# Patient Record
Sex: FEMALE | Race: WHITE | NOT HISPANIC OR LATINO
[De-identification: names, ages, dates, MRNs, and addresses within clinical notes are randomized per-mention and may not be internally consistent; named-entity substitution may affect disease eponyms.]

---

## 2018-01-12 ENCOUNTER — TRANSCRIPTION ENCOUNTER (OUTPATIENT)
Age: 78
End: 2018-01-12

## 2019-04-18 ENCOUNTER — INPATIENT (INPATIENT)
Facility: HOSPITAL | Age: 79
LOS: 5 days | Discharge: HOME | DRG: 871 | End: 2019-04-24
Attending: HOSPITALIST | Admitting: HOSPITALIST
Payer: COMMERCIAL

## 2019-04-18 VITALS — WEIGHT: 145.06 LBS | HEIGHT: 61 IN

## 2019-04-18 DIAGNOSIS — E87.6 HYPOKALEMIA: ICD-10-CM

## 2019-04-18 LAB
ALBUMIN SERPL ELPH-MCNC: 3.5 G/DL — SIGNIFICANT CHANGE UP (ref 3.3–5.2)
ALP SERPL-CCNC: 72 U/L — SIGNIFICANT CHANGE UP (ref 40–120)
ALT FLD-CCNC: 21 U/L — SIGNIFICANT CHANGE UP
ANION GAP SERPL CALC-SCNC: 15 MMOL/L — SIGNIFICANT CHANGE UP (ref 5–17)
APPEARANCE UR: CLEAR — SIGNIFICANT CHANGE UP
AST SERPL-CCNC: 16 U/L — SIGNIFICANT CHANGE UP
BACTERIA # UR AUTO: ABNORMAL
BASOPHILS # BLD AUTO: 0 K/UL — SIGNIFICANT CHANGE UP (ref 0–0.2)
BASOPHILS NFR BLD AUTO: 0.2 % — SIGNIFICANT CHANGE UP (ref 0–2)
BILIRUB SERPL-MCNC: 0.6 MG/DL — SIGNIFICANT CHANGE UP (ref 0.4–2)
BILIRUB UR-MCNC: NEGATIVE — SIGNIFICANT CHANGE UP
BUN SERPL-MCNC: 34 MG/DL — HIGH (ref 8–20)
CALCIUM SERPL-MCNC: 9.3 MG/DL — SIGNIFICANT CHANGE UP (ref 8.6–10.2)
CHLORIDE SERPL-SCNC: 96 MMOL/L — LOW (ref 98–107)
CO2 SERPL-SCNC: 28 MMOL/L — SIGNIFICANT CHANGE UP (ref 22–29)
COLOR SPEC: YELLOW — SIGNIFICANT CHANGE UP
CREAT SERPL-MCNC: 1.18 MG/DL — SIGNIFICANT CHANGE UP (ref 0.5–1.3)
DIFF PNL FLD: ABNORMAL
EOSINOPHIL # BLD AUTO: 0.1 K/UL — SIGNIFICANT CHANGE UP (ref 0–0.5)
EOSINOPHIL NFR BLD AUTO: 0.4 % — SIGNIFICANT CHANGE UP (ref 0–6)
EPI CELLS # UR: SIGNIFICANT CHANGE UP
GLUCOSE SERPL-MCNC: 123 MG/DL — HIGH (ref 70–115)
GLUCOSE UR QL: NEGATIVE MG/DL — SIGNIFICANT CHANGE UP
HCT VFR BLD CALC: 36.6 % — LOW (ref 37–47)
HGB BLD-MCNC: 12 G/DL — SIGNIFICANT CHANGE UP (ref 12–16)
KETONES UR-MCNC: NEGATIVE — SIGNIFICANT CHANGE UP
LEUKOCYTE ESTERASE UR-ACNC: ABNORMAL
LYMPHOCYTES # BLD AUTO: 1.1 K/UL — SIGNIFICANT CHANGE UP (ref 1–4.8)
LYMPHOCYTES # BLD AUTO: 7.1 % — LOW (ref 20–55)
MCHC RBC-ENTMCNC: 29.7 PG — SIGNIFICANT CHANGE UP (ref 27–31)
MCHC RBC-ENTMCNC: 32.8 G/DL — SIGNIFICANT CHANGE UP (ref 32–36)
MCV RBC AUTO: 90.6 FL — SIGNIFICANT CHANGE UP (ref 81–99)
MONOCYTES # BLD AUTO: 1.2 K/UL — HIGH (ref 0–0.8)
MONOCYTES NFR BLD AUTO: 7.6 % — SIGNIFICANT CHANGE UP (ref 3–10)
NEUTROPHILS # BLD AUTO: 13.2 K/UL — HIGH (ref 1.8–8)
NEUTROPHILS NFR BLD AUTO: 84.4 % — HIGH (ref 37–73)
NITRITE UR-MCNC: NEGATIVE — SIGNIFICANT CHANGE UP
PH UR: 6.5 — SIGNIFICANT CHANGE UP (ref 5–8)
PLATELET # BLD AUTO: 411 K/UL — HIGH (ref 150–400)
POTASSIUM SERPL-MCNC: 2.7 MMOL/L — CRITICAL LOW (ref 3.5–5.3)
POTASSIUM SERPL-SCNC: 2.7 MMOL/L — CRITICAL LOW (ref 3.5–5.3)
PROT SERPL-MCNC: 7.4 G/DL — SIGNIFICANT CHANGE UP (ref 6.6–8.7)
PROT UR-MCNC: 30 MG/DL
RBC # BLD: 4.04 M/UL — LOW (ref 4.4–5.2)
RBC # FLD: 15.2 % — SIGNIFICANT CHANGE UP (ref 11–15.6)
RBC CASTS # UR COMP ASSIST: ABNORMAL /HPF (ref 0–4)
SODIUM SERPL-SCNC: 139 MMOL/L — SIGNIFICANT CHANGE UP (ref 135–145)
SP GR SPEC: 1.01 — SIGNIFICANT CHANGE UP (ref 1.01–1.02)
UROBILINOGEN FLD QL: 4 MG/DL
WBC # BLD: 15.7 K/UL — HIGH (ref 4.8–10.8)
WBC # FLD AUTO: 15.7 K/UL — HIGH (ref 4.8–10.8)
WBC UR QL: ABNORMAL

## 2019-04-18 PROCEDURE — 99223 1ST HOSP IP/OBS HIGH 75: CPT | Mod: GC

## 2019-04-18 PROCEDURE — 93010 ELECTROCARDIOGRAM REPORT: CPT

## 2019-04-18 PROCEDURE — 71045 X-RAY EXAM CHEST 1 VIEW: CPT | Mod: 26

## 2019-04-18 PROCEDURE — 99285 EMERGENCY DEPT VISIT HI MDM: CPT

## 2019-04-18 RX ORDER — ATORVASTATIN CALCIUM 80 MG/1
20 TABLET, FILM COATED ORAL AT BEDTIME
Qty: 0 | Refills: 0 | Status: DISCONTINUED | OUTPATIENT
Start: 2019-04-18 | End: 2019-04-24

## 2019-04-18 RX ORDER — AMIODARONE HYDROCHLORIDE 400 MG/1
200 TABLET ORAL DAILY
Qty: 0 | Refills: 0 | Status: DISCONTINUED | OUTPATIENT
Start: 2019-04-18 | End: 2019-04-24

## 2019-04-18 RX ORDER — POTASSIUM CHLORIDE 20 MEQ
40 PACKET (EA) ORAL ONCE
Qty: 0 | Refills: 0 | Status: COMPLETED | OUTPATIENT
Start: 2019-04-18 | End: 2019-04-18

## 2019-04-18 RX ORDER — CEFTRIAXONE 500 MG/1
1 INJECTION, POWDER, FOR SOLUTION INTRAMUSCULAR; INTRAVENOUS EVERY 24 HOURS
Qty: 0 | Refills: 0 | Status: DISCONTINUED | OUTPATIENT
Start: 2019-04-18 | End: 2019-04-22

## 2019-04-18 RX ORDER — SODIUM CHLORIDE 9 MG/ML
1000 INJECTION, SOLUTION INTRAVENOUS
Qty: 0 | Refills: 0 | Status: DISCONTINUED | OUTPATIENT
Start: 2019-04-18 | End: 2019-04-19

## 2019-04-18 RX ORDER — AMLODIPINE BESYLATE 2.5 MG/1
5 TABLET ORAL DAILY
Qty: 0 | Refills: 0 | Status: DISCONTINUED | OUTPATIENT
Start: 2019-04-18 | End: 2019-04-24

## 2019-04-18 RX ORDER — SACCHAROMYCES BOULARDII 250 MG
250 POWDER IN PACKET (EA) ORAL
Qty: 0 | Refills: 0 | Status: DISCONTINUED | OUTPATIENT
Start: 2019-04-18 | End: 2019-04-24

## 2019-04-18 RX ORDER — APIXABAN 2.5 MG/1
5 TABLET, FILM COATED ORAL EVERY 12 HOURS
Qty: 0 | Refills: 0 | Status: DISCONTINUED | OUTPATIENT
Start: 2019-04-18 | End: 2019-04-24

## 2019-04-18 RX ORDER — POTASSIUM CHLORIDE 20 MEQ
20 PACKET (EA) ORAL
Qty: 0 | Refills: 0 | Status: DISCONTINUED | OUTPATIENT
Start: 2019-04-18 | End: 2019-04-18

## 2019-04-18 RX ORDER — LEVOTHYROXINE SODIUM 125 MCG
25 TABLET ORAL DAILY
Qty: 0 | Refills: 0 | Status: DISCONTINUED | OUTPATIENT
Start: 2019-04-18 | End: 2019-04-24

## 2019-04-18 RX ORDER — POTASSIUM CHLORIDE 20 MEQ
10 PACKET (EA) ORAL
Qty: 0 | Refills: 0 | Status: COMPLETED | OUTPATIENT
Start: 2019-04-18 | End: 2019-04-19

## 2019-04-18 RX ORDER — HEPARIN SODIUM 5000 [USP'U]/ML
5000 INJECTION INTRAVENOUS; SUBCUTANEOUS EVERY 8 HOURS
Qty: 0 | Refills: 0 | Status: DISCONTINUED | OUTPATIENT
Start: 2019-04-18 | End: 2019-04-18

## 2019-04-18 RX ORDER — ACETAMINOPHEN 500 MG
650 TABLET ORAL EVERY 6 HOURS
Qty: 0 | Refills: 0 | Status: DISCONTINUED | OUTPATIENT
Start: 2019-04-18 | End: 2019-04-24

## 2019-04-18 RX ADMIN — Medication 100 MILLIEQUIVALENT(S): at 19:41

## 2019-04-18 RX ADMIN — Medication 100 MILLIEQUIVALENT(S): at 22:52

## 2019-04-18 RX ADMIN — Medication 40 MILLIEQUIVALENT(S): at 19:41

## 2019-04-18 RX ADMIN — CEFTRIAXONE 100 GRAM(S): 500 INJECTION, POWDER, FOR SOLUTION INTRAMUSCULAR; INTRAVENOUS at 22:02

## 2019-04-18 NOTE — H&P ADULT - ASSESSMENT
77 yo female, PMH of afib, on eliquis, htn, hld, hypothyroid, sent from [facility] for medical eval for abnormal lab results, found to have acute metabolic encephalopathy secondary to hypokalemia and uti     Admit to medicine  monitored bed  vitals per routine  dash/tlc diet  ambulate as tolerated     Acute Metabolic Encephalopathy 79 yo female, PMH of afib, on eliquis, htn, hld, hypothyroid, sent from [facility] for medical eval for abnormal lab results, found to have acute metabolic encephalopathy secondary to hypokalemia and uti     Admit to medicine  monitored bed  vitals per routine  dash/tlc diet  ambulate as tolerated     Acute Metabolic Encephalopathy secondary to urinary tract infection  Patient symptomatic with increased frequency for last 2 weeks.   Leukocytosis noted   s/p rocephin 1g iv x1 in ER  c/w rocephin 1g iv daily with florastor  urine cultures in lab pending    Hypokalemia  noted in labs  s/p 40meq oral repletion in ER  s/p 3 10meg kriders in ER  Will repeat in AM labs to assess trend  Mag level ordered     Atrial Fibrillation  currently rate controlled  c/w home meds  Amiodarone 200mg po daily  Eliquis 5mg po bid 79 yo female, PMH of afib, on eliquis, htn, hld, hypothyroid, sent from [facility] for medical eval for abnormal lab results, found to have acute metabolic encephalopathy secondary to hypokalemia and uti     Admit to medicine  monitored bed  vitals per routine  dash/tlc diet  ambulate as tolerated     Acute Metabolic Encephalopathy secondary to urinary tract infection  Patient symptomatic with increased frequency for last 2 weeks.   Leukocytosis noted   s/p rocephin 1g iv x1 in ER  c/w rocephin 1g iv daily with florastor  urine cultures in lab pending    Hypokalemia  noted in labs  s/p 40meq oral repletion in ER  s/p 3 10meg kriders in ER  Will repeat in AM labs to assess trend  Mag level ordered     Atrial Fibrillation  currently rate controlled  c/w home meds  Amiodarone 200mg po daily  Eliquis 5mg po bid    HTN  currently stable  take 3 agents at home  c/w norvasc 5mg po daily  holding enalapril 20mg po daily and chlorthalidone 25 mg po daily given symptoms of urinary frequency along with ckd state  will monitor bp and adjust agents as needed     CKD stage 3   unknown prior baseline  avoid nephrotoxic agents   will monitor    Hypothyroidism  c/w home meds  levothyroxine 25mcg daily    HLD  c/w home meds  atorvastatin 20mg po qhs    Preventive Measure  DVT: on eliquis    Advanced Directives: DNR/DNI MOLST completed at bedside 77 yo female, PMH of afib, on eliquis, htn, hld, hypothyroid, sent from [facility] for medical eval for abnormal lab results, found to have acute metabolic encephalopathy secondary to hypokalemia and uti     Admit to medicine  monitored bed  vitals per routine  dash/tlc diet  ambulate as tolerated     Acute Metabolic Encephalopathy secondary to urinary tract infection  Patient symptomatic with increased frequency for last 2 weeks.   Leukocytosis noted   s/p rocephin 1g iv x1 in ER  c/w rocephin 1g iv daily with florastor  urine cultures in lab pending    Hypokalemia  noted in labs  s/p 40meq oral repletion in ER  s/p 3 10meg kriders in ER  Will repeat in AM labs to assess trend  Mag level ordered     Atrial Fibrillation  currently rate controlled  c/w home meds  Amiodarone 200mg po daily  Eliquis 5mg po bid    HTN  currently stable  take 3 agents at home  c/w norvasc 5mg po daily  holding enalapril 20mg po daily and chlorthalidone 25 mg po daily given symptoms of urinary frequency along with ckd state  will monitor bp and adjust agents as needed     CKD stage 3   unknown prior baseline  avoid nephrotoxic agents   will monitor    Hypothyroidism  c/w home meds  levothyroxine 25mcg daily    HLD  c/w home meds  atorvastatin 20mg po qhs    Preventive Measure  DVT: on eliquis    Advanced Directives: FULL CODE 77 yo female, PMH of afib, on eliquis, htn, hld, hypothyroid, sent from [facility] for medical eval for abnormal lab results, found to have acute metabolic encephalopathy secondary to hypokalemia and uti     Admit to medicine  monitored bed  vitals per routine  dash/tlc diet  ambulate as tolerated     Acute Metabolic Encephalopathy secondary to urinary tract infection with sepsis  Noted for fever, leukocytosis with source  Patient symptomatic with increased frequency for last 2 weeks.   Leukocytosis noted   s/p rocephin 1g iv x1 in ER  c/w rocephin 1g iv daily with florastor  urine cultures in lab pending  lactate ordered, will order fluids if elevated  ordering blood cultures x2    Hypokalemia  noted in labs  s/p 40meq oral repletion in ER  s/p 3 10meg kriders in ER  Will repeat in AM labs to assess trend  Mag level ordered     Umbilical Hernia, reducible   No active drainage from hernia      Atrial Fibrillation  currently rate controlled  c/w home meds  Amiodarone 200mg po daily  Eliquis 5mg po bid    HTN  currently stable  take 3 agents at home  c/w norvasc 5mg po daily  holding enalapril 20mg po daily and chlorthalidone 25 mg po daily given symptoms of urinary frequency along with ckd state  will monitor bp and adjust agents as needed     CKD stage 3   unknown prior baseline  avoid nephrotoxic agents   will monitor    Hypothyroidism  c/w home meds  levothyroxine 25mcg daily    HLD  c/w home meds  atorvastatin 20mg po qhs    Preventive Measure  DVT: on eliquis    Advanced Directives: FULL CODE 79 yo female, PMH of afib, on eliquis, htn, hld, hypothyroid, sent from [facility] for medical eval for abnormal lab results, found to have acute metabolic encephalopathy secondary to hypokalemia and uti     Admit to medicine  monitored bed  vitals per routine  dash/tlc diet  ambulate as tolerated     Acute Metabolic Encephalopathy secondary to urinary tract infection with sepsis  Noted for fever, leukocytosis with source  Patient symptomatic with increased frequency for last 2 weeks.   Leukocytosis noted   s/p rocephin 1g iv x1 in ER  c/w rocephin 1g iv daily with florastor  urine cultures in lab pending  lactate ordered, will order fluids if elevated  ordering blood cultures x2    Hypokalemia  noted in labs  s/p 40meq oral repletion in ER  s/p 3 10meg kriders in ER  Will repeat in AM labs to assess trend  Mag level ordered     Cellulitis of Umbilical Hernia   No active drainage from hernia  ordering Bactroban topical tid   Will monitor for improvement and if no change, will start systemic antibiotic     Atrial Fibrillation  currently rate controlled  c/w home meds  Amiodarone 200mg po daily  Eliquis 5mg po bid    HTN  currently stable  take 3 agents at home  c/w norvasc 5mg po daily  holding enalapril 20mg po daily and chlorthalidone 25 mg po daily given symptoms of urinary frequency along with ckd state  will monitor bp and adjust agents as needed     CKD stage 3   unknown prior baseline  avoid nephrotoxic agents   will monitor    Hypothyroidism  c/w home meds  levothyroxine 25mcg daily    HLD  c/w home meds  atorvastatin 20mg po qhs    Preventive Measure  DVT: on eliquis    Advanced Directives: FULL CODE

## 2019-04-18 NOTE — H&P ADULT - HISTORY OF PRESENT ILLNESS
77 yo female, PMH of Afib, Hypothyroid, HLD, HTN presenting 77 yo female, PMH of Afib, Hypothyroid, HLD, HTN presenting to [facility] send in from 77 yo female, PMH of Afib, Hypothyroid, HLD, HTN presenting to [facility] sent in from PMD office for evaluation. HPI derived from daughter and patient at bedside.     Per daughter at beside, patient has been having increased confusion and difficulty concentrating over last 2 weeks. Patient resides with this daughter. Patient states that she has felt as if she is "lost in herself." Does not have any specific recalling events that triggered this feeling off but per daughter. 79 yo female, PMH of Afib, Hypothyroid, HLD, HTN presenting to [facility] sent in from PMD office for evaluation. HPI derived from daughter and patient at bedside.     Per daughter at beside, patient has been having increased confusion and difficulty concentrating over last 2 weeks. Patient resides with this daughter. Patient states that she has felt as if she is "lost in herself." Does not have any specific recalling events that triggered this feeling off but per daughter. Over last week, patient states that she has been having urinary frequency but without any symptoms of dysuria. Denied any fevers, chills, chest pain, productive cough. Denied any falls or trauma at home. Went to PMD who evaluated patient and noted that umbilical hernia was concerning for infection and insisted that patient present to hospital for evaluation.     In ED: urine cx drawn, noted to have leukocytosis with positive UA and hypokalemia that was repleted with oral K and 3 k riders. rocephin x1 given in ER. Patient also noted to have fever in .4, blood cultures x2 ordered to be drawn.

## 2019-04-18 NOTE — ED STATDOCS - PROGRESS NOTE DETAILS
PA NOTE: PT evaluated by intake physician. HPI/PE/ROS as noted above. Will follow up plan per intake physician. Potassium level 2.7, po K, k rider and bedside monitor, awaiting urinalysis PA NOTE: Discussed need to admit with patient & discussed risk and benefits.  Patient agreed to admission.  Discussed case w/ admitting medicine doctor - agreed to admit to their service. Pt admitted for hypokalemia and UTI and confusion.

## 2019-04-18 NOTE — ED ADULT NURSE NOTE - OBJECTIVE STATEMENT
received pt AOx4 in supertrack, c/o new onset incontinence, sent by PMD for elevated WBC and + UTI, pt also has bulding umblilical hernia which pt states is draining, appears red around the edges and painful to touch, denies fevers, chills, n/v/d, resp even unlabored, in no distress, MAEx4, neuro intact. will continue to monitor

## 2019-04-18 NOTE — H&P ADULT - NSHPSOCIALHISTORY_GEN_ALL_CORE
Denies smoking, drinking or drug use  Lives with daughter  ambulates with rolling walker  able to complete most ADLs at home

## 2019-04-18 NOTE — H&P ADULT - NSHPLABSRESULTS_GEN_ALL_CORE
12.0   15.7  )-----------( 411      ( 2019 18:37 )             36.6     -18    139  |  96<L>  |  34.0<H>  ----------------------------<  123<H>  2.7<LL>   |  28.0  |  1.18    Ca    9.3      2019 18:37    TPro  7.4  /  Alb  3.5  /  TBili  0.6  /  DBili  x   /  AST  16  /  ALT  21  /  AlkPhos  72  -      Urinalysis Basic - ( 2019 19:24 )    Color: Yellow / Appearance: Clear / S.010 / pH: x  Gluc: x / Ketone: Negative  / Bili: Negative / Urobili: 4 mg/dL   Blood: x / Protein: 30 mg/dL / Nitrite: Negative   Leuk Esterase: Moderate / RBC: 11-25 /HPF / WBC 26-50   Sq Epi: x / Non Sq Epi: Occasional / Bacteria: Occasional

## 2019-04-18 NOTE — ED ADULT NURSE NOTE - NSIMPLEMENTINTERV_GEN_ALL_ED
Implemented All Fall with Harm Risk Interventions:  [place] to call system. Call bell, personal items and telephone within reach. Instruct patient to call for assistance. Room bathroom lighting operational. Non-slip footwear when patient is off stretcher. Physically safe environment: no spills, clutter or unnecessary equipment. Stretcher in lowest position, wheels locked, appropriate side rails in place. Provide visual cue, wrist band, yellow gown, etc. Monitor gait and stability. Monitor for mental status changes and reorient to person, place, and time. Review medications for side effects contributing to fall risk. Reinforce activity limits and safety measures with patient and family. Provide visual clues: red socks.

## 2019-04-18 NOTE — ED ADULT TRIAGE NOTE - CHIEF COMPLAINT QUOTE
"The doctor says I have in infection and to come in" Pt is unsure of where infection is arrives with paper from [facility], [place], [place] and [place] lab results WBC 16.4  Pt A & Ox4.

## 2019-04-18 NOTE — H&P ADULT - NSHPPHYSICALEXAM_GEN_ALL_CORE
VS:   Vital Signs Last 24 Hrs  T(C): 38 (18 Apr 2019 22:25), Max: 38 (18 Apr 2019 22:25)  T(F): 100.4 (18 Apr 2019 22:25), Max: 100.4 (18 Apr 2019 22:25)  HR: 71 (18 Apr 2019 22:25) (71 - 78)  BP: 136/71 (18 Apr 2019 22:25) (121/71 - 136/71)  BP(mean): --  RR: 18 (18 Apr 2019 22:25) (18 - 20)  SpO2: 96% (18 Apr 2019 22:25) (96% - 97%)    Physical Exam:   Gen: elderly female, laying in ER stretcher, NAD  HEENT: NCAT, EOMI, PERRLA, Pupils 6mm to 4mm. moist oral mucosa  CVS: RRR, +S1/S2, no murmurs, rubs or gallops appreciated  Pulm: CTAB, no wheeze, rales, rhonchi  GI: +BS, obese, soft, nondistended, mildly tender umbilical hernia, reducible, with mild erythema and warmth on left lateral border of hernia. No active drainage   Ext: No cyanosis, edema or calf tenderness. 2+ radial and dp pulses b/l.   Neuro: AAOx 2 to self and time. Motor 4/5 upper and lower ext. Sensation intact. Gait not assessed. No focal deficits.  Skin: mildly erythematous skin over umbilical hernia with tenderness. No active drainage. No other lesions or rashes noted.

## 2019-04-18 NOTE — ED STATDOCS - ATTENDING CONTRIBUTION TO CARE
I, Dr. Gonzalez, performed the initial face to face bedside interview with this patient regarding history of present illness, review of symptoms and relevant past medical, social and family history.  I completed an independent physical examination.  I was the initial provider who evaluated this patient. I have signed out the follow up of any pending tests (i.e. labs, radiological studies) to the ACP.  I have communicated the patient’s plan of care and disposition with the ACP.

## 2019-04-18 NOTE — ED STATDOCS - OBJECTIVE STATEMENT
73 y/o F pt with PMHx of HTN presents to the ED c/o elevated white count and UTI symptoms. Past 2 days she has been slightly short of breath, chills and coughing. Blood work was completed today, showing high white blood cell count. Pt is not having burning during urination, but is having incontinence, for the past few weeks, associated with not being able to control bowels (for 3 weeks). She has also been having short term memory issues and confusion. Pt has a hernia at the belly button and for the past two weeks discharge has become more mucus like, and thicker and has its normal erythematous color. Belly button was cleaned at 2pm by Dr. Whitaker, who believes it is infected. Denies dysuria, fever.   Dr. Whitaker

## 2019-04-18 NOTE — H&P ADULT - NSICDXPASTMEDICALHX_GEN_ALL_CORE_FT
PAST MEDICAL HISTORY:  Atrial fibrillation     HLD (hyperlipidemia)     HTN (hypertension)     Hypothyroid

## 2019-04-18 NOTE — H&P ADULT - NSHPREVIEWOFSYSTEMS_GEN_ALL_CORE
denied fevers, chills, chest pain, dyspnea, productive cough, n/v/d/c, leg swelling, dysuria, hematuria    +abdominal pain at umbilicus, urinary frequency.

## 2019-04-19 LAB
ANION GAP SERPL CALC-SCNC: 13 MMOL/L — SIGNIFICANT CHANGE UP (ref 5–17)
ANION GAP SERPL CALC-SCNC: 14 MMOL/L — SIGNIFICANT CHANGE UP (ref 5–17)
BASE EXCESS BLDV CALC-SCNC: 4 MMOL/L — HIGH (ref -2–2)
BASOPHILS # BLD AUTO: 0 K/UL — SIGNIFICANT CHANGE UP (ref 0–0.2)
BASOPHILS NFR BLD AUTO: 0.1 % — SIGNIFICANT CHANGE UP (ref 0–2)
BUN SERPL-MCNC: 24 MG/DL — HIGH (ref 8–20)
BUN SERPL-MCNC: 27 MG/DL — HIGH (ref 8–20)
CA-I SERPL-SCNC: 1.07 MMOL/L — LOW (ref 1.15–1.33)
CALCIUM SERPL-MCNC: 8.2 MG/DL — LOW (ref 8.6–10.2)
CALCIUM SERPL-MCNC: 8.5 MG/DL — LOW (ref 8.6–10.2)
CHLORIDE BLDV-SCNC: 103 MMOL/L — SIGNIFICANT CHANGE UP (ref 98–107)
CHLORIDE SERPL-SCNC: 100 MMOL/L — SIGNIFICANT CHANGE UP (ref 98–107)
CHLORIDE SERPL-SCNC: 101 MMOL/L — SIGNIFICANT CHANGE UP (ref 98–107)
CO2 SERPL-SCNC: 23 MMOL/L — SIGNIFICANT CHANGE UP (ref 22–29)
CO2 SERPL-SCNC: 25 MMOL/L — SIGNIFICANT CHANGE UP (ref 22–29)
CREAT SERPL-MCNC: 0.85 MG/DL — SIGNIFICANT CHANGE UP (ref 0.5–1.3)
CREAT SERPL-MCNC: 0.97 MG/DL — SIGNIFICANT CHANGE UP (ref 0.5–1.3)
EOSINOPHIL # BLD AUTO: 0.1 K/UL — SIGNIFICANT CHANGE UP (ref 0–0.5)
EOSINOPHIL NFR BLD AUTO: 0.5 % — SIGNIFICANT CHANGE UP (ref 0–6)
GAS PNL BLDV: 140 MMOL/L — SIGNIFICANT CHANGE UP (ref 135–145)
GAS PNL BLDV: SIGNIFICANT CHANGE UP
GAS PNL BLDV: SIGNIFICANT CHANGE UP
GLUCOSE BLDV-MCNC: 125 MG/DL — HIGH (ref 70–99)
GLUCOSE SERPL-MCNC: 117 MG/DL — HIGH (ref 70–115)
GLUCOSE SERPL-MCNC: 130 MG/DL — HIGH (ref 70–115)
HCO3 BLDV-SCNC: 28 MMOL/L — HIGH (ref 20–26)
HCT VFR BLD CALC: 32 % — LOW (ref 37–47)
HCT VFR BLDA CALC: 36 — LOW (ref 39–50)
HGB BLD CALC-MCNC: 11.8 G/DL — SIGNIFICANT CHANGE UP (ref 11.5–15.5)
HGB BLD-MCNC: 10.5 G/DL — LOW (ref 12–16)
LACTATE BLDV-MCNC: 1.2 MMOL/L — SIGNIFICANT CHANGE UP (ref 0.5–2)
LYMPHOCYTES # BLD AUTO: 1.2 K/UL — SIGNIFICANT CHANGE UP (ref 1–4.8)
LYMPHOCYTES # BLD AUTO: 8.5 % — LOW (ref 20–55)
MAGNESIUM SERPL-MCNC: 2 MG/DL — SIGNIFICANT CHANGE UP (ref 1.6–2.6)
MAGNESIUM SERPL-MCNC: 2.1 MG/DL — SIGNIFICANT CHANGE UP (ref 1.6–2.6)
MCHC RBC-ENTMCNC: 29.5 PG — SIGNIFICANT CHANGE UP (ref 27–31)
MCHC RBC-ENTMCNC: 32.8 G/DL — SIGNIFICANT CHANGE UP (ref 32–36)
MCV RBC AUTO: 89.9 FL — SIGNIFICANT CHANGE UP (ref 81–99)
MONOCYTES # BLD AUTO: 1.3 K/UL — HIGH (ref 0–0.8)
MONOCYTES NFR BLD AUTO: 9.4 % — SIGNIFICANT CHANGE UP (ref 3–10)
NEUTROPHILS # BLD AUTO: 11.1 K/UL — HIGH (ref 1.8–8)
NEUTROPHILS NFR BLD AUTO: 81.1 % — HIGH (ref 37–73)
OTHER CELLS CSF MANUAL: 15 ML/DL — LOW (ref 18–22)
PCO2 BLDV: 38 MMHG — SIGNIFICANT CHANGE UP (ref 35–50)
PH BLDV: 7.48 — HIGH (ref 7.32–7.43)
PHOSPHATE SERPL-MCNC: 2.4 MG/DL — SIGNIFICANT CHANGE UP (ref 2.4–4.7)
PLATELET # BLD AUTO: 331 K/UL — SIGNIFICANT CHANGE UP (ref 150–400)
PO2 BLDV: 69 MMHG — HIGH (ref 25–45)
POTASSIUM BLDV-SCNC: 3.2 MMOL/L — LOW (ref 3.4–4.5)
POTASSIUM SERPL-MCNC: 3.2 MMOL/L — LOW (ref 3.5–5.3)
POTASSIUM SERPL-MCNC: 3.4 MMOL/L — LOW (ref 3.5–5.3)
POTASSIUM SERPL-SCNC: 3.2 MMOL/L — LOW (ref 3.5–5.3)
POTASSIUM SERPL-SCNC: 3.4 MMOL/L — LOW (ref 3.5–5.3)
RBC # BLD: 3.56 M/UL — LOW (ref 4.4–5.2)
RBC # FLD: 15.1 % — SIGNIFICANT CHANGE UP (ref 11–15.6)
SAO2 % BLDV: 94 % — SIGNIFICANT CHANGE UP
SODIUM SERPL-SCNC: 137 MMOL/L — SIGNIFICANT CHANGE UP (ref 135–145)
SODIUM SERPL-SCNC: 139 MMOL/L — SIGNIFICANT CHANGE UP (ref 135–145)
WBC # BLD: 13.6 K/UL — HIGH (ref 4.8–10.8)
WBC # FLD AUTO: 13.6 K/UL — HIGH (ref 4.8–10.8)

## 2019-04-19 PROCEDURE — 99232 SBSQ HOSP IP/OBS MODERATE 35: CPT | Mod: GC

## 2019-04-19 RX ORDER — NYSTATIN CREAM 100000 [USP'U]/G
1 CREAM TOPICAL
Qty: 0 | Refills: 0 | Status: DISCONTINUED | OUTPATIENT
Start: 2019-04-19 | End: 2019-04-24

## 2019-04-19 RX ORDER — SODIUM CHLORIDE 9 MG/ML
1000 INJECTION, SOLUTION INTRAVENOUS
Qty: 0 | Refills: 0 | Status: DISCONTINUED | OUTPATIENT
Start: 2019-04-19 | End: 2019-04-20

## 2019-04-19 RX ORDER — MUPIROCIN 20 MG/G
1 OINTMENT TOPICAL THREE TIMES A DAY
Qty: 0 | Refills: 0 | Status: DISCONTINUED | OUTPATIENT
Start: 2019-04-19 | End: 2019-04-19

## 2019-04-19 RX ORDER — POTASSIUM CHLORIDE 20 MEQ
40 PACKET (EA) ORAL EVERY 4 HOURS
Qty: 0 | Refills: 0 | Status: COMPLETED | OUTPATIENT
Start: 2019-04-19 | End: 2019-04-19

## 2019-04-19 RX ADMIN — APIXABAN 5 MILLIGRAM(S): 2.5 TABLET, FILM COATED ORAL at 17:30

## 2019-04-19 RX ADMIN — SODIUM CHLORIDE 100 MILLILITER(S): 9 INJECTION, SOLUTION INTRAVENOUS at 11:14

## 2019-04-19 RX ADMIN — APIXABAN 5 MILLIGRAM(S): 2.5 TABLET, FILM COATED ORAL at 06:33

## 2019-04-19 RX ADMIN — Medication 25 MICROGRAM(S): at 06:33

## 2019-04-19 RX ADMIN — MUPIROCIN 1 APPLICATION(S): 20 OINTMENT TOPICAL at 06:31

## 2019-04-19 RX ADMIN — Medication 250 MILLIGRAM(S): at 17:30

## 2019-04-19 RX ADMIN — AMIODARONE HYDROCHLORIDE 200 MILLIGRAM(S): 400 TABLET ORAL at 06:33

## 2019-04-19 RX ADMIN — Medication 650 MILLIGRAM(S): at 02:21

## 2019-04-19 RX ADMIN — Medication 100 MILLIEQUIVALENT(S): at 00:00

## 2019-04-19 RX ADMIN — ATORVASTATIN CALCIUM 20 MILLIGRAM(S): 80 TABLET, FILM COATED ORAL at 22:42

## 2019-04-19 RX ADMIN — Medication 40 MILLIEQUIVALENT(S): at 11:14

## 2019-04-19 RX ADMIN — SODIUM CHLORIDE 100 MILLILITER(S): 9 INJECTION, SOLUTION INTRAVENOUS at 00:55

## 2019-04-19 RX ADMIN — Medication 250 MILLIGRAM(S): at 06:33

## 2019-04-19 RX ADMIN — NYSTATIN CREAM 1 APPLICATION(S): 100000 CREAM TOPICAL at 14:06

## 2019-04-19 RX ADMIN — Medication 40 MILLIEQUIVALENT(S): at 06:54

## 2019-04-19 RX ADMIN — CEFTRIAXONE 100 GRAM(S): 500 INJECTION, POWDER, FOR SOLUTION INTRAMUSCULAR; INTRAVENOUS at 19:53

## 2019-04-19 RX ADMIN — MUPIROCIN 1 APPLICATION(S): 20 OINTMENT TOPICAL at 13:21

## 2019-04-19 NOTE — PROVIDER CONTACT NOTE (OTHER) - SITUATION
pt temperature orally was 120.4 then rectally 103.2. /58 HR 78 SPO2 is 94%. pt temperature orally was 102.4 then rectally 103.2. /58 HR 78 SPO2 is 94%.

## 2019-04-19 NOTE — PROGRESS NOTE ADULT - ASSESSMENT
77 yo female, PMH of afib, on eliquis, htn, hld, hypothyroid, sent from [facility] for medical eval for abnormal lab results, found to have acute metabolic encephalopathy secondary to hypokalemia and uti       Acute Metabolic Encephalopathy secondary to urinary tract infection with sepsis  Noted for fever, leukocytosis with source  Patient symptomatic with increased frequency for last 2 weeks.   Leukocytosis noted   s/p rocephin 1g iv x1 in ER  c/w rocephin 1g iv daily with florastor  urine cultures in lab pending  lactate ordered, will order fluids if elevated  ordering blood cultures x2    Hypokalemia  improved  noted in labs  s/p 40meq oral repletion in ER  s/p 3 10meg kriders in ER  40meq kcl po x2 ordered  Mag level ordered     Cellulitis of Umbilical Hernia   No active drainage from hernia  ordering Bactroban topical tid   Will monitor for improvement and if no change, will start systemic antibiotic     Atrial Fibrillation  currently rate controlled  c/w home meds  Amiodarone 200mg po daily  Eliquis 5mg po bid    HTN  currently stable  take 3 agents at home  c/w norvasc 5mg po daily  holding enalapril 20mg po daily and chlorthalidone 25 mg po daily given symptoms of urinary frequency along with ckd state  will monitor bp and adjust agents as needed     CKD stage 3   unknown prior baseline  avoid nephrotoxic agents   will monitor    Hypothyroidism  c/w home meds  levothyroxine 25mcg daily    HLD  c/w home meds  atorvastatin 20mg po qhs    Preventive Measure  DVT: on eliquis    Advanced Directives: FULL CODE 79 yo female, PMH of afib, on eliquis, htn, hld, hypothyroid, sent from [facility] for medical eval for abnormal lab results, found to have acute metabolic encephalopathy secondary to hypokalemia and uti       Acute Toxic Metabolic Encephalopathy secondary to GNR urinary tract infection with sepsis  encephalopathy + sepsis resolved  c/w rocephin 1g iv daily with florastor, started 4/18, 5d course planned empirically unless resistant org noted on final UC, last day 4/22 of abx  fu final UC, < 100k cfu but likely because sample taken after abx, pt was symptomatic so warrants tx  fu blood cultures x2 4/19    Hypokalemia  persistent, mild, asymptomatic  replete + repeat  Mag level WNL    Umbilical Hernia fungal infection  nystatin topical bid started 4/19, at least 10d course, last day 4/28, longer if needed    Atrial Fibrillation  currently rate controlled  c/w home meds  Amiodarone 200mg po daily  Eliquis 5mg po bid    HTN  currently stable  take 3 agents at home  c/w norvasc 5mg po daily  holding enalapril 20mg po daily and chlorthalidone 25 mg po daily as not needed at this time  will monitor bp and adjust agents as needed, resume home meds as needed/tolerated    CKD stage 3   unknown prior baseline  avoid nephrotoxic agents   will monitor    Hypothyroidism  chronic, stable, uncomplicated  c/w home meds  levothyroxine 25mcg daily    HLD  chronic, stable, uncomplicated  c/w home meds  atorvastatin 20mg po qhs    Preventive Measure  DVT: on eliquis    Advanced Directives: FULL CODE    dispo: dc home when blood cx resulted if neg, also pending final UC first to ensure no resistant org, likely in 24-48hrs    outpt fu: pmd

## 2019-04-19 NOTE — PROGRESS NOTE ADULT - SUBJECTIVE AND OBJECTIVE BOX
CC: Patient is a 78y old  Female who presents with a chief complaint of hypokalemia and uti (18 Apr 2019 22:19)      SUBJECTIVE:  Patient seen and examined at bedside, No acute overnight events.  Patient ambulating, eating well, voiding and last BM yesterday.  Not on telemetry    Patient says that she feels a bit better. Still complains that she still feels confused. Still without any dysuria symptoms.     ROS: Denies fever, chills, chest pain, cough, SOB, abdominal pain, nausea, vomiting, diarrhea, constipation, calf pain.      OBJECTIVE:  VS:   Vital Signs Last 24 Hrs  T(C): 37.1 (19 Apr 2019 06:17), Max: 38.9 (19 Apr 2019 02:17)  T(F): 98.7 (19 Apr 2019 06:17), Max: 102 (19 Apr 2019 02:17)  HR: 66 (19 Apr 2019 03:54) (66 - 78)  BP: 104/51 (19 Apr 2019 03:54) (104/51 - 136/71)  BP(mean): --  RR: 18 (19 Apr 2019 03:54) (18 - 20)  SpO2: 94% (19 Apr 2019 03:54) (94% - 97%)    Physical Exam:   Gen: elderly female, laying in bed, NAD  HEENT: NCAT, EOMI, PERRLA moist oral mucosa  CVS: RRR, +S1/S2, no murmurs, rubs or gallops appreciated  Pulm: CTAB, no wheeze, rales, rhonchi  GI: +BS, obese, soft, nondistended, mildly tender umbilical hernia, reducible, with mild erythema and warmth on left lateral border of hernia. No active drainage   Ext: No cyanosis, edema or calf tenderness. 2+ radial and dp pulses b/l.   Neuro: AAOx 2 to self and time. No focal deficits.  Skin: mildly erythematous skin over umbilical hernia with tenderness. No active drainage. No other lesions or rashes noted.    Labs:                        12.0   15.7  )-----------( 411      ( 18 Apr 2019 18:37 )             36.6   04-19    137  |  100  |  27.0<H>  ----------------------------<  130<H>  3.4<L>   |  23.0  |  0.97    Ca    8.5<L>      19 Apr 2019 02:05  Mg     2.0     04-19    TPro  7.4  /  Alb  3.5  /  TBili  0.6  /  DBili  x   /  AST  16  /  ALT  21  /  AlkPhos  72  04-18        Radiology:  *Pull    Medications:  MEDICATIONS  (STANDING):  amiodarone    Tablet 200 milliGRAM(s) Oral daily  amLODIPine   Tablet 5 milliGRAM(s) Oral daily  apixaban 5 milliGRAM(s) Oral every 12 hours  atorvastatin 20 milliGRAM(s) Oral at bedtime  cefTRIAXone   IVPB 1 Gram(s) IV Intermittent every 24 hours  levothyroxine 25 MICROGram(s) Oral daily  multiple electrolytes Injection Type 1 1000 milliLiter(s) (100 mL/Hr) IV Continuous <Continuous>  mupirocin 2% Ointment 1 Application(s) Topical three times a day  potassium chloride    Tablet ER 40 milliEquivalent(s) Oral every 4 hours  saccharomyces boulardii 250 milliGRAM(s) Oral two times a day    MEDICATIONS  (PRN):  acetaminophen   Tablet .. 650 milliGRAM(s) Oral every 6 hours PRN Temp greater or equal to 38C (100.4F), Mild Pain (1 - 3)

## 2019-04-19 NOTE — PROVIDER CONTACT NOTE (OTHER) - ACTION/TREATMENT ORDERED:
Resident put in IVF Normosol @100mL/hr and Tylenol PO to be given. Lactate from 4/19 is resulted, ceftriaxone is being given for UTI, blood cultures are pending. continue to monitor and recheck temp

## 2019-04-20 LAB
-  AMIKACIN: SIGNIFICANT CHANGE UP
-  AMPICILLIN/SULBACTAM: SIGNIFICANT CHANGE UP
-  AMPICILLIN: SIGNIFICANT CHANGE UP
-  AZTREONAM: SIGNIFICANT CHANGE UP
-  CEFAZOLIN: SIGNIFICANT CHANGE UP
-  CEFEPIME: SIGNIFICANT CHANGE UP
-  CEFOXITIN: SIGNIFICANT CHANGE UP
-  CEFTRIAXONE: SIGNIFICANT CHANGE UP
-  CIPROFLOXACIN: SIGNIFICANT CHANGE UP
-  ERTAPENEM: SIGNIFICANT CHANGE UP
-  GENTAMICIN: SIGNIFICANT CHANGE UP
-  IMIPENEM: SIGNIFICANT CHANGE UP
-  LEVOFLOXACIN: SIGNIFICANT CHANGE UP
-  MEROPENEM: SIGNIFICANT CHANGE UP
-  NITROFURANTOIN: SIGNIFICANT CHANGE UP
-  PIPERACILLIN/TAZOBACTAM: SIGNIFICANT CHANGE UP
-  TIGECYCLINE: SIGNIFICANT CHANGE UP
-  TOBRAMYCIN: SIGNIFICANT CHANGE UP
-  TRIMETHOPRIM/SULFAMETHOXAZOLE: SIGNIFICANT CHANGE UP
ANION GAP SERPL CALC-SCNC: 12 MMOL/L — SIGNIFICANT CHANGE UP (ref 5–17)
BASOPHILS # BLD AUTO: 0 K/UL — SIGNIFICANT CHANGE UP (ref 0–0.2)
BASOPHILS NFR BLD AUTO: 0.1 % — SIGNIFICANT CHANGE UP (ref 0–2)
BUN SERPL-MCNC: 21 MG/DL — HIGH (ref 8–20)
CALCIUM SERPL-MCNC: 8.7 MG/DL — SIGNIFICANT CHANGE UP (ref 8.6–10.2)
CHLORIDE SERPL-SCNC: 104 MMOL/L — SIGNIFICANT CHANGE UP (ref 98–107)
CO2 SERPL-SCNC: 26 MMOL/L — SIGNIFICANT CHANGE UP (ref 22–29)
CREAT SERPL-MCNC: 0.89 MG/DL — SIGNIFICANT CHANGE UP (ref 0.5–1.3)
CULTURE RESULTS: SIGNIFICANT CHANGE UP
EOSINOPHIL # BLD AUTO: 0.1 K/UL — SIGNIFICANT CHANGE UP (ref 0–0.5)
EOSINOPHIL NFR BLD AUTO: 0.7 % — SIGNIFICANT CHANGE UP (ref 0–6)
GLUCOSE SERPL-MCNC: 110 MG/DL — SIGNIFICANT CHANGE UP (ref 70–115)
HCT VFR BLD CALC: 34.5 % — LOW (ref 37–47)
HGB BLD-MCNC: 11 G/DL — LOW (ref 12–16)
LYMPHOCYTES # BLD AUTO: 1.2 K/UL — SIGNIFICANT CHANGE UP (ref 1–4.8)
LYMPHOCYTES # BLD AUTO: 9 % — LOW (ref 20–55)
MAGNESIUM SERPL-MCNC: 2.2 MG/DL — SIGNIFICANT CHANGE UP (ref 1.6–2.6)
MCHC RBC-ENTMCNC: 29.2 PG — SIGNIFICANT CHANGE UP (ref 27–31)
MCHC RBC-ENTMCNC: 31.9 G/DL — LOW (ref 32–36)
MCV RBC AUTO: 91.5 FL — SIGNIFICANT CHANGE UP (ref 81–99)
METHOD TYPE: SIGNIFICANT CHANGE UP
MONOCYTES # BLD AUTO: 1.3 K/UL — HIGH (ref 0–0.8)
MONOCYTES NFR BLD AUTO: 9.9 % — SIGNIFICANT CHANGE UP (ref 3–10)
NEUTROPHILS # BLD AUTO: 10.8 K/UL — HIGH (ref 1.8–8)
NEUTROPHILS NFR BLD AUTO: 80 % — HIGH (ref 37–73)
NT-PROBNP SERPL-SCNC: 2450 PG/ML — HIGH (ref 0–300)
ORGANISM # SPEC MICROSCOPIC CNT: SIGNIFICANT CHANGE UP
ORGANISM # SPEC MICROSCOPIC CNT: SIGNIFICANT CHANGE UP
PHOSPHATE SERPL-MCNC: 2.7 MG/DL — SIGNIFICANT CHANGE UP (ref 2.4–4.7)
PLATELET # BLD AUTO: 324 K/UL — SIGNIFICANT CHANGE UP (ref 150–400)
POTASSIUM SERPL-MCNC: 3.5 MMOL/L — SIGNIFICANT CHANGE UP (ref 3.5–5.3)
POTASSIUM SERPL-SCNC: 3.5 MMOL/L — SIGNIFICANT CHANGE UP (ref 3.5–5.3)
RBC # BLD: 3.77 M/UL — LOW (ref 4.4–5.2)
RBC # FLD: 15.4 % — SIGNIFICANT CHANGE UP (ref 11–15.6)
SODIUM SERPL-SCNC: 142 MMOL/L — SIGNIFICANT CHANGE UP (ref 135–145)
SPECIMEN SOURCE: SIGNIFICANT CHANGE UP
WBC # BLD: 13.5 K/UL — HIGH (ref 4.8–10.8)
WBC # FLD AUTO: 13.5 K/UL — HIGH (ref 4.8–10.8)

## 2019-04-20 PROCEDURE — 99233 SBSQ HOSP IP/OBS HIGH 50: CPT | Mod: GC

## 2019-04-20 RX ORDER — FLUCONAZOLE 150 MG/1
150 TABLET ORAL ONCE
Qty: 0 | Refills: 0 | Status: COMPLETED | OUTPATIENT
Start: 2019-04-20 | End: 2019-04-20

## 2019-04-20 RX ORDER — NYSTATIN CREAM 100000 [USP'U]/G
1 CREAM TOPICAL ONCE
Qty: 0 | Refills: 0 | Status: COMPLETED | OUTPATIENT
Start: 2019-04-20 | End: 2019-04-20

## 2019-04-20 RX ORDER — SODIUM CHLORIDE 9 MG/ML
1000 INJECTION, SOLUTION INTRAVENOUS
Qty: 0 | Refills: 0 | Status: COMPLETED | OUTPATIENT
Start: 2019-04-20 | End: 2019-04-20

## 2019-04-20 RX ADMIN — AMLODIPINE BESYLATE 5 MILLIGRAM(S): 2.5 TABLET ORAL at 06:04

## 2019-04-20 RX ADMIN — SODIUM CHLORIDE 100 MILLILITER(S): 9 INJECTION, SOLUTION INTRAVENOUS at 05:48

## 2019-04-20 RX ADMIN — Medication 250 MILLIGRAM(S): at 17:23

## 2019-04-20 RX ADMIN — CEFTRIAXONE 100 GRAM(S): 500 INJECTION, POWDER, FOR SOLUTION INTRAMUSCULAR; INTRAVENOUS at 20:45

## 2019-04-20 RX ADMIN — APIXABAN 5 MILLIGRAM(S): 2.5 TABLET, FILM COATED ORAL at 17:23

## 2019-04-20 RX ADMIN — NYSTATIN CREAM 1 APPLICATION(S): 100000 CREAM TOPICAL at 12:28

## 2019-04-20 RX ADMIN — APIXABAN 5 MILLIGRAM(S): 2.5 TABLET, FILM COATED ORAL at 06:04

## 2019-04-20 RX ADMIN — AMIODARONE HYDROCHLORIDE 200 MILLIGRAM(S): 400 TABLET ORAL at 08:29

## 2019-04-20 RX ADMIN — NYSTATIN CREAM 1 APPLICATION(S): 100000 CREAM TOPICAL at 17:23

## 2019-04-20 RX ADMIN — NYSTATIN CREAM 1 APPLICATION(S): 100000 CREAM TOPICAL at 06:04

## 2019-04-20 RX ADMIN — ATORVASTATIN CALCIUM 20 MILLIGRAM(S): 80 TABLET, FILM COATED ORAL at 20:45

## 2019-04-20 RX ADMIN — Medication 250 MILLIGRAM(S): at 06:04

## 2019-04-20 RX ADMIN — FLUCONAZOLE 150 MILLIGRAM(S): 150 TABLET ORAL at 12:28

## 2019-04-20 RX ADMIN — Medication 25 MICROGRAM(S): at 06:04

## 2019-04-20 NOTE — PROGRESS NOTE ADULT - ASSESSMENT
77 yo female, PMH of afib, on eliquis, htn, hld, hypothyroid, sent from [facility] for medical eval for abnormal lab results, found to have acute metabolic encephalopathy secondary to hypokalemia and uti     Acute Toxic Metabolic Encephalopathy secondary to GNR urinary tract infection with sepsis  Encephalopathy + sepsis resolved now  C/w Rocephin 1g iv daily with florastor, started 4/18, 5d course planned empirically unless resistant org noted on final UC, last day 4/22 of abx  F/u final UC, < 100k cfu but likely because sample taken after abx, pt was symptomatic so warrants tx  F/u blood cultures x2 4/19    Hypokalemia  Resolved  Mag level WNL    Umbilical Hernia fungal infection  Nystatin topical bid started 4/19, at least 10d course, last day 4/28, longer if needed    Atrial Fibrillation  Currently rate controlled  C/w home meds  Amiodarone 200mg po daily  Eliquis 5mg po bid    HTN  Currently stable  Takes 3 agents at home  c/w norvasc 5mg po daily  Holding enalapril 20mg po daily and chlorthalidone 25 mg po daily as not needed at this time  Will monitor bp and adjust agents as needed, resume home meds as needed/tolerated    CKD stage 3   Unknown prior baseline  Avoid nephrotoxic agents   Will monitor    Hypothyroidism  chronic, stable, uncomplicated  c/w home meds  levothyroxine 25mcg daily    HLD  chronic, stable, uncomplicated  c/w home meds  atorvastatin 20mg po qhs    Preventive Measure  DVT: on eliquis    Advanced Directives: FULL CODE    dispo: dc home when blood cx resulted if neg, also pending final UC first to ensure no resistant org, likely in 24-48hrs    outpt fu: pmd Patient is a 79 y/o female with PMH of afib, on eliquis, htn, hld, hypothyroid, sent from [facility] for medical eval for abnormal lab results, found to have acute metabolic encephalopathy secondary to hypokalemia and uti. Patient started rocephin with mild improvement of symptoms.     Acute Toxic Metabolic Encephalopathy secondary to GNR urinary tract infection with sepsis  with leucocytosis trending down   Encephalopathy + sepsis resolved now  C/w Rocephin 1g iv daily with florastor, started 4/18, currently day #3, 7d course planned empirically unless resistant org noted on final UC  F/u final UC, < 100k cfu but likely because sample taken after abx, pt was symptomatic so warrants tx  F/u blood cultures x2 4/19    Hypokalemia  replete and follow up     Umbilical Hernia with fungal infection  Nystatin topical bid started 4/19, at least 10d course, last day 4/28, longer if needed  diflucan x 1    Atrial Fibrillation  Currently rate controlled  Amiodarone 200mg po daily  Eliquis 5mg po bid    HTN- well controlled  c/w norvasc 5mg po daily  Holding enalapril 20mg po daily and chlorthalidone 25 mg po daily as not needed at this time  Will monitor bp and adjust agents as needed, resume home meds as needed/tolerated    CKD stage 3   with elevated bnp   Unknown prior baseline  Avoid nephrotoxic agents   Will monitor, f/up echo    Hypothyroidism  chronic, stable, uncomplicated  levothyroxine 25mcg daily  f/up TSH due to use of amio     HLD  chronic, stable, uncomplicated  atorvastatin 20mg po qhs    Normocytic anemia - likely aocd, will monitor. No e/o acute bleeding noted    Preventive Measure  DVT: on eliquis    Advanced Directives: FULL CODE    dispo: PT evaluation pending. D/c to home in next 2-3 days pending PT and improvement in clinical status    outpt fu: pmd Dr Whitaker [phone] (will call with update on 4/22). Plan d/w daughter over the phone

## 2019-04-20 NOTE — PROGRESS NOTE ADULT - SUBJECTIVE AND OBJECTIVE BOX
Patient is a 78y old  Female who presents with a chief complaint of hypokalemia and uti (19 Apr 2019 07:47)    INTERVAL/OVERNIGHT EVENTS  Patient reports subjective improvement of her symptoms since admission but still reports some decreased sense of well being. She had one febrile spike overnight that has not recurred and not further complaints.    ROS: Denies chest pain, cough, SOB, abdominal pain, nausea, vomiting, diarrhea, constipation, calf pain    Vital Signs Last 24 Hrs  T(C): 37.2 (20 Apr 2019 09:00), Max: 39.6 (19 Apr 2019 23:53)  T(F): 99 (20 Apr 2019 09:00), Max: 103.2 (19 Apr 2019 23:53)  HR: 72 (20 Apr 2019 09:00) (56 - 75)  BP: 110/60 (20 Apr 2019 09:00) (110/60 - 130/60)  BP(mean): --  RR: 18 (20 Apr 2019 09:00) (18 - 20)  SpO2: 97% (20 Apr 2019 09:00) (94% - 97%)    Physical Exam:   Gen: elderly female, lying in bed in NAD  HEENT: NCAT, EOMI, PERRLA moist oral mucosa  CVS: RRR, +S1/S2, no murmurs, rubs or gallops appreciated  Pulm: CTAB, no wheeze, rales, rhonchi but mild bibasilar crackles  GI: +BS, obese, soft, nondistended, mildly tender umbilical hernia, reducible, with mild erythema and warmth on left lateral border of hernia and minimal whitish fluid.   Ext: No cyanosis, edema or calf tenderness.   Neuro: AAOx 2 to self and time. No focal deficits.  Skin: mildly erythematous skin over umbilical hernia with tenderness. No active drainage. No other lesions or rashes noted.                          11.0   13.5  )-----------( 324      ( 20 Apr 2019 07:28 )             34.5     04-20    142  |  104  |  21.0<H>  ----------------------------<  110  3.5   |  26.0  |  0.89    Ca    8.7      20 Apr 2019 07:28  Phos  2.7     04-20  Mg     2.2     04-20    TPro  7.4  /  Alb  3.5  /  TBili  0.6  /  DBili  x   /  AST  16  /  ALT  21  /  AlkPhos  72  04-18    MEDICATIONS  (STANDING):  amiodarone    Tablet 200 milliGRAM(s) Oral daily  amLODIPine   Tablet 5 milliGRAM(s) Oral daily  apixaban 5 milliGRAM(s) Oral every 12 hours  atorvastatin 20 milliGRAM(s) Oral at bedtime  cefTRIAXone   IVPB 1 Gram(s) IV Intermittent every 24 hours  levothyroxine 25 MICROGram(s) Oral daily  nystatin Ointment 1 Application(s) Topical two times a day  saccharomyces boulardii 250 milliGRAM(s) Oral two times a day    MEDICATIONS  (PRN):  acetaminophen   Tablet .. 650 milliGRAM(s) Oral every 6 hours PRN Temp greater or equal to 38C (100.4F), Mild Pain (1 - 3) Patient is a 78y old  Female who presents with a chief complaint of hypokalemia and uti (19 Apr 2019 07:47)    INTERVAL/OVERNIGHT EVENTS  Patient seen and examined at bedside. No acute distress and no acute overnight events. States that she is feeling weak but overall better. She had one febrile spike overnight that has not recurred and not further complaints.    ROS: Denies chest pain, cough, SOB, abdominal pain, nausea, vomiting, diarrhea, constipation, calf pain. All other ROS negative     Vital Signs Last 24 Hrs  T(C): 37.2 (20 Apr 2019 09:00), Max: 39.6 (19 Apr 2019 23:53) (Tmax 101.3)  T(F): 99 (20 Apr 2019 09:00), Max: 103.2 (19 Apr 2019 23:53)  HR: 72 (20 Apr 2019 09:00) (56 - 75)  BP: 110/60 (20 Apr 2019 09:00) (110/60 - 130/60)  RR: 18 (20 Apr 2019 09:00) (18 - 20)  SpO2: 97% (20 Apr 2019 09:00) (94% - 97%)    Physical Exam:   Gen: elderly female, lying in bed in NAD  HEENT: NCAT, EOMI, PERRLA moist oral mucosa  CVS: RRR, +S1/S2, no murmurs, rubs or gallops appreciated  Pulm: CTAB, no wheeze, rales, rhonchi but mild bibasilar crackles  GI: +BS, obese, soft, nondistended, mildly tender umbilical hernia, reducible, with mild erythema and warmth on left lateral border of hernia and minimal whitish fluid. No foul odor noted   Ext: No cyanosis, edema or calf tenderness.   Neuro: AAOx 2 to self and time. No focal deficits.  Skin: mildly erythematous skin over umbilical hernia with tenderness as above. b/l fungal rash under breats    LABS:                   11.0   13.5  )-----------( 324      ( 20 Apr 2019 07:28 )             34.5     04-20    142  |  104  |  21.0<H>  ----------------------------<  110  3.5   |  26.0  |  0.89    Ca    8.7      20 Apr 2019 07:28  Phos  2.7     04-20  Mg     2.2     04-20    TPro  7.4  /  Alb  3.5  /  TBili  0.6  /  DBili  x   /  AST  16  /  ALT  21  /  AlkPhos  72  04-18    Urine culture with Ecoli 50-99K pansensitive with blood cx pending in lab     MEDICATIONS  (STANDING):  amiodarone    Tablet 200 milliGRAM(s) Oral daily  amLODIPine   Tablet 5 milliGRAM(s) Oral daily  apixaban 5 milliGRAM(s) Oral every 12 hours  atorvastatin 20 milliGRAM(s) Oral at bedtime  cefTRIAXone   IVPB 1 Gram(s) IV Intermittent every 24 hours  levothyroxine 25 MICROGram(s) Oral daily  nystatin Ointment 1 Application(s) Topical two times a day  saccharomyces boulardii 250 milliGRAM(s) Oral two times a day    MEDICATIONS  (PRN):  acetaminophen   Tablet .. 650 milliGRAM(s) Oral every 6 hours PRN Temp greater or equal to 38C (100.4F), Mild Pain (1 - 3)

## 2019-04-21 ENCOUNTER — TRANSCRIPTION ENCOUNTER (OUTPATIENT)
Age: 79
End: 2019-04-21

## 2019-04-21 LAB
ANION GAP SERPL CALC-SCNC: 11 MMOL/L — SIGNIFICANT CHANGE UP (ref 5–17)
BASOPHILS # BLD AUTO: 0 K/UL — SIGNIFICANT CHANGE UP (ref 0–0.2)
BASOPHILS NFR BLD AUTO: 0.2 % — SIGNIFICANT CHANGE UP (ref 0–2)
BUN SERPL-MCNC: 18 MG/DL — SIGNIFICANT CHANGE UP (ref 8–20)
CALCIUM SERPL-MCNC: 8.9 MG/DL — SIGNIFICANT CHANGE UP (ref 8.6–10.2)
CHLORIDE SERPL-SCNC: 102 MMOL/L — SIGNIFICANT CHANGE UP (ref 98–107)
CO2 SERPL-SCNC: 27 MMOL/L — SIGNIFICANT CHANGE UP (ref 22–29)
CREAT SERPL-MCNC: 0.75 MG/DL — SIGNIFICANT CHANGE UP (ref 0.5–1.3)
EOSINOPHIL # BLD AUTO: 0.1 K/UL — SIGNIFICANT CHANGE UP (ref 0–0.5)
EOSINOPHIL NFR BLD AUTO: 0.8 % — SIGNIFICANT CHANGE UP (ref 0–6)
GLUCOSE SERPL-MCNC: 114 MG/DL — SIGNIFICANT CHANGE UP (ref 70–115)
HCT VFR BLD CALC: 34 % — LOW (ref 37–47)
HGB BLD-MCNC: 10.9 G/DL — LOW (ref 12–16)
LYMPHOCYTES # BLD AUTO: 1.2 K/UL — SIGNIFICANT CHANGE UP (ref 1–4.8)
LYMPHOCYTES # BLD AUTO: 9.4 % — LOW (ref 20–55)
MAGNESIUM SERPL-MCNC: 2 MG/DL — SIGNIFICANT CHANGE UP (ref 1.6–2.6)
MCHC RBC-ENTMCNC: 29 PG — SIGNIFICANT CHANGE UP (ref 27–31)
MCHC RBC-ENTMCNC: 32.1 G/DL — SIGNIFICANT CHANGE UP (ref 32–36)
MCV RBC AUTO: 90.4 FL — SIGNIFICANT CHANGE UP (ref 81–99)
MONOCYTES # BLD AUTO: 0.8 K/UL — SIGNIFICANT CHANGE UP (ref 0–0.8)
MONOCYTES NFR BLD AUTO: 6.2 % — SIGNIFICANT CHANGE UP (ref 3–10)
NEUTROPHILS # BLD AUTO: 10.9 K/UL — HIGH (ref 1.8–8)
NEUTROPHILS NFR BLD AUTO: 83 % — HIGH (ref 37–73)
PHOSPHATE SERPL-MCNC: 3.1 MG/DL — SIGNIFICANT CHANGE UP (ref 2.4–4.7)
PLATELET # BLD AUTO: 330 K/UL — SIGNIFICANT CHANGE UP (ref 150–400)
POTASSIUM SERPL-MCNC: 3.5 MMOL/L — SIGNIFICANT CHANGE UP (ref 3.5–5.3)
POTASSIUM SERPL-SCNC: 3.5 MMOL/L — SIGNIFICANT CHANGE UP (ref 3.5–5.3)
RBC # BLD: 3.76 M/UL — LOW (ref 4.4–5.2)
RBC # FLD: 15.1 % — SIGNIFICANT CHANGE UP (ref 11–15.6)
SODIUM SERPL-SCNC: 140 MMOL/L — SIGNIFICANT CHANGE UP (ref 135–145)
TROPONIN T SERPL-MCNC: <0.01 NG/ML — SIGNIFICANT CHANGE UP (ref 0–0.06)
TSH SERPL-MCNC: 3.47 UIU/ML — SIGNIFICANT CHANGE UP (ref 0.27–4.2)
WBC # BLD: 13.2 K/UL — HIGH (ref 4.8–10.8)
WBC # FLD AUTO: 13.2 K/UL — HIGH (ref 4.8–10.8)

## 2019-04-21 PROCEDURE — 93010 ELECTROCARDIOGRAM REPORT: CPT

## 2019-04-21 PROCEDURE — 99232 SBSQ HOSP IP/OBS MODERATE 35: CPT | Mod: GC

## 2019-04-21 RX ORDER — LIDOCAINE 4 G/100G
1 CREAM TOPICAL ONCE
Qty: 0 | Refills: 0 | Status: COMPLETED | OUTPATIENT
Start: 2019-04-21 | End: 2019-04-21

## 2019-04-21 RX ORDER — NYSTATIN CREAM 100000 [USP'U]/G
1 CREAM TOPICAL
Qty: 1 | Refills: 0
Start: 2019-04-21 | End: 2019-04-27

## 2019-04-21 RX ORDER — POTASSIUM CHLORIDE 20 MEQ
20 PACKET (EA) ORAL
Qty: 0 | Refills: 0 | Status: COMPLETED | OUTPATIENT
Start: 2019-04-21 | End: 2019-04-21

## 2019-04-21 RX ADMIN — Medication 20 MILLIEQUIVALENT(S): at 14:12

## 2019-04-21 RX ADMIN — ATORVASTATIN CALCIUM 20 MILLIGRAM(S): 80 TABLET, FILM COATED ORAL at 22:39

## 2019-04-21 RX ADMIN — LIDOCAINE 1 PATCH: 4 CREAM TOPICAL at 07:11

## 2019-04-21 RX ADMIN — APIXABAN 5 MILLIGRAM(S): 2.5 TABLET, FILM COATED ORAL at 18:01

## 2019-04-21 RX ADMIN — AMIODARONE HYDROCHLORIDE 200 MILLIGRAM(S): 400 TABLET ORAL at 06:26

## 2019-04-21 RX ADMIN — NYSTATIN CREAM 1 APPLICATION(S): 100000 CREAM TOPICAL at 05:46

## 2019-04-21 RX ADMIN — Medication 250 MILLIGRAM(S): at 18:01

## 2019-04-21 RX ADMIN — LIDOCAINE 1 PATCH: 4 CREAM TOPICAL at 06:37

## 2019-04-21 RX ADMIN — AMLODIPINE BESYLATE 5 MILLIGRAM(S): 2.5 TABLET ORAL at 06:26

## 2019-04-21 RX ADMIN — Medication 20 MILLIEQUIVALENT(S): at 11:42

## 2019-04-21 RX ADMIN — Medication 25 MICROGRAM(S): at 06:26

## 2019-04-21 RX ADMIN — Medication 250 MILLIGRAM(S): at 06:26

## 2019-04-21 RX ADMIN — APIXABAN 5 MILLIGRAM(S): 2.5 TABLET, FILM COATED ORAL at 06:26

## 2019-04-21 RX ADMIN — NYSTATIN CREAM 1 APPLICATION(S): 100000 CREAM TOPICAL at 18:01

## 2019-04-21 RX ADMIN — CEFTRIAXONE 100 GRAM(S): 500 INJECTION, POWDER, FOR SOLUTION INTRAMUSCULAR; INTRAVENOUS at 22:39

## 2019-04-21 NOTE — PROVIDER CONTACT NOTE (OTHER) - BACKGROUND
pt admitted from PMD office due to hypokalemia and positive UTI. pt has history atrial fibrillation, hypertension, hypothyroid, and hyperlipidemia.

## 2019-04-21 NOTE — PROVIDER CONTACT NOTE (OTHER) - ASSESSMENT
pt denies SOB; VSS. denies radiation of pain. pt describes pain as a dull but constant 6/10 pain under right breast. pt said onset was about 3-4 am but went away for short time and she fell back asleep but then now it is constant.

## 2019-04-21 NOTE — PROVIDER CONTACT NOTE (OTHER) - SITUATION
pt complaint of chest pain under right breast; states it feels like someone is sitting on her chest. 6/10 pain scale. denies numbness or weakness in hands.

## 2019-04-21 NOTE — PROGRESS NOTE ADULT - ASSESSMENT
Patient is a 77 y/o female with PMH of afib, on eliquis, htn, hld, hypothyroid, sent [facility] for medical eval for abnormal lab results, found to have acute metabolic encephalopathy secondary to hypokalemia and uti. Patient started rocephin with mild improvement of symptoms.     Acute Toxic Metabolic Encephalopathy secondary to GNR urinary tract infection with sepsis  with leucocytosis trending down   - Encephalopathy + sepsis RESOLVED  - C/w Rocephin 1g iv daily with florastor, started 4/18, currently day #4, 7d course planned empirically   - E.coli on Urine culture, sensitive to ceftriaxone.  - blood cultures negative x2 4/19    Hypokalemia  - Corrected.   - As patient is on amiodarone will replete until K at 4.    Umbilical Hernia with fungal infection  - Skin erythema is improved.   - c/w Nystatin topical bid started 4/19, at least 10d course, last day 4/28, longer if needed  diflucan x 1    Atrial Fibrillation  - Currently rate controlled  - c/w Amiodarone 200mg po daily  - c/w Eliquis 5mg po bid    HTN- well controlled  - BP has been well controlled with norvasc 5mg po daily, will continue.   - C/w Holding enalapril 20mg po daily and chlorthalidone 25 mg po daily as not needed at this time, will resume home meds as needed/tolerated    WILDER   - Resolved.   - Unknown prior baseline  - Avoid nephrotoxic agents       Moderate Aortic Stenosis  - Systolic murmur n aortic focus  - TTE confirmed    Hypothyroidism  chronic, stable, uncomplicated  levothyroxine 25mcg daily  f/up TSH due to use of amio     HLD  chronic, stable, uncomplicated  atorvastatin 20mg po qhs    Normocytic anemia - likely aocd, will monitor. No e/o acute bleeding noted    Preventive Measure  DVT: on eliquis    Advanced Directives: FULL CODE    dispo: PT evaluation pending. D/c to home in next 2-3 days pending PT and improvement in clinical status    outpt fu: pmd Dr Whitaker [phone] (will call with update on 4/22). Plan d/w daughter over the phone Patient is a 79 y/o female with PMH of afib, on eliquis, htn, hld, hypothyroid, sent from [facility] for medical eval for abnormal lab results, found to have acute metabolic encephalopathy secondary to hypokalemia and uti. Patient started rocephin with mild improvement of symptoms.     Acute Toxic Metabolic Encephalopathy secondary to GNR urinary tract infection with sepsis  with leucocytosis trending down   - Encephalopathy + sepsis RESOLVED  - C/w Rocephin 1g iv daily with florastor, started 4/18, currently day #4, 7d course planned empirically.  - E.coli on Urine culture, sensitive to ceftriaxone.  - Pending PT eval for disposition planning   - blood cultures negative x2 4/19    Hypokalemia  - Corrected.   - As patient is on amiodarone will replete until K at 4.    Umbilical Hernia with fungal infection  - Skin erythema is improved.   - c/w Nystatin topical bid started 4/19, at least 10d course, last day 4/28, longer if needed  diflucan x 1    Pleuritic chest pain  - Localized stabbing- like pain on right chest, exacerbating with deep inspiration.  - EKG w/o change from admission no ST-T changes. Troponin negative x1  - Probably secondary to reduce mobility out of bed.  - c/w Lidocaine patch qd PRN and incentive spirometry.     Atrial Fibrillation  - Currently rate controlled  - c/w Amiodarone 200mg po daily  - c/w Eliquis 5mg po bid    HTN- well controlled  - BP has been well controlled with norvasc 5mg po daily, will continue.   - C/w Holding enalapril 20mg po daily and chlorthalidone 25 mg po daily as not needed at this time, will resume home meds as needed/tolerated    WILDER   - Resolved.   - Unknown prior baseline  - Avoid nephrotoxic agents       Moderate Aortic Stenosis  - Systolic murmur n aortic focus  - TTE confirmed    Hypothyroidism  chronic, stable, uncomplicated  levothyroxine 25mcg daily  f/up TSH due to use of amio     HLD  chronic, stable, uncomplicated  atorvastatin 20mg po qhs    Normocytic anemia - likely aocd, will monitor. No e/o acute bleeding noted    Preventive Measure  DVT: on eliquis    Advanced Directives: FULL CODE    Dispo: D/c to home in next 24-48 hr pending PT evaluation and disposition planning.     Outpt fu: pmd Dr Whitaker [phone] (will call with update on 4/22). Plan d/w daughter over the phone Patient is a 79 y/o female with PMH of afib, on eliquis, htn, hld, hypothyroid, sent from [facility] for medical eval for abnormal lab results, found to have acute metabolic encephalopathy secondary to hypokalemia and uti. Patient started rocephin with improvement of symptoms.     Acute Toxic Metabolic Encephalopathy secondary to GNR urinary tract infection with sepsis  with leucocytosis trending down   - Encephalopathy + sepsis RESOLVED  - C/w Rocephin 1g iv daily with florastor, started 4/18, currently day #4, 7d course planned empirically.  - E.coli on Urine culture, sensitive to ceftriaxone.  - Pending PT eval for disposition planning   - blood cultures negative x2 4/19    Pleuritic chest pain  - Localized stabbing- like pain on right chest, exacerbating with deep inspiration.  - EKG w/o change from admission no ST-T changes. Troponin negative x1  - Probably secondary to reduce mobility out of bed.  - c/w Lidocaine patch qd PRN and incentive spirometry.     Hypokalemia  - Corrected.   - As patient is on amiodarone will replete until K at 4.    Umbilical Hernia with fungal infection  - Skin erythema is improved.   - c/w Nystatin topical bid started 4/19, at least 10d course, last day 4/28, longer if needed  diflucan x 1    Atrial Fibrillation  - Currently rate controlled  - c/w Amiodarone 200mg po daily  - c/w Eliquis 5mg po bid    HTN- well controlled  - BP has been well controlled with norvasc 5mg po daily, will continue.   - C/w Holding enalapril 20mg po daily and chlorthalidone 25 mg po daily as not needed at this time, will resume home meds as needed/tolerated    WILDER   - Resolved.   - Unknown prior baseline  - Avoid nephrotoxic agents     Moderate Aortic Stenosis  - Systolic murmur in aortic focus  - TTE confirmed    Hypothyroidism  chronic, stable, uncomplicated  levothyroxine 25mcg daily  f/up TSH noted due to use of amio     HLD  chronic, stable, uncomplicated  atorvastatin 20mg po qhs    Normocytic anemia - likely aocd, will monitor. No e/o acute bleeding noted    Preventive Measure  DVT: on eliquis    Advanced Directives: FULL CODE    Dispo: D/c to home on 4/22 pending PT evaluation and disposition planning.     Outpt fu: pmd Dr Whitaker [phone] (will call with update on 4/22). Plan d/w daughter over the phone

## 2019-04-21 NOTE — DISCHARGE NOTE PROVIDER - CARE PROVIDER_API CALL
Kerrie Whitaker MD  Address: [address] [place] [address]  Phone: [phone]  Phone: (   )    -  Fax: (   )    -  Follow Up Time:

## 2019-04-21 NOTE — PHYSICAL THERAPY INITIAL EVALUATION ADULT - GAIT PATTERN USED, PT EVAL
decreased gait velocity and activity tolerance, decreased upright posture, incidental standing rest breaks due to fatigue

## 2019-04-21 NOTE — PHYSICAL THERAPY INITIAL EVALUATION ADULT - CRITERIA FOR SKILLED THERAPEUTIC INTERVENTIONS
impairments found/predicted duration of therapy intervention/functional limitations in following categories/therapy frequency/anticipated discharge recommendation/rehab potential

## 2019-04-21 NOTE — PHYSICAL THERAPY INITIAL EVALUATION ADULT - ADDITIONAL COMMENTS
owns and uses a RW, 3 steps 1 rail to enter home, at this time does not need to negotiate stairs within the home

## 2019-04-21 NOTE — PHYSICAL THERAPY INITIAL EVALUATION ADULT - PERTINENT HX OF CURRENT PROBLEM, REHAB EVAL
pt presents to [facility] due to UTI, chest pain, acute metabolic encephalopathy, hypokalemia

## 2019-04-21 NOTE — DISCHARGE NOTE PROVIDER - NSDCCPTREATMENT_GEN_ALL_CORE_FT
PRINCIPAL PROCEDURE  Procedure: XR chest, 1 view  Findings and Treatment:       SECONDARY PROCEDURE  Procedure: TTE (transthoracic echocardiography)  Findings and Treatment:

## 2019-04-21 NOTE — DISCHARGE NOTE PROVIDER - PROVIDER TOKENS
FREE:[LAST:[Sherman],FIRST:[MD Kerrie],PHONE:[(   )    -],FAX:[(   )    -],ADDRESS:[Address: [address] [place] [address]  Phone: [phone]]]

## 2019-04-21 NOTE — DISCHARGE NOTE PROVIDER - NSDCFUADDAPPT_GEN_ALL_CORE_FT
Please see Dr Kerrie Whitaker in office in 2-3 days after discharge (up to one week maximum)   Please make an appointment with a dermatologist to look at the umbilical discoloration as an outpatient Please see Dr Kerrie Whitaker in office in 2-3 days after discharge (up to one week maximum)   Please make an appointment with a dermatologist to look at the umbilical discoloration as an outpatient. We have cleaned the area and placed absorptive gauze in the crease and have not noted any further discharge. It is important during showers that you clean this area and pat dry

## 2019-04-21 NOTE — DISCHARGE NOTE PROVIDER - NSDCCPCAREPLAN_GEN_ALL_CORE_FT
PRINCIPAL DISCHARGE DIAGNOSIS  Diagnosis: Acute metabolic encephalopathy  Assessment and Plan of Treatment:       SECONDARY DISCHARGE DIAGNOSES  Diagnosis: Hyperlipidemia  Assessment and Plan of Treatment:     Diagnosis: Hypothyroidism  Assessment and Plan of Treatment:     Diagnosis: Atrial fibrillation, controlled  Assessment and Plan of Treatment:     Diagnosis: Hypokalemia  Assessment and Plan of Treatment:     Diagnosis: Acute kidney injury  Assessment and Plan of Treatment:     Diagnosis: Moderate aortic stenosis  Assessment and Plan of Treatment:     Diagnosis: Pleuritic chest pain  Assessment and Plan of Treatment:     Diagnosis: Sepsis secondary to UTI  Assessment and Plan of Treatment: PRINCIPAL DISCHARGE DIAGNOSIS  Diagnosis: Acute metabolic encephalopathy  Assessment and Plan of Treatment: You were found to have a UTI during your hospital stay leading to changes in your mental status. You were treated with IV antibiotics for a total of 6 days and will go home with 1 more day of oral antibiotics.      SECONDARY DISCHARGE DIAGNOSES  Diagnosis: Hyperlipidemia  Assessment and Plan of Treatment: Continue with your home medication, and continue with your regularly scheduled appointments with your family doctor.    Diagnosis: Hypothyroidism  Assessment and Plan of Treatment: Stable. Continue with your home medications.    Diagnosis: Atrial fibrillation, controlled  Assessment and Plan of Treatment: Stable. Please continue to follow up with your cardiologist and primary care doctor, and take your medications as prescribed.    Diagnosis: Acute kidney injury  Assessment and Plan of Treatment: You had a slight elevation of your kidney numbers on admission to the hospital, you were given IV fluids and the numbers were monitored. The creatinine and BUN which are the makers we use for kidney function returned back to normal. PRINCIPAL DISCHARGE DIAGNOSIS  Diagnosis: Acute metabolic encephalopathy  Assessment and Plan of Treatment: You were found to have a UTI during your hospital stay leading to changes in your mental status. You were treated with IV antibiotics (vanco and zosyn for one day, then rocephin for a few days) for a total of 6 days and will go home with 1 more day of oral antibiotics.  Labs as below:             11.3   9.8   )-----------( 392      ( 22 Apr 2019 06:52 )             35.1   04-22  141  |  104  |  21.0<H>  ----------------------------<  98  4.0   |  26.0  |  0.79  Ca    9.4      22 Apr 2019 06:52  Phos  3.2     04-22  Mg     2.2     04-22  < from: TTE Echo Complete w/Doppler (04.21.19 @ 09:02) >  Summary:   1. Normal left ventricular internal cavity size.   2. Normal global left ventricular systolic function.   3. Left ventricular ejection fraction, by visual estimation, is 50 to   55%.   4. Spectral Doppler shows impaired relaxation pattern of left   ventricular myocardial filling (Grade I diastolic dysfunction).   5. Peak transaortic gradient equals 29.2 mmHg, mean transaortic gradient   equals 14.0 mmHg, the calculated aortic valve area equals 1.23 cm² by the   continuity equation consistent with moderate aortic stenosis.   6. Mild mitral annular calcification.  < end of copied text >      SECONDARY DISCHARGE DIAGNOSES  Diagnosis: Acute kidney injury  Assessment and Plan of Treatment: You had a slight elevation of your kidney numbers on admission to the hospital, you were given IV fluids and the numbers were monitored. The creatinine and BUN which are the makers we use for kidney function returned back to normal.    Diagnosis: Hyperlipidemia  Assessment and Plan of Treatment: Continue with your home medication, and continue with your regularly scheduled appointments with your family doctor.    Diagnosis: Hypothyroidism  Assessment and Plan of Treatment: Stable. Continue with your home medications.    Diagnosis: Atrial fibrillation, controlled  Assessment and Plan of Treatment: Stable. Please continue to follow up with your cardiologist and primary care doctor, and take your medications as prescribed.

## 2019-04-21 NOTE — DISCHARGE NOTE PROVIDER - NSDCHHHOMEBOUNDOTHER_GEN_ALL_CORE_FT
not bed bount, requires home PT to return to baseline. not bed bound, requires home PT to return to baseline.

## 2019-04-21 NOTE — PROGRESS NOTE ADULT - SUBJECTIVE AND OBJECTIVE BOX
78y Female,   Patient is a 78y old  Female who presents with a chief complaint of hypokalemia and uti (20 Apr 2019 12:32)      INTERVAL/OVERNIGHT EVENTS:    Patient examined at beside. Patient presented stabbing-like chest pain localized and limited to the 5th intercostal space directly below the breast. Patient states this pain is 6/10 intensity, non radiating, exacerbates with deep inspiration and to the touch.  Repeat EKG w/o changes compared to admission, Troponin negative x1. Patient was given lidocaine patch with improvement of symptoms.     Patient states she has not been walking since admission. Tolerates PO w/o nausea, vomiting, diarrhea. Patient states she has been using the incentive spirometer regularly.   Patient denies calf pain, abdominal pain, headaches, fever, chills, dysuria, hematuria, diarrhea, constipation, SOB, palpitations.   Rest of ROS not contributory except for above.      VITALS  T(C): 37 (04-21-19 @ 05:32), Max: 37.2 (04-20-19 @ 09:00)  HR: 60 (04-21-19 @ 05:32) (57 - 76)  BP: 128/54 (04-21-19 @ 05:32) (110/60 - 138/65)  RR: 20 (04-21-19 @ 05:32) (18 - 20)  SpO2: 94% (04-21-19 @ 05:32) (94% - 99%)      PHYSICAL EXAM:  Physical Exam:   Gen: elderly female, lying in bed in NAD  HEENT: NCAT, EOMI, PERRLA moist oral mucosa  CVS: RRR, +S1/S2, no murmurs, rubs or gallops appreciated  Pulm: Tenderness on palapation of right chest. Lungs are clear to auscultation.   GI: +BS, obese, soft, nondistended, mildly tender umbilical hernia, reducible, with mild erythema and warmth on left lateral border of hernia and minimal whitish fluid. No foul odor noted   Ext: No cyanosis, edema or calf tenderness.   Neuro: AAOx 2 to self and time. No focal deficits.  Skin: mildly erythematous skin over umbilical hernia improving.  b/l fungal rash under breats        LABS                     10.9   13.2  )-----------( 330      ( 21 Apr 2019 06:01 )             34.0       04-21    140  |  102  |  18.0  ----------------------------<  114  3.5   |  27.0  |  0.75    Ca    8.9      21 Apr 2019 06:01  Phos  3.1     04-21  Mg     2.0     04-21        CARDIAC MARKERS ( 21 Apr 2019 06:01 )  x     / <0.01 ng/mL / x     / x     / x          Culture - Blood (collected 19 Apr 2019 02:07)  Source: .Blood  Preliminary Report (21 Apr 2019 03:00):    No growth at 48 hours    Culture - Blood (collected 19 Apr 2019 02:06)  Source: .Blood  Preliminary Report (21 Apr 2019 03:00):    No growth at 48 hours    Culture - Urine (collected 18 Apr 2019 19:24)  Source: .Urine  Final Report (20 Apr 2019 12:00):    50,000 - 99,000 CFU/mL Escherichia coli    <10,000 CFU/ml Gram Positive Cocci in Clusters  Organism: Escherichia coli (20 Apr 2019 12:00)  Organism: Escherichia coli (20 Apr 2019 12:00)      MEDICATIONS  (STANDING):  amiodarone    Tablet 200 milliGRAM(s) Oral daily  amLODIPine   Tablet 5 milliGRAM(s) Oral daily  apixaban 5 milliGRAM(s) Oral every 12 hours  atorvastatin 20 milliGRAM(s) Oral at bedtime  cefTRIAXone   IVPB 1 Gram(s) IV Intermittent every 24 hours  levothyroxine 25 MICROGram(s) Oral daily  nystatin Ointment 1 Application(s) Topical two times a day  saccharomyces boulardii 250 milliGRAM(s) Oral two times a day    MEDICATIONS  (PRN):  acetaminophen   Tablet .. 650 milliGRAM(s) Oral every 6 hours PRN Temp greater or equal to 38C (100.4F), Mild Pain (1 - 3) 78y Female,   Patient is a 78y old  Female who presents with a chief complaint of hypokalemia and uti (20 Apr 2019 12:32)      INTERVAL/OVERNIGHT EVENTS:    Patient examined at beside. Patient presented stabbing-like chest pain localized and limited to the 5th intercostal space directly below the breast. Patient states this pain is 6/10 intensity, non radiating, exacerbates with deep inspiration and to the touch.  Repeat EKG w/o changes compared to admission, Troponin negative x1. Patient was given lidocaine patch with improvement of symptoms.     Patient states she has not been walking since admission. Tolerates PO w/o nausea, vomiting, diarrhea. Patient states she has been using the incentive spirometer regularly.   Patient denies calf pain, abdominal pain, headaches, fever, chills, dysuria, hematuria, diarrhea, constipation, SOB, palpitations.   Rest of ROS not contributory except for above.      VITALS  T(C): 37 (04-21-19 @ 05:32), Max: 37.2 (04-20-19 @ 09:00)  HR: 60 (04-21-19 @ 05:32) (57 - 76)  BP: 128/54 (04-21-19 @ 05:32) (110/60 - 138/65)  RR: 20 (04-21-19 @ 05:32) (18 - 20)  SpO2: 94% (04-21-19 @ 05:32) (94% - 99%)      PHYSICAL EXAM:  Physical Exam:   Gen: elderly female, lying in bed in NAD  HEENT: NCAT, EOMI, PERRLA moist oral mucosa  CVS: RRR, +S1/S2, Systolic murmur III/VI on aortic focus.   Pulm: Tenderness on palpation of right chest. Lungs are clear to auscultation.   GI: +BS, obese, soft, nondistended, mildly tender umbilical hernia, reducible, with mild erythema and warmth on left lateral border of hernia and minimal whitish fluid. No foul odor noted   Ext: No cyanosis, edema or calf tenderness.   Neuro: AAOx 2 to self and time. No focal deficits.  Skin: mildly erythematous skin over umbilical hernia improving.  b/l fungal rash under breats        LABS                     10.9   13.2  )-----------( 330      ( 21 Apr 2019 06:01 )             34.0       04-21    140  |  102  |  18.0  ----------------------------<  114  3.5   |  27.0  |  0.75    Ca    8.9      21 Apr 2019 06:01  Phos  3.1     04-21  Mg     2.0     04-21        CARDIAC MARKERS ( 21 Apr 2019 06:01 )  x     / <0.01 ng/mL / x     / x     / x          Culture - Blood (collected 19 Apr 2019 02:07)  Source: .Blood  Preliminary Report (21 Apr 2019 03:00):    No growth at 48 hours    Culture - Blood (collected 19 Apr 2019 02:06)  Source: .Blood  Preliminary Report (21 Apr 2019 03:00):    No growth at 48 hours    Culture - Urine (collected 18 Apr 2019 19:24)  Source: .Urine  Final Report (20 Apr 2019 12:00):    50,000 - 99,000 CFU/mL Escherichia coli    <10,000 CFU/ml Gram Positive Cocci in Clusters  Organism: Escherichia coli (20 Apr 2019 12:00)  Organism: Escherichia coli (20 Apr 2019 12:00)      MEDICATIONS  (STANDING):  amiodarone    Tablet 200 milliGRAM(s) Oral daily  amLODIPine   Tablet 5 milliGRAM(s) Oral daily  apixaban 5 milliGRAM(s) Oral every 12 hours  atorvastatin 20 milliGRAM(s) Oral at bedtime  cefTRIAXone   IVPB 1 Gram(s) IV Intermittent every 24 hours  levothyroxine 25 MICROGram(s) Oral daily  nystatin Ointment 1 Application(s) Topical two times a day  saccharomyces boulardii 250 milliGRAM(s) Oral two times a day    MEDICATIONS  (PRN):  acetaminophen   Tablet .. 650 milliGRAM(s) Oral every 6 hours PRN Temp greater or equal to 38C (100.4F), Mild Pain (1 - 3) 78y Female,   Patient is a 78y old  Female who presents with a chief complaint of hypokalemia and uti (20 Apr 2019 12:32)      INTERVAL/OVERNIGHT EVENTS:    Patient examined at beside. Patient presented stabbing-like chest pain localized and limited to the 5th intercostal space directly below the breast. Patient states this pain is 6/10 intensity, non radiating, exacerbates with deep inspiration and to the touch.  Repeat EKG w/o changes compared to admission, Troponin negative x1. Patient was given lidocaine patch with improvement of symptoms.     Patient states she has not been walking since admission. Tolerates PO w/o nausea, vomiting, diarrhea. Patient states she has been using the incentive spirometer regularly.   Patient denies calf pain, abdominal pain, headaches, fever, chills, dysuria, hematuria, diarrhea, constipation, SOB, palpitations.   Rest of ROS not contributory except for above.      VITALS  T(C): 37 (04-21-19 @ 05:32), Max: 37.2 (04-20-19 @ 09:00)  HR: 60 (04-21-19 @ 05:32) (57 - 76)  BP: 128/54 (04-21-19 @ 05:32) (110/60 - 138/65)  RR: 20 (04-21-19 @ 05:32) (18 - 20)  SpO2: 94% (04-21-19 @ 05:32) (94% - 99%)      PHYSICAL EXAM:  Physical Exam:   Gen: elderly female, lying in bed in NAD  HEENT: NCAT, EOMI, PERRLA moist oral mucosa  CVS: RRR, +S1/S2, Systolic murmur III/VI on aortic focus.   Pulm: Tenderness on palpation of right chest. Lungs are clear to auscultation.   GI: +BS, obese, soft, nondistended, mildly tender umbilical hernia, reducible, with mild erythema and warmth on left lateral border of hernia and minimal whitish fluid. No foul odor noted   Ext: No cyanosis, edema or calf tenderness.   Neuro: AAOx 2 to self and time. No focal deficits.  Skin: mildly erythematous skin over umbilical hernia improving.  b/l fungal rash under breats        LABS                     10.9   13.2  )-----------( 330      ( 21 Apr 2019 06:01 )             34.0       04-21    140  |  102  |  18.0  ----------------------------<  114  3.5   |  27.0  |  0.75    Ca    8.9      21 Apr 2019 06:01  Phos  3.1     04-21  Mg     2.0     04-21        CARDIAC MARKERS ( 21 Apr 2019 06:01 )  x     / <0.01 ng/mL / x     / x     / x          Culture - Blood (collected 19 Apr 2019 02:07)  Source: .Blood  Preliminary Report (21 Apr 2019 03:00):    No growth at 48 hours    Culture - Blood (collected 19 Apr 2019 02:06)  Source: .Blood  Preliminary Report (21 Apr 2019 03:00):    No growth at 48 hours    Culture - Urine (collected 18 Apr 2019 19:24)  Source: .Urine  Final Report (20 Apr 2019 12:00):    50,000 - 99,000 CFU/mL Escherichia coli    <10,000 CFU/ml Gram Positive Cocci in Clusters  Organism: Escherichia coli (20 Apr 2019 12:00)  Organism: Escherichia coli (20 Apr 2019 12:00)        IMAGING   TTE Echo Complete w/Doppler (04.21.19 @ 09:02) >  Summary:   1. Normal left ventricular internal cavity size.   2. Normal global left ventricular systolic function.   3. Left ventricular ejection fraction, by visual estimation, is 50 to   55%.   4. Spectral Doppler shows impaired relaxation pattern of left   ventricular myocardial filling (Grade I diastolic dysfunction).   5. Peak transaortic gradient equals 29.2 mmHg, mean transaortic gradient   equals 14.0 mmHg, the calculated aortic valve area equals 1.23 cm² by the   continuity equation consistent with moderate aortic stenosis.   6. Mild mitral annular calcification.            MEDICATIONS  (STANDING):  amiodarone    Tablet 200 milliGRAM(s) Oral daily  amLODIPine   Tablet 5 milliGRAM(s) Oral daily  apixaban 5 milliGRAM(s) Oral every 12 hours  atorvastatin 20 milliGRAM(s) Oral at bedtime  cefTRIAXone   IVPB 1 Gram(s) IV Intermittent every 24 hours  levothyroxine 25 MICROGram(s) Oral daily  nystatin Ointment 1 Application(s) Topical two times a day  saccharomyces boulardii 250 milliGRAM(s) Oral two times a day    MEDICATIONS  (PRN):  acetaminophen   Tablet .. 650 milliGRAM(s) Oral every 6 hours PRN Temp greater or equal to 38C (100.4F), Mild Pain (1 - 3) 78y Female,   Patient is a 78y old  Female who presents with a chief complaint of hypokalemia and uti (20 Apr 2019 12:32)    INTERVAL/OVERNIGHT EVENTS:    Patient seen and examined at beside. No acute distress. Patient presented stabbing-like chest pain localized and limited to the 5th intercostal space directly below the breast. Patient states this pain is 6/10 intensity, non radiating, exacerbates with deep inspiration and to the touch.  Repeat EKG w/o changes compared to admission, Troponin negative x1. Patient was given lidocaine patch with improvement of symptoms.     Patient states she has not been walking since admission. Tolerates PO w/o nausea, vomiting, diarrhea. Patient states she has been using the incentive spirometer regularly.     ROS: Patient denies calf pain, abdominal pain, headaches, fever, chills, dysuria, hematuria, diarrhea, constipation, SOB, palpitations.   Rest of ROS not contributory except for above.    VITALS  T(C): 37 (04-21-19 @ 05:32), Max: 37.2 (04-20-19 @ 09:00)  HR: 60 (04-21-19 @ 05:32) (57 - 76)  BP: 128/54 (04-21-19 @ 05:32) (110/60 - 138/65)  RR: 20 (04-21-19 @ 05:32) (18 - 20)  SpO2: 94% (04-21-19 @ 05:32) (94% - 99%)    PHYSICAL EXAM:  Gen: elderly female, lying in bed in NAD  HEENT: NCAT, EOMI, PERRLA moist oral mucosa  CVS: RRR, +S1/S2, Systolic murmur III/VI on aortic focus.   Pulm: Tenderness on palpation of right chest. Lungs are clear to auscultation.   GI: +BS, obese, soft, nondistended, mildly tender umbilical hernia, reducible, with mild erythema and warmth on left lateral border of hernia and minimal whitish fluid. No foul odor noted   Ext: No cyanosis, edema or calf tenderness.   Neuro: AAOx 2 to self and time. No focal deficits.  Skin: mildly erythematous skin over umbilical hernia improving.  b/l fungal rash under breats      LABS                     10.9   13.2  )-----------( 330      ( 21 Apr 2019 06:01 )             34.0       04-21    140  |  102  |  18.0  ----------------------------<  114  3.5   |  27.0  |  0.75    Ca    8.9      21 Apr 2019 06:01  Phos  3.1     04-21  Mg     2.0     04-21        CARDIAC MARKERS ( 21 Apr 2019 06:01 )  x     / <0.01 ng/mL / x     / x     / x          Culture - Blood (collected 19 Apr 2019 02:07)  Source: .Blood  Preliminary Report (21 Apr 2019 03:00):    No growth at 48 hours    Culture - Blood (collected 19 Apr 2019 02:06)  Source: .Blood  Preliminary Report (21 Apr 2019 03:00):    No growth at 48 hours    Culture - Urine (collected 18 Apr 2019 19:24)  Source: .Urine  Final Report (20 Apr 2019 12:00):    50,000 - 99,000 CFU/mL Escherichia coli    <10,000 CFU/ml Gram Positive Cocci in Clusters  Organism: Escherichia coli (20 Apr 2019 12:00)  Organism: Escherichia coli (20 Apr 2019 12:00)        IMAGING   TTE Echo Complete w/Doppler (04.21.19 @ 09:02) >  Summary:   1. Normal left ventricular internal cavity size.   2. Normal global left ventricular systolic function.   3. Left ventricular ejection fraction, by visual estimation, is 50 to   55%.   4. Spectral Doppler shows impaired relaxation pattern of left   ventricular myocardial filling (Grade I diastolic dysfunction).   5. Peak transaortic gradient equals 29.2 mmHg, mean transaortic gradient   equals 14.0 mmHg, the calculated aortic valve area equals 1.23 cm² by the   continuity equation consistent with moderate aortic stenosis.   6. Mild mitral annular calcification.      MEDICATIONS  (STANDING):  amiodarone    Tablet 200 milliGRAM(s) Oral daily  amLODIPine   Tablet 5 milliGRAM(s) Oral daily  apixaban 5 milliGRAM(s) Oral every 12 hours  atorvastatin 20 milliGRAM(s) Oral at bedtime  cefTRIAXone   IVPB 1 Gram(s) IV Intermittent every 24 hours  levothyroxine 25 MICROGram(s) Oral daily  nystatin Ointment 1 Application(s) Topical two times a day  saccharomyces boulardii 250 milliGRAM(s) Oral two times a day    MEDICATIONS  (PRN):  acetaminophen   Tablet .. 650 milliGRAM(s) Oral every 6 hours PRN Temp greater or equal to 38C (100.4F), Mild Pain (1 - 3)

## 2019-04-21 NOTE — DISCHARGE NOTE PROVIDER - HOSPITAL COURSE
Patient is a 77 y/o female with PMH of afib, on eliquis, htn, hld, hypothyroid, sent from [facility] for medical eval for abnormal lab results, found to have acute metabolic encephalopathy secondary to hypokalemia and uti. Patient started rocephin with improvement of symptoms.         On ED found to have,        Hospital course further complicated with pleuritic chest pain probably secondary to ...        Case followed by PT who recommended ...        Vitals were closely followed and corrected as needed. All electrolyte abnormalities were monitored carefully and repleted as necessary during this hospitalization.        Patient is medically optimized for discharge  _______ with recommendation to follow up with primary care doctor within 1 week from discharge.        ________________________________    Progress Note for 04/22/2019 Patient is a 79 y/o female with PMH of afib, on eliquis, htn, hld, hypothyroid, sent from [facility] for medical eval for abnormal lab results, and chief complain of feeling “out of herself”.        Upon arrival to ED, Code sepsis was activated for fever (100.4), leukocytosis (15) and grossly positive UA. Patient was started on vancomycin and Zosyn IV x 1 dose and later changed to Rocephin. Patient was also found to have WILDER, hypokalemia (2.7) and periumbilical skin fungal infection.  At day 1 encephalopathy was resolved and leukocytosis trending down. Urine culture came back positive for E.coli sensitive to Ceftriaxone and blood cultures were negative x 2. At time of discharge patient will be changed to Po antibiotic ________, with last dose on __________        Hospital course further complicated with pleuritic chest pain probably secondary to decreased mobility during admission. Chest Xray on admission w/o signs of effusion or infection. ECG was w/o changed from ECG done on admission and troponin was negative x2. Patient improved after better positioning and lidocaine patch.         Patient was noted to have elevated BNP (2450) and systolic murmur, TTE done during admission showed grade I diastolic dysfunction with EF 55-60% and moderate aortic stenosis. Follow up with cardiologist is recommended after discharge.        Kidney function was closely monitored during admission. Creatinine and BUN trended down to normal values with improvement of GFR.        During admission the blood pressure was controlled only with Norvasc 5 mg. Enalapril and chlorthalidone were held due to altered kidney function. At discharge, it will be recommended to follow up with PMD for monitoring of BP and adjustment of HTN medication as needed.         Regarding A.fib, patient remained rate controlled during admission.        Case followed by PT who recommended home with home PT and assistance PRN.         Vitals were closely followed and corrected as needed. All electrolyte abnormalities were monitored carefully and repleted as necessary during this hospitalization.        Patient is medically optimized for discharge _______ with recommendation to follow up with primary care doctor within 1 week from discharge.            ________________________________    Progress Note for 04/22/2019 Patient is a 79 y/o female with PMH of afib, on eliquis, htn, hld, hypothyroid, sent from [facility] for medical eval for abnormal lab results, and chief complain of feeling “out of herself”. Upon arrival to ED, Code sepsis was activated for fever and leukocytosis w/ a grossly positive UA. Pt was admitted Acute Toxic Metabolic Encephalopathy secondary to GNR urinary tract infection complicated by sepsis. Blood and urine cultures were drawn. Patient was started on vancomycin and Zosyn IV x 1 dose and later changed to Rocephin. Pt received a total of 5 days of rocephin and was changed to Vantin PO on 4/23 for 2 more days of antibiotics.         Hospital course further complicated with pleuritic chest pain probably secondary to decreased mobility during admission. Chest Xray on admission w/o signs of effusion or infection. ECG was w/o changed from ECG done on admission and troponin was negative x2. Patient improved after better positioning and lidocaine patch.         Patient was noted to have elevated BNP (2450) and systolic murmur, TTE done during admission showed grade I diastolic dysfunction with EF 55-60% and moderate aortic stenosis. Follow up with cardiologist is recommended after discharge.        Hospital course was further complicated by hypokalemia and mild creatinine and BUN elevations, likely prerenal. Creatinine and BUN trended down to normal values during hospital stay. Enalapril and chlorthalidone were held due to altered kidney function. At discharge, it will be recommended to follow up with PMD for monitoring of BP and adjustment of HTN medication as needed.         Case followed by PT who recommended home with home PT and assistance PRN.         Vitals were closely followed and corrected as needed. All electrolyte abnormalities were monitored carefully and repleted as necessary during this hospitalization. Patient is medically optimized for discharge with recommendation to follow up with primary care doctor within 1 week from discharge.        Vital Signs Last 24 Hrs    T(C): 36.2 (24 Apr 2019 08:00), Max: 36.8 (24 Apr 2019 00:35)    T(F): 97.2 (24 Apr 2019 08:00), Max: 98.2 (24 Apr 2019 00:35)    HR: 53 (24 Apr 2019 08:00) (53 - 60)    BP: 117/57 (24 Apr 2019 08:00) (110/53 - 138/76)    BP(mean): --    RR: 18 (24 Apr 2019 08:00) (18 - 18)    SpO2: 95% (24 Apr 2019 08:00) (95% - 96%)        Gen: elderly female, lying in bed in NAD    HEENT: clear conjunctiva b/l, EOMI, pupils are reactive to light and accomodation. Moist oral mucosa.    CVS: RRR, +S1/S2, Systolic murmur III/VI on aortic focus.     Pulm: Tenderness on palpation of right chest, improved. Lungs are clear to auscultation.     GI: +BS, obese, soft, nondistended, mildly tender umbilical hernia, reducible, with mild erythema and warmth on left lateral border of hernia. No foul odor noted     Ext: No cyanosis, edema or calf tenderness.     Neuro: AAOx 2 to self and time. No focal deficits.                   11.3     9.8   )-----------( 392      ( 22 Apr 2019 06:52 )               35.1     04-22        141  |  104  |  21.0<H>    ----------------------------<  98    4.0   |  26.0  |  0.79        Ca    9.4      22 Apr 2019 06:52    Phos  3.2     04-22    Mg     2.2     04-22        MEDICATIONS  (STANDING):    amiodarone    Tablet 200 milliGRAM(s) Oral daily    amLODIPine   Tablet 5 milliGRAM(s) Oral daily    apixaban 5 milliGRAM(s) Oral every 12 hours    atorvastatin 20 milliGRAM(s) Oral at bedtime    cefpodoxime 200 milliGRAM(s) Oral every 12 hours    levothyroxine 25 MICROGram(s) Oral daily    nystatin Ointment 1 Application(s) Topical two times a day    saccharomyces boulardii 250 milliGRAM(s) Oral two times a day        MEDICATIONS  (PRN):    acetaminophen   Tablet .. 650 milliGRAM(s) Oral every 6 hours PRN Temp greater or equal to 38C (100.4F), Mild Pain (1 - 3) Patient is a 79 y/o female with PMH of afib, on eliquis, htn, hld, hypothyroid, sent from [facility] for medical eval for abnormal lab results, and chief complain of feeling “out of herself”. Upon arrival to ED, Code sepsis was activated for fever and leukocytosis w/ a grossly positive UA. Pt was admitted Acute Toxic Metabolic Encephalopathy secondary to GNR urinary tract infection complicated by sepsis. Blood and urine cultures were drawn. Patient was started on vancomycin and Zosyn IV x 1 dose and later changed to Rocephin. Pt received a total of 5 days of rocephin and was changed to Vantin PO on 4/23 for 2 more days of antibiotics.         Hospital course further complicated with pleuritic chest pain probably secondary to decreased mobility during admission. Chest Xray on admission w/o signs of effusion or infection. ECG was w/o changed from ECG done on admission and troponin was negative x2. Patient improved after better positioning and lidocaine patch. She was also noted to have irritation in the skin near an umbilical hernia that is reducible. It was cleaned and no further noted discharge. Improvement in erythema also noted. We do recommend that she see dermatology for this skin region.         Patient was noted to have elevated BNP (2450) and systolic murmur, TTE done during admission showed grade I diastolic dysfunction with EF 55-60% and moderate aortic stenosis. Follow up with cardiologist is recommended after discharge.        Hospital course was further complicated by hypokalemia and mild creatinine and BUN elevations, likely prerenal. Creatinine and BUN trended down to normal values during hospital stay. Enalapril and chlorthalidone were held due to altered kidney function. At discharge, it will be recommended to follow up with PMD for monitoring of BP and adjustment of HTN medication as needed.         Case followed by PT who recommended home with home PT and assistance PRN.         Vitals were closely followed and corrected as needed. All electrolyte abnormalities were monitored carefully and repleted as necessary during this hospitalization. Patient is medically optimized for discharge with recommendation to follow up with primary care doctor within 1 week from discharge. Total time coordinating this discharge was 40 minutes.

## 2019-04-22 LAB
ANION GAP SERPL CALC-SCNC: 11 MMOL/L — SIGNIFICANT CHANGE UP (ref 5–17)
BASOPHILS # BLD AUTO: 0 K/UL — SIGNIFICANT CHANGE UP (ref 0–0.2)
BASOPHILS NFR BLD AUTO: 0.1 % — SIGNIFICANT CHANGE UP (ref 0–2)
BUN SERPL-MCNC: 21 MG/DL — HIGH (ref 8–20)
CALCIUM SERPL-MCNC: 9.4 MG/DL — SIGNIFICANT CHANGE UP (ref 8.6–10.2)
CHLORIDE SERPL-SCNC: 104 MMOL/L — SIGNIFICANT CHANGE UP (ref 98–107)
CO2 SERPL-SCNC: 26 MMOL/L — SIGNIFICANT CHANGE UP (ref 22–29)
CREAT SERPL-MCNC: 0.79 MG/DL — SIGNIFICANT CHANGE UP (ref 0.5–1.3)
EOSINOPHIL # BLD AUTO: 0.1 K/UL — SIGNIFICANT CHANGE UP (ref 0–0.5)
EOSINOPHIL NFR BLD AUTO: 1.1 % — SIGNIFICANT CHANGE UP (ref 0–6)
GLUCOSE SERPL-MCNC: 98 MG/DL — SIGNIFICANT CHANGE UP (ref 70–115)
HCT VFR BLD CALC: 35.1 % — LOW (ref 37–47)
HGB BLD-MCNC: 11.3 G/DL — LOW (ref 12–16)
LYMPHOCYTES # BLD AUTO: 1.1 K/UL — SIGNIFICANT CHANGE UP (ref 1–4.8)
LYMPHOCYTES # BLD AUTO: 11.7 % — LOW (ref 20–55)
MAGNESIUM SERPL-MCNC: 2.2 MG/DL — SIGNIFICANT CHANGE UP (ref 1.6–2.6)
MCHC RBC-ENTMCNC: 29.2 PG — SIGNIFICANT CHANGE UP (ref 27–31)
MCHC RBC-ENTMCNC: 32.2 G/DL — SIGNIFICANT CHANGE UP (ref 32–36)
MCV RBC AUTO: 90.7 FL — SIGNIFICANT CHANGE UP (ref 81–99)
MONOCYTES # BLD AUTO: 0.5 K/UL — SIGNIFICANT CHANGE UP (ref 0–0.8)
MONOCYTES NFR BLD AUTO: 5.3 % — SIGNIFICANT CHANGE UP (ref 3–10)
NEUTROPHILS # BLD AUTO: 7.9 K/UL — SIGNIFICANT CHANGE UP (ref 1.8–8)
NEUTROPHILS NFR BLD AUTO: 81.4 % — HIGH (ref 37–73)
PHOSPHATE SERPL-MCNC: 3.2 MG/DL — SIGNIFICANT CHANGE UP (ref 2.4–4.7)
PLATELET # BLD AUTO: 392 K/UL — SIGNIFICANT CHANGE UP (ref 150–400)
POTASSIUM SERPL-MCNC: 4 MMOL/L — SIGNIFICANT CHANGE UP (ref 3.5–5.3)
POTASSIUM SERPL-SCNC: 4 MMOL/L — SIGNIFICANT CHANGE UP (ref 3.5–5.3)
RBC # BLD: 3.87 M/UL — LOW (ref 4.4–5.2)
RBC # FLD: 14.9 % — SIGNIFICANT CHANGE UP (ref 11–15.6)
SODIUM SERPL-SCNC: 141 MMOL/L — SIGNIFICANT CHANGE UP (ref 135–145)
WBC # BLD: 9.8 K/UL — SIGNIFICANT CHANGE UP (ref 4.8–10.8)
WBC # FLD AUTO: 9.8 K/UL — SIGNIFICANT CHANGE UP (ref 4.8–10.8)

## 2019-04-22 PROCEDURE — 99232 SBSQ HOSP IP/OBS MODERATE 35: CPT | Mod: GC

## 2019-04-22 RX ORDER — CEFPODOXIME PROXETIL 100 MG
200 TABLET ORAL EVERY 12 HOURS
Qty: 0 | Refills: 0 | Status: DISCONTINUED | OUTPATIENT
Start: 2019-04-23 | End: 2019-04-24

## 2019-04-22 RX ORDER — CEFTRIAXONE 500 MG/1
1 INJECTION, POWDER, FOR SOLUTION INTRAMUSCULAR; INTRAVENOUS ONCE
Qty: 0 | Refills: 0 | Status: COMPLETED | OUTPATIENT
Start: 2019-04-22 | End: 2019-04-22

## 2019-04-22 RX ADMIN — Medication 250 MILLIGRAM(S): at 17:14

## 2019-04-22 RX ADMIN — NYSTATIN CREAM 1 APPLICATION(S): 100000 CREAM TOPICAL at 06:26

## 2019-04-22 RX ADMIN — AMIODARONE HYDROCHLORIDE 200 MILLIGRAM(S): 400 TABLET ORAL at 06:26

## 2019-04-22 RX ADMIN — APIXABAN 5 MILLIGRAM(S): 2.5 TABLET, FILM COATED ORAL at 06:26

## 2019-04-22 RX ADMIN — NYSTATIN CREAM 1 APPLICATION(S): 100000 CREAM TOPICAL at 17:14

## 2019-04-22 RX ADMIN — CEFTRIAXONE 100 GRAM(S): 500 INJECTION, POWDER, FOR SOLUTION INTRAMUSCULAR; INTRAVENOUS at 20:34

## 2019-04-22 RX ADMIN — ATORVASTATIN CALCIUM 20 MILLIGRAM(S): 80 TABLET, FILM COATED ORAL at 22:00

## 2019-04-22 RX ADMIN — Medication 250 MILLIGRAM(S): at 06:26

## 2019-04-22 RX ADMIN — Medication 25 MICROGRAM(S): at 06:25

## 2019-04-22 RX ADMIN — APIXABAN 5 MILLIGRAM(S): 2.5 TABLET, FILM COATED ORAL at 17:14

## 2019-04-22 RX ADMIN — AMLODIPINE BESYLATE 5 MILLIGRAM(S): 2.5 TABLET ORAL at 06:26

## 2019-04-22 NOTE — PROGRESS NOTE ADULT - ASSESSMENT
Patient is a 79 y/o female with PMH of afib, on eliquis, htn, hld, hypothyroid, sent from [facility] for medical eval for abnormal lab results, found to have acute metabolic encephalopathy secondary to hypokalemia and uti. Patient started Rocephin with improvement of symptoms, now change to PO Vantin.     Acute Toxic Metabolic Encephalopathy secondary to GNR urinary tract infection with sepsis  with leucocytosis trending down   - Encephalopathy + sepsis RESOLVED  - C/w Rocephin 1g iv daily with florastor, started 4/18, currently day #5, 7d course planned empirically.  - Rocephin will be d/c today after 8pm dose and Vantin will be started on 04/23  - E.coli on Urine culture, sensitive to ceftriaxone.  - blood cultures negative x2 4/19    Pleuritic chest pain  - Improving.  - Localized stabbing- like pain on right chest, exacerbating with deep inspiration.  - EKG w/o change from admission no ST-T changes. Troponin negative x1  - Probably secondary to reduce mobility out of bed.  - c/w Lidocaine patch qd PRN and incentive spirometry.     Hypokalemia  - Corrected.   - As patient is on amiodarone will replete until K at 4.    Umbilical Hernia with fungal infection  - Skin erythema is improved.   - c/w Nystatin topical bid started 4/19, at least 10d course, last day 4/28, longer if needed  diflucan x 1    Atrial Fibrillation  - Currently rate controlled  - c/w Amiodarone 200mg po daily  - c/w Eliquis 5mg po bid    HTN- well controlled  - BP has been well controlled with norvasc 5mg po daily, will continue.   - C/w Holding enalapril 20mg po daily and chlorthalidone 25 mg po daily as not needed at this time, will resume home meds as needed/tolerated    WILDER   - Resolved.   - Unknown prior baseline  - Avoid nephrotoxic agents     Moderate Aortic Stenosis  - Systolic murmur in aortic focus  - TTE confirmed    Hypothyroidism  chronic, stable, uncomplicated  levothyroxine 25mcg daily  f/up TSH noted due to use of amio     HLD  chronic, stable, uncomplicated  atorvastatin 20mg po qhs    Normocytic anemia - likely aocd, will monitor. No e/o acute bleeding noted    Preventive Measure  DVT: on eliquis    Advanced Directives: FULL CODE    Dispo: Patient was evaluated by PT on 04/22 who recommended Home with PT. After phone conversation with daughter, is now known that patient will be living on the basement for which she will need to go up and down 13 stairsteps and patient will be alone most of the day. Daughter requested patient to be placed on HERB until she is able to not need assistance for ADL, as daughter works all day and will not be able to help her. PT fatoumata is pending to reassess patient.  .  D/c within 24- 48 hours pending PT fatoumata.     Outpt fu: pmd Dr Whitaker [phone] (will call with update on 4/22). Plan d/w daughter over the phone Patient is a 77 y/o female with PMH of afib, on eliquis, htn, hld, hypothyroid, sent from [facility] for medical eval for abnormal lab results, found to have acute metabolic encephalopathy secondary to hypokalemia and uti. Patient started Rocephin with improvement of symptoms, now change to PO Vantin.     Acute Toxic Metabolic Encephalopathy secondary to GNR urinary tract infection with sepsis  with leucocytosis trending down   - Encephalopathy + sepsis RESOLVED  - C/w Rocephin 1g iv daily with florastor, started 4/18, currently day #5, 7d course planned empirically.  - Rocephin will be d/c today after 8pm dose and Vantin will be started on 04/23  - E.coli on Urine culture, sensitive to ceftriaxone.  - blood cultures negative x2 4/19    Pleuritic chest pain  - Improving.  - Localized stabbing- like pain on right chest, exacerbating with deep inspiration.  - EKG w/o change from admission no ST-T changes. Troponin negative x1  - Probably secondary to reduce mobility out of bed.  - c/w Lidocaine patch qd PRN and incentive spirometry.     Hypokalemia  - Corrected.   - As patient is on amiodarone will replete until K at 4.    Umbilical Hernia with fungal infection  - Skin erythema is improved.   - c/w Nystatin topical bid started 4/19, at least 10d course, last day 4/28, longer if needed  diflucan x 1    Atrial Fibrillation  - Currently rate controlled  - c/w Amiodarone 200mg po daily  - c/w Eliquis 5mg po bid    HTN- well controlled  - BP has been well controlled with norvasc 5mg po daily, will continue.   - C/w Holding enalapril 20mg po daily and chlorthalidone 25 mg po daily as not needed at this time, will resume home meds as needed/tolerated    WILDER   - Resolved.   - Unknown prior baseline  - Avoid nephrotoxic agents     Moderate Aortic Stenosis  - Systolic murmur in aortic focus  - TTE confirmed    Hypothyroidism  chronic, stable, uncomplicated  levothyroxine 25mcg daily  f/up TSH noted due to use of amio     HLD  chronic, stable, uncomplicated  atorvastatin 20mg po qhs    Normocytic anemia - likely aocd, will monitor. No e/o acute bleeding noted    Preventive Measure  DVT: on eliquis    Advanced Directives: FULL CODE    Dispo: Patient was evaluated by PT on 04/22 who recommended Home with PT. After phone conversation with daughter, is now known that patient will be living in the basement for which she will need to go up and down 13 stairsteps and patient will be alone most of the day. Daughter requested patient to be placed on HERB until she is able to not need assistance for ADL, as daughter works all day and will not be able to help her. PT fatoumata is pending to reassess patient.  .  D/c within 24- 48 hours pending PT fatoumata.     Outpt fu: pmd Dr Whitaker [phone] (will call with update on 4/23). Plan d/w daughter over the phone

## 2019-04-22 NOTE — PROGRESS NOTE ADULT - SUBJECTIVE AND OBJECTIVE BOX
78y Female,   Patient is a 78y old  Female who presents with a chief complaint of hypokalemia and uti (20 Apr 2019 12:32)    INTERVAL/OVERNIGHT EVENTS:    Patient seen and examined at beside. No acute distress.   Patient states feeling well. Pleuritic pain is still present, mild intensity, non radiating. Patient states she was OOB to chair on couple of occasion on prior day and fel "weird," on further questioning patient stated she felt weak and required assistance to get out and back to bed. Patient continues tolerating PO diet w/o nausea, vomiting, diarrhea. Patient states she has been using the incentive spirometer regularly.  Patient states pain related to fungal infection on umbilical area is controlled.     ROS: Patient denies calf pain, abdominal pain, headaches, fever, chills, dysuria, hematuria, diarrhea, constipation, SOB, palpitations.   Rest of ROS not contributory except for above.    VITALS  Vital Signs Last 24 Hrs  T(C): 36.6 (22 Apr 2019 08:36), Max: 36.9 (21 Apr 2019 23:35)  T(F): 97.8 (22 Apr 2019 08:36), Max: 98.4 (21 Apr 2019 23:35)  HR: 69 (22 Apr 2019 08:36) (61 - 69)  BP: 132/67 (22 Apr 2019 08:36) (116/53 - 142/57)  RR: 18 (22 Apr 2019 08:36) (18 - 18)  SpO2: 96% (22 Apr 2019 08:36) (94% - 97%)    PHYSICAL EXAM:  Gen: elderly female, lying in bed in NAD  HEENT: NCAT, EOMI, PERRLA moist oral mucosa  CVS: RRR, +S1/S2, Systolic murmur III/VI on aortic focus.   Pulm: Tenderness on palpation of right chest. Lungs are clear to auscultation.   GI: +BS, obese, soft, nondistended, mildly tender umbilical hernia, reducible, with mild erythema and warmth on left lateral border of hernia and minimal whitish fluid. No foul odor noted   Ext: No cyanosis, edema or calf tenderness.   Neuro: AAOx 2 to self and time. No focal deficits.  Skin: mildly erythematous skin over umbilical hernia improving.  b/l fungal rash under breasts      LABS                        11.3   9.8   )-----------( 392      ( 22 Apr 2019 06:52 )             35.1   04-22    141  |  104  |  21.0<H>  ----------------------------<  98  4.0   |  26.0  |  0.79    Ca    9.4      22 Apr 2019 06:52  Phos  3.2     04-22  Mg     2.2     04-22    Culture - Urine (04.18.19 @ 19:24)    -  Amikacin: S <=16    -  Ampicillin: S <=8 These ampicillin results predict results for amoxicillin    -  Ampicillin/Sulbactam: S <=8/4    -  Aztreonam: S <=4    -  Cefazolin: S <=8 For uncomplicated UTI with K. pneumoniae, E. coli, or P. mirablis: HIMANSHU <=16 is sensitive and HIMANSHU >=32 is resistant. This also predicts results for oral agents cefaclor, cefdinir, cefpodoxime, cefprozil, cefuroxime axetil, cephalexin and locarbef for uncomplicated UTI. Note that some isolates may be susceptible to these agents while testing resistant to cefazolin.    -  Cefepime: S <=4    -  Cefoxitin: S <=8    -  Ceftriaxone: S <=1 Enterobacter, Citrobacter, and Serratia may develop resistance during prolonged therapy    -  Ciprofloxacin: S <=1    -  Ertapenem: S <=1    -  Gentamicin: S <=4    -  Imipenem: S <=1    -  Levofloxacin: S <=2    -  Meropenem: S <=1    -  Nitrofurantoin: S <=32 Should not be used to treat pyelonephritis    -  Piperacillin/Tazobactam: S <=16    -  Tigecycline: S <=2    -  Tobramycin: S <=4    -  Trimethoprim/Sulfamethoxazole: S <=2/38    Specimen Source: .Urine    Culture Results:   50,000 - 99,000 CFU/mL Escherichia coli  <10,000 CFU/ml Gram Positive Cocci in Clusters    Organism Identification: Escherichia coli    Organism: Escherichia coli    Method Type: HIMANSHU    Culture - Blood (04.19.19 @ 02:07)    Specimen Source: .Blood    Culture Results:   No growth at 48 hours        MEDICATIONS  (STANDING):  MEDICATIONS  (STANDING):  amiodarone    Tablet 200 milliGRAM(s) Oral daily  amLODIPine   Tablet 5 milliGRAM(s) Oral daily  apixaban 5 milliGRAM(s) Oral every 12 hours  atorvastatin 20 milliGRAM(s) Oral at bedtime  cefTRIAXone   IVPB 1 Gram(s) IV Intermittent once  levothyroxine 25 MICROGram(s) Oral daily  nystatin Ointment 1 Application(s) Topical two times a day  saccharomyces boulardii 250 milliGRAM(s) Oral two times a day    MEDICATIONS  (PRN):  acetaminophen   Tablet .. 650 milliGRAM(s) Oral every 6 hours PRN Temp greater or equal to 38C (100.4F), Mild Pain (1 - 3)

## 2019-04-23 PROCEDURE — 99232 SBSQ HOSP IP/OBS MODERATE 35: CPT | Mod: GC

## 2019-04-23 RX ADMIN — NYSTATIN CREAM 1 APPLICATION(S): 100000 CREAM TOPICAL at 17:43

## 2019-04-23 RX ADMIN — AMIODARONE HYDROCHLORIDE 200 MILLIGRAM(S): 400 TABLET ORAL at 06:14

## 2019-04-23 RX ADMIN — NYSTATIN CREAM 1 APPLICATION(S): 100000 CREAM TOPICAL at 06:15

## 2019-04-23 RX ADMIN — ATORVASTATIN CALCIUM 20 MILLIGRAM(S): 80 TABLET, FILM COATED ORAL at 21:50

## 2019-04-23 RX ADMIN — Medication 250 MILLIGRAM(S): at 17:44

## 2019-04-23 RX ADMIN — Medication 25 MICROGRAM(S): at 06:14

## 2019-04-23 RX ADMIN — APIXABAN 5 MILLIGRAM(S): 2.5 TABLET, FILM COATED ORAL at 06:14

## 2019-04-23 RX ADMIN — Medication 200 MILLIGRAM(S): at 08:49

## 2019-04-23 RX ADMIN — APIXABAN 5 MILLIGRAM(S): 2.5 TABLET, FILM COATED ORAL at 17:43

## 2019-04-23 RX ADMIN — AMLODIPINE BESYLATE 5 MILLIGRAM(S): 2.5 TABLET ORAL at 06:14

## 2019-04-23 RX ADMIN — Medication 200 MILLIGRAM(S): at 21:50

## 2019-04-23 RX ADMIN — Medication 250 MILLIGRAM(S): at 06:14

## 2019-04-23 NOTE — PROGRESS NOTE ADULT - ATTENDING COMMENTS
Note addended where needed. Plan discussed with patient at bedside. d/c planning pending PT reeval
Patient seen and examined at the bedside. I was physically present for key portions of the evaluation and management services provided. Agree with the above history, physical, assessment, and plan with the necessary amendments/elaborations already made above.
Note addended where needed. Plan discussed with patient at bedside and daughter over the phone
Note addended where needed. Plan discussed with patient & daughter
Note addended where needed. Plan discussed with patient at bedside. Spoke to daughter yesterday.

## 2019-04-23 NOTE — PROGRESS NOTE ADULT - ASSESSMENT
Patient is a 79 y/o female with PMH of afib, on eliquis, htn, hld, hypothyroid, sent [facility] for medical eval for abnormal lab results, found to have acute metabolic encephalopathy secondary to hypokalemia and uti. Patient started Rocephin with improvement of symptoms, now change to PO Vantin.     Acute Toxic Metabolic Encephalopathy secondary to GNR urinary tract infection with sepsis  with leucocytosis trending down   - Encephalopathy + sepsis RESOLVED  - C/w Rocephin 1g iv daily with florastor, started 4/18, currently day #5, 7d course planned empirically.  - Rocephin will be d/c today after 8pm dose and Vantin will be started on 04/23  - E.coli on Urine culture, sensitive to ceftriaxone.  - blood cultures negative x2 4/19    Pleuritic chest pain  - Improving.  - Localized stabbing- like pain on right chest, exacerbating with deep inspiration.  - EKG w/o change from admission no ST-T changes. Troponin negative x1  - Probably secondary to reduce mobility out of bed.  - c/w Lidocaine patch qd PRN and incentive spirometry.     Hypokalemia  - Corrected.   - As patient is on amiodarone will replete until K at 4.    Umbilical Hernia with fungal infection  - Skin erythema is improved.   - c/w Nystatin topical bid started 4/19, at least 10d course, last day 4/28, longer if needed  diflucan x 1    Atrial Fibrillation  - Currently rate controlled  - c/w Amiodarone 200mg po daily  - c/w Eliquis 5mg po bid    HTN- well controlled  - BP has been well controlled with norvasc 5mg po daily, will continue.   - C/w Holding enalapril 20mg po daily and chlorthalidone 25 mg po daily as not needed at this time, will resume home meds as needed/tolerated    WILDER   - Resolved.   - Unknown prior baseline  - Avoid nephrotoxic agents     Moderate Aortic Stenosis  - Systolic murmur in aortic focus  - TTE confirmed    Hypothyroidism  chronic, stable, uncomplicated  levothyroxine 25mcg daily  f/up TSH noted due to use of amio     HLD  chronic, stable, uncomplicated  atorvastatin 20mg po qhs    Normocytic anemia - likely aocd, will monitor. No e/o acute bleeding noted    Preventive Measure  DVT: on eliquis    Advanced Directives: FULL CODE    Dispo: Patient was evaluated by PT on 04/22 who recommended Home with PT. After phone conversation with daughter, is now known that patient will be living in the basement for which she will need to go up and down 10 stairsteps and patient will be alone most of the day. Daughter requested patient to be placed on HERB until she is able to not need assistance for ADL and accomodations at the house can be done for her to be safely placed. PT eval is pending to reassess patient.  .  D/c within 24- 48 hours pending PT eval.     Outpt fu: pmd Dr Whitaker [phone] (will call with update on 4/23). Plan d/w daughter over the phone Patient is a 77 y/o female with PMH of afib, on eliquis, htn, hld, hypothyroid, sent from [facility] for medical eval for abnormal lab results, found to have acute metabolic encephalopathy secondary to hypokalemia and uti. Patient started Rocephin with improvement of symptoms, now change to PO Vantin.     Acute Toxic Metabolic Encephalopathy secondary to GNR urinary tract infection with sepsis  with leucocytosis trending down   - Encephalopathy + sepsis RESOLVED  - C/w Rocephin 1g iv daily with florastor, started 4/18, currently day #5, 7d course planned empirically.  - Rocephin will be d/c today after 8pm dose and Vantin will be started on 04/23  - E.coli on Urine culture, sensitive to ceftriaxone.  - blood cultures negative x2 4/19    Pleuritic chest pain  - Improving.  - Localized stabbing- like pain on right chest, exacerbating with deep inspiration.  - EKG w/o change from admission no ST-T changes. Troponin negative x1  - Probably secondary to reduce mobility out of bed.  - c/w Lidocaine patch qd PRN and incentive spirometry.     Hypokalemia  - Corrected.   - As patient is on amiodarone will replete until K at 4.    Umbilical Hernia with fungal infection  - Skin erythema is improved.   - c/w Nystatin topical bid started 4/19, at least 10d course, last day 4/28, longer if needed  diflucan x 1    Atrial Fibrillation  - Currently rate controlled  - c/w Amiodarone 200mg po daily  - c/w Eliquis 5mg po bid    HTN- well controlled  - BP has been well controlled with norvasc 5mg po daily, will continue.   - C/w Holding enalapril 20mg po daily and chlorthalidone 25 mg po daily as not needed at this time, will resume home meds as needed/tolerated    WILDER   - Resolved.   - Unknown prior baseline  - Avoid nephrotoxic agents     Moderate Aortic Stenosis  - Systolic murmur in aortic focus  - TTE confirmed    Hypothyroidism  chronic, stable, uncomplicated  levothyroxine 25mcg daily  f/up TSH noted due to use of amio     HLD  chronic, stable, uncomplicated  atorvastatin 20mg po qhs    Normocytic anemia - likely aocd, will monitor. No e/o acute bleeding noted    Preventive Measure  DVT: on eliquis    Advanced Directives: FULL CODE    Dispo: Patient was evaluated by PT on 04/22 who recommended Home with PT. After phone conversation with daughter, is now known that patient will be living in the basement for which she will need to go up and down 10 stairsteps and patient will be alone most of the day. Daughter requested patient to be placed on HERB until she is able to not need assistance for ADL and accommodations at the house can be done for her to be safely placed. PT eval is pending to reassess patient.  .  D/c within 24- 48 hours pending PT eval.     Outpt fu: pmd Dr Whitaker [phone] (will call with update on 4/23). Plan d/w daughter over the phone Patient is a 79 y/o female with PMH of afib, on eliquis, htn, hld, hypothyroid, sent from [facility] for medical eval for abnormal lab results, found to have acute metabolic encephalopathy secondary to hypokalemia and uti. Patient started Rocephin with improvement of symptoms, now changed to PO Vantin.     Acute Toxic Metabolic Encephalopathy secondary to GNR urinary tract infection with sepsis  with leucocytosis normalized. Improved  - Encephalopathy + sepsis RESOLVED  - C/w Rocephin 1g iv daily with florastor, started 4/18, currently day #5, 7d course planned empirically.  - Rocephin was d/c'd after 8pm dose on 4/22 and Vantin will be started on 04/23  - E.coli on Urine culture, sensitive to ceftriaxone.  - blood cultures negative x2 4/19    Pleuritic chest pain  - Improving.  - EKG w/o change from admission no ST-T changes. Troponin negative x1  - Probably secondary to reduce mobility out of bed.  - c/w Lidocaine patch qd PRN and incentive spirometry.     Hypokalemia  - Corrected.   - As patient is on amiodarone will replete until K at 4.    Umbilical Hernia with fungal infection  - Skin erythema is improved.   - c/w Nystatin topical bid started 4/19, at least 10d course, last day 4/28, longer if needed  diflucan x 1    Atrial Fibrillation  - Currently rate controlled  - c/w Amiodarone 200mg po daily, Eliquis 5mg po bid    HTN- well controlled  - BP has been well controlled with norvasc 5mg po daily, will continue.   - C/w Holding enalapril 20mg po daily and chlorthalidone 25 mg po daily as not needed at this time, will resume home meds as needed/tolerated    WILDER   -stable  - Unknown prior baseline  - Avoid nephrotoxic agents     Moderate Aortic Stenosis  - Systolic murmur in aortic focus  - TTE confirmed    Hypothyroidism  levothyroxine 25mcg daily  f/up TSH noted due to use of amio     HLD  chronic, stable, uncomplicated  atorvastatin 20mg po qhs    Normocytic anemia - likely aocd, will monitor. No e/o acute bleeding noted    Preventive Measure  DVT: on eliquis    Advanced Directives: FULL CODE    Dispo: Patient was evaluated by PT on 04/22 who recommended Home with PT. Seen by PT again today, did well with minimal assist. Spoke to daughter 2x, will try for rehab and if denied, she will have to take home and help with needs in addition to PT at home. There are 3 stairs to the home, 10 stairs to get down to basement and a basement exit with 5 stairs to go up and exit.     Outpt fu: pmd Dr Whitaker [phone] (will call with update prior to d/c)  Plan d/w daughter over the phone

## 2019-04-23 NOTE — PROGRESS NOTE ADULT - SUBJECTIVE AND OBJECTIVE BOX
78y Female,   Patient is a 78y old  Female who presents with a chief complaint of hypokalemia and uti (20 Apr 2019 12:32)    INTERVAL/OVERNIGHT EVENTS:    Patient seen and examined at beside. No acute distress.   Patient states feeling well. Pleuritic pain is still present, mild intensity, non radiating. Patient states she was OOB to chair on couple of occasion on prior day and fel "weird," on further questioning patient stated she felt weak and required assistance to get out and back to bed. Patient continues tolerating PO diet w/o nausea, vomiting, diarrhea. Patient states she has been using the incentive spirometer regularly.  Patient states pain related to fungal infection on umbilical area is controlled.     ROS: Patient denies calf pain, abdominal pain, headaches, fever, chills, dysuria, hematuria, diarrhea, constipation, SOB, palpitations.   Rest of ROS not contributory except for above.    VITALS  Vital Signs Last 24 Hrs  T(C): 36.6 (23 Apr 2019 07:48), Max: 36.8 (22 Apr 2019 23:38)  T(F): 97.9 (23 Apr 2019 07:48), Max: 98.3 (22 Apr 2019 23:38)  HR: 55 (23 Apr 2019 07:48) (54 - 82)  BP: 110/53 (23 Apr 2019 07:48) (110/53 - 138/69)  RR: 18 (23 Apr 2019 07:48) (18 - 18)  SpO2: 95% (23 Apr 2019 07:48) (94% - 95%)    PHYSICAL EXAM:  Gen: elderly female, lying in bed in NAD  HEENT: clear conjunctiva b/l, EOMI, pupils are reactive to light and accomodation. Moist oral mucosa.  CVS: RRR, +S1/S2, Systolic murmur III/VI on aortic focus.   Pulm: Tenderness on palpation of right chest, improved. Lungs are clear to auscultation.   GI: +BS, obese, soft, nondistended, mildly tender umbilical hernia, reducible, with mild erythema and warmth on left lateral border of hernia. No foul odor noted   Ext: No cyanosis, edema or calf tenderness.   Neuro: AAOx 2 to self and time. No focal deficits.  Skin: mildly erythematous skin over umbilical hernia improving.  b/l fungal rash under breasts, no new rashes, erythema or other skin lesions noted.       LABS                                   11.3   9.8   )-----------( 392      ( 22 Apr 2019 06:52 )             35.1   04-22    141  |  104  |  21.0<H>  ----------------------------<  98  4.0   |  26.0  |  0.79    Ca    9.4      22 Apr 2019 06:52  Phos  3.2     04-22  Mg     2.2     04-22        MEDICATIONS  (STANDING):  amiodarone    Tablet 200 milliGRAM(s) Oral daily  amLODIPine   Tablet 5 milliGRAM(s) Oral daily  apixaban 5 milliGRAM(s) Oral every 12 hours  atorvastatin 20 milliGRAM(s) Oral at bedtime  cefpodoxime 200 milliGRAM(s) Oral every 12 hours  levothyroxine 25 MICROGram(s) Oral daily  nystatin Ointment 1 Application(s) Topical two times a day  saccharomyces boulardii 250 milliGRAM(s) Oral two times a day      MEDICATIONS  (PRN):  acetaminophen   Tablet .. 650 milliGRAM(s) Oral every 6 hours PRN Temp greater or equal to 38C (100.4F), Mild Pain (1 - 3) 78y Female,   Patient is a 78y old  Female who presents with a chief complaint of hypokalemia and uti (20 Apr 2019 12:32)    INTERVAL/OVERNIGHT EVENTS:    Patient seen and examined at beside. No acute distress.   Patient states feeling well. Pleuritic pain is still present, mild intensity, non radiating. Patient states she was OOB to chair on couple of occasion on prior day and fel "weird," on further questioning patient stated she felt weak and required assistance to get out and back to bed. Patient continues tolerating PO diet w/o nausea, vomiting, diarrhea. Patient states she has been using the incentive spirometer regularly.  Patient states pain related to fungal infection on umbilical area is controlled.     ROS: Patient denies calf pain, abdominal pain, headaches, fever, chills, dysuria, hematuria, diarrhea, constipation, SOB, palpitations.   Rest of ROS not contributory except for above.    VITALS  Vital Signs Last 24 Hrs  T(C): 36.6 (23 Apr 2019 07:48), Max: 36.8 (22 Apr 2019 23:38)  T(F): 97.9 (23 Apr 2019 07:48), Max: 98.3 (22 Apr 2019 23:38)  HR: 55 (23 Apr 2019 07:48) (54 - 82)  BP: 110/53 (23 Apr 2019 07:48) (110/53 - 138/69)  RR: 18 (23 Apr 2019 07:48) (18 - 18)  SpO2: 95% (23 Apr 2019 07:48) (94% - 95%)    PHYSICAL EXAM:  Gen: elderly female, lying in bed in NAD  HEENT: clear conjunctiva b/l, EOMI, pupils are reactive to light and accomodation. Moist oral mucosa.  CVS: RRR, +S1/S2, Systolic murmur III/VI on aortic focus.   Pulm: Tenderness on palpation of right chest, improved. Lungs are clear to auscultation.   GI: +BS, obese, soft, nondistended, mildly tender umbilical hernia, reducible, with mild erythema and warmth on left lateral border of hernia. No foul odor noted   Ext: No cyanosis, edema or calf tenderness.   Neuro: AAOx 2 to self and time. No focal deficits.  Skin: mildly erythematous skin over umbilical hernia improving.  b/l fungal rash under breasts, no new rashes, erythema or other skin lesions noted.     LABS                              11.3   9.8   )-----------( 392      ( 22 Apr 2019 06:52 )             35.1   04-22    141  |  104  |  21.0<H>  ----------------------------<  98  4.0   |  26.0  |  0.79    Ca    9.4      22 Apr 2019 06:52  Phos  3.2     04-22  Mg     2.2     04-22    MEDICATIONS  (STANDING):  amiodarone    Tablet 200 milliGRAM(s) Oral daily  amLODIPine   Tablet 5 milliGRAM(s) Oral daily  apixaban 5 milliGRAM(s) Oral every 12 hours  atorvastatin 20 milliGRAM(s) Oral at bedtime  cefpodoxime 200 milliGRAM(s) Oral every 12 hours  levothyroxine 25 MICROGram(s) Oral daily  nystatin Ointment 1 Application(s) Topical two times a day  saccharomyces boulardii 250 milliGRAM(s) Oral two times a day      MEDICATIONS  (PRN):  acetaminophen   Tablet .. 650 milliGRAM(s) Oral every 6 hours PRN Temp greater or equal to 38C (100.4F), Mild Pain (1 - 3)

## 2019-04-24 ENCOUNTER — TRANSCRIPTION ENCOUNTER (OUTPATIENT)
Age: 79
End: 2019-04-24

## 2019-04-24 VITALS — WEIGHT: 151.9 LBS

## 2019-04-24 LAB
CULTURE RESULTS: SIGNIFICANT CHANGE UP
CULTURE RESULTS: SIGNIFICANT CHANGE UP
SPECIMEN SOURCE: SIGNIFICANT CHANGE UP
SPECIMEN SOURCE: SIGNIFICANT CHANGE UP

## 2019-04-24 PROCEDURE — 87040 BLOOD CULTURE FOR BACTERIA: CPT

## 2019-04-24 PROCEDURE — 84295 ASSAY OF SERUM SODIUM: CPT

## 2019-04-24 PROCEDURE — 93306 TTE W/DOPPLER COMPLETE: CPT

## 2019-04-24 PROCEDURE — 97530 THERAPEUTIC ACTIVITIES: CPT

## 2019-04-24 PROCEDURE — 82330 ASSAY OF CALCIUM: CPT

## 2019-04-24 PROCEDURE — 83605 ASSAY OF LACTIC ACID: CPT

## 2019-04-24 PROCEDURE — 83880 ASSAY OF NATRIURETIC PEPTIDE: CPT

## 2019-04-24 PROCEDURE — 93005 ELECTROCARDIOGRAM TRACING: CPT

## 2019-04-24 PROCEDURE — 71045 X-RAY EXAM CHEST 1 VIEW: CPT

## 2019-04-24 PROCEDURE — 36415 COLL VENOUS BLD VENIPUNCTURE: CPT

## 2019-04-24 PROCEDURE — 83735 ASSAY OF MAGNESIUM: CPT

## 2019-04-24 PROCEDURE — 82947 ASSAY GLUCOSE BLOOD QUANT: CPT

## 2019-04-24 PROCEDURE — 82803 BLOOD GASES ANY COMBINATION: CPT

## 2019-04-24 PROCEDURE — 97116 GAIT TRAINING THERAPY: CPT

## 2019-04-24 PROCEDURE — 80048 BASIC METABOLIC PNL TOTAL CA: CPT

## 2019-04-24 PROCEDURE — 96374 THER/PROPH/DIAG INJ IV PUSH: CPT

## 2019-04-24 PROCEDURE — 85027 COMPLETE CBC AUTOMATED: CPT

## 2019-04-24 PROCEDURE — 84484 ASSAY OF TROPONIN QUANT: CPT

## 2019-04-24 PROCEDURE — 84443 ASSAY THYROID STIM HORMONE: CPT

## 2019-04-24 PROCEDURE — 87186 SC STD MICRODIL/AGAR DIL: CPT

## 2019-04-24 PROCEDURE — 80053 COMPREHEN METABOLIC PANEL: CPT

## 2019-04-24 PROCEDURE — 99285 EMERGENCY DEPT VISIT HI MDM: CPT | Mod: 25

## 2019-04-24 PROCEDURE — 84132 ASSAY OF SERUM POTASSIUM: CPT

## 2019-04-24 PROCEDURE — 84100 ASSAY OF PHOSPHORUS: CPT

## 2019-04-24 PROCEDURE — 97110 THERAPEUTIC EXERCISES: CPT

## 2019-04-24 PROCEDURE — 82435 ASSAY OF BLOOD CHLORIDE: CPT

## 2019-04-24 PROCEDURE — 81001 URINALYSIS AUTO W/SCOPE: CPT

## 2019-04-24 PROCEDURE — 99239 HOSP IP/OBS DSCHRG MGMT >30: CPT | Mod: GC

## 2019-04-24 PROCEDURE — 85014 HEMATOCRIT: CPT

## 2019-04-24 PROCEDURE — 97163 PT EVAL HIGH COMPLEX 45 MIN: CPT

## 2019-04-24 PROCEDURE — 87086 URINE CULTURE/COLONY COUNT: CPT

## 2019-04-24 RX ORDER — SACCHAROMYCES BOULARDII 250 MG
1 POWDER IN PACKET (EA) ORAL
Qty: 6 | Refills: 0
Start: 2019-04-24 | End: 2019-04-26

## 2019-04-24 RX ORDER — CEFPODOXIME PROXETIL 100 MG
1 TABLET ORAL
Qty: 2 | Refills: 0
Start: 2019-04-24 | End: 2019-04-24

## 2019-04-24 RX ADMIN — Medication 250 MILLIGRAM(S): at 05:04

## 2019-04-24 RX ADMIN — NYSTATIN CREAM 1 APPLICATION(S): 100000 CREAM TOPICAL at 05:04

## 2019-04-24 RX ADMIN — APIXABAN 5 MILLIGRAM(S): 2.5 TABLET, FILM COATED ORAL at 05:04

## 2019-04-24 RX ADMIN — AMIODARONE HYDROCHLORIDE 200 MILLIGRAM(S): 400 TABLET ORAL at 05:04

## 2019-04-24 RX ADMIN — AMLODIPINE BESYLATE 5 MILLIGRAM(S): 2.5 TABLET ORAL at 05:04

## 2019-04-24 RX ADMIN — Medication 200 MILLIGRAM(S): at 07:57

## 2019-04-24 RX ADMIN — Medication 25 MICROGRAM(S): at 05:04

## 2019-04-24 NOTE — DIETITIAN INITIAL EVALUATION ADULT. - OTHER INFO
Pt admitted for hypokalemia and UTI. Pt currently with great appetite/intake. 100% PO. Pt with good comprehension of DASH diet. No other issues at this time.

## 2019-04-24 NOTE — CHART NOTE - NSCHARTNOTEFT_GEN_A_CORE
Brief discharge day note     Patient seen and examined at bedside. No acute distress and no acute overnight events.   ROS No chest pain, palpitations, sob, light headedness/dizziness, difficulty breathing/cough, fevers/chills, abdominal pain, n/v, diarrhea/constipation, dysuria or increased urinary frequency. All other ROS negative     Vital Signs Last 24 Hrs  T(C): 36.2 (24 Apr 2019 08:00), Max: 36.8 (24 Apr 2019 00:35)  T(F): 97.2 (24 Apr 2019 08:00), Max: 98.2 (24 Apr 2019 00:35)  HR: 53 (24 Apr 2019 08:00) (53 - 60)  BP: 117/57 (24 Apr 2019 08:00) (110/53 - 138/76)  RR: 18 (24 Apr 2019 08:00) (18 - 18)  SpO2: 95% (24 Apr 2019 08:00) (95% - 96%)    PHYSICAL EXAM:  Gen: elderly female, lying in bed in NAD  HEENT: clear conjunctiva b/l, EOMI, pupils are reactive to light and accomodation. Moist oral mucosa.  CVS: RRR, +S1/S2, Systolic murmur III/VI on aortic focus.   Pulm: Tenderness on palpation of right chest, improved. Lungs are clear to auscultation.   GI: +BS, obese, soft, nondistended, mildly tender umbilical hernia, reducible, with mild erythema and no discharge noted   Ext: No cyanosis, edema or calf tenderness.   Neuro: AAOx 3  Skin: mildly erythematous skin over umbilical hernia improving.  b/l fungal rash under breasts, no new rashes, erythema or other skin lesions noted.     No new labs     Patient for d/c today to home. Please see discharge papers for details. Plan d/w daughter over the phone

## 2019-04-24 NOTE — DISCHARGE NOTE NURSING/CASE MANAGEMENT/SOCIAL WORK - NSDCFUADDAPPT_GEN_ALL_CORE_FT
Pt uses [facility] in [place] [phone]  Pt has no difficulties obtaining her prescriptions/medications for home.

## 2019-04-24 NOTE — DISCHARGE NOTE NURSING/CASE MANAGEMENT/SOCIAL WORK - NSDCDPATPORTLINK_GEN_ALL_CORE
You can access the [facility] Patient Portal, offered by [facility], by registering with the following website: [email or web address]

## 2019-05-19 NOTE — ED ADULT TRIAGE NOTE - AS HEIGHT TYPE
I have personally seen and examined this patient.  I have fully participated in the care of this patient. I have reviewed all pertinent clinical information, including history, physical exam, plan and the Resident’s note and agree except as noted.
stated

## 2019-12-07 ENCOUNTER — INPATIENT (INPATIENT)
Facility: HOSPITAL | Age: 79
LOS: 6 days | Discharge: HOME | DRG: 689 | End: 2019-12-14
Attending: HOSPITALIST | Admitting: HOSPITALIST
Payer: COMMERCIAL

## 2019-12-07 VITALS
HEART RATE: 70 BPM | TEMPERATURE: 98 F | RESPIRATION RATE: 18 BRPM | OXYGEN SATURATION: 99 % | DIASTOLIC BLOOD PRESSURE: 67 MMHG | WEIGHT: 119.93 LBS | HEIGHT: 60 IN | SYSTOLIC BLOOD PRESSURE: 143 MMHG

## 2019-12-07 DIAGNOSIS — E87.6 HYPOKALEMIA: ICD-10-CM

## 2019-12-07 PROBLEM — I10 ESSENTIAL (PRIMARY) HYPERTENSION: Chronic | Status: ACTIVE | Noted: 2019-04-18

## 2019-12-07 PROBLEM — I48.91 UNSPECIFIED ATRIAL FIBRILLATION: Chronic | Status: ACTIVE | Noted: 2019-04-18

## 2019-12-07 PROBLEM — K46.9 UNSPECIFIED ABDOMINAL HERNIA WITHOUT OBSTRUCTION OR GANGRENE: Chronic | Status: ACTIVE | Noted: 2019-04-25

## 2019-12-07 PROBLEM — E03.9 HYPOTHYROIDISM, UNSPECIFIED: Chronic | Status: ACTIVE | Noted: 2019-04-18

## 2019-12-07 PROBLEM — E78.5 HYPERLIPIDEMIA, UNSPECIFIED: Chronic | Status: ACTIVE | Noted: 2019-04-18

## 2019-12-07 LAB
ALBUMIN SERPL ELPH-MCNC: 3.5 G/DL — SIGNIFICANT CHANGE UP (ref 3.3–5.2)
ALP SERPL-CCNC: 69 U/L — SIGNIFICANT CHANGE UP (ref 40–120)
ALT FLD-CCNC: 49 U/L — HIGH
ANION GAP SERPL CALC-SCNC: 16 MMOL/L — SIGNIFICANT CHANGE UP (ref 5–17)
APPEARANCE UR: ABNORMAL
AST SERPL-CCNC: 55 U/L — HIGH
BACTERIA # UR AUTO: ABNORMAL
BASOPHILS # BLD AUTO: 0.03 K/UL — SIGNIFICANT CHANGE UP (ref 0–0.2)
BASOPHILS NFR BLD AUTO: 0.3 % — SIGNIFICANT CHANGE UP (ref 0–2)
BILIRUB SERPL-MCNC: 0.7 MG/DL — SIGNIFICANT CHANGE UP (ref 0.4–2)
BILIRUB UR-MCNC: NEGATIVE — SIGNIFICANT CHANGE UP
BUN SERPL-MCNC: 29 MG/DL — HIGH (ref 8–20)
CALCIUM SERPL-MCNC: 9.2 MG/DL — SIGNIFICANT CHANGE UP (ref 8.6–10.2)
CHLORIDE SERPL-SCNC: 92 MMOL/L — LOW (ref 98–107)
CO2 SERPL-SCNC: 31 MMOL/L — HIGH (ref 22–29)
COLOR SPEC: YELLOW — SIGNIFICANT CHANGE UP
CREAT SERPL-MCNC: 0.7 MG/DL — SIGNIFICANT CHANGE UP (ref 0.5–1.3)
DIFF PNL FLD: ABNORMAL
EOSINOPHIL # BLD AUTO: 0.04 K/UL — SIGNIFICANT CHANGE UP (ref 0–0.5)
EOSINOPHIL NFR BLD AUTO: 0.3 % — SIGNIFICANT CHANGE UP (ref 0–6)
EPI CELLS # UR: SIGNIFICANT CHANGE UP
GLUCOSE SERPL-MCNC: 109 MG/DL — SIGNIFICANT CHANGE UP (ref 70–115)
GLUCOSE UR QL: NEGATIVE MG/DL — SIGNIFICANT CHANGE UP
HCT VFR BLD CALC: 40.6 % — SIGNIFICANT CHANGE UP (ref 34.5–45)
HGB BLD-MCNC: 12.3 G/DL — SIGNIFICANT CHANGE UP (ref 11.5–15.5)
IMM GRANULOCYTES NFR BLD AUTO: 0.5 % — SIGNIFICANT CHANGE UP (ref 0–1.5)
KETONES UR-MCNC: NEGATIVE — SIGNIFICANT CHANGE UP
LEUKOCYTE ESTERASE UR-ACNC: ABNORMAL
LIDOCAIN IGE QN: 31 U/L — SIGNIFICANT CHANGE UP (ref 22–51)
LYMPHOCYTES # BLD AUTO: 0.89 K/UL — LOW (ref 1–3.3)
LYMPHOCYTES # BLD AUTO: 7.5 % — LOW (ref 13–44)
MCHC RBC-ENTMCNC: 25.6 PG — LOW (ref 27–34)
MCHC RBC-ENTMCNC: 30.3 GM/DL — LOW (ref 32–36)
MCV RBC AUTO: 84.4 FL — SIGNIFICANT CHANGE UP (ref 80–100)
MONOCYTES # BLD AUTO: 0.89 K/UL — SIGNIFICANT CHANGE UP (ref 0–0.9)
MONOCYTES NFR BLD AUTO: 7.5 % — SIGNIFICANT CHANGE UP (ref 2–14)
NEUTROPHILS # BLD AUTO: 9.92 K/UL — HIGH (ref 1.8–7.4)
NEUTROPHILS NFR BLD AUTO: 83.9 % — HIGH (ref 43–77)
NITRITE UR-MCNC: NEGATIVE — SIGNIFICANT CHANGE UP
PH UR: 7 — SIGNIFICANT CHANGE UP (ref 5–8)
PLATELET # BLD AUTO: 269 K/UL — SIGNIFICANT CHANGE UP (ref 150–400)
POTASSIUM SERPL-MCNC: 2.1 MMOL/L — CRITICAL LOW (ref 3.5–5.3)
POTASSIUM SERPL-SCNC: 2.1 MMOL/L — CRITICAL LOW (ref 3.5–5.3)
PROT SERPL-MCNC: 7.2 G/DL — SIGNIFICANT CHANGE UP (ref 6.6–8.7)
PROT UR-MCNC: 30 MG/DL
RBC # BLD: 4.81 M/UL — SIGNIFICANT CHANGE UP (ref 3.8–5.2)
RBC # FLD: 15.7 % — HIGH (ref 10.3–14.5)
RBC CASTS # UR COMP ASSIST: ABNORMAL /HPF (ref 0–4)
SODIUM SERPL-SCNC: 139 MMOL/L — SIGNIFICANT CHANGE UP (ref 135–145)
SP GR SPEC: 1.01 — SIGNIFICANT CHANGE UP (ref 1.01–1.02)
TROPONIN T SERPL-MCNC: <0.01 NG/ML — SIGNIFICANT CHANGE UP (ref 0–0.06)
UROBILINOGEN FLD QL: 1 MG/DL
WBC # BLD: 11.83 K/UL — HIGH (ref 3.8–10.5)
WBC # FLD AUTO: 11.83 K/UL — HIGH (ref 3.8–10.5)
WBC UR QL: >50

## 2019-12-07 PROCEDURE — 71045 X-RAY EXAM CHEST 1 VIEW: CPT | Mod: 26

## 2019-12-07 PROCEDURE — 93010 ELECTROCARDIOGRAM REPORT: CPT

## 2019-12-07 PROCEDURE — 99223 1ST HOSP IP/OBS HIGH 75: CPT

## 2019-12-07 PROCEDURE — 99285 EMERGENCY DEPT VISIT HI MDM: CPT

## 2019-12-07 RX ORDER — ESCITALOPRAM OXALATE 10 MG/1
10 TABLET, FILM COATED ORAL DAILY
Refills: 0 | Status: DISCONTINUED | OUTPATIENT
Start: 2019-12-07 | End: 2019-12-14

## 2019-12-07 RX ORDER — ACETAMINOPHEN 500 MG
650 TABLET ORAL EVERY 6 HOURS
Refills: 0 | Status: DISCONTINUED | OUTPATIENT
Start: 2019-12-07 | End: 2019-12-14

## 2019-12-07 RX ORDER — POTASSIUM CHLORIDE 20 MEQ
40 PACKET (EA) ORAL EVERY 4 HOURS
Refills: 0 | Status: COMPLETED | OUTPATIENT
Start: 2019-12-07 | End: 2019-12-08

## 2019-12-07 RX ORDER — LEVOTHYROXINE SODIUM 125 MCG
25 TABLET ORAL DAILY
Refills: 0 | Status: DISCONTINUED | OUTPATIENT
Start: 2019-12-07 | End: 2019-12-14

## 2019-12-07 RX ORDER — ATORVASTATIN CALCIUM 80 MG/1
10 TABLET, FILM COATED ORAL AT BEDTIME
Refills: 0 | Status: DISCONTINUED | OUTPATIENT
Start: 2019-12-07 | End: 2019-12-14

## 2019-12-07 RX ORDER — CEFTRIAXONE 500 MG/1
1000 INJECTION, POWDER, FOR SOLUTION INTRAMUSCULAR; INTRAVENOUS ONCE
Refills: 0 | Status: COMPLETED | OUTPATIENT
Start: 2019-12-07 | End: 2019-12-07

## 2019-12-07 RX ORDER — SODIUM CHLORIDE 9 MG/ML
1000 INJECTION INTRAMUSCULAR; INTRAVENOUS; SUBCUTANEOUS ONCE
Refills: 0 | Status: COMPLETED | OUTPATIENT
Start: 2019-12-07 | End: 2019-12-07

## 2019-12-07 RX ORDER — MAGNESIUM SULFATE 500 MG/ML
2 VIAL (ML) INJECTION ONCE
Refills: 0 | Status: COMPLETED | OUTPATIENT
Start: 2019-12-07 | End: 2019-12-07

## 2019-12-07 RX ORDER — AMLODIPINE BESYLATE 2.5 MG/1
5 TABLET ORAL DAILY
Refills: 0 | Status: DISCONTINUED | OUTPATIENT
Start: 2019-12-07 | End: 2019-12-14

## 2019-12-07 RX ORDER — APIXABAN 2.5 MG/1
5 TABLET, FILM COATED ORAL
Refills: 0 | Status: DISCONTINUED | OUTPATIENT
Start: 2019-12-07 | End: 2019-12-14

## 2019-12-07 RX ORDER — POTASSIUM CHLORIDE 20 MEQ
10 PACKET (EA) ORAL
Refills: 0 | Status: COMPLETED | OUTPATIENT
Start: 2019-12-07 | End: 2019-12-07

## 2019-12-07 RX ORDER — SODIUM CHLORIDE 9 MG/ML
1000 INJECTION, SOLUTION INTRAVENOUS
Refills: 0 | Status: DISCONTINUED | OUTPATIENT
Start: 2019-12-07 | End: 2019-12-14

## 2019-12-07 RX ORDER — SACCHAROMYCES BOULARDII 250 MG
250 POWDER IN PACKET (EA) ORAL
Refills: 0 | Status: DISCONTINUED | OUTPATIENT
Start: 2019-12-07 | End: 2019-12-14

## 2019-12-07 RX ORDER — POTASSIUM CHLORIDE 20 MEQ
20 PACKET (EA) ORAL DAILY
Refills: 0 | Status: DISCONTINUED | OUTPATIENT
Start: 2019-12-08 | End: 2019-12-14

## 2019-12-07 RX ADMIN — SODIUM CHLORIDE 1000 MILLILITER(S): 9 INJECTION INTRAMUSCULAR; INTRAVENOUS; SUBCUTANEOUS at 16:33

## 2019-12-07 RX ADMIN — Medication 50 GRAM(S): at 18:07

## 2019-12-07 RX ADMIN — SODIUM CHLORIDE 1000 MILLILITER(S): 9 INJECTION INTRAMUSCULAR; INTRAVENOUS; SUBCUTANEOUS at 19:07

## 2019-12-07 RX ADMIN — Medication 100 MILLIEQUIVALENT(S): at 19:17

## 2019-12-07 RX ADMIN — Medication 40 MILLIEQUIVALENT(S): at 22:02

## 2019-12-07 RX ADMIN — Medication 100 MILLIEQUIVALENT(S): at 21:56

## 2019-12-07 RX ADMIN — Medication 100 MILLIEQUIVALENT(S): at 20:36

## 2019-12-07 RX ADMIN — ATORVASTATIN CALCIUM 10 MILLIGRAM(S): 80 TABLET, FILM COATED ORAL at 22:02

## 2019-12-07 NOTE — H&P ADULT - NSICDXPASTMEDICALHX_GEN_ALL_CORE_FT
PAST MEDICAL HISTORY:  Atrial fibrillation     Hernia     HLD (hyperlipidemia)     HTN (hypertension)     Hypothyroid

## 2019-12-07 NOTE — H&P ADULT - NSHPLABSRESULTS_GEN_ALL_CORE
12.3   11.83 )-----------( 269      ( 07 Dec 2019 17:11 )             40.6       139  |  92<L>  |  29.0<H>  ----------------------------<  109  2.1<LL>   |  31.0<H>  |  0.70    Ca    9.2      07 Dec 2019 17:11    TPro  7.2  /  Alb  3.5  /  TBili  0.7  /  DBili  x   /  AST  55<H>  /  ALT  49<H>  /  AlkPhos  69      Urine Microscopic-Add On (NC) (19 @ 17:29)    Red Blood Cell - Urine: 6-10 /HPF    White Blood Cell - Urine: >50    Bacteria: Many    Epithelial Cells: Occasional  Urinalysis Basic - ( 07 Dec 2019 17:29 )    Color: Yellow / Appearance: Slightly Turbid / S.015 / pH: x  Gluc: x / Ketone: Negative  / Bili: Negative / Urobili: 1 mg/dL   Blood: x / Protein: 30 mg/dL / Nitrite: Negative   Leuk Esterase: Moderate / RBC: 6-10 /HPF / WBC >50   Sq Epi: x / Non Sq Epi: Occasional / Bacteria: Many

## 2019-12-07 NOTE — ED PROVIDER NOTE - CARE PLAN
Principal Discharge DX:	Hypokalemia  Secondary Diagnosis:	HTN (hypertension)  Secondary Diagnosis:	HLD (hyperlipidemia)

## 2019-12-07 NOTE — ED ADULT NURSE NOTE - OBJECTIVE STATEMENT
Patient presents to ED for c/o  weakness and disoriented lately. Instructed to come to PMD by Dr. Kramer. Pt had similar sx's when she had a UTI. Denies urinary symptoms. Patient presents to ED A/Ox4, VSS, denies chest pain or sob.  Respirations are even and unlabored, lungs cta, +bowel x4 quads, abdomen soft, nontender/nondistended, skin w/d/i.

## 2019-12-07 NOTE — ED ADULT TRIAGE NOTE - CHIEF COMPLAINT QUOTE
As per daughter pt has been weak and disoriented lately, currently a&ox3. Instructed to come to PMD by Dr. Kramer. Pt had similar sx's when she had a UTI. Denies urinary symptoms.

## 2019-12-07 NOTE — ED PROVIDER NOTE - CHPI ED SYMPTOMS NEG
no headache/no loss of consciousness/no nausea/no pain/no back pain/no dizziness/no fever/no vomiting/no chills

## 2019-12-07 NOTE — ED PROVIDER NOTE - OBJECTIVE STATEMENT
The patient is a 79 year old female presents with generalized weakness similar to her previous UTI  No motor No sensory loss, No fever, No CP, No SOB, No abd pain.

## 2019-12-07 NOTE — H&P ADULT - ASSESSMENT
78 yo fenale with atrial fib , HTN , h/o UTI admitted foir severe hypokalemia , weakness and possible UTI     1- Hypokalemia - severe   replace iv K , Po K ordered   iv fludi with K supplement ordered     2- Weakness -multifactorial [robably due to UTI and hypokalemia   PT ambulate   iv hydration   3- suopected recurrent UTI   blood cx urine cx ordered   start empirical iv rocephin   add florastar     4- Atrial fib   resume ho,me meds , anticoagulation   rate cristina   5- Hypothyroidsm - cont synthroid   will check TSH   DVt prophylaxis on eliquis

## 2019-12-07 NOTE — ED ADULT NURSE NOTE - CHPI ED NUR SYMPTOMS NEG
no nausea/no numbness/no fever/no loss of consciousness/no change in level of consciousness/no vomiting/no blurred vision/no confusion

## 2019-12-07 NOTE — ED PROVIDER NOTE - CLINICAL SUMMARY MEDICAL DECISION MAKING FREE TEXT BOX
The patient presents with generalized weakness and has K of 2.1 and also has UTI and will admit for further evaluation

## 2019-12-07 NOTE — ED ADULT NURSE NOTE - ED STAT RN HANDOFF DETAILS
report given to THAIS Khanna in ED holding room. pt transported to [facility] in stable condition by RN.

## 2019-12-07 NOTE — H&P ADULT - HISTORY OF PRESENT ILLNESS
80 yo kaila was brought to [facility] with complaints of weakness , slight confusion . Per son in law she was having hard time getting up stand since yesterday , noted to be weak and having some intermittent confusion , she is with frequent urination as well, had previous UTI and similar symptoms per family . Pt denies nay fever , no chills ,no urinary burning or pain , admits frequency and smelling urine . She had also admits having right sided abd discomfoart and righ flank lower back pain , + headache . she has no diarrhea , good appetite .

## 2019-12-07 NOTE — ED ADULT TRIAGE NOTE - PRO INTERPRETER NEED 2
Preferred pharmacy verified and updated.    Pt advised to check with pharmacy first before picking up prescriptions.      Pt advised their refill will be addressed within 24-48 hrs.    Please call patient back if any questions, comments or concerns.    Telephone:  [phone]         English

## 2019-12-08 LAB
-  COAGULASE NEGATIVE STAPHYLOCOCCUS: SIGNIFICANT CHANGE UP
ANION GAP SERPL CALC-SCNC: 12 MMOL/L — SIGNIFICANT CHANGE UP (ref 5–17)
BUN SERPL-MCNC: 18 MG/DL — SIGNIFICANT CHANGE UP (ref 8–20)
CALCIUM SERPL-MCNC: 8.2 MG/DL — LOW (ref 8.6–10.2)
CHLORIDE SERPL-SCNC: 104 MMOL/L — SIGNIFICANT CHANGE UP (ref 98–107)
CO2 SERPL-SCNC: 25 MMOL/L — SIGNIFICANT CHANGE UP (ref 22–29)
CREAT SERPL-MCNC: 0.53 MG/DL — SIGNIFICANT CHANGE UP (ref 0.5–1.3)
GLUCOSE SERPL-MCNC: 99 MG/DL — SIGNIFICANT CHANGE UP (ref 70–115)
MAGNESIUM SERPL-MCNC: 2.3 MG/DL — SIGNIFICANT CHANGE UP (ref 1.6–2.6)
METHOD TYPE: SIGNIFICANT CHANGE UP
POTASSIUM SERPL-MCNC: 3.5 MMOL/L — SIGNIFICANT CHANGE UP (ref 3.5–5.3)
POTASSIUM SERPL-SCNC: 3.5 MMOL/L — SIGNIFICANT CHANGE UP (ref 3.5–5.3)
SODIUM SERPL-SCNC: 141 MMOL/L — SIGNIFICANT CHANGE UP (ref 135–145)
TSH SERPL-MCNC: 3.76 UIU/ML — SIGNIFICANT CHANGE UP (ref 0.27–4.2)

## 2019-12-08 PROCEDURE — 99233 SBSQ HOSP IP/OBS HIGH 50: CPT

## 2019-12-08 RX ORDER — IBUPROFEN 200 MG
400 TABLET ORAL EVERY 8 HOURS
Refills: 0 | Status: COMPLETED | OUTPATIENT
Start: 2019-12-08 | End: 2019-12-10

## 2019-12-08 RX ADMIN — Medication 250 MILLIGRAM(S): at 15:08

## 2019-12-08 RX ADMIN — Medication 400 MILLIGRAM(S): at 15:09

## 2019-12-08 RX ADMIN — Medication 250 MILLIGRAM(S): at 06:09

## 2019-12-08 RX ADMIN — Medication 400 MILLIGRAM(S): at 22:30

## 2019-12-08 RX ADMIN — ATORVASTATIN CALCIUM 10 MILLIGRAM(S): 80 TABLET, FILM COATED ORAL at 21:44

## 2019-12-08 RX ADMIN — Medication 400 MILLIGRAM(S): at 18:35

## 2019-12-08 RX ADMIN — Medication 25 MICROGRAM(S): at 06:09

## 2019-12-08 RX ADMIN — Medication 20 MILLIEQUIVALENT(S): at 12:21

## 2019-12-08 RX ADMIN — SODIUM CHLORIDE 80 MILLILITER(S): 9 INJECTION, SOLUTION INTRAVENOUS at 01:32

## 2019-12-08 RX ADMIN — AMLODIPINE BESYLATE 5 MILLIGRAM(S): 2.5 TABLET ORAL at 06:09

## 2019-12-08 RX ADMIN — Medication 40 MILLIEQUIVALENT(S): at 01:36

## 2019-12-08 RX ADMIN — Medication 400 MILLIGRAM(S): at 21:44

## 2019-12-08 RX ADMIN — APIXABAN 5 MILLIGRAM(S): 2.5 TABLET, FILM COATED ORAL at 06:09

## 2019-12-08 RX ADMIN — ESCITALOPRAM OXALATE 10 MILLIGRAM(S): 10 TABLET, FILM COATED ORAL at 12:21

## 2019-12-08 RX ADMIN — APIXABAN 5 MILLIGRAM(S): 2.5 TABLET, FILM COATED ORAL at 15:08

## 2019-12-08 RX ADMIN — Medication 40 MILLIEQUIVALENT(S): at 06:09

## 2019-12-08 NOTE — PHYSICAL THERAPY INITIAL EVALUATION ADULT - ADDITIONAL COMMENTS
Pt. is a  poor historian. Reporting she lives in a house with her daughter and son in law. Reports son in law is at home at all times and able to assist as needed. Pt. with 2 HENNA and one rail and no stairs inside. Pt. reports being modified independent with a RW with all functional skills with exception to stairs (requiring assist) prior to admission. Reporting she does not own any other DME.

## 2019-12-08 NOTE — PROGRESS NOTE ADULT - ASSESSMENT
80 yo  female with HTN , h/o UTI admittedc for hypokalemia , UTI weakness       1- UTI - gr neg   recurrent infection with some flank pain   will get CT of abd pelvis to evaluate for pyelonephritis'    2- severe hypokalemia   replaced , K is better     3- HTN - controlled on current medication

## 2019-12-08 NOTE — ED ADULT NURSE REASSESSMENT NOTE - NS ED NURSE REASSESS COMMENT FT1
pt cleaned and changed into clean gown and clean sheets placed on stretcher. pt tolerated well. will continue to reassess.

## 2019-12-08 NOTE — ED ADULT NURSE REASSESSMENT NOTE - NS ED NURSE REASSESS COMMENT FT1
pt c/o L lateral upper thigh pain. tender to palpation. no redness, swelling or warmth noted. medicine PA made aware. awaiting further orders.

## 2019-12-08 NOTE — PROGRESS NOTE ADULT - SUBJECTIVE AND OBJECTIVE BOX
Internal Medicine Hospitalist Progress Note    seen examined , c/o left sided hip back pain   no nausea , no diarrhea         ROS: as above, all remaining ROS are negative.       BACKGROUND:  MEDICATIONS  (STANDING):  amLODIPine   Tablet 5 milliGRAM(s) Oral daily  apixaban 5 milliGRAM(s) Oral two times a day  atorvastatin 10 milliGRAM(s) Oral at bedtime  escitalopram 10 milliGRAM(s) Oral daily  ibuprofen  Tablet. 400 milliGRAM(s) Oral every 8 hours  lactated ringers 1000 milliLiter(s) (80 mL/Hr) IV Continuous <Continuous>  levothyroxine 25 MICROGram(s) Oral daily  potassium chloride    Tablet ER 20 milliEquivalent(s) Oral daily  saccharomyces boulardii 250 milliGRAM(s) Oral two times a day    MEDICATIONS  (PRN):  acetaminophen   Tablet .. 650 milliGRAM(s) Oral every 6 hours PRN Moderate Pain (4 - 6)    Allergies    No Known Allergies    Intolerances            VITALS:  Vital Signs Last 24 Hrs  T(C): 36.6 (08 Dec 2019 09:30), Max: 36.8 (08 Dec 2019 06:03)  T(F): 97.8 (08 Dec 2019 09:30), Max: 98.3 (08 Dec 2019 06:03)  HR: 64 (08 Dec 2019 09:30) (59 - 64)  BP: 108/58 (08 Dec 2019 09:30) (108/58 - 150/66)  BP(mean): --  RR: 18 (08 Dec 2019 09:30) (18 - 18)  SpO2: 96% (08 Dec 2019 09:30) (92% - 97%) Daily     Daily   CAPILLARY BLOOD GLUCOSE        I&O's Summary      PHYSICAL EXAM:      Constitutional: awake alert , no distress    Respiratory: CTA bilatearl     Cardiovascular: regualr s1 s2   systolic murmur     Gastrointestinal: soft no tenderness , Bs positive       Extremities: no =edema     V      LABS:                        12.3   11.83 )-----------( 269      ( 07 Dec 2019 17:11 )             40.6     12-08    141  |  104  |  18.0  ----------------------------<  99  3.5   |  25.0  |  0.53    Ca    8.2<L>      08 Dec 2019 07:50  Mg     2.3     12-08    TPro  7.2  /  Alb  3.5  /  TBili  0.7  /  DBili  x   /  AST  55<H>  /  ALT  49<H>  /  AlkPhos  69  12-07    Culture - Urine (12.07.19 @ 17:28)    Specimen Source: .Urine    Culture Results:   >100,000 CFU/ml Gram Negative Rods Identification and susceptibility to  follow.  Culture in progress        Radiology :

## 2019-12-08 NOTE — CHART NOTE - NSCHARTNOTEFT_GEN_A_CORE
PA note    Called to evaluate patient with left upper thigh pain. Patient states she was sitting for a long time today on the toilet and she thinks that's why she has this pain. Pain does not radiate.  Pain is 4-5/10. Has not had a similar pain in the past,    Left leg: no edema, no ecchymosis noted. Positive mild tenderness to touch. FROM    A/P: Left leg pain         - Tylenol 650mg PO now          - Con't to monitor         - Call PA as needed

## 2019-12-09 LAB
-  AMIKACIN: SIGNIFICANT CHANGE UP
-  AMPICILLIN/SULBACTAM: SIGNIFICANT CHANGE UP
-  AMPICILLIN: SIGNIFICANT CHANGE UP
-  AZTREONAM: SIGNIFICANT CHANGE UP
-  CEFAZOLIN: SIGNIFICANT CHANGE UP
-  CEFEPIME: SIGNIFICANT CHANGE UP
-  CEFOXITIN: SIGNIFICANT CHANGE UP
-  CEFTRIAXONE: SIGNIFICANT CHANGE UP
-  CIPROFLOXACIN: SIGNIFICANT CHANGE UP
-  CLINDAMYCIN: SIGNIFICANT CHANGE UP
-  CLINDAMYCIN: SIGNIFICANT CHANGE UP
-  ERTAPENEM: SIGNIFICANT CHANGE UP
-  ERYTHROMYCIN: SIGNIFICANT CHANGE UP
-  ERYTHROMYCIN: SIGNIFICANT CHANGE UP
-  GENTAMICIN: SIGNIFICANT CHANGE UP
-  IMIPENEM: SIGNIFICANT CHANGE UP
-  LEVOFLOXACIN: SIGNIFICANT CHANGE UP
-  MEROPENEM: SIGNIFICANT CHANGE UP
-  NITROFURANTOIN: SIGNIFICANT CHANGE UP
-  OXACILLIN: SIGNIFICANT CHANGE UP
-  OXACILLIN: SIGNIFICANT CHANGE UP
-  PENICILLIN: SIGNIFICANT CHANGE UP
-  PENICILLIN: SIGNIFICANT CHANGE UP
-  PIPERACILLIN/TAZOBACTAM: SIGNIFICANT CHANGE UP
-  RIFAMPIN: SIGNIFICANT CHANGE UP
-  RIFAMPIN: SIGNIFICANT CHANGE UP
-  TETRACYCLINE: SIGNIFICANT CHANGE UP
-  TETRACYCLINE: SIGNIFICANT CHANGE UP
-  TIGECYCLINE: SIGNIFICANT CHANGE UP
-  TOBRAMYCIN: SIGNIFICANT CHANGE UP
-  TRIMETHOPRIM/SULFAMETHOXAZOLE: SIGNIFICANT CHANGE UP
-  VANCOMYCIN: SIGNIFICANT CHANGE UP
-  VANCOMYCIN: SIGNIFICANT CHANGE UP
CULTURE RESULTS: SIGNIFICANT CHANGE UP
HCT VFR BLD CALC: 34.4 % — LOW (ref 34.5–45)
HGB BLD-MCNC: 10.6 G/DL — LOW (ref 11.5–15.5)
MCHC RBC-ENTMCNC: 26.2 PG — LOW (ref 27–34)
MCHC RBC-ENTMCNC: 30.8 GM/DL — LOW (ref 32–36)
MCV RBC AUTO: 85.1 FL — SIGNIFICANT CHANGE UP (ref 80–100)
METHOD TYPE: SIGNIFICANT CHANGE UP
ORGANISM # SPEC MICROSCOPIC CNT: SIGNIFICANT CHANGE UP
ORGANISM # SPEC MICROSCOPIC CNT: SIGNIFICANT CHANGE UP
PLATELET # BLD AUTO: 283 K/UL — SIGNIFICANT CHANGE UP (ref 150–400)
RBC # BLD: 4.04 M/UL — SIGNIFICANT CHANGE UP (ref 3.8–5.2)
RBC # FLD: 15.9 % — HIGH (ref 10.3–14.5)
SPECIMEN SOURCE: SIGNIFICANT CHANGE UP
WBC # BLD: 10.5 K/UL — SIGNIFICANT CHANGE UP (ref 3.8–10.5)
WBC # FLD AUTO: 10.5 K/UL — SIGNIFICANT CHANGE UP (ref 3.8–10.5)

## 2019-12-09 PROCEDURE — 99223 1ST HOSP IP/OBS HIGH 75: CPT

## 2019-12-09 PROCEDURE — 99232 SBSQ HOSP IP/OBS MODERATE 35: CPT

## 2019-12-09 RX ORDER — LISINOPRIL 2.5 MG/1
5 TABLET ORAL
Refills: 0 | Status: DISCONTINUED | OUTPATIENT
Start: 2019-12-09 | End: 2019-12-14

## 2019-12-09 RX ORDER — CEFTRIAXONE 500 MG/1
1000 INJECTION, POWDER, FOR SOLUTION INTRAMUSCULAR; INTRAVENOUS EVERY 24 HOURS
Refills: 0 | Status: DISCONTINUED | OUTPATIENT
Start: 2019-12-09 | End: 2019-12-14

## 2019-12-09 RX ADMIN — Medication 25 MICROGRAM(S): at 05:33

## 2019-12-09 RX ADMIN — LISINOPRIL 5 MILLIGRAM(S): 2.5 TABLET ORAL at 13:39

## 2019-12-09 RX ADMIN — Medication 400 MILLIGRAM(S): at 22:25

## 2019-12-09 RX ADMIN — ESCITALOPRAM OXALATE 10 MILLIGRAM(S): 10 TABLET, FILM COATED ORAL at 10:47

## 2019-12-09 RX ADMIN — LISINOPRIL 5 MILLIGRAM(S): 2.5 TABLET ORAL at 21:56

## 2019-12-09 RX ADMIN — AMLODIPINE BESYLATE 5 MILLIGRAM(S): 2.5 TABLET ORAL at 05:32

## 2019-12-09 RX ADMIN — Medication 400 MILLIGRAM(S): at 21:55

## 2019-12-09 RX ADMIN — APIXABAN 5 MILLIGRAM(S): 2.5 TABLET, FILM COATED ORAL at 05:33

## 2019-12-09 RX ADMIN — Medication 400 MILLIGRAM(S): at 07:31

## 2019-12-09 RX ADMIN — Medication 250 MILLIGRAM(S): at 05:33

## 2019-12-09 RX ADMIN — Medication 400 MILLIGRAM(S): at 13:39

## 2019-12-09 RX ADMIN — Medication 20 MILLIEQUIVALENT(S): at 10:48

## 2019-12-09 RX ADMIN — Medication 250 MILLIGRAM(S): at 17:37

## 2019-12-09 RX ADMIN — Medication 400 MILLIGRAM(S): at 15:26

## 2019-12-09 RX ADMIN — Medication 400 MILLIGRAM(S): at 05:33

## 2019-12-09 RX ADMIN — CEFTRIAXONE 100 MILLIGRAM(S): 500 INJECTION, POWDER, FOR SOLUTION INTRAMUSCULAR; INTRAVENOUS at 10:47

## 2019-12-09 RX ADMIN — APIXABAN 5 MILLIGRAM(S): 2.5 TABLET, FILM COATED ORAL at 17:37

## 2019-12-09 RX ADMIN — ATORVASTATIN CALCIUM 10 MILLIGRAM(S): 80 TABLET, FILM COATED ORAL at 21:58

## 2019-12-09 NOTE — PROGRESS NOTE ADULT - SUBJECTIVE AND OBJECTIVE BOX
INFECTIOUS DISEASES AND INTERNAL MEDICINE at [place]  =======================================================  Darius Grubbs MD  Diplomates American Board of Internal Medicine and Infectious Diseases  Telephone [phone]  Fax            [phone]  =======================================================    DONNA [id number]      HPI:  80 yo kaila was brought to [facility] with complaints of weakness , slight confusion . Per son in law she was having hard time getting up stand   , noted to be weak and having some intermittent confusion , she is with frequent urination as well, had previous UTI and similar symptoms per family . Pt denies   fever , no chills ,no urinary burning or pain , admits frequency and smelling urine . She had also admits having right sided abd discomfoart and righ flank lower back pain , + headache . she has no diarrhea , good appetite .  URINE CX WITH GM NEG RODS ECOLI BLOOD CX GM POS COCCI IN CLUSTERS   ASKED TO EVALUATE FROM ID STANDPOINT       PAST MEDICAL & SURGICAL HISTORY:  Hernia  HLD (hyperlipidemia)  Hypothyroid  HTN (hypertension)  Atrial fibrillation  No significant past surgical history      ANTIBIOTICS  cefTRIAXone   IVPB 1000 milliGRAM(s) IV Intermittent every 24 hours      Allergies    No Known Allergies    Intolerances        SOCIAL HISTORY:     FAMILY HX   FAMILY HISTORY:  FH: lung cancer: mother  FH: breast cancer: mother,  at age 50&#x27;s  FH: pancreatic cancer: father, in his 70&#x27;s       Vital Signs Last 24 Hrs  T(C): 36.7 (09 Dec 2019 07:29), Max: 36.9 (08 Dec 2019 15:35)  T(F): 98 (09 Dec 2019 07:29), Max: 98.5 (08 Dec 2019 15:35)  HR: 57 (09 Dec 2019 07:29) (52 - 66)  BP: 166/72 (09 Dec 2019 07:29) (128/68 - 166/72)  BP(mean): --  RR: 18 (09 Dec 2019 07:29) (17 - 18)  SpO2: 91% (09 Dec 2019 07:29) (91% - 97%)  Drug Dosing Weight  Height (cm): 152.4 (07 Dec 2019 14:34)  Weight (kg): 54.4 (07 Dec 2019 14:34)  BMI (kg/m2): 23.4 (07 Dec 2019 14:34)  BSA (m2): 1.5 (07 Dec 2019 14:34)      REVIEW OF SYSTEMS:    CONSTITUTIONAL:  As per HPI.    HEENT:  Eyes:  No diplopia or blurred vision. ENT:  No earache, sore throat or runny nose.    CARDIOVASCULAR:  No pressure, squeezing, strangling, tightness, heaviness or aching about the chest, neck, axilla or epigastrium.    RESPIRATORY:  No cough, shortness of breath, PND or orthopnea.    GASTROINTESTINAL:  No nausea, vomiting or diarrhea.    GENITOURINARY:  No dysuria, frequency or urgency.    MUSCULOSKELETAL:  As per HPI.    SKIN:  No change in skin, hair or nails.    NEUROLOGIC:  No paresthesias, fasciculations, seizures or weakness.                  PHYSICAL EXAMINATION:    GENERAL: The patient is a well-developed, well-nourished _____in no apparent distress. ___ is alert and oriented x3.    VITAL SIGNS: T(C): 36.7 (19 @ 07:29), Max: 36.9 (19 @ 15:35)  HR: 57 (19 @ 07:29) (52 - 66)  BP: 166/72 (19 @ 07:29) (128/68 - 166/72)  RR: 18 (19 @ 07:29) (17 - 18)  SpO2: 91% (19 @ 07:29) (91% - 97%)  Wt(kg): --    HEENT: Head is normocephalic and atraumatic.  ANICTERIC  NECK: Supple. No carotid bruits.  No lymphadenopathy or thyromegaly.    LUNGS:COARSE BREATH SOUNDS    HEART: Regular rate and rhythm without murmur.    ABDOMEN: Soft, nontender, and nondistended.  Positive bowel sounds.  No hepatosplenomegaly was noted. NO REBOUND NO GUARDING    EXTREMITIES: NO EDEMA NO ERYTHEMA    NEUROLOGIC: NON FOCAL      SKIN: No ulceration or induration present. NO RASH        BLOOD CULTURES  Culture Results:   Growth in aerobic bottle: Gram Positive Cocci in Clusters  Aerobic Bottle: 19:24 Hours to positivity  Anaerobic Bottle: No growth to date  .  ***Blood Panel PCR results on this specimen are available  approximately 3 hours after the Gram stain result.***  Gram stain, PCR, and/or culture results may not always  correspond due to difference in methodologies.  ************************************************************  This PCR assay was performed using [employer].  The following targets are tested for: Enterococcus,  vancomycin resistant enterococci, Listeria monocytogenes,  coagulase negative staphylococci, S. aureus,  methicillin resistant S. aureus, Streptococcus agalactiae  (Group B), S. pneumoniae, S. pyogenes (Group A),  Acinetobacter baumannii, Enterobacter cloacae, E. coli,  Klebsiella oxytoca, K. pneumoniae, Proteus sp.,  Serratia marcescens, Haemophilus influenzae,  Neisseria meningitidis, Pseudomonas aeruginosa, Candida  albicans, C. glabrata, C krusei, C parapsilosis,  C. tropicalis and the KPC resistance gene.  "Due to technical problems, Proteus sp. will Not be reported as part of  the BCID panel until further notice"  .  TYPE: (C=Critical, N=Notification, A=Abnormal) C  TESTS:  _ BLD   DATE/TIME CALLED: _ 2019 17:20:43  CALLED TO: Naomi SCHUSTER RN  READ BACK (2 Patient Identifiers)(Y/N): _ Y  READ BACK VALUES (Y/N): _ Y  CALLED BY: Naomi BA ( @ 19:49)  Culture Results:   Growth in aerobic bottle: Gram Positive Cocci in Clusters  Growth in anaerobic bottle: Gram Positive Cocci in Clusters  Aerobic Bottle: 20:35 Hours to positivity  Anaerobic Bottle: 25:52 Hours to positivity  .  TYPE: (C=Critical, N=Notification, A=Abnormal) C  TESTS:  _ BLD GS  DATE/TIME CALLED: _ 2019 17:20:05  CALLED TO: _ ISMAEL SCHUSTERRN  READ BACK (2 Patient Identifiers)(Y/N): _ Y  READ BACK VALUES (Y/N): _ Y  CALLED BY: Naomi BA ( @ 19:48)  Culture Results:   >100,000 CFU/ml Escherichia coli ( @ 17:28)       URINE CX          LABS:                        10.6   10.50 )-----------( 283      ( 09 Dec 2019 07:08 )             34.4         141  |  104  |  18.0  ----------------------------<  99  3.5   |  25.0  |  0.53    Ca    8.2<L>      08 Dec 2019 07:50  Mg     2.3         TPro  7.2  /  Alb  3.5  /  TBili  0.7  /  DBili  x   /  AST  55<H>  /  ALT  49<H>  /  AlkPhos  69        Urinalysis Basic - ( 07 Dec 2019 17:29 )    Color: Yellow / Appearance: Slightly Turbid / S.015 / pH: x  Gluc: x / Ketone: Negative  / Bili: Negative / Urobili: 1 mg/dL   Blood: x / Protein: 30 mg/dL / Nitrite: Negative   Leuk Esterase: Moderate / RBC: 6-10 /HPF / WBC >50   Sq Epi: x / Non Sq Epi: Occasional / Bacteria: Many        RADIOLOGY & ADDITIONAL STUDIES:      ASSESSMENT/PLAN    80 yo kaila was brought to [facility] with complaints of weakness , slight confusion . Per son in law she was having hard time getting up stand   , noted to be weak and having some intermittent confusion , she is with frequent urination as well, had previous UTI and similar symptoms per family . Pt denies   fever , no chills ,no urinary burning or pain , admits frequency and smelling urine . She had also admits having right sided abd discomfoart and righ flank lower back pain , + headache . she has no diarrhea , good appetite .  URINE CX WITH GM NEG RODS ECOLI BLOOD CX GM POS COCCI IN CLUSTERS   BLOOD CX COAG NEG STAPH MOST LIKELY CONTAMINANT  WILL CONTINUE ROCEPHIN FOR NOW WILL FOLLOWUP WITH FURTHER RECOMMENDATIONS  D/W HOSPITALIST                 ELAN HOWE MD

## 2019-12-09 NOTE — PROGRESS NOTE ADULT - SUBJECTIVE AND OBJECTIVE BOX
Internal Medicine Hospitalist Progress Note    seen examined , c/o left sided hip thigh pain   weak , can not sit upright   ID consulted for positive blood cx     pt si comfortable , no distress       Vital Signs Last 24 Hrs  T(C): 36.7 (09 Dec 2019 11:07), Max: 36.9 (08 Dec 2019 15:35)  T(F): 98.1 (09 Dec 2019 11:07), Max: 98.5 (08 Dec 2019 15:35)  HR: 61 (09 Dec 2019 11:07) (52 - 66)  BP: 136/60 (09 Dec 2019 11:07) (128/68 - 166/72)  BP(mean): --  RR: 18 (09 Dec 2019 11:07) (17 - 18)  SpO2: 97% (09 Dec 2019 11:07) (91% - 97%)        PHYSICAL EXAM:      Constitutional: awake alert , no distress    Respiratory: CTA bilatearl     Cardiovascular: regualr s1 s2     systolic murmur     Gastrointestinal: soft no tenderness , Bs positive       Extremities: no pretibial edema     Skin : normal color no rash                            10.6   10.50 )-----------( 283      ( 09 Dec 2019 07:08 )             34.4   12-08    141  |  104  |  18.0  ----------------------------<  99  3.5   |  25.0  |  0.53    Ca    8.2<L>      08 Dec 2019 07:50  Mg     2.3     12-08    TPro  7.2  /  Alb  3.5  /  TBili  0.7  /  DBili  x   /  AST  55<H>  /  ALT  49<H>  /  AlkPhos  69  12-07      Specimen Source: .Urine    Culture Results:   >100,000 CFU/ml Gram Negative Rods Identification and susceptibility to  follow.  Culture in progress    Culture - Blood (12.07.19 @ 19:49)    -  Coagulase negative Staphylococcus: Detec    Specimen Source: .Blood    Organism: Blood Culture PCR    Culture Results:   Growth in aerobic bottle: Gram Positive Cocci in Clusters  Aerobic Bottle: 19:24 Hours to positivity  Anaerobic Bottle: No growth to date  .  ***Blood Panel PCR results on this specimen are available  approximately 3 hours after the Gram stain result.***  Gram stain, PCR, and/or culture results may not always  correspond due to difference in methodologies.  ************************************************************  This PCR assay was performed using [employer].  The following targets are tested for: Enterococcus,  vancomycin resistant enterococci, Listeria monocytogenes,  coagulase negative staphylococci, S. aureus,  methicillin resistant S. aureus, Streptococcus agalactiae  (Group B), S. pneumoniae, S. pyogenes (Group A),  Acinetobacter baumannii, Enterobacter cloacae, E. coli,  Klebsiella oxytoca, K. pneumoniae, Proteus sp.,  Serratia marcescens, Haemophilus influenzae,  Neisseria meningitidis, Pseudomonas aeruginosa, Candida  albicans, C. glabrata, C krusei, C parapsilosis,  C. tropicalis and the KPC resistance gene.  "Due to technical problems, Proteus sp. will Not be reported as part of  the BCID panel until further notice"  .  TYPE: (C=Critical, N=Notification, A=Abnormal) C  TESTS:  _ BLD   DATE/TIME CALLED: _ 12/08/2019 17:20:43  CALLED TO: Naomi SCHUSTER RN  READ BACK (2 Patient Identifiers)(Y/N): _ Y  READ BACK VALUES (Y/N): _ Y  CALLED BY: Naomi BA    Organism Identification: Blood Culture PCR    Method Type: PCR        Radiology :

## 2019-12-09 NOTE — PROGRESS NOTE ADULT - ASSESSMENT
78 yo  female with HTN , h/o UTI admittedc for hypokalemia , UTI weakness , blood cx coag neg staph positive , ? suspected contamination Id consulted , blood cx reordered       1- UTI - gr neg infection   final reports are pending   coint empirical iv rocephin    recurrent infection with some flank pain    CT of abd pelvis to evaluate for pyelonephritis'  2- Positive blood cx = coag staph   ? contamination   repeat cx today ordered   afebrile     3-severe hypokalemia   replaced , K is improved   monitor   4- Atrial fib on - eliquis   cont amio   4- HTN - controlled on Norvasc   restart lisinopril home meds ( held on admission due to low soft BP )

## 2019-12-09 NOTE — CONSULT NOTE ADULT - SUBJECTIVE AND OBJECTIVE BOX
DONNA [id number]      HPI:  78 yo kaila was brought to [facility] with complaints of weakness , slight confusion . Per son in law she was having hard time getting up stand   , noted to be weak and having some intermittent confusion , she is with frequent urination as well, had previous UTI and similar symptoms per family . Pt denies   fever , no chills ,no urinary burning or pain , admits frequency and smelling urine . She had also admits having right sided abd discomfoart and righ flank lower back pain , + headache . she has no diarrhea , good appetite .  URINE CX WITH GM NEG RODS ECOLI BLOOD CX GM POS COCCI IN CLUSTERS   ASKED TO EVALUATE FROM ID STANDPOINT       PAST MEDICAL & SURGICAL HISTORY:  Hernia  HLD (hyperlipidemia)  Hypothyroid  HTN (hypertension)  Atrial fibrillation  No significant past surgical history      ANTIBIOTICS  cefTRIAXone   IVPB 1000 milliGRAM(s) IV Intermittent every 24 hours      Allergies    No Known Allergies    Intolerances        SOCIAL HISTORY:     FAMILY HX   FAMILY HISTORY:  FH: lung cancer: mother  FH: breast cancer: mother,  at age 50&#x27;s  FH: pancreatic cancer: father, in his 70&#x27;s       Vital Signs Last 24 Hrs  T(C): 36.7 (09 Dec 2019 07:29), Max: 36.9 (08 Dec 2019 15:35)  T(F): 98 (09 Dec 2019 07:29), Max: 98.5 (08 Dec 2019 15:35)  HR: 57 (09 Dec 2019 07:29) (52 - 66)  BP: 166/72 (09 Dec 2019 07:29) (128/68 - 166/72)  BP(mean): --  RR: 18 (09 Dec 2019 07:29) (17 - 18)  SpO2: 91% (09 Dec 2019 07:29) (91% - 97%)  Drug Dosing Weight  Height (cm): 152.4 (07 Dec 2019 14:34)  Weight (kg): 54.4 (07 Dec 2019 14:34)  BMI (kg/m2): 23.4 (07 Dec 2019 14:34)  BSA (m2): 1.5 (07 Dec 2019 14:34)      REVIEW OF SYSTEMS:    CONSTITUTIONAL:  As per HPI.    HEENT:  Eyes:  No diplopia or blurred vision. ENT:  No earache, sore throat or runny nose.    CARDIOVASCULAR:  No pressure, squeezing, strangling, tightness, heaviness or aching about the chest, neck, axilla or epigastrium.    RESPIRATORY:  No cough, shortness of breath, PND or orthopnea.    GASTROINTESTINAL:  No nausea, vomiting or diarrhea.    GENITOURINARY:  No dysuria, frequency or urgency.    MUSCULOSKELETAL:  As per HPI.    SKIN:  No change in skin, hair or nails.    NEUROLOGIC:  No paresthesias, fasciculations, seizures or weakness.                  PHYSICAL EXAMINATION:    GENERAL: The patient is a well-developed, well-nourished _____in no apparent distress. ___ is alert and oriented x3.    VITAL SIGNS: T(C): 36.7 (19 @ 07:29), Max: 36.9 (19 @ 15:35)  HR: 57 (19 @ 07:29) (52 - 66)  BP: 166/72 (19 @ 07:29) (128/68 - 166/72)  RR: 18 (19 @ 07:29) (17 - 18)  SpO2: 91% (19 @ 07:29) (91% - 97%)  Wt(kg): --    HEENT: Head is normocephalic and atraumatic.  ANICTERIC  NECK: Supple. No carotid bruits.  No lymphadenopathy or thyromegaly.    LUNGS:COARSE BREATH SOUNDS    HEART: Regular rate and rhythm without murmur.    ABDOMEN: Soft, nontender, and nondistended.  Positive bowel sounds.  No hepatosplenomegaly was noted. NO REBOUND NO GUARDING    EXTREMITIES: NO EDEMA NO ERYTHEMA    NEUROLOGIC: NON FOCAL      SKIN: No ulceration or induration present. NO RASH        BLOOD CULTURES  Culture Results:   Growth in aerobic bottle: Gram Positive Cocci in Clusters  Aerobic Bottle: 19:24 Hours to positivity  Anaerobic Bottle: No growth to date  .  ***Blood Panel PCR results on this specimen are available  approximately 3 hours after the Gram stain result.***  Gram stain, PCR, and/or culture results may not always  correspond due to difference in methodologies.  ************************************************************  This PCR assay was performed using [employer].  The following targets are tested for: Enterococcus,  vancomycin resistant enterococci, Listeria monocytogenes,  coagulase negative staphylococci, S. aureus,  methicillin resistant S. aureus, Streptococcus agalactiae  (Group B), S. pneumoniae, S. pyogenes (Group A),  Acinetobacter baumannii, Enterobacter cloacae, E. coli,  Klebsiella oxytoca, K. pneumoniae, Proteus sp.,  Serratia marcescens, Haemophilus influenzae,  Neisseria meningitidis, Pseudomonas aeruginosa, Candida  albicans, C. glabrata, C krusei, C parapsilosis,  C. tropicalis and the KPC resistance gene.  "Due to technical problems, Proteus sp. will Not be reported as part of  the BCID panel until further notice"  .  TYPE: (C=Critical, N=Notification, A=Abnormal) C  TESTS:  _ BLD   DATE/TIME CALLED: _ 2019 17:20:43  CALLED TO: Naomi SCHUSTER RN  READ BACK (2 Patient Identifiers)(Y/N): _ Y  READ BACK VALUES (Y/N): _ Y  CALLED BY: _ PullE ( @ 19:49)  Culture Results:   Growth in aerobic bottle: Gram Positive Cocci in Clusters  Growth in anaerobic bottle: Gram Positive Cocci in Clusters  Aerobic Bottle: 20:35 Hours to positivity  Anaerobic Bottle: 25:52 Hours to positivity  .  TYPE: (C=Critical, N=Notification, A=Abnormal) C  TESTS:  _ BLD   DATE/TIME CALLED: _ 2019 17:20:05  CALLED TO: Naomi SCHUSTER RN  READ BACK (2 Patient Identifiers)(Y/N): _ Y  READ BACK VALUES (Y/N): _ Y  CALLED BY: _ SMAHME ( @ 19:48)  Culture Results:   >100,000 CFU/ml Escherichia coli ( @ 17:28)       URINE CX          LABS:                        10.6   10.50 )-----------( 283      ( 09 Dec 2019 07:08 )             34.4         141  |  104  |  18.0  ----------------------------<  99  3.5   |  25.0  |  0.53    Ca    8.2<L>      08 Dec 2019 07:50  Mg     2.3         TPro  7.2  /  Alb  3.5  /  TBili  0.7  /  DBili  x   /  AST  55<H>  /  ALT  49<H>  /  AlkPhos  69        Urinalysis Basic - ( 07 Dec 2019 17:29 )    Color: Yellow / Appearance: Slightly Turbid / S.015 / pH: x  Gluc: x / Ketone: Negative  / Bili: Negative / Urobili: 1 mg/dL   Blood: x / Protein: 30 mg/dL / Nitrite: Negative   Leuk Esterase: Moderate / RBC: 6-10 /HPF / WBC >50   Sq Epi: x / Non Sq Epi: Occasional / Bacteria: Many        RADIOLOGY & ADDITIONAL STUDIES:      ASSESSMENT/PLAN    78 yo kaila was brought to [facility] with complaints of weakness , slight confusion . Per son in law she was having hard time getting up stand   , noted to be weak and having some intermittent confusion , she is with frequent urination as well, had previous UTI and similar symptoms per family . Pt denies   fever , no chills ,no urinary burning or pain , admits frequency and smelling urine . She had also admits having right sided abd discomfoart and righ flank lower back pain , + headache . she has no diarrhea , good appetite .  URINE CX WITH GM NEG RODS ECOLI BLOOD CX GM POS COCCI IN CLUSTERS   BLOOD CX COAG NEG STAPH MOST LIKELY CONTAMINANT  WILL CONTINUE ROCEPHIN FOR NOW WILL FOLLOWUP WITH FURTHER RECOMMENDATIONS  D/W HOSPITALIST                 ELAN HOWE MD      Electronic Signatures:  Elan Howe)  (Signed 09-Dec-2019 10:45)  	Authored: Progress Note, Reason for Admission, Subjective and Objective      Last Updated: 09-Dec-2019 10:45 by Elan Howe)

## 2019-12-10 LAB
CULTURE RESULTS: SIGNIFICANT CHANGE UP
ORGANISM # SPEC MICROSCOPIC CNT: SIGNIFICANT CHANGE UP
ORGANISM # SPEC MICROSCOPIC CNT: SIGNIFICANT CHANGE UP
SPECIMEN SOURCE: SIGNIFICANT CHANGE UP

## 2019-12-10 PROCEDURE — 74176 CT ABD & PELVIS W/O CONTRAST: CPT | Mod: 26

## 2019-12-10 PROCEDURE — 99232 SBSQ HOSP IP/OBS MODERATE 35: CPT

## 2019-12-10 RX ORDER — KETOROLAC TROMETHAMINE 30 MG/ML
15 SYRINGE (ML) INJECTION EVERY 8 HOURS
Refills: 0 | Status: DISCONTINUED | OUTPATIENT
Start: 2019-12-10 | End: 2019-12-11

## 2019-12-10 RX ADMIN — APIXABAN 5 MILLIGRAM(S): 2.5 TABLET, FILM COATED ORAL at 05:18

## 2019-12-10 RX ADMIN — ATORVASTATIN CALCIUM 10 MILLIGRAM(S): 80 TABLET, FILM COATED ORAL at 20:38

## 2019-12-10 RX ADMIN — APIXABAN 5 MILLIGRAM(S): 2.5 TABLET, FILM COATED ORAL at 20:38

## 2019-12-10 RX ADMIN — ESCITALOPRAM OXALATE 10 MILLIGRAM(S): 10 TABLET, FILM COATED ORAL at 11:04

## 2019-12-10 RX ADMIN — Medication 250 MILLIGRAM(S): at 05:18

## 2019-12-10 RX ADMIN — Medication 15 MILLIGRAM(S): at 16:43

## 2019-12-10 RX ADMIN — LISINOPRIL 5 MILLIGRAM(S): 2.5 TABLET ORAL at 20:41

## 2019-12-10 RX ADMIN — Medication 15 MILLIGRAM(S): at 20:38

## 2019-12-10 RX ADMIN — Medication 15 MILLIGRAM(S): at 13:29

## 2019-12-10 RX ADMIN — Medication 25 MICROGRAM(S): at 05:18

## 2019-12-10 RX ADMIN — Medication 400 MILLIGRAM(S): at 05:16

## 2019-12-10 RX ADMIN — Medication 250 MILLIGRAM(S): at 20:38

## 2019-12-10 RX ADMIN — AMLODIPINE BESYLATE 5 MILLIGRAM(S): 2.5 TABLET ORAL at 05:18

## 2019-12-10 RX ADMIN — Medication 15 MILLIGRAM(S): at 22:44

## 2019-12-10 RX ADMIN — LISINOPRIL 5 MILLIGRAM(S): 2.5 TABLET ORAL at 05:18

## 2019-12-10 RX ADMIN — Medication 400 MILLIGRAM(S): at 05:46

## 2019-12-10 RX ADMIN — CEFTRIAXONE 100 MILLIGRAM(S): 500 INJECTION, POWDER, FOR SOLUTION INTRAMUSCULAR; INTRAVENOUS at 11:04

## 2019-12-10 RX ADMIN — Medication 20 MILLIEQUIVALENT(S): at 11:04

## 2019-12-10 NOTE — PROGRESS NOTE ADULT - ASSESSMENT
80 yo kaila was brought to [facility] with complaints of weakness , slight confusion . Per son in law she was having hard time getting up stand   , noted to be weak and having some intermittent confusion , she is with frequent urination as well, had previous UTI and similar symptoms per family . Pt denies   fever , no chills ,no urinary burning or pain , admits frequency and smelling urine . She had also admits having right sided abd discomfoart and righ flank lower back pain , + headache . she has no diarrhea , good appetite .  URINE CX WITH GM NEG RODS ECOLI   BLOOD CX GM POS COCCI IN CLUSTERS  3/4   BLOOD CX COAG NEG STAPH MOST LIKELY CONTAMINANT 3/4 Bottles  PT WITH NO ARTIFICAL VALVE OR PACEMAKER   REPEAT BLOOD CX ARE DRAWN   CONTINUE ROCEPHIN FOR UTI CNA CHANGE TO ORAL FOR POSSIBLE DISCHARGE   BUT REPEAT CX RESULTS  WILL NEED TO BE FOLLOWED UP

## 2019-12-10 NOTE — PROGRESS NOTE ADULT - ASSESSMENT
80 yo  female with HTN , h/o UTI admittedc for hypokalemia , UTI weakness , blood cx coag neg staph positive , ? suspected contamination ID  consulted , blood cx reordered , Ct of abd positive pyelo, + cyst in R ovary       1- UTI - with suspected pyelonephritis  gr neg infection   cont  empirical iv rocephin  home in 24 hours with po abx     2- Positive blood cx ,  coag staph   ? contamination   repeat cx is pending     3-severe hypokalemia   replaced   resolved   monitor     4- Atrial fib on - eliquis   cont amio     5 HTN - controlled on Norvasc   restart lisinopril home meds     6- Right ovarian cyst   daughter si informed   she is aware of gyn oncology follow up after discharge to be evaluated further

## 2019-12-10 NOTE — PROGRESS NOTE ADULT - SUBJECTIVE AND OBJECTIVE BOX
Internal Medicine Hospitalist Progress Note    seen examined for UTI , + blood cx , h/o atrial fib   she is with weakness , pain in the left side little better   I spoke  to her daughter in details over the phone     ID consulted for positive blood cx     Vital Signs Last 24 Hrs  T(C): 36.7 (10 Dec 2019 15:40), Max: 36.8 (10 Dec 2019 00:00)  T(F): 98.1 (10 Dec 2019 15:40), Max: 98.3 (10 Dec 2019 00:00)  HR: 76 (10 Dec 2019 15:40) (58 - 76)  BP: 151/76 (10 Dec 2019 15:40) (126/67 - 157/70)  BP(mean): --  RR: 18 (10 Dec 2019 11:28) (18 - 18)  SpO2: 94% (10 Dec 2019 11:28) (93% - 97%)    PHYSICAL EXAM:      Constitutional: awake alert , no distress    Respiratory: CTA bilatearl     Cardiovascular: regualr s1 s2     +systolic murmur     Gastrointestinal: soft no tenderness , Bs positive       Extremities: no pretibial edema     Skin : normal color no rash                                   10.6   10.50 )-----------( 283      ( 09 Dec 2019 07:08 )             34.4              Culture in progress    Culture - Blood (12.07.19 @ 19:49)    -  Coagulase negative Staphylococcus: Detec    Specimen Source: .Blood    Organism: Blood Culture PCR    Culture Results:   Growth in aerobic bottle: Gram Positive Cocci in Clusters  Aerobic Bottle: 19:24 Hours to positivity  Anaerobic Bottle: No growth to date  .  ***Blood Panel PCR results on this specimen are available  approximately 3 hours after the Gram stain result.***  Gram stain, PCR, and/or culture results may not always    < from: CT Abdomen and Pelvis w/ Oral Cont (12.10.19 @ 01:11) >  IMPRESSION:     2 mm nonobstructing right renal calculus.    No hydronephrosis.    Mild nonspecific perinephric fat stranding; clinical correlation is   recommended for pyelonephritis.    3 cm right ovarian cyst; annual sonographic follow-up is recommended and   a baseline pelvic ultrasound should be considered.    Fat-containing umbilical hernia with infiltration of the fat within the   hernia; correlation is recommended for symptoms.    Additional findings as above.    < end of copied text >    correspond due to difference in methodologies.  ************************************************************  This PCR assay was performed using [employer].  The following targets are tested for: Enterococcus,  vancomycin resistant enterococci, Listeria monocytogenes,  coagulase negative staphylococci, S. aureus,  methicillin resistant S. aureus, Streptococcus agalactiae  (Group B), S. pneumoniae, S. pyogenes (Group A),  Acinetobacter baumannii, Enterobacter cloacae, E. coli,  Klebsiella oxytoca, K. pneumoniae, Proteus sp.,  Serratia marcescens, Haemophilus influenzae,  Neisseria meningitidis, Pseudomonas aeruginosa, Candida  albicans, C. glabrata, C krusei, C parapsilosis,  C. tropicalis and the KPC resistance gene.  "Due to technical problems, Proteus sp. will Not be reported as part of  the BCID panel until further notice"  .  TYPE: (C=Critical, N=Notification, A=Abnormal) C  TESTS:  _ BLD   DATE/TIME CALLED: _ 12/08/2019 17:20:43  CALLED TO: _ ISMAEL SCHUSTER RN  READ BACK (2 Patient Identifiers)(Y/N): _ Y  READ BACK VALUES (Y/N): _ Y  CALLED BY: Naomi BA    Organism Identification: Blood Culture PCR    Method Type: PCR        Radiology :

## 2019-12-10 NOTE — PROGRESS NOTE ADULT - SUBJECTIVE AND OBJECTIVE BOX
INFECTIOUS DISEASES AND INTERNAL MEDICINE at [place]  =======================================================  Darius Grubbs MD  Diplomates American Board of Internal Medicine and Infectious Diseases  Telephone [phone]  Fax            [phone]  =======================================================    DONNA HYATT [id number]    Follow up: ecoli uti  blood cx 3/4 coag neg staph    Allergies:  No Known Allergies      Medications:  acetaminophen   Tablet .. 650 milliGRAM(s) Oral every 6 hours PRN  amLODIPine   Tablet 5 milliGRAM(s) Oral daily  apixaban 5 milliGRAM(s) Oral two times a day  atorvastatin 10 milliGRAM(s) Oral at bedtime  cefTRIAXone   IVPB 1000 milliGRAM(s) IV Intermittent every 24 hours  escitalopram 10 milliGRAM(s) Oral daily  lactated ringers 1000 milliLiter(s) IV Continuous <Continuous>  levothyroxine 25 MICROGram(s) Oral daily  lisinopril 5 milliGRAM(s) Oral two times a day  potassium chloride    Tablet ER 20 milliEquivalent(s) Oral daily  saccharomyces boulardii 250 milliGRAM(s) Oral two times a day    SOCIAL       FAMILY   FAMILY HISTORY:  FH: lung cancer: mother  FH: breast cancer: mother,  at age 50&#x27;s  FH: pancreatic cancer: father, in his 70&#x27;s     REVIEW OF SYSTEMS:  CONSTITUTIONAL:  No Fever or chills  HEENT:   No diplopia or blurred vision.  No earache, sore throat or runny nose.  CARDIOVASCULAR:  No pressure, squeezing, strangling, tightness, heaviness or aching about the chest, neck, axilla or epigastrium.  RESPIRATORY:  No cough, shortness of breath, PND or orthopnea.  GASTROINTESTINAL:  No nausea, vomiting or diarrhea.  GENITOURINARY:  No dysuria, frequency or urgency. No Blood in urine  MUSCULOSKELETAL:   AS PER HPI  SKIN:  No change in skin, hair or nails.  NEUROLOGIC:  No paresthesias, fasciculations, seizures or weakness.  PSYCHIATRIC:  No disorder of thought or mood.  ENDOCRINE:  No heat or cold intolerance, polyuria or polydipsia.  HEMATOLOGICAL:  No easy bruising or bleeding.            Physical Exam:  ICU Vital Signs Last 24 Hrs  T(C): 36.7 (10 Dec 2019 07:26), Max: 36.8 (10 Dec 2019 00:00)  T(F): 98 (10 Dec 2019 07:26), Max: 98.3 (10 Dec 2019 00:00)  HR: 64 (10 Dec 2019 07:) (58 - 68)  BP: 157/70 (10 Dec 2019 07:) (127/65 - 157/70)  BP(mean): --  ABP: --  ABP(mean): --  RR: 18 (10 Dec 2019 07:) (18 - 19)  SpO2: 95% (10 Dec 2019 07:) (93% - 97%)    GEN: NAD,   HEENT: normocephalic and atraumatic. EOMI. JESSIE.    NECK: Supple. No carotid bruits.  No lymphadenopathy or thyromegaly.  LUNGS: Clear to auscultation.  HEART: Regular rate and rhythm without murmur.  ABDOMEN: Soft, nontender, and nondistended.  Positive bowel sounds.    : No CVA tenderness  EXTREMITIES: Without any cyanosis, clubbing, rash, lesions or edema.  MSK: no joint swelling  NEUROLOGIC: Cranial nerves II through XII are grossly intact.  PSYCHIATRIC: Appropriate affect .  SKIN: No ulceration or induration present.        Labs:                              10.6   10.50 )-----------( 283      ( 09 Dec 2019 07:08 )             34.4                 CAPILLARY BLOOD GLUCOSE            RECENT CULTURES:   @ 19:49 .Blood Coag Negative Staphylococcus  Blood Culture PCR    Growth in aerobic bottle: Coag Negative Staphylococcus  Aerobic Bottle: 19:24 Hours to positivity  Anaerobic Bottle: No growth to date  .  ***Blood Panel PCR results on this specimen are available  approximately 3 hours after the Gram stain result.***  Gram stain, PCR, and/or culture results may not always  correspond due to difference in methodologies.  ************************************************************  This PCR assay was performed using [employer].  The following targets aretested for: Enterococcus,  vancomycin resistant enterococci, Listeria monocytogenes,  coagulase negative staphylococci, S. aureus,  methicillin resistant S. aureus, Streptococcus agalactiae  (Group B), S. pneumoniae, S. pyogenes (Group A),  Acinetobacter baumannii, Enterobacter cloacae, E. coli,  Klebsiella oxytoca, K. pneumoniae, Proteus sp.,  Serratia marcescens, Haemophilus influenzae,  Neisseria meningitidis, Pseudomonas aeruginosa, Candida  albicans, C. glabrata, C krusei, C parapsilosis,  C. tropicalis and the KPC resistance gene.  "Due to technical problems, Proteus sp. will Not be reported as part of  the BCID panel until further notice"  .  TYPE: (C=Critical, N=Notification, A=Abnormal) C  TESTS:  _ BLD   DATE/TIME CALLED: _ 2019 17:20:43  CALLED TO: Naomi SCHUSTER RN  READ BACK (2 Patient Identifiers)(Y/N): _ Y  READ BACK VALUES (Y/N): _ Y  CALLED BY: Naomi viavooMIKE         @ 19:48 .Blood Coag Negative Staphylococcus    Growth in aerobic bottle: Coag Negative Staphylococcus  Growth in anaerobic bottle: Gram Positive Cocci in Clusters  Aerobic Bottle: 20:35 Hours to positivity  Anaerobic Bottle: 25:52 Hours to positivity  .  TYPE: (C=Critical, N=Notification, A=Abnormal) C  TESTS:  _ BLD   DATE/TIME CALLED: _ 2019 17:20:05  CALLED TO: Naomi SCHUSTER RN  READ BACK (2 Patient Identifiers)(Y/N): _ Y  READ BACK VALUES (Y/N): _ Y  CALLED BY: Naomi KYLEYouchange HoldingsMIKE         @ 17:28 .Urine Escherichia coli    >100,000 CFU/ml Escherichia coli

## 2019-12-11 LAB
ANION GAP SERPL CALC-SCNC: 13 MMOL/L — SIGNIFICANT CHANGE UP (ref 5–17)
BUN SERPL-MCNC: 22 MG/DL — HIGH (ref 8–20)
CALCIUM SERPL-MCNC: 8.7 MG/DL — SIGNIFICANT CHANGE UP (ref 8.6–10.2)
CHLORIDE SERPL-SCNC: 104 MMOL/L — SIGNIFICANT CHANGE UP (ref 98–107)
CO2 SERPL-SCNC: 24 MMOL/L — SIGNIFICANT CHANGE UP (ref 22–29)
CREAT SERPL-MCNC: 0.55 MG/DL — SIGNIFICANT CHANGE UP (ref 0.5–1.3)
GLUCOSE SERPL-MCNC: 95 MG/DL — SIGNIFICANT CHANGE UP (ref 70–115)
MAGNESIUM SERPL-MCNC: 2 MG/DL — SIGNIFICANT CHANGE UP (ref 1.6–2.6)
PHOSPHATE SERPL-MCNC: 3.2 MG/DL — SIGNIFICANT CHANGE UP (ref 2.4–4.7)
POTASSIUM SERPL-MCNC: 3.5 MMOL/L — SIGNIFICANT CHANGE UP (ref 3.5–5.3)
POTASSIUM SERPL-SCNC: 3.5 MMOL/L — SIGNIFICANT CHANGE UP (ref 3.5–5.3)
SODIUM SERPL-SCNC: 141 MMOL/L — SIGNIFICANT CHANGE UP (ref 135–145)

## 2019-12-11 PROCEDURE — 99232 SBSQ HOSP IP/OBS MODERATE 35: CPT

## 2019-12-11 RX ADMIN — Medication 15 MILLIGRAM(S): at 05:25

## 2019-12-11 RX ADMIN — ATORVASTATIN CALCIUM 10 MILLIGRAM(S): 80 TABLET, FILM COATED ORAL at 21:57

## 2019-12-11 RX ADMIN — APIXABAN 5 MILLIGRAM(S): 2.5 TABLET, FILM COATED ORAL at 18:02

## 2019-12-11 RX ADMIN — LISINOPRIL 5 MILLIGRAM(S): 2.5 TABLET ORAL at 18:02

## 2019-12-11 RX ADMIN — CEFTRIAXONE 100 MILLIGRAM(S): 500 INJECTION, POWDER, FOR SOLUTION INTRAMUSCULAR; INTRAVENOUS at 13:40

## 2019-12-11 RX ADMIN — Medication 250 MILLIGRAM(S): at 05:21

## 2019-12-11 RX ADMIN — Medication 650 MILLIGRAM(S): at 21:57

## 2019-12-11 RX ADMIN — AMLODIPINE BESYLATE 5 MILLIGRAM(S): 2.5 TABLET ORAL at 05:21

## 2019-12-11 RX ADMIN — ESCITALOPRAM OXALATE 10 MILLIGRAM(S): 10 TABLET, FILM COATED ORAL at 12:26

## 2019-12-11 RX ADMIN — Medication 20 MILLIEQUIVALENT(S): at 12:26

## 2019-12-11 RX ADMIN — LISINOPRIL 5 MILLIGRAM(S): 2.5 TABLET ORAL at 05:21

## 2019-12-11 RX ADMIN — APIXABAN 5 MILLIGRAM(S): 2.5 TABLET, FILM COATED ORAL at 05:21

## 2019-12-11 RX ADMIN — Medication 25 MICROGRAM(S): at 05:21

## 2019-12-11 RX ADMIN — Medication 250 MILLIGRAM(S): at 18:02

## 2019-12-11 NOTE — PROGRESS NOTE ADULT - ASSESSMENT
78 yo kaila was brought to [facility] with complaints of weakness , slight confusion . Per son in law she was having hard time getting up stand   , noted to be weak and having some intermittent confusion , she is with frequent urination as well, had previous UTI and similar symptoms per family . Pt denies   fever , no chills ,no urinary burning or pain , admits frequency and smelling urine . She had also admits having right sided abd discomfoart and righ flank lower back pain , + headache . she has no diarrhea , good appetite .  URINE CX WITH GM NEG RODS ECOLI   BLOOD CX GM POS COCCI IN CLUSTERS  3/4   BLOOD CX COAG NEG STAPH MOST LIKELY CONTAMINANT 3/4 Bottles  PT WITH NO ARTIFICAL VALVE OR PACEMAKER   REPEAT BLOOD CX ARE NEG SO FAR      UTI CAN  CHANGE TO ORAL FOR POSSIBLE DISCHARGE TOTAL 10 DAYS FOR PYELONEPHRITIS   WILL FOLLOW UP D/W HOSPITALIST

## 2019-12-11 NOTE — PROGRESS NOTE ADULT - ASSESSMENT
78 yo  female with HTN , h/o UTI admittedc for hypokalemia , UTI weakness , blood cx coag neg staph positive , ? suspected contamination ID  consulted , blood cx reordered , Ct of abd positive pyelo, + cyst in R ovary , repeat blood cx negative   will treat total 10 days abx       1- UTI - with suspected pyelonephritis    + e coli infection   change to po abx  on discharge total 10 days   home in 24 hours with po abx     2- Positive blood cx ,  coag staph    repeat cx is neg   contamination , bacteremia is ruled out     3-severe hypokalemia - resolved   replaced     4- Atrial fib on - eliquis   cont amio     5 HTN - controlled on Norvasc     6- Right ovarian cyst   daughter si informed   she is aware of gyn oncology follow up after discharge to be evaluated further   referal will be given

## 2019-12-11 NOTE — PROGRESS NOTE ADULT - SUBJECTIVE AND OBJECTIVE BOX
Internal Medicine Hospitalist Progress Note    seen examined for UTI , + blood cx , h/o atrial fib   she is feeling better today , wants to go home   pain is improving   no overnight events reported     ID follow up appreciated     Vital Signs Last 24 Hrs  T(C): 37.1 (11 Dec 2019 11:07), Max: 37.3 (11 Dec 2019 00:06)  T(F): 98.8 (11 Dec 2019 11:07), Max: 99.2 (11 Dec 2019 07:31)  HR: 67 (11 Dec 2019 11:07) (64 - 76)  BP: 105/58 (11 Dec 2019 11:07) (105/58 - 152/77)  BP(mean): --  RR: 18 (11 Dec 2019 11:07) (18 - 18)  SpO2: 95% (11 Dec 2019 11:07) (92% - 97%)  PHYSICAL EXAM:      Constitutional: awake alert , no distress    Respiratory: CTA bilatearl     Cardiovascular: regular  s1 s2     Gastrointestinal: soft no tenderness , Bs positive     Extremities: no pretibial edema                                        10.6   10.50 )-----------( 283      ( 09 Dec 2019 07:08 )             34.4       repeat blood cx negative   Urine cx E coli positive      >  IMPRESSION:     2 mm nonobstructing right renal calculus.    No hydronephrosis.    Mild nonspecific perinephric fat stranding; clinical correlation is   recommended for pyelonephritis.    3 cm right ovarian cyst; annual sonographic follow-up is recommended and   a baseline pelvic ultrasound should be considered.    Fat-containing umbilical hernia with infiltration of the fat within the   hernia; correlation is recommended for symptoms.    Additional findings as above.    < end of copied text >    correspond due to difference in methodologies.  ************************************************************  This PCR assay was performed using [employer].  The following targets are tested for: Enterococcus,  vancomycin resistant enterococci, Listeria monocytogenes,  coagulase negative staphylococci, S. aureus,  methicillin resistant S. aureus, Streptococcus agalactiae  (Group B), S. pneumoniae, S. pyogenes (Group A),  Acinetobacter baumannii, Enterobacter cloacae, E. coli,  Klebsiella oxytoca, K. pneumoniae, Proteus sp.,  Serratia marcescens, Haemophilus influenzae,  Neisseria meningitidis, Pseudomonas aeruginosa, Candida  albicans, C. glabrata, C krusei, C parapsilosis,  C. tropicalis and the KPC resistance gene.  "Due to technical problems, Proteus sp. will Not be reported as part of  the BCID panel until further notice"  .  TYPE: (C=Critical, N=Notification, A=Abnormal) C  TESTS:  _ BLD   DATE/TIME CALLED: _ 12/08/2019 17:20:43  CALLED TO: Naomi SCHUSTER RN  READ BACK (2 Patient Identifiers)(Y/N): _ Y  READ BACK VALUES (Y/N): _ Y  CALLED BY: Naomi BA    Organism Identification: Blood Culture PCR    Method Type: PCR        Radiology :

## 2019-12-11 NOTE — PROGRESS NOTE ADULT - SUBJECTIVE AND OBJECTIVE BOX
INFECTIOUS DISEASES AND INTERNAL MEDICINE at [place]  =======================================================  Darius Grubbs MD  Diplomates American Board of Internal Medicine and Infectious Diseases  Telephone [phone]  Fax            [phone]  =======================================================    JHONNY HYATTTH [id number]    Follow up: ecoli uti  blood cx 3/4 coag neg staph\  REPEAT CX NEG SO FAR    Allergies:  No Known Allergies      Medications:  acetaminophen   Tablet .. 650 milliGRAM(s) Oral every 6 hours PRN  amLODIPine   Tablet 5 milliGRAM(s) Oral daily  apixaban 5 milliGRAM(s) Oral two times a day  atorvastatin 10 milliGRAM(s) Oral at bedtime  cefTRIAXone   IVPB 1000 milliGRAM(s) IV Intermittent every 24 hours  escitalopram 10 milliGRAM(s) Oral daily  lactated ringers 1000 milliLiter(s) IV Continuous <Continuous>  levothyroxine 25 MICROGram(s) Oral daily  lisinopril 5 milliGRAM(s) Oral two times a day  potassium chloride    Tablet ER 20 milliEquivalent(s) Oral daily  saccharomyces boulardii 250 milliGRAM(s) Oral two times a day    SOCIAL       FAMILY   FAMILY HISTORY:  FH: lung cancer: mother  FH: breast cancer: mother,  at age 50&#x27;s  FH: pancreatic cancer: father, in his 70&#x27;s     REVIEW OF SYSTEMS:  CONSTITUTIONAL:  No Fever or chills  HEENT:   No diplopia or blurred vision.  No earache, sore throat or runny nose.  CARDIOVASCULAR:  No pressure, squeezing, strangling, tightness, heaviness or aching about the chest, neck, axilla or epigastrium.  RESPIRATORY:  No cough, shortness of breath, PND or orthopnea.  GASTROINTESTINAL:  No nausea, vomiting or diarrhea.  GENITOURINARY:  No dysuria, frequency or urgency. No Blood in urine  MUSCULOSKELETAL:   AS PER HPI  SKIN:  No change in skin, hair or nails.  NEUROLOGIC:  No paresthesias, fasciculations, seizures or weakness.  PSYCHIATRIC:  No disorder of thought or mood.  ENDOCRINE:  No heat or cold intolerance, polyuria or polydipsia.  HEMATOLOGICAL:  No easy bruising or bleeding.            Physical Exam:   Vital Signs Last 24 Hrs  T(C): 37.3 (11 Dec 2019 07:31), Max: 37.3 (11 Dec 2019 00:06)  T(F): 99.2 (11 Dec 2019 07:31), Max: 99.2 (11 Dec 2019 07:31)  HR: 74 (11 Dec 2019 07:) (64 - 76)  BP: 152/77 (11 Dec 2019 07:) (126/67 - 152/77)  BP(mean): --  RR: 18 (11 Dec 2019 07:) (18 - 18)  SpO2: 92% (11 Dec 2019 07:) (92% - 97%)      GEN: NAD,   HEENT: normocephalic and atraumatic. EOMI. JSESIE.    NECK: Supple. No carotid bruits.  No lymphadenopathy or thyromegaly.  LUNGS: Clear to auscultation.  HEART: Regular rate and rhythm without murmur.  ABDOMEN: Soft, nontender, and nondistended.  Positive bowel sounds.    : No CVA tenderness  EXTREMITIES: Without any cyanosis, clubbing, rash, lesions or edema.  MSK: no joint swelling  NEUROLOGIC: Cranial nerves II through XII are grossly intact.  PSYCHIATRIC: Appropriate affect .  SKIN: No ulceration or induration present.        Labs:          	    141  |  104  |  22.0<H>  ----------------------------<  95  3.5   |  24.0  |  0.55    Ca    8.7      11 Dec 2019 06:30  Phos  3.2       Mg     2.0                 RECENT CULTURES:   @ 19:49 .Blood Coag Negative Staphylococcus  Blood Culture PCR    Growth in aerobic bottle: Coag Negative Staphylococcus  Aerobic Bottle: 19:24 Hours to positivity  Anaerobic Bottle: No growth to date  .  ***Blood Panel PCR results on this specimen are available  approximately 3 hours after the Gram stain result.***  Gram stain, PCR, and/or culture results may not always  correspond due to difference in methodologies.  ************************************************************  This PCR assay was performed using [facility].  The following targets aretested for: Enterococcus,  vancomycin resistant enterococci, Listeria monocytogenes,  coagulase negative staphylococci, S. aureus,  methicillin resistant S. aureus, Streptococcus agalactiae  (Group B), S. pneumoniae, S. pyogenes (Group A),  Acinetobacter baumannii, Enterobacter cloacae, E. coli,  Klebsiella oxytoca, K. pneumoniae, Proteus sp.,  Serratia marcescens, Haemophilus influenzae,  Neisseria meningitidis, Pseudomonas aeruginosa, Candida  albicans, C. glabrata, C krusei, C parapsilosis,  C. tropicalis and the KPC resistance gene.  "Due to technical problems, Proteus sp. will Not be reported as part of  the BCID panel until further notice"  .  TYPE: (C=Critical, N=Notification, A=Abnormal) C  TESTS:  _ BLD   DATE/TIME CALLED: _ 2019 17:20:43  CALLED TO: Naomi SCHUSTER RN  READ BACK (2 Patient Identifiers)(Y/N): _ Y  READ BACK VALUES (Y/N): _ Y  CALLED BY: Naomi [employer]un[employer]uresMIKE         @ 19:48 .Blood Coag Negative Staphylococcus    Growth in aerobic bottle: Coag Negative Staphylococcus  Growth in anaerobic bottle: Gram Positive Cocci in Clusters  Aerobic Bottle: 20:35 Hours to positivity  Anaerobic Bottle: 25:52 Hours to positivity  .  TYPE: (C=Critical, N=Notification, A=Abnormal) C  TESTS:  _ BLD   DATE/TIME CALLED: _ 2019 17:20:05  CALLED TO: Naomi SCHUSTER RN  READ BACK (2 Patient Identifiers)(Y/N): _ Y  READ BACK VALUES (Y/N): _ Y  CALLED BY: Naomi [employer]         @ 17:28 .Urine Escherichia coli    >100,000 CFU/ml Escherichia coli

## 2019-12-12 PROCEDURE — 99232 SBSQ HOSP IP/OBS MODERATE 35: CPT

## 2019-12-12 RX ADMIN — CEFTRIAXONE 100 MILLIGRAM(S): 500 INJECTION, POWDER, FOR SOLUTION INTRAMUSCULAR; INTRAVENOUS at 16:37

## 2019-12-12 RX ADMIN — LISINOPRIL 5 MILLIGRAM(S): 2.5 TABLET ORAL at 16:43

## 2019-12-12 RX ADMIN — AMLODIPINE BESYLATE 5 MILLIGRAM(S): 2.5 TABLET ORAL at 06:49

## 2019-12-12 RX ADMIN — Medication 250 MILLIGRAM(S): at 06:49

## 2019-12-12 RX ADMIN — Medication 20 MILLIEQUIVALENT(S): at 16:38

## 2019-12-12 RX ADMIN — APIXABAN 5 MILLIGRAM(S): 2.5 TABLET, FILM COATED ORAL at 16:43

## 2019-12-12 RX ADMIN — Medication 25 MICROGRAM(S): at 06:49

## 2019-12-12 RX ADMIN — Medication 250 MILLIGRAM(S): at 16:43

## 2019-12-12 RX ADMIN — Medication 650 MILLIGRAM(S): at 23:29

## 2019-12-12 RX ADMIN — ESCITALOPRAM OXALATE 10 MILLIGRAM(S): 10 TABLET, FILM COATED ORAL at 16:38

## 2019-12-12 RX ADMIN — Medication 650 MILLIGRAM(S): at 23:59

## 2019-12-12 RX ADMIN — ATORVASTATIN CALCIUM 10 MILLIGRAM(S): 80 TABLET, FILM COATED ORAL at 21:12

## 2019-12-12 RX ADMIN — APIXABAN 5 MILLIGRAM(S): 2.5 TABLET, FILM COATED ORAL at 06:49

## 2019-12-12 RX ADMIN — LISINOPRIL 5 MILLIGRAM(S): 2.5 TABLET ORAL at 06:49

## 2019-12-12 NOTE — PROGRESS NOTE ADULT - SUBJECTIVE AND OBJECTIVE BOX
Internal Medicine Hospitalist Progress Note    seen examined for UTI , + blood cx , h/o atrial fib   she is feeling better today , agree to go to [facility]   daughter was at the bedside   long discussion re treatment , test result and discharge planning   had discuassed advance directives with the patient and daughter   MOLTS form provided they will review     pt wishes do not ressusictae     Vital Signs Last 24 Hrs  T(C): 36.6 (12 Dec 2019 16:02), Max: 36.9 (11 Dec 2019 22:40)  T(F): 97.9 (12 Dec 2019 16:02), Max: 98.5 (11 Dec 2019 22:40)  HR: 59 (12 Dec 2019 16:02) (59 - 623)  BP: 118/69 (12 Dec 2019 16:02) (118/69 - 149/76)  BP(mean): --  RR: 18 (12 Dec 2019 16:02) (16 - 18)  SpO2: 98% (12 Dec 2019 16:02) (96% - 98%)    Constitutional: awake alert , no distress    Respiratory: CTA bilatearl     Cardiovascular: regular  s1 s2     Gastrointestinal: soft no tenderness , Bs positive     Extremities: no pretibial edema                                        10.6   10.50 )-----------( 283      ( 09 Dec 2019 07:08 )             34.4       repeat blood cx negative   Urine cx E coli positive      >  IMPRESSION:     2 mm nonobstructing right renal calculus.    No hydronephrosis.    Mild nonspecific perinephric fat stranding; clinical correlation is   recommended for pyelonephritis.    3 cm right ovarian cyst; annual sonographic follow-up is recommended and   a baseline pelvic ultrasound should be considered.    Fat-containing umbilical hernia with infiltration of the fat within the   hernia; correlation is recommended for symptoms.    Additional findings as above.    < end of copied text >    correspond due to difference in methodologies.  ************************************************************  This PCR assay was performed using [employer].  The following targets are tested for: Enterococcus,  vancomycin resistant enterococci, Listeria monocytogenes,  coagulase negative staphylococci, S. aureus,  methicillin resistant S. aureus, Streptococcus agalactiae  (Group B), S. pneumoniae, S. pyogenes (Group A),  Acinetobacter baumannii, Enterobacter cloacae, E. coli,  Klebsiella oxytoca, K. pneumoniae, Proteus sp.,  Serratia marcescens, Haemophilus influenzae,  Neisseria meningitidis, Pseudomonas aeruginosa, Candida  albicans, C. glabrata, C krusei, C parapsilosis,  C. tropicalis and the KPC resistance gene.  "Due to technical problems, Proteus sp. will Not be reported as part of  the BCID panel until further notice"  .  TYPE: (C=Critical, N=Notification, A=Abnormal) C  TESTS:  _ BLD GS  DATE/TIME CALLED: _ 12/08/2019 17:20:43  CALLED TO: _ ISMAEL SCHUSTER RN  READ BACK (2 Patient Identifiers)(Y/N): _ Y  READ BACK VALUES (Y/N): _ Y  CALLED BY: Naomi BA    Organism Identification: Blood Culture PCR    Method Type: PCR        Radiology :

## 2019-12-12 NOTE — PROGRESS NOTE ADULT - ASSESSMENT
78 yo kaila was brought to [facility] with complaints of weakness , slight confusion . Per son in law she was having hard time getting up stand   , noted to be weak and having some intermittent confusion , she is with frequent urination as well, had previous UTI and similar symptoms per family . Pt denies   fever , no chills ,no urinary burning or pain , admits frequency and smelling urine . She had also admits having right sided abd discomfoart and righ flank lower back pain , + headache . she has no diarrhea , good appetite .  URINE CX WITH GM NEG RODS ECOLI   BLOOD CX GM POS COCCI IN CLUSTERS  3/4   BLOOD CX COAG NEG STAPH MOST LIKELY CONTAMINANT 3/4 Bottles  PT WITH NO ARTIFICAL VALVE OR PACEMAKER   REPEAT BLOOD CX ARE NEG       UTI CAN  CHANGE TO ORAL FOR POSSIBLE DISCHARGE TOTAL 10 DAYS FOR PYELONEPHRITIS   WILL FOLLOW UP   AS NEEDED PLEASE CALL IF QUESTIONS  D/W HOSPITALIST

## 2019-12-12 NOTE — PROGRESS NOTE ADULT - ASSESSMENT
80 yo  female with HTN , h/o UTI admittedc for hypokalemia , UTI weakness , blood cx coag neg staph positive , ? suspected contamination ID  consulted , blood cx reordered , Ct of abd positive pyelo, + cyst in R ovary , repeat blood cx negative   will treat total 10 days abx       1- UTI - with suspected left pyelonephritis    + e coli infection   change to po abx  on discharge total 10 days   home vs [facility]   2- Acute metabolic encephalopathy on admission liekly due to UTI   resolved     3-severe hypokalemia - resolved   replaced     4- Atrial fib on - eliquis   cont amio     5 HTN - controlled on Norvasc     6- Right ovarian cyst   daughter si informed   she is aware of gyn oncology follow up after discharge to be evaluated further   referal will be given

## 2019-12-12 NOTE — PROGRESS NOTE ADULT - SUBJECTIVE AND OBJECTIVE BOX
INFECTIOUS DISEASES AND INTERNAL MEDICINE at [place]  =======================================================  Darius Grubbs MD  Diplomates American Board of Internal Medicine and Infectious Diseases  Telephone [phone]  Fax            [phone]  =======================================================    JHONNY HYATTTH [id number]    Follow up: ecoli uti  blood cx 3/4 coag neg staph\  REPEAT  BLOOD CX NEG     Allergies:  No Known Allergies      Medications:  acetaminophen   Tablet .. 650 milliGRAM(s) Oral every 6 hours PRN  amLODIPine   Tablet 5 milliGRAM(s) Oral daily  apixaban 5 milliGRAM(s) Oral two times a day  atorvastatin 10 milliGRAM(s) Oral at bedtime  cefTRIAXone   IVPB 1000 milliGRAM(s) IV Intermittent every 24 hours  escitalopram 10 milliGRAM(s) Oral daily  lactated ringers 1000 milliLiter(s) IV Continuous <Continuous>  levothyroxine 25 MICROGram(s) Oral daily  lisinopril 5 milliGRAM(s) Oral two times a day  potassium chloride    Tablet ER 20 milliEquivalent(s) Oral daily  saccharomyces boulardii 250 milliGRAM(s) Oral two times a day    SOCIAL       FAMILY   FAMILY HISTORY:  FH: lung cancer: mother  FH: breast cancer: mother,  at age 50&#x27;s  FH: pancreatic cancer: father, in his 70&#x27;s     REVIEW OF SYSTEMS:  CONSTITUTIONAL:  No Fever or chills  HEENT:   No diplopia or blurred vision.  No earache, sore throat or runny nose.  CARDIOVASCULAR:  No pressure, squeezing, strangling, tightness, heaviness or aching about the chest, neck, axilla or epigastrium.  RESPIRATORY:  No cough, shortness of breath, PND or orthopnea.  GASTROINTESTINAL:  No nausea, vomiting or diarrhea.  GENITOURINARY:  No dysuria, frequency or urgency. No Blood in urine  MUSCULOSKELETAL:   AS PER HPI  SKIN:  No change in skin, hair or nails.  NEUROLOGIC:  No paresthesias, fasciculations, seizures or weakness.  PSYCHIATRIC:  No disorder of thought or mood.  ENDOCRINE:  No heat or cold intolerance, polyuria or polydipsia.  HEMATOLOGICAL:  No easy bruising or bleeding.            Physical Exam:    Vital Signs Last 24 Hrs  T(C): 36.9 (12 Dec 2019 08:21), Max: 37.1 (11 Dec 2019 15:53)  T(F): 98.5 (12 Dec 2019 08:21), Max: 98.8 (11 Dec 2019 15:53)  HR: 623 (12 Dec 2019 08:21) (70 - 623)  BP: 149/76 (12 Dec 2019 08:21) (120/66 - 149/76)  BP(mean): --  RR: 16 (12 Dec 2019 08:21) (16 - 18)  SpO2: 96% (12 Dec 2019 08:21) (95% - 98%)    GEN: NAD,   HEENT: normocephalic and atraumatic. EOMI. JESSIE.    NECK: Supple. No carotid bruits.  No lymphadenopathy or thyromegaly.  LUNGS: Clear to auscultation.  HEART: Regular rate and rhythm without murmur.  ABDOMEN: Soft, nontender, and nondistended.  Positive bowel sounds.    : No CVA tenderness  EXTREMITIES: Without any cyanosis, clubbing, rash, lesions or edema.  MSK: no joint swelling  NEUROLOGIC: Cranial nerves II through XII are grossly intact.  PSYCHIATRIC: Appropriate affect .  SKIN: No ulceration or induration present.        Labs:            141  |  104  |  22.0<H>  ----------------------------<  95  3.5   |  24.0  |  0.55    Ca    8.7      11 Dec 2019 06:30  Phos  3.2       Mg     2.0               RECENT CULTURES:   @ 19:49 .Blood Coag Negative Staphylococcus  Blood Culture PCR    Growth in aerobic bottle: Coag Negative Staphylococcus  Aerobic Bottle: 19:24 Hours to positivity  Anaerobic Bottle: No growth to date  .  ***Blood Panel PCR results on this specimen are available  approximately 3 hours after the Gram stain result.***  Gram stain, PCR, and/or culture results may not always  correspond due to difference in methodologies.  ************************************************************  This PCR assay was performed using [employer].  The following targets aretested for: Enterococcus,  vancomycin resistant enterococci, Listeria monocytogenes,  coagulase negative staphylococci, S. aureus,  methicillin resistant S. aureus, Streptococcus agalactiae  (Group B), S. pneumoniae, S. pyogenes (Group A),  Acinetobacter baumannii, Enterobacter cloacae, E. coli,  Klebsiella oxytoca, K. pneumoniae, Proteus sp.,  Serratia marcescens, Haemophilus influenzae,  Neisseria meningitidis, Pseudomonas aeruginosa, Candida  albicans, C. glabrata, C krusei, C parapsilosis,  C. tropicalis and the KPC resistance gene.  "Due to technical problems, Proteus sp. will Not be reported as part of  the BCID panel until further notice"  .  TYPE: (C=Critical, N=Notification, A=Abnormal) C  TESTS:  _ BLD   DATE/TIME CALLED: _ 2019 17:20:43  CALLED TO: Naomi SCHUSTER RN  READ BACK (2 Patient Identifiers)(Y/N): _ Y  READ BACK VALUES (Y/N): _ Y  CALLED BY: Naomi [employer]KE         @ 19:48 .Blood Coag Negative Staphylococcus    Growth in aerobic bottle: Coag Negative Staphylococcus  Growth in anaerobic bottle: Gram Positive Cocci in Clusters  Aerobic Bottle: 20:35 Hours to positivity  Anaerobic Bottle: 25:52 Hours to positivity  .  TYPE: (C=Critical, N=Notification, A=Abnormal) C  TESTS:  _ BLD   DATE/TIME CALLED: _ 2019 17:20:05  CALLED TO: Naomi SCHUSTER RN  READ BACK (2 Patient Identifiers)(Y/N): _ Y  READ BACK VALUES (Y/N): _ Y  CALLED BY: Naomi [employer]         @ 17:28 .Urine Escherichia coli    >100,000 CFU/ml Escherichia coli

## 2019-12-12 NOTE — CDI QUERY NOTE - NSCDIOTHERTXTBX_GEN_ALL_CORE_HH
Documentation noted confusion. Patient admitted with UTI    Supporting Documentation:  12/11 ID Note 78 yo female was brought to [facility] with complaints of weakness , slight confusion . Per son in law she was having hard time getting up stand   , noted to be weak and having some intermittent confusion , she is with frequent urination as well, had previous UTI and similar symptoms per family . Pt denies   fever , no chills ,no urinary burning or pain , admits frequency and smelling urine   12/7 H/P: noted to be weak and having some intermittent confusion , she is with frequent urination as well, had previous UTI and similar symptoms per family .     Please clarify if the above clinical indicator can support a diagnosis  A) Metabolic encephalopathy-now resolved  B) Other, please specify  C) Not clinically significant

## 2019-12-13 ENCOUNTER — TRANSCRIPTION ENCOUNTER (OUTPATIENT)
Age: 79
End: 2019-12-13

## 2019-12-13 LAB
ANION GAP SERPL CALC-SCNC: 13 MMOL/L — SIGNIFICANT CHANGE UP (ref 5–17)
BUN SERPL-MCNC: 20 MG/DL — SIGNIFICANT CHANGE UP (ref 8–20)
CALCIUM SERPL-MCNC: 9.5 MG/DL — SIGNIFICANT CHANGE UP (ref 8.6–10.2)
CHLORIDE SERPL-SCNC: 106 MMOL/L — SIGNIFICANT CHANGE UP (ref 98–107)
CO2 SERPL-SCNC: 23 MMOL/L — SIGNIFICANT CHANGE UP (ref 22–29)
CREAT SERPL-MCNC: 0.59 MG/DL — SIGNIFICANT CHANGE UP (ref 0.5–1.3)
CULTURE RESULTS: SIGNIFICANT CHANGE UP
GLUCOSE SERPL-MCNC: 149 MG/DL — HIGH (ref 70–115)
HCT VFR BLD CALC: 39.5 % — SIGNIFICANT CHANGE UP (ref 34.5–45)
HGB BLD-MCNC: 11.3 G/DL — LOW (ref 11.5–15.5)
MCHC RBC-ENTMCNC: 25.2 PG — LOW (ref 27–34)
MCHC RBC-ENTMCNC: 28.6 GM/DL — LOW (ref 32–36)
MCV RBC AUTO: 88.2 FL — SIGNIFICANT CHANGE UP (ref 80–100)
ORGANISM # SPEC MICROSCOPIC CNT: SIGNIFICANT CHANGE UP
PLATELET # BLD AUTO: 436 K/UL — HIGH (ref 150–400)
POTASSIUM SERPL-MCNC: 4.4 MMOL/L — SIGNIFICANT CHANGE UP (ref 3.5–5.3)
POTASSIUM SERPL-SCNC: 4.4 MMOL/L — SIGNIFICANT CHANGE UP (ref 3.5–5.3)
RBC # BLD: 4.48 M/UL — SIGNIFICANT CHANGE UP (ref 3.8–5.2)
RBC # FLD: 15.9 % — HIGH (ref 10.3–14.5)
SODIUM SERPL-SCNC: 142 MMOL/L — SIGNIFICANT CHANGE UP (ref 135–145)
SPECIMEN SOURCE: SIGNIFICANT CHANGE UP
WBC # BLD: 9.73 K/UL — SIGNIFICANT CHANGE UP (ref 3.8–10.5)
WBC # FLD AUTO: 9.73 K/UL — SIGNIFICANT CHANGE UP (ref 3.8–10.5)

## 2019-12-13 PROCEDURE — 73502 X-RAY EXAM HIP UNI 2-3 VIEWS: CPT | Mod: 26,LT

## 2019-12-13 PROCEDURE — 99232 SBSQ HOSP IP/OBS MODERATE 35: CPT

## 2019-12-13 RX ADMIN — LISINOPRIL 5 MILLIGRAM(S): 2.5 TABLET ORAL at 16:33

## 2019-12-13 RX ADMIN — ATORVASTATIN CALCIUM 10 MILLIGRAM(S): 80 TABLET, FILM COATED ORAL at 20:45

## 2019-12-13 RX ADMIN — APIXABAN 5 MILLIGRAM(S): 2.5 TABLET, FILM COATED ORAL at 05:24

## 2019-12-13 RX ADMIN — Medication 250 MILLIGRAM(S): at 05:24

## 2019-12-13 RX ADMIN — Medication 20 MILLIEQUIVALENT(S): at 11:44

## 2019-12-13 RX ADMIN — CEFTRIAXONE 100 MILLIGRAM(S): 500 INJECTION, POWDER, FOR SOLUTION INTRAMUSCULAR; INTRAVENOUS at 11:44

## 2019-12-13 RX ADMIN — ESCITALOPRAM OXALATE 10 MILLIGRAM(S): 10 TABLET, FILM COATED ORAL at 11:45

## 2019-12-13 RX ADMIN — Medication 250 MILLIGRAM(S): at 16:32

## 2019-12-13 RX ADMIN — LISINOPRIL 5 MILLIGRAM(S): 2.5 TABLET ORAL at 05:24

## 2019-12-13 RX ADMIN — APIXABAN 5 MILLIGRAM(S): 2.5 TABLET, FILM COATED ORAL at 16:33

## 2019-12-13 RX ADMIN — Medication 25 MICROGRAM(S): at 05:24

## 2019-12-13 RX ADMIN — AMLODIPINE BESYLATE 5 MILLIGRAM(S): 2.5 TABLET ORAL at 05:24

## 2019-12-13 NOTE — PROGRESS NOTE ADULT - ASSESSMENT
78 yo  female with HTN , h/o UTI admittedc for hypokalemia , UTI weakness , blood cx coag neg staph positive , ? suspected contamination ID  consulted , blood cx reordered , Ct of abd positive pyelo, + cyst in R ovary , repeat blood cx negative   will treat total 10 days abx       1- UTI - with suspected left pyelonephritis    + e coli infection   change to po abx  on discharge total 10 days   agree wo [facility]   Pt reevaluation re stair and possible home with family   2- Left hip pain xary ordered   pain meds as needed     \3- Acute metabolic encephalopathy on admission liekly due to UTI   resolved     4- Atrial fib on - eliquis   cont amio , rate stable     5 HTN - controlled on Norvasc     6- Right ovarian cyst   daughter and pt are  informed   she is aware of gyn oncology follow up after discharge to be evaluated further   referal will be given to Dr Red

## 2019-12-13 NOTE — PROGRESS NOTE ADULT - REASON FOR ADMISSION
weakness , suspected UTI

## 2019-12-13 NOTE — PROGRESS NOTE ADULT - SUBJECTIVE AND OBJECTIVE BOX
Internal Medicine Hospitalist Progress Note    seen examined for UTI , + blood cx , h/o atrial fib   she is with one episode of diarrhea this am , + hip pain   cramps in the abd , now feels better         Vital Signs Last 24 Hrs  T(C): 36.5 (13 Dec 2019 08:05), Max: 36.9 (13 Dec 2019 00:04)  T(F): 97.7 (13 Dec 2019 08:05), Max: 98.4 (13 Dec 2019 00:04)  HR: 54 (13 Dec 2019 08:05) (54 - 65)  BP: 145/74 (13 Dec 2019 08:05) (118/69 - 145/74)  BP(mean): --  RR: 18 (13 Dec 2019 08:05) (18 - 18)  SpO2: 96% (13 Dec 2019 08:05) (95% - 98%)      Constitutional: awake alert , no distress    Respiratory: CTA bilateral     Cardiovascular: regular  s1 s2     Gastrointestinal: soft no tenderness , Bs positive     Extremities: no pretibial edema

## 2019-12-13 NOTE — DISCHARGE NOTE NURSING/CASE MANAGEMENT/SOCIAL WORK - PATIENT PORTAL LINK FT
You can access the FollowMyHealth Patient Portal offered by [facility] by registering at the following website: [email or web address]. By joining [employer]’s FollowMyHealth portal, you will also be able to view your health information using other applications (apps) compatible with our system.

## 2019-12-14 ENCOUNTER — TRANSCRIPTION ENCOUNTER (OUTPATIENT)
Age: 79
End: 2019-12-14

## 2019-12-14 VITALS
SYSTOLIC BLOOD PRESSURE: 159 MMHG | DIASTOLIC BLOOD PRESSURE: 64 MMHG | HEART RATE: 63 BPM | RESPIRATION RATE: 19 BRPM | TEMPERATURE: 97 F

## 2019-12-14 PROCEDURE — 71045 X-RAY EXAM CHEST 1 VIEW: CPT

## 2019-12-14 PROCEDURE — 87186 SC STD MICRODIL/AGAR DIL: CPT

## 2019-12-14 PROCEDURE — 84443 ASSAY THYROID STIM HORMONE: CPT

## 2019-12-14 PROCEDURE — 83690 ASSAY OF LIPASE: CPT

## 2019-12-14 PROCEDURE — 80048 BASIC METABOLIC PNL TOTAL CA: CPT

## 2019-12-14 PROCEDURE — 73502 X-RAY EXAM HIP UNI 2-3 VIEWS: CPT

## 2019-12-14 PROCEDURE — 85027 COMPLETE CBC AUTOMATED: CPT

## 2019-12-14 PROCEDURE — 97530 THERAPEUTIC ACTIVITIES: CPT

## 2019-12-14 PROCEDURE — 87086 URINE CULTURE/COLONY COUNT: CPT

## 2019-12-14 PROCEDURE — 99285 EMERGENCY DEPT VISIT HI MDM: CPT | Mod: 25

## 2019-12-14 PROCEDURE — 87150 DNA/RNA AMPLIFIED PROBE: CPT

## 2019-12-14 PROCEDURE — 80053 COMPREHEN METABOLIC PANEL: CPT

## 2019-12-14 PROCEDURE — 93005 ELECTROCARDIOGRAM TRACING: CPT

## 2019-12-14 PROCEDURE — 83735 ASSAY OF MAGNESIUM: CPT

## 2019-12-14 PROCEDURE — 84100 ASSAY OF PHOSPHORUS: CPT

## 2019-12-14 PROCEDURE — 99239 HOSP IP/OBS DSCHRG MGMT >30: CPT

## 2019-12-14 PROCEDURE — 96374 THER/PROPH/DIAG INJ IV PUSH: CPT

## 2019-12-14 PROCEDURE — 97163 PT EVAL HIGH COMPLEX 45 MIN: CPT

## 2019-12-14 PROCEDURE — 97110 THERAPEUTIC EXERCISES: CPT

## 2019-12-14 PROCEDURE — 81001 URINALYSIS AUTO W/SCOPE: CPT

## 2019-12-14 PROCEDURE — 36415 COLL VENOUS BLD VENIPUNCTURE: CPT

## 2019-12-14 PROCEDURE — 87040 BLOOD CULTURE FOR BACTERIA: CPT

## 2019-12-14 PROCEDURE — 74176 CT ABD & PELVIS W/O CONTRAST: CPT

## 2019-12-14 PROCEDURE — 97116 GAIT TRAINING THERAPY: CPT

## 2019-12-14 PROCEDURE — 84484 ASSAY OF TROPONIN QUANT: CPT

## 2019-12-14 RX ORDER — SACCHAROMYCES BOULARDII 250 MG
1 POWDER IN PACKET (EA) ORAL
Qty: 0 | Refills: 0 | DISCHARGE
Start: 2019-12-14

## 2019-12-14 RX ORDER — CEPHALEXIN 500 MG
1 CAPSULE ORAL
Qty: 8 | Refills: 0
Start: 2019-12-14 | End: 2019-12-17

## 2019-12-14 RX ORDER — POTASSIUM CHLORIDE 20 MEQ
1 PACKET (EA) ORAL
Qty: 10 | Refills: 0
Start: 2019-12-14 | End: 2019-12-23

## 2019-12-14 RX ADMIN — Medication 25 MICROGRAM(S): at 05:21

## 2019-12-14 RX ADMIN — AMLODIPINE BESYLATE 5 MILLIGRAM(S): 2.5 TABLET ORAL at 05:21

## 2019-12-14 RX ADMIN — CEFTRIAXONE 100 MILLIGRAM(S): 500 INJECTION, POWDER, FOR SOLUTION INTRAMUSCULAR; INTRAVENOUS at 08:10

## 2019-12-14 RX ADMIN — Medication 250 MILLIGRAM(S): at 05:21

## 2019-12-14 RX ADMIN — APIXABAN 5 MILLIGRAM(S): 2.5 TABLET, FILM COATED ORAL at 05:21

## 2019-12-14 RX ADMIN — LISINOPRIL 5 MILLIGRAM(S): 2.5 TABLET ORAL at 05:21

## 2019-12-14 NOTE — DISCHARGE NOTE PROVIDER - NSDCCPCAREPLAN_GEN_ALL_CORE_FT
PRINCIPAL DISCHARGE DIAGNOSIS  Diagnosis: E-coli UTI  Assessment and Plan of Treatment: pyelonephritis  compl;ete course of antibiotocs   hydrate   follow up with your doctor as needed      SECONDARY DISCHARGE DIAGNOSES  Diagnosis: Hypertension  Assessment and Plan of Treatment: cont home meds monitor BP closely and discuss with your family physician to adjust medicatiosn as needed    Diagnosis: Hypokalemia  Assessment and Plan of Treatment: take potassium pill as needed   get blood work done next week    Diagnosis: Right ovarian cyst  Assessment and Plan of Treatment: follow up with gyn Dr Larios ( number on follow up instructions )    Diagnosis: HLD (hyperlipidemia)  Assessment and Plan of Treatment:     Diagnosis: HTN (hypertension)  Assessment and Plan of Treatment:

## 2019-12-14 NOTE — DISCHARGE NOTE PROVIDER - CARE PROVIDER_API CALL
Dillan Larios)  Gynecologic Oncology; Obstetrics and Gynecology  [address] [place] [address]  Phone: [phone]  Fax: [phone]  Follow Up Time: 1 week

## 2019-12-14 NOTE — DISCHARGE NOTE PROVIDER - NSDCMRMEDTOKEN_GEN_ALL_CORE_FT
amiodarone 200 mg oral tablet: 1 tab(s) orally once a day  amLODIPine 5 mg oral tablet: 1 tab(s) orally once a day  atorvastatin 20 mg oral tablet: 1 tab(s) orally once a day  chlorthalidone 25 mg oral tablet: 0.5 tab(s) orally once a day  Eliquis 5 mg oral tablet: 1 tab(s) orally 2 times a day  enalapril 20 mg oral tablet: 1 tab(s) orally once a day  escitalopram 10 mg oral tablet: 1 tab(s) orally once a day  potassium chloride 10 mEq oral tablet, extended release: 1 tab(s) orally once a day   saccharomyces boulardii lyo 250 mg oral capsule: 1 cap(s) orally 2 times a day  Synthroid 25 mcg (0.025 mg) oral tablet: 1 tab(s) orally once a day

## 2019-12-14 NOTE — DISCHARGE NOTE PROVIDER - HOSPITAL COURSE
80 yo female was brought to [facility] with complaints of weakness , slight confusion . Per son in law she was having hard time getting up stand   , noted to be weak and having some intermittent confusion , she is with frequent urination as well, had previous UTI and similar symptoms per family . Pt denies   fever , no chills ,no urinary burning or pain , admits frequency and smelling urine . She had also admits having right sided abd discomfort and  flank lower back pain , + headache . she has no diarrhea , good appetite . In the ED lab showed leukocytosis , low K , and UA positive started on empirical inv rocephin blood cx and urine cx ordered . Urine cx gr neg rods , blodo cx from admission coag neg staph ID called repaet cx ordered , which is neg likely contaminated . She had repaet labs hypokalemia resolved , + falnk pain CT scan ordered susoected pyelonephritis on the left , discussed with ID rec to complete course of therapy , total 10 days abx , on discharge changed to po keflex     Pt was seen by PT as well , doing well stable , discharged home with home care and the daughter     Incidental findings on CT scan r ovarian cyst ; daughter and pt are informed and instructed to follow up with gyn oncology , phone number and name of specialist provided on discharge papers     GOC /MOLTS form discussed with the pt and daughter , decision to review talk about it , wishes likely DNR/DNI however was not completed on this admission         total time spend 35 min coordinating care

## 2020-03-07 NOTE — PROGRESS NOTE ADULT - PROVIDER SPECIALTY LIST ADULT
Family Medicine Overall doing well and well appearing on exam today. Reviewed growth charts and age appropriate diet, sleep, milestones, behaviors, etc.

## 2020-08-17 NOTE — ED PROVIDER NOTE - ENMT, MLM
Airway patent, Nasal mucosa clear. Mouth with normal mucosa. Throat has no vesicles, no oropharyngeal exudates and uvula is midline.
N/A

## 2020-08-25 ENCOUNTER — INPATIENT (INPATIENT)
Facility: HOSPITAL | Age: 80
LOS: 0 days | Discharge: HOME | DRG: 556 | End: 2020-08-25
Attending: HOSPITALIST | Admitting: HOSPITALIST
Payer: COMMERCIAL

## 2020-08-25 VITALS
TEMPERATURE: 98 F | WEIGHT: 143.08 LBS | RESPIRATION RATE: 16 BRPM | HEIGHT: 61 IN | DIASTOLIC BLOOD PRESSURE: 52 MMHG | SYSTOLIC BLOOD PRESSURE: 132 MMHG | HEART RATE: 55 BPM | OXYGEN SATURATION: 98 %

## 2020-08-25 DIAGNOSIS — R41.82 ALTERED MENTAL STATUS, UNSPECIFIED: ICD-10-CM

## 2020-08-25 PROCEDURE — 73502 X-RAY EXAM HIP UNI 2-3 VIEWS: CPT | Mod: 26,LT

## 2020-08-25 PROCEDURE — 99284 EMERGENCY DEPT VISIT MOD MDM: CPT

## 2020-08-25 PROCEDURE — 93971 EXTREMITY STUDY: CPT | Mod: 26,LT

## 2020-08-25 PROCEDURE — G0378: CPT

## 2020-08-25 PROCEDURE — 73502 X-RAY EXAM HIP UNI 2-3 VIEWS: CPT

## 2020-08-25 PROCEDURE — 93971 EXTREMITY STUDY: CPT

## 2020-08-25 RX ORDER — ACETAMINOPHEN 500 MG
650 TABLET ORAL ONCE
Refills: 0 | Status: COMPLETED | OUTPATIENT
Start: 2020-08-25 | End: 2020-08-25

## 2020-08-25 RX ORDER — ACETAMINOPHEN 500 MG
2 TABLET ORAL
Qty: 24 | Refills: 0
Start: 2020-08-25 | End: 2020-08-27

## 2020-08-25 RX ADMIN — Medication 650 MILLIGRAM(S): at 16:34

## 2020-08-25 NOTE — ED PROVIDER NOTE - MUSCULOSKELETAL, MLM
+Flexible at hips and knees. No deformities. Good pulses distally. No shortness of external rotation of lower extremities.

## 2020-08-25 NOTE — ED PROVIDER NOTE - OBJECTIVE STATEMENT
78 y/o F presents to the ED with a PMHx of HTN, Afib c/o L thigh pain onset today. Denies any recent falls or trauma. Pain from lateral aspect of thigh with no swelling. Pt reports it was her first episode. 80 y/o F presents to the ED c/o L thigh pain onset today. Denies any recent falls or trauma. Pain from lateral aspect of L thigh with no swelling. First episode. Pt reports cardiac diseases but pt is uncertain of which diseases.

## 2020-08-25 NOTE — ED ADULT NURSE NOTE - NSIMPLEMENTINTERV_GEN_ALL_ED
Implemented All Fall Risk Interventions:  [place] to call system. Call bell, personal items and telephone within reach. Instruct patient to call for assistance. Room bathroom lighting operational. Non-slip footwear when patient is off stretcher. Physically safe environment: no spills, clutter or unnecessary equipment. Stretcher in lowest position, wheels locked, appropriate side rails in place. Provide visual cue, wrist band, yellow gown, etc. Monitor gait and stability. Monitor for mental status changes and reorient to person, place, and time. Review medications for side effects contributing to fall risk. Reinforce activity limits and safety measures with patient and family.

## 2020-08-25 NOTE — ED PROVIDER NOTE - PATIENT PORTAL LINK FT
You can access the FollowMyHealth Patient Portal offered by [facility] by registering at the following website: [email or web address]. By joining [employer]’s FollowMyHealth portal, you will also be able to view your health information using other applications (apps) compatible with our system.

## 2020-08-25 NOTE — ED ADULT NURSE NOTE - OBJECTIVE STATEMENT
patient with left leg pain and subjective swelling. denies injury. patient able to ambulate with assistance, uses walker at home. no other complaints at this time.

## 2020-09-11 ENCOUNTER — EMERGENCY (EMERGENCY)
Facility: HOSPITAL | Age: 80
LOS: 1 days | Discharge: HOME | End: 2020-09-11
Attending: EMERGENCY MEDICINE
Payer: COMMERCIAL

## 2020-09-11 VITALS
SYSTOLIC BLOOD PRESSURE: 131 MMHG | TEMPERATURE: 98 F | HEART RATE: 60 BPM | OXYGEN SATURATION: 97 % | RESPIRATION RATE: 20 BRPM | WEIGHT: 143.08 LBS | HEIGHT: 61 IN | DIASTOLIC BLOOD PRESSURE: 68 MMHG

## 2020-09-11 LAB
BASOPHILS # BLD AUTO: 0.07 K/UL — SIGNIFICANT CHANGE UP (ref 0–0.2)
BASOPHILS NFR BLD AUTO: 0.9 % — SIGNIFICANT CHANGE UP (ref 0–2)
EOSINOPHIL # BLD AUTO: 0.3 K/UL — SIGNIFICANT CHANGE UP (ref 0–0.5)
EOSINOPHIL NFR BLD AUTO: 3.8 % — SIGNIFICANT CHANGE UP (ref 0–6)
HCT VFR BLD CALC: 34.8 % — SIGNIFICANT CHANGE UP (ref 34.5–45)
HGB BLD-MCNC: 9.9 G/DL — LOW (ref 11.5–15.5)
IMM GRANULOCYTES NFR BLD AUTO: 0.4 % — SIGNIFICANT CHANGE UP (ref 0–1.5)
LYMPHOCYTES # BLD AUTO: 1.41 K/UL — SIGNIFICANT CHANGE UP (ref 1–3.3)
LYMPHOCYTES # BLD AUTO: 18.1 % — SIGNIFICANT CHANGE UP (ref 13–44)
MCHC RBC-ENTMCNC: 22.8 PG — LOW (ref 27–34)
MCHC RBC-ENTMCNC: 28.4 GM/DL — LOW (ref 32–36)
MCV RBC AUTO: 80 FL — SIGNIFICANT CHANGE UP (ref 80–100)
MONOCYTES # BLD AUTO: 0.59 K/UL — SIGNIFICANT CHANGE UP (ref 0–0.9)
MONOCYTES NFR BLD AUTO: 7.6 % — SIGNIFICANT CHANGE UP (ref 2–14)
NEUTROPHILS # BLD AUTO: 5.41 K/UL — SIGNIFICANT CHANGE UP (ref 1.8–7.4)
NEUTROPHILS NFR BLD AUTO: 69.2 % — SIGNIFICANT CHANGE UP (ref 43–77)
PLATELET # BLD AUTO: 311 K/UL — SIGNIFICANT CHANGE UP (ref 150–400)
RBC # BLD: 4.35 M/UL — SIGNIFICANT CHANGE UP (ref 3.8–5.2)
RBC # FLD: 16.7 % — HIGH (ref 10.3–14.5)
WBC # BLD: 7.81 K/UL — SIGNIFICANT CHANGE UP (ref 3.8–10.5)
WBC # FLD AUTO: 7.81 K/UL — SIGNIFICANT CHANGE UP (ref 3.8–10.5)

## 2020-09-11 PROCEDURE — 85025 COMPLETE CBC W/AUTO DIFF WBC: CPT

## 2020-09-11 PROCEDURE — 36415 COLL VENOUS BLD VENIPUNCTURE: CPT

## 2020-09-11 PROCEDURE — 99284 EMERGENCY DEPT VISIT MOD MDM: CPT

## 2020-09-11 PROCEDURE — 99283 EMERGENCY DEPT VISIT LOW MDM: CPT

## 2020-09-11 NOTE — ED PROVIDER NOTE - PATIENT PORTAL LINK FT
You can access the FollowMyHealth Patient Portal offered by [facility] by registering at the following website: [email or web address]. By joining [employer]’s FollowMyHealth portal, you will also be able to view your health information using other applications (apps) compatible with our system. You can access the FollowMyHealth Patient Portal offered by [facility] by registering at the following website: [email or web address]. By joining [employer]’s FollowMyHealth portal, you will also be able to view your health information using other applications (apps) compatible with our system.

## 2020-09-11 NOTE — ED PROVIDER NOTE - ATTENDING CONTRIBUTION TO CARE
I, Tino Goldstein, have personally performed a face to face diagnostic evaluation on this patient. I have reviewed the ACP note and agree with the history, exam and plan of care, except as noted.    78 yo Fx of umbilical hernia, Afib on Eliquis p/w bleeding from the umbilical hernia. hernia reducible. irritation and inflammation on the left inner hernia, no active bleed. patient given bacitracin. Family request CBC. Hb 9.9, similar to prior. no acute intervention needed.

## 2020-09-11 NOTE — ED ADULT TRIAGE NOTE - CHIEF COMPLAINT QUOTE
Patient brought in by ambulance A/Ox3, reports bleeding from hernia on Eliquis, bleeding controlled at this time.

## 2020-09-11 NOTE — ED PROVIDER NOTE - FAMILY HISTORY
FH: pancreatic cancer, father, in his 70's      FH: lung cancer, mother     Mother  Still living? Unknown  FH: breast cancer, Age at diagnosis: Age Unknown

## 2020-09-11 NOTE — ED PROVIDER NOTE - CLINICAL SUMMARY MEDICAL DECISION MAKING FREE TEXT BOX
patient hx of umbilical hernia p/w abrasion and bleeding. no active bleed. Hb 9.9, similar to prior.

## 2020-09-11 NOTE — ED PROVIDER NOTE - NSFOLLOWUPINSTRUCTIONS_ED_ALL_ED_FT
Please follow up with your doctor within 48 hours. Please follow up with surgery clinic within 72 hours. Return to ER if bleeding to site,. abdominal pain, nausea, vomiting, constipation, diarrhea, or any new concerns

## 2020-09-11 NOTE — ED PROVIDER NOTE - PHYSICAL EXAMINATION
Abdomen: Soft, non tender, + umbilical hernia that is non tender to palpation and fully reducible. there is no bleeding from the site, There is skin irritation to the left side of the hernia, no lacerations,

## 2020-09-11 NOTE — ED PROVIDER NOTE - NSFOLLOWUPCLINICS_GEN_ALL_ED_FT
[facility] General Surgery  Surgery  [address] [place] [address]  Phone: [phone]  Fax:   Follow Up Time:

## 2020-09-11 NOTE — ED PROVIDER NOTE - OBJECTIVE STATEMENT
78 y/o female presents c/o bleeding from her hernia. PT reports a long h/o umbilical hernia. She states she has a small abrasion to the area and it was bleeding this morning. The bleeding stopped several hours ago. PT is on Eliquis.  PT states she told her daughter about the bleeding this morning and she called 911 for her mother since she was worried. PT denies any pain to the site, abdominal pain, vomiting, constipation, or diarrhea. PT reports she is feeling well and did  not want to come to the ED

## 2020-09-17 NOTE — PATIENT PROFILE ADULT - ARE ANY OF THE ITEMS ON THE CHART
no This patient was evaluated for sepsis.  At this time, a diagnosis of sepsis is not supported by the overall clinical picture.

## 2021-02-26 ENCOUNTER — EMERGENCY (EMERGENCY)
Facility: HOSPITAL | Age: 81
LOS: 1 days | Discharge: HOME | End: 2021-02-26
Attending: EMERGENCY MEDICINE
Payer: COMMERCIAL

## 2021-02-26 VITALS
DIASTOLIC BLOOD PRESSURE: 72 MMHG | OXYGEN SATURATION: 96 % | TEMPERATURE: 98 F | RESPIRATION RATE: 18 BRPM | SYSTOLIC BLOOD PRESSURE: 145 MMHG | HEIGHT: 61 IN | HEART RATE: 70 BPM | WEIGHT: 154.98 LBS

## 2021-02-26 LAB
ABO RH CONFIRMATION: SIGNIFICANT CHANGE UP
ALBUMIN SERPL ELPH-MCNC: 4 G/DL — SIGNIFICANT CHANGE UP (ref 3.3–5.2)
ALP SERPL-CCNC: 82 U/L — SIGNIFICANT CHANGE UP (ref 40–120)
ALT FLD-CCNC: 10 U/L — SIGNIFICANT CHANGE UP
ANION GAP SERPL CALC-SCNC: 12 MMOL/L — SIGNIFICANT CHANGE UP (ref 5–17)
ANISOCYTOSIS BLD QL: SIGNIFICANT CHANGE UP
APTT BLD: 31.3 SEC — SIGNIFICANT CHANGE UP (ref 27.5–35.5)
AST SERPL-CCNC: 12 U/L — SIGNIFICANT CHANGE UP
BASOPHILS # BLD AUTO: 0 K/UL — SIGNIFICANT CHANGE UP (ref 0–0.2)
BASOPHILS NFR BLD AUTO: 0 % — SIGNIFICANT CHANGE UP (ref 0–2)
BILIRUB SERPL-MCNC: <0.2 MG/DL — LOW (ref 0.4–2)
BLD GP AB SCN SERPL QL: SIGNIFICANT CHANGE UP
BUN SERPL-MCNC: 20 MG/DL — SIGNIFICANT CHANGE UP (ref 8–20)
CALCIUM SERPL-MCNC: 8.9 MG/DL — SIGNIFICANT CHANGE UP (ref 8.6–10.2)
CHLORIDE SERPL-SCNC: 111 MMOL/L — HIGH (ref 98–107)
CO2 SERPL-SCNC: 19 MMOL/L — LOW (ref 22–29)
CREAT SERPL-MCNC: 1.02 MG/DL — SIGNIFICANT CHANGE UP (ref 0.5–1.3)
ELLIPTOCYTES BLD QL SMEAR: SLIGHT — SIGNIFICANT CHANGE UP
EOSINOPHIL # BLD AUTO: 0.09 K/UL — SIGNIFICANT CHANGE UP (ref 0–0.5)
EOSINOPHIL NFR BLD AUTO: 0.9 % — SIGNIFICANT CHANGE UP (ref 0–6)
FERRITIN SERPL-MCNC: 8 NG/ML — LOW (ref 15–150)
GIANT PLATELETS BLD QL SMEAR: PRESENT — SIGNIFICANT CHANGE UP
GLUCOSE SERPL-MCNC: 132 MG/DL — HIGH (ref 70–99)
HCT VFR BLD CALC: 25 % — LOW (ref 34.5–45)
HGB BLD-MCNC: 6.6 G/DL — CRITICAL LOW (ref 11.5–15.5)
HYPOCHROMIA BLD QL: SIGNIFICANT CHANGE UP
INR BLD: 1.21 RATIO — HIGH (ref 0.88–1.16)
LYMPHOCYTES # BLD AUTO: 0.92 K/UL — LOW (ref 1–3.3)
LYMPHOCYTES # BLD AUTO: 9.6 % — LOW (ref 13–44)
MACROCYTES BLD QL: SLIGHT — SIGNIFICANT CHANGE UP
MANUAL SMEAR VERIFICATION: SIGNIFICANT CHANGE UP
MCHC RBC-ENTMCNC: 19.9 PG — LOW (ref 27–34)
MCHC RBC-ENTMCNC: 26.4 GM/DL — LOW (ref 32–36)
MCV RBC AUTO: 75.5 FL — LOW (ref 80–100)
MICROCYTES BLD QL: SIGNIFICANT CHANGE UP
MONOCYTES # BLD AUTO: 0.66 K/UL — SIGNIFICANT CHANGE UP (ref 0–0.9)
MONOCYTES NFR BLD AUTO: 6.9 % — SIGNIFICANT CHANGE UP (ref 2–14)
NEUTROPHILS # BLD AUTO: 7.88 K/UL — HIGH (ref 1.8–7.4)
NEUTROPHILS NFR BLD AUTO: 81.7 % — HIGH (ref 43–77)
NEUTS BAND # BLD: 0.9 % — SIGNIFICANT CHANGE UP (ref 0–8)
OB PNL STL: NEGATIVE — SIGNIFICANT CHANGE UP
PLAT MORPH BLD: NORMAL — SIGNIFICANT CHANGE UP
PLATELET # BLD AUTO: 339 K/UL — SIGNIFICANT CHANGE UP (ref 150–400)
POIKILOCYTOSIS BLD QL AUTO: SLIGHT — SIGNIFICANT CHANGE UP
POLYCHROMASIA BLD QL SMEAR: SIGNIFICANT CHANGE UP
POTASSIUM SERPL-MCNC: 4.1 MMOL/L — SIGNIFICANT CHANGE UP (ref 3.5–5.3)
POTASSIUM SERPL-SCNC: 4.1 MMOL/L — SIGNIFICANT CHANGE UP (ref 3.5–5.3)
PROT SERPL-MCNC: 6.6 G/DL — SIGNIFICANT CHANGE UP (ref 6.6–8.7)
PROTHROM AB SERPL-ACNC: 13.9 SEC — HIGH (ref 10.6–13.6)
RBC # BLD: 3.31 M/UL — LOW (ref 3.8–5.2)
RBC # FLD: 18.3 % — HIGH (ref 10.3–14.5)
RBC BLD AUTO: ABNORMAL
SODIUM SERPL-SCNC: 142 MMOL/L — SIGNIFICANT CHANGE UP (ref 135–145)
WBC # BLD: 9.54 K/UL — SIGNIFICANT CHANGE UP (ref 3.8–10.5)
WBC # FLD AUTO: 9.54 K/UL — SIGNIFICANT CHANGE UP (ref 3.8–10.5)

## 2021-02-26 PROCEDURE — 93010 ELECTROCARDIOGRAM REPORT: CPT

## 2021-02-26 PROCEDURE — 71045 X-RAY EXAM CHEST 1 VIEW: CPT | Mod: 26

## 2021-02-26 PROCEDURE — 99220: CPT

## 2021-02-26 RX ORDER — AMLODIPINE BESYLATE 2.5 MG/1
10 TABLET ORAL DAILY
Refills: 0 | Status: DISCONTINUED | OUTPATIENT
Start: 2021-02-26 | End: 2021-03-03

## 2021-02-26 RX ORDER — FERROUS SULFATE 325(65) MG
325 TABLET ORAL DAILY
Refills: 0 | Status: DISCONTINUED | OUTPATIENT
Start: 2021-02-26 | End: 2021-03-03

## 2021-02-26 RX ORDER — LEVOTHYROXINE SODIUM 125 MCG
25 TABLET ORAL DAILY
Refills: 0 | Status: DISCONTINUED | OUTPATIENT
Start: 2021-02-26 | End: 2021-03-03

## 2021-02-26 RX ORDER — ESCITALOPRAM OXALATE 10 MG/1
10 TABLET, FILM COATED ORAL DAILY
Refills: 0 | Status: DISCONTINUED | OUTPATIENT
Start: 2021-02-26 | End: 2021-03-03

## 2021-02-26 RX ORDER — AMIODARONE HYDROCHLORIDE 400 MG/1
200 TABLET ORAL DAILY
Refills: 0 | Status: DISCONTINUED | OUTPATIENT
Start: 2021-02-26 | End: 2021-03-03

## 2021-02-26 RX ORDER — ATORVASTATIN CALCIUM 80 MG/1
20 TABLET, FILM COATED ORAL AT BEDTIME
Refills: 0 | Status: DISCONTINUED | OUTPATIENT
Start: 2021-02-26 | End: 2021-03-03

## 2021-02-26 RX ORDER — APIXABAN 2.5 MG/1
5 TABLET, FILM COATED ORAL EVERY 12 HOURS
Refills: 0 | Status: DISCONTINUED | OUTPATIENT
Start: 2021-02-26 | End: 2021-03-03

## 2021-02-26 RX ADMIN — APIXABAN 5 MILLIGRAM(S): 2.5 TABLET, FILM COATED ORAL at 23:13

## 2021-02-26 NOTE — ED CDU PROVIDER INITIAL DAY NOTE - PMH
Atrial fibrillation    Hernia    HLD (hyperlipidemia)    HTN (hypertension)    Hypothyroid    Iron deficiency anemia

## 2021-02-26 NOTE — ED CDU PROVIDER INITIAL DAY NOTE - CHPI ED SYMPTOMS NEG
no chest pain/no cough/no edema/no fever/no headache/no hemoptysis/no wheezing/no chills/no diaphoresis

## 2021-02-26 NOTE — ED PROVIDER NOTE - OBJECTIVE STATEMENT
81 yo female history of AFIB, HTN, HLD on Eliquis presents with worsening SOB over the past two days. Per the patient, she has been experiencing SOB at rest and with exertion. She denies any recent chest pain, palpitations, cough w sputum production. The patient informed her PCP of her SOB who referred her for outpatient lab testing which revealed that her hemoglobin was 8. She notes that her hemoglobin has never been this low and that she has never experienced a blood transfusion. The patient otherwise is denying any recent fever/chills, sore throat, cough, abdominal pain, n/v/d, dysuria, hematuria, focal weakness/numbness/tingling.

## 2021-02-26 NOTE — ED CDU PROVIDER INITIAL DAY NOTE - ATTENDING CONTRIBUTION TO CARE
I agree with the PA's note and was available for any issues/concerns. I was directly involved in patient care. My brief overall assessment is as follows:    80 year old female PMHx iron deficient anemia, HTN c/o generalized weakness and intermittent dyspnea for few days. Initial work up demonstrates anemia due to iron deficiency, will transfuse and supplement iron

## 2021-02-26 NOTE — ED ADULT TRIAGE NOTE - CHIEF COMPLAINT QUOTE
patient alert and oriented x 4 in no distress with daughter went to PMD for labs H/H low 8, breathing labored as per daughter

## 2021-02-26 NOTE — ED PROVIDER NOTE - ATTENDING CONTRIBUTION TO CARE
I, Tino Goldstein, personally saw the patient with the resident, and completed the key components of the history and physical exam. I then discussed the management plan with the resident.    79 yo F hx of Afib on Eliquist, HTN, HDL, hypothyroid, anemia sent in by pmd for hb 8 on routine outpatient blood work. patient report sob since yesterday. no dark stool. Hb 6.6. occult feces negative. no abdominal pain. ekg without ischemic changes. trop negative. cxr clear. Iron studies showed very low iron. macrocytic anemia. will transfuse with 2 U PRBC.

## 2021-02-26 NOTE — ED PROVIDER NOTE - PHYSICAL EXAMINATION
Const: Awake, alert and oriented. In no acute distress. Well appearing.  HEENT: NC/AT. Moist mucous membranes.  Eyes: No scleral icterus. EOMI.  Neck:. Soft and supple. Full ROM without pain.  Cardiac: Regular rate and regular rhythm. +S1/S2. Peripheral pulses 2+ and symmetric. Trace b/l ankle edema noted on exam.   Resp: Speaking in full sentences. No evidence of respiratory distress. No wheezes, rales or rhonchi.  Abd: Soft, non-tender, non-distended. Normal bowel sounds in all 4 quadrants. No guarding or rebound.  Back: Spine midline and non-tender. No CVAT.  Skin: No rashes, abrasions or lacerations.  Lymph: No cervical lymphadenopathy.  Neuro: Awake, alert & oriented x 3. Moves all extremities symmetrically.

## 2021-02-26 NOTE — ED ADULT NURSE NOTE - OBJECTIVE STATEMENT
Pt a&ox3 from home. Pt states she had blood work done and received a call from her md that her H&H are low and to come to ER. Pt states sometimes she feels short of breath. Pt placed on CM. pt educated on plan of care, pt able to successfully teach back plan of care to RN, RN will continue to reeducate pt during hospital stay.

## 2021-02-26 NOTE — ED CDU PROVIDER INITIAL DAY NOTE - PHYSICAL EXAMINATION
Const: Awake, alert and oriented. In no acute distress. Well appearing.  HEENT: NC/AT. Moist mucous membranes.  Eyes: No scleral icterus. EOMI. pale conjunctiva  Neck:. Soft and supple. Full ROM without pain.  Cardiac: Regular rate and regular rhythm. +S1/S2. Peripheral pulses 2+ and symmetric. Trace b/l ankle edema noted on exam.   Resp: Speaking in full sentences. No evidence of respiratory distress. No wheezes, rales or rhonchi.  Abd: Soft, non-tender, non-distended. Normal bowel sounds in all 4 quadrants. No guarding or rebound.  Back: Spine midline and non-tender. No CVAT.  Skin: No rashes, abrasions or lacerations.  Lymph: No cervical lymphadenopathy.  Neuro: Awake, alert & oriented x 3. Moves all extremities symmetrically.

## 2021-02-26 NOTE — ED CDU PROVIDER INITIAL DAY NOTE - OBJECTIVE STATEMENT
81 yo female history of AFIB, iron def anemia, HTN, HLD on Eliquis presents with worsening SOB over the past two days. Per the patient, she has been experiencing SOB at rest and with exertion. She denies any recent chest pain, palpitations, cough w sputum production. The patient informed her PCP of her SOB who referred her for outpatient lab testing which revealed that her hemoglobin was 8. She notes that her hemoglobin has never been this low and that she has never experienced a blood transfusion. The patient otherwise is denying any recent fever/chills, sore throat, cough, abdominal pain, n/v/d, dysuria, hematuria, focal weakness/numbness/tingling.

## 2021-02-27 VITALS
HEART RATE: 60 BPM | DIASTOLIC BLOOD PRESSURE: 68 MMHG | OXYGEN SATURATION: 96 % | SYSTOLIC BLOOD PRESSURE: 152 MMHG | RESPIRATION RATE: 16 BRPM

## 2021-02-27 LAB
SARS-COV-2 RNA SPEC QL NAA+PROBE: SIGNIFICANT CHANGE UP
TROPONIN T SERPL-MCNC: <0.01 NG/ML — SIGNIFICANT CHANGE UP (ref 0–0.06)

## 2021-02-27 PROCEDURE — U0003: CPT

## 2021-02-27 PROCEDURE — 85730 THROMBOPLASTIN TIME PARTIAL: CPT

## 2021-02-27 PROCEDURE — 83550 IRON BINDING TEST: CPT

## 2021-02-27 PROCEDURE — 99283 EMERGENCY DEPT VISIT LOW MDM: CPT | Mod: 25

## 2021-02-27 PROCEDURE — 99217: CPT

## 2021-02-27 PROCEDURE — 93005 ELECTROCARDIOGRAM TRACING: CPT

## 2021-02-27 PROCEDURE — 84484 ASSAY OF TROPONIN QUANT: CPT

## 2021-02-27 PROCEDURE — 82746 ASSAY OF FOLIC ACID SERUM: CPT

## 2021-02-27 PROCEDURE — U0005: CPT

## 2021-02-27 PROCEDURE — 83880 ASSAY OF NATRIURETIC PEPTIDE: CPT

## 2021-02-27 PROCEDURE — 86923 COMPATIBILITY TEST ELECTRIC: CPT

## 2021-02-27 PROCEDURE — 86901 BLOOD TYPING SEROLOGIC RH(D): CPT

## 2021-02-27 PROCEDURE — 86900 BLOOD TYPING SEROLOGIC ABO: CPT

## 2021-02-27 PROCEDURE — G0378: CPT

## 2021-02-27 PROCEDURE — 82607 VITAMIN B-12: CPT

## 2021-02-27 PROCEDURE — 82272 OCCULT BLD FECES 1-3 TESTS: CPT

## 2021-02-27 PROCEDURE — 86850 RBC ANTIBODY SCREEN: CPT

## 2021-02-27 PROCEDURE — 83540 ASSAY OF IRON: CPT

## 2021-02-27 PROCEDURE — 82728 ASSAY OF FERRITIN: CPT

## 2021-02-27 PROCEDURE — 85025 COMPLETE CBC W/AUTO DIFF WBC: CPT

## 2021-02-27 PROCEDURE — 85610 PROTHROMBIN TIME: CPT

## 2021-02-27 PROCEDURE — 36415 COLL VENOUS BLD VENIPUNCTURE: CPT

## 2021-02-27 PROCEDURE — 84466 ASSAY OF TRANSFERRIN: CPT

## 2021-02-27 PROCEDURE — P9016: CPT

## 2021-02-27 PROCEDURE — 80053 COMPREHEN METABOLIC PANEL: CPT

## 2021-02-27 PROCEDURE — 36430 TRANSFUSION BLD/BLD COMPNT: CPT

## 2021-02-27 PROCEDURE — 71045 X-RAY EXAM CHEST 1 VIEW: CPT

## 2021-02-27 PROCEDURE — 93010 ELECTROCARDIOGRAM REPORT: CPT

## 2021-02-27 RX ORDER — DOCUSATE SODIUM 100 MG
1 CAPSULE ORAL
Qty: 30 | Refills: 0
Start: 2021-02-27 | End: 2021-03-28

## 2021-02-27 RX ORDER — FERROUS SULFATE 325(65) MG
1 TABLET ORAL
Qty: 30 | Refills: 0
Start: 2021-02-27 | End: 2021-03-28

## 2021-02-27 RX ADMIN — Medication 25 MICROGRAM(S): at 05:45

## 2021-02-27 RX ADMIN — Medication 20 MILLIGRAM(S): at 05:45

## 2021-02-27 RX ADMIN — AMIODARONE HYDROCHLORIDE 200 MILLIGRAM(S): 400 TABLET ORAL at 05:45

## 2021-02-27 RX ADMIN — AMLODIPINE BESYLATE 10 MILLIGRAM(S): 2.5 TABLET ORAL at 05:45

## 2021-02-27 NOTE — ED ADULT NURSE REASSESSMENT NOTE - COMFORT CARE
assisted to bathroom/meal provided/plan of care explained/po fluids offered/wait time explained
plan of care explained/wait time explained

## 2021-02-27 NOTE — ED CDU PROVIDER DISPOSITION NOTE - ATTENDING CONTRIBUTION TO CARE
pleasant adult female with iron deficiency anemia; occult blood negative; requiring transfusions; no transfusion reactions; feeling improved s/p 2U PRBC; ok for d/c with iron supplementation and heme f/u    I, Myke Mitchell, participated in the care of this patient with the ACP. I discussed the history and physical exam findings as well as lab results and plan of care with the ACP. I agree with ACP's history, physical and assessment.

## 2021-02-27 NOTE — ED ADULT NURSE REASSESSMENT NOTE - NS ED NURSE REASSESS COMMENT FT1
Pt resting comfortably in bed no complaints at this time. PRBC complete no reaction noted. Pt VSS. Will continue to monitor,
VSS, no reaction noted through first 15 min of transfusion Pt resting comfortably will continue to monitor.
Assumed care of the Pt at 2303.Verbal report received from THAIS Real. Pt is Aox4 in no acute distress. Pt denies any chest pain, dizziness and SOB at this time. Pt states that she feels fine. Pt VSS, PIV patent, breathing even and unlabored. Pt to received 2 units PRBCs. Pt in understanding of pan of care, wait time explained, plan of care explained, will continue to monitor
Assumed care of the patient @1199. Pt A&Ox4. Patient in understanding of plan of care. Patient with no further questions for the RN. Resting in comfort. Call bell within reach and encouraged to use when assistance needed. Will continue to monitor.

## 2021-02-27 NOTE — ED CDU PROVIDER DISPOSITION NOTE - CLINICAL COURSE
79 yo female hx of afib on eliquis htn hld  presenting to ER with symptomatic anemia, outpatient labs at 8, found to have h/h of 6.6/25 fecal occult negative. placed in observation for tele monitoring and transfusion. labs showing low serum iron and increased TIBC. to place on oral iron daily and fu with heme. given strict return precautions

## 2021-02-27 NOTE — ED CDU PROVIDER DISPOSITION NOTE - PATIENT PORTAL LINK FT
You can access the FollowMyHealth Patient Portal offered by [facility] by registering at the following website: [email or web address]. By joining [employer]’s FollowMyHealth portal, you will also be able to view your health information using other applications (apps) compatible with our system.

## 2021-02-27 NOTE — ED CDU PROVIDER DISPOSITION NOTE - CARE PROVIDER_API CALL
Isa Velazquez)  HematologyOncology; Medical Oncology  [address] [address]  Phone: [phone]  Fax: [phone]  Follow Up Time: 7-10 Days

## 2021-02-27 NOTE — ED CDU PROVIDER DISPOSITION NOTE - NSFOLLOWUPINSTRUCTIONS_ED_ALL_ED_FT
please take daily iron supplement   iron can cause constipation please take daily stool softener and hydrate well   please follow with the blood doctor (referral included)   new or worsening symptoms including and not limited to increased shortness of breath palpitations chest pain abdominal pain, dark stools or bloody stools return to the ER immediately     Anemia  Anemia is a condition in which the concentration of red blood cells or hemoglobin in the blood is below normal. Hemoglobin is a substance in red blood cells that carries oxygen to the tissues of the body. Anemia results in not enough oxygen reaching these tissues which can cause symptoms such as weakness, dizziness/lightheadedness, shortness of breath, chest pain, paleness, or nausea. The cause of your anemia may or may not be determined immediately. If your hemoglobin was dangerously low, you may have received a blood transfusion. Usually reactions to transfusions occur immediately but monitor yourself for any fevers, rash, or shortness of breath.    SEEK IMMEDIATE MEDICAL CARE IF YOU HAVE ANY OF THE FOLLOWING SYMPTOMS: extreme weakness/chest pain/shortness of breath, black or bloody stools, vomiting blood, fainting, fever, or any signs of dehydration.

## 2021-02-27 NOTE — ED CDU PROVIDER SUBSEQUENT DAY NOTE - ATTENDING CONTRIBUTION TO CARE
pleasant adult female with iron deficiency anemia; occult blood negative; requiring transfusions; no transfusion reactions; feeling improved s/p 2U PRBC; ok for d/c with iron supplementation and heme f/u

## 2021-03-17 ENCOUNTER — EMERGENCY (EMERGENCY)
Facility: HOSPITAL | Age: 81
LOS: 1 days | Discharge: HOME | End: 2021-03-17
Attending: EMERGENCY MEDICINE
Payer: COMMERCIAL

## 2021-03-17 VITALS
TEMPERATURE: 98 F | DIASTOLIC BLOOD PRESSURE: 73 MMHG | HEART RATE: 85 BPM | SYSTOLIC BLOOD PRESSURE: 173 MMHG | HEIGHT: 61 IN | OXYGEN SATURATION: 94 % | RESPIRATION RATE: 18 BRPM

## 2021-03-17 LAB
ALBUMIN SERPL ELPH-MCNC: 4 G/DL — SIGNIFICANT CHANGE UP (ref 3.3–5.2)
ALP SERPL-CCNC: 77 U/L — SIGNIFICANT CHANGE UP (ref 40–120)
ALT FLD-CCNC: 55 U/L — HIGH
ANION GAP SERPL CALC-SCNC: 13 MMOL/L — SIGNIFICANT CHANGE UP (ref 5–17)
ANISOCYTOSIS BLD QL: SIGNIFICANT CHANGE UP
APTT BLD: 26.4 SEC — LOW (ref 27.5–35.5)
AST SERPL-CCNC: 63 U/L — HIGH
BASOPHILS # BLD AUTO: 0 K/UL — SIGNIFICANT CHANGE UP (ref 0–0.2)
BASOPHILS NFR BLD AUTO: 0 % — SIGNIFICANT CHANGE UP (ref 0–2)
BILIRUB SERPL-MCNC: 0.4 MG/DL — SIGNIFICANT CHANGE UP (ref 0.4–2)
BUN SERPL-MCNC: 11 MG/DL — SIGNIFICANT CHANGE UP (ref 8–20)
CALCIUM SERPL-MCNC: 8.6 MG/DL — SIGNIFICANT CHANGE UP (ref 8.6–10.2)
CHLORIDE SERPL-SCNC: 99 MMOL/L — SIGNIFICANT CHANGE UP (ref 98–107)
CO2 SERPL-SCNC: 23 MMOL/L — SIGNIFICANT CHANGE UP (ref 22–29)
CREAT SERPL-MCNC: 0.64 MG/DL — SIGNIFICANT CHANGE UP (ref 0.5–1.3)
CRP SERPL-MCNC: 56 MG/L — HIGH
D DIMER BLD IA.RAPID-MCNC: 342 NG/ML DDU — HIGH
EOSINOPHIL # BLD AUTO: 0 K/UL — SIGNIFICANT CHANGE UP (ref 0–0.5)
EOSINOPHIL NFR BLD AUTO: 0 % — SIGNIFICANT CHANGE UP (ref 0–6)
FERRITIN SERPL-MCNC: 48 NG/ML — SIGNIFICANT CHANGE UP (ref 15–150)
GIANT PLATELETS BLD QL SMEAR: PRESENT — SIGNIFICANT CHANGE UP
GLUCOSE SERPL-MCNC: 102 MG/DL — HIGH (ref 70–99)
HCT VFR BLD CALC: 39.6 % — SIGNIFICANT CHANGE UP (ref 34.5–45)
HGB BLD-MCNC: 11.3 G/DL — LOW (ref 11.5–15.5)
INR BLD: 1.06 RATIO — SIGNIFICANT CHANGE UP (ref 0.88–1.16)
LYMPHOCYTES # BLD AUTO: 0.29 K/UL — LOW (ref 1–3.3)
LYMPHOCYTES # BLD AUTO: 4.4 % — LOW (ref 13–44)
MANUAL SMEAR VERIFICATION: SIGNIFICANT CHANGE UP
MCHC RBC-ENTMCNC: 22.6 PG — LOW (ref 27–34)
MCHC RBC-ENTMCNC: 28.5 GM/DL — LOW (ref 32–36)
MCV RBC AUTO: 79 FL — LOW (ref 80–100)
MICROCYTES BLD QL: SIGNIFICANT CHANGE UP
MONOCYTES # BLD AUTO: 0.23 K/UL — SIGNIFICANT CHANGE UP (ref 0–0.9)
MONOCYTES NFR BLD AUTO: 3.5 % — SIGNIFICANT CHANGE UP (ref 2–14)
NEUTROPHILS # BLD AUTO: 6.03 K/UL — SIGNIFICANT CHANGE UP (ref 1.8–7.4)
NEUTROPHILS NFR BLD AUTO: 92.1 % — HIGH (ref 43–77)
PLAT MORPH BLD: NORMAL — SIGNIFICANT CHANGE UP
PLATELET # BLD AUTO: 302 K/UL — SIGNIFICANT CHANGE UP (ref 150–400)
POIKILOCYTOSIS BLD QL AUTO: SIGNIFICANT CHANGE UP
POLYCHROMASIA BLD QL SMEAR: SIGNIFICANT CHANGE UP
POTASSIUM SERPL-MCNC: 2.9 MMOL/L — CRITICAL LOW (ref 3.5–5.3)
POTASSIUM SERPL-SCNC: 2.9 MMOL/L — CRITICAL LOW (ref 3.5–5.3)
PROCALCITONIN SERPL-MCNC: 0.14 NG/ML — HIGH (ref 0.02–0.1)
PROT SERPL-MCNC: 7.4 G/DL — SIGNIFICANT CHANGE UP (ref 6.6–8.7)
PROTHROM AB SERPL-ACNC: 12.3 SEC — SIGNIFICANT CHANGE UP (ref 10.6–13.6)
RBC # BLD: 5.01 M/UL — SIGNIFICANT CHANGE UP (ref 3.8–5.2)
RBC # FLD: 22.1 % — HIGH (ref 10.3–14.5)
RBC BLD AUTO: ABNORMAL
SODIUM SERPL-SCNC: 135 MMOL/L — SIGNIFICANT CHANGE UP (ref 135–145)
WBC # BLD: 6.55 K/UL — SIGNIFICANT CHANGE UP (ref 3.8–10.5)
WBC # FLD AUTO: 6.55 K/UL — SIGNIFICANT CHANGE UP (ref 3.8–10.5)

## 2021-03-17 PROCEDURE — 93010 ELECTROCARDIOGRAM REPORT: CPT

## 2021-03-17 PROCEDURE — 99285 EMERGENCY DEPT VISIT HI MDM: CPT

## 2021-03-17 PROCEDURE — 71045 X-RAY EXAM CHEST 1 VIEW: CPT | Mod: 26

## 2021-03-17 RX ORDER — POTASSIUM CHLORIDE 20 MEQ
40 PACKET (EA) ORAL EVERY 4 HOURS
Refills: 0 | Status: COMPLETED | OUTPATIENT
Start: 2021-03-17 | End: 2021-03-18

## 2021-03-17 RX ADMIN — Medication 40 MILLIEQUIVALENT(S): at 23:44

## 2021-03-17 NOTE — ED ADULT NURSE NOTE - NSIMPLEMENTINTERV_GEN_ALL_ED
Implemented All Universal Safety Interventions:  [place] to call system. Call bell, personal items and telephone within reach. Instruct patient to call for assistance. Room bathroom lighting operational. Non-slip footwear when patient is off stretcher. Physically safe environment: no spills, clutter or unnecessary equipment. Stretcher in lowest position, wheels locked, appropriate side rails in place.

## 2021-03-17 NOTE — ED ADULT NURSE NOTE - NSFALLRSKPASTHIST_ED_ALL_ED
Patient:   IRON SALES            MRN: [id number]            FIN: [id number]              Age:   27 years     Sex:  MALE     :  93   Associated Diagnoses:   None   Author:   PARRISH DUTTON     Subjective:  Pt s/e at bedside this AM  Pain tolerable on Rx, no acute complaints or changes to baseline sensation  Wound Vac approx 70cc since OR  Denies chest pain, dyspnea, Fevers/ Chills  Objective:  Vitals between:   15-DARIUS-2020 07:52:37   TO   2020 07:52:37                   LAST RESULT MINIMUM MAXIMUM  Temperature 36.9 36.7 37.2  Heart Rate 108 105 131  Respiratory Rate 18 13 19  NISBP           128 112 161  NIDBP           73 68 97  NIMBP           91 83 114  SpO2                    98 88 99  Labs between:  15-DARIUS-2020 07:52 to 2020 07:52  CBC:                 WBC  HgB  Hct  Plt  MCV  RDW   2020 6.6  (!) 6.2  (L) 18.1  177  87.9  14.3   15-DARIUS-2020   (L) 7.8  (L) 23.8         DIFF:                 Seg  Neutroph//ABS  Lymph//ABS  Mono//ABS  EOS/ABS  2020 NOT APPLICABLE  73 // 4.8 16 // 1.1 10 // 0.6 0 // (L) 0.0                  Physical Exam:  Gen: NAD, Cooperative  Card: Tachycardic  Pulm: breathing comfortably on room air  MSK  LLE:  Dressings c/d/i, wound vac in place to suction  Mild TTP throughout surgical incisions  Compartments soft and compressible  Wiggles and senses digits 1-5, SILT L2-S1  +EHL/FHL, unable to DF/PF at baseline prior to surgery  Dopplerable DP/PT, WWP  RLE:   Dressings c/d/i  Mild TTP throughout surgical incisions  Calf NTTP  Compartments soft and compressible  +EHL/FHL/TA/GS  SILT L2-S1  Dopplerable DP/PT, WWP  RUE:   Coaptation splint c/d/i  Compartments soft and compressilble  +Ain/pin/ulnar, Wiggles and senses digits 1-5 however unable to extend wrist, weak hand   WWP, Cap Refil <2 sec   Assessment  28yo M w/ R Humeral Shaft Fx in Coaptation splint, Left lower leg compartment syndrome s/p fasciotomy/I&D (Nina ) I&D/Medial wound  closure and lateral WV Application (Lieder 6/15), Left Shatzker II tibial plateau fracture s/p ORIF (Lieder 6/15), L Distal 1/3 tibial shaft fracture w/ Pilon component s/p ORIF (Lieder 6/15), Left open distal femur fracture s/p retrograde IMN (Nina 6/14)and right closed midshaft femur fracture s/p  retrograde femoral nail (Nina 6/14), now POD 1 doing well  Plan:   Continue care per primary  Plastics consultation Dr. Carson for closure of lateral wound, monitor WV Output  Dr. Cheng to address RUE fractures, until then NWB in coaptation splint  ABx: Zosyn to be continued until wound closure  DVT prophylaxis per Trauma team, Reccomend Xarelto 10mg daily x35d, SCDs  Ok to be up to chair with assistance  Bone health: 4000 IU cholecalciferol   Pain control: Tramadol, Tylenol. No NSAIDs  Weight bearing: NWB LLE/RUE, strict ice and elevation; WBAT RLE  PT and OT  Diet: General   Ice and elevate.   Ortho to follow, will d/w attending and advise as plan changes  Eden Figueroa MD  [facility] Orthopaedic Surgery Resident PGY 1  Contact via [employer]  agree with above  no changes  VS: tachycaridic, otherwise stable, AF  Hb: 6.2  A: POD #1 ORIF L tibia plateau, pilon, shaft, talus, wound vac  R humerus and Asher Femurs: managed by Dr. Wood  P: Pain control  DVT px: lovenox   Plastics for wound closure LLE  zosyn until soft tissue closure obtained  ROM as rik L knee  NWB LLE  PT  cholecalciferol 4000 IU PO daily for 12 weeks  F/U Lieder in 2 weeks: call [facility] [place] office for appt [phone]   no

## 2021-03-17 NOTE — ED ADULT NURSE NOTE - OBJECTIVE STATEMENT
Pt reports being extra weak lately, states she was tested yesterday and is covid +. Son in law at home is +. Pt denies any other symptoms at this time. Pt noted to have ecchymosis around right eye, pt states she slipped out of the shower last week. pt placed on cm. pt educated on plan of care, pt able to successfully teach back plan of care to RN, RN will continue to reeducate pt during hospital stay.

## 2021-03-17 NOTE — ED ADULT TRIAGE NOTE - CHIEF COMPLAINT QUOTE
Patient with weakness since yesterday, diagnosed Covid positive on Monday. c/o difficulty getting out of chair and some shortness of breath. Patient with old bruising to face, states fell 2 weeks ago.

## 2021-03-17 NOTE — ED ADULT TRIAGE NOTE - ISOLATION PROVIDED EDUCATION
October 3, 2019     Patient: Kathleen Bhandari   YOB: 1959   Date of Visit: 10/3/2019       To Whom it May Concern:    Kathleen Bhandari was seen in my clinic on 10/3/2019 at 10:00 am.    Please excuse Yissel Pinonruben for her absence from work on the date listed above to be able to make her appointment. She may return to work 10/7/19    Sincerely,         Randy Olson PA-C    Medical information is confidential and cannot be disclosed without the written consent of the patient or her representative. Patient

## 2021-03-18 ENCOUNTER — INPATIENT (INPATIENT)
Facility: HOSPITAL | Age: 81
LOS: 3 days | Discharge: NURSING, REHAB OR LONG-TERM CARE | DRG: 177 | End: 2021-03-22
Attending: INTERNAL MEDICINE | Admitting: INTERNAL MEDICINE
Payer: MEDICARE

## 2021-03-18 VITALS
HEART RATE: 69 BPM | SYSTOLIC BLOOD PRESSURE: 173 MMHG | HEIGHT: 61 IN | TEMPERATURE: 98 F | RESPIRATION RATE: 18 BRPM | WEIGHT: 153 LBS | OXYGEN SATURATION: 95 % | DIASTOLIC BLOOD PRESSURE: 66 MMHG

## 2021-03-18 VITALS
SYSTOLIC BLOOD PRESSURE: 155 MMHG | HEART RATE: 61 BPM | OXYGEN SATURATION: 93 % | DIASTOLIC BLOOD PRESSURE: 79 MMHG | RESPIRATION RATE: 16 BRPM | TEMPERATURE: 99 F

## 2021-03-18 PROBLEM — D50.9 IRON DEFICIENCY ANEMIA, UNSPECIFIED: Chronic | Status: ACTIVE | Noted: 2021-02-26

## 2021-03-18 LAB
HCT VFR BLD CALC: 36 % — SIGNIFICANT CHANGE UP (ref 34.5–45)
HGB BLD-MCNC: 10.1 G/DL — LOW (ref 11.5–15.5)
MCHC RBC-ENTMCNC: 22.5 PG — LOW (ref 27–34)
MCHC RBC-ENTMCNC: 28.1 GM/DL — LOW (ref 32–36)
MCV RBC AUTO: 80.4 FL — SIGNIFICANT CHANGE UP (ref 80–100)
PLATELET # BLD AUTO: 287 K/UL — SIGNIFICANT CHANGE UP (ref 150–400)
RBC # BLD: 4.48 M/UL — SIGNIFICANT CHANGE UP (ref 3.8–5.2)
RBC # FLD: 21.6 % — HIGH (ref 10.3–14.5)
SARS-COV-2 RNA SPEC QL NAA+PROBE: DETECTED

## 2021-03-18 PROCEDURE — 84145 PROCALCITONIN (PCT): CPT

## 2021-03-18 PROCEDURE — U0005: CPT

## 2021-03-18 PROCEDURE — 82728 ASSAY OF FERRITIN: CPT

## 2021-03-18 PROCEDURE — 96374 THER/PROPH/DIAG INJ IV PUSH: CPT

## 2021-03-18 PROCEDURE — 80053 COMPREHEN METABOLIC PANEL: CPT

## 2021-03-18 PROCEDURE — 85610 PROTHROMBIN TIME: CPT

## 2021-03-18 PROCEDURE — 36415 COLL VENOUS BLD VENIPUNCTURE: CPT

## 2021-03-18 PROCEDURE — 85730 THROMBOPLASTIN TIME PARTIAL: CPT

## 2021-03-18 PROCEDURE — 99285 EMERGENCY DEPT VISIT HI MDM: CPT | Mod: 25

## 2021-03-18 PROCEDURE — 93005 ELECTROCARDIOGRAM TRACING: CPT

## 2021-03-18 PROCEDURE — U0003: CPT

## 2021-03-18 PROCEDURE — 85379 FIBRIN DEGRADATION QUANT: CPT

## 2021-03-18 PROCEDURE — 96375 TX/PRO/DX INJ NEW DRUG ADDON: CPT

## 2021-03-18 PROCEDURE — 71045 X-RAY EXAM CHEST 1 VIEW: CPT

## 2021-03-18 PROCEDURE — 86140 C-REACTIVE PROTEIN: CPT

## 2021-03-18 PROCEDURE — 85025 COMPLETE CBC W/AUTO DIFF WBC: CPT

## 2021-03-18 PROCEDURE — 96376 TX/PRO/DX INJ SAME DRUG ADON: CPT

## 2021-03-18 RX ORDER — DEXAMETHASONE 0.5 MG/5ML
1 ELIXIR ORAL
Qty: 5 | Refills: 0
Start: 2021-03-18 | End: 2021-03-22

## 2021-03-18 RX ORDER — POTASSIUM CHLORIDE 20 MEQ
10 PACKET (EA) ORAL
Refills: 0 | Status: COMPLETED | OUTPATIENT
Start: 2021-03-18 | End: 2021-03-18

## 2021-03-18 RX ORDER — DEXAMETHASONE 0.5 MG/5ML
6 ELIXIR ORAL ONCE
Refills: 0 | Status: COMPLETED | OUTPATIENT
Start: 2021-03-18 | End: 2021-03-18

## 2021-03-18 RX ORDER — SODIUM CHLORIDE 9 MG/ML
3 INJECTION INTRAMUSCULAR; INTRAVENOUS; SUBCUTANEOUS ONCE
Refills: 0 | Status: COMPLETED | OUTPATIENT
Start: 2021-03-18 | End: 2021-03-18

## 2021-03-18 RX ORDER — SODIUM CHLORIDE 9 MG/ML
1000 INJECTION, SOLUTION INTRAVENOUS ONCE
Refills: 0 | Status: COMPLETED | OUTPATIENT
Start: 2021-03-18 | End: 2021-03-18

## 2021-03-18 RX ADMIN — Medication 100 MILLIEQUIVALENT(S): at 02:32

## 2021-03-18 RX ADMIN — SODIUM CHLORIDE 1000 MILLILITER(S): 9 INJECTION, SOLUTION INTRAVENOUS at 23:56

## 2021-03-18 RX ADMIN — Medication 100 MILLIEQUIVALENT(S): at 03:46

## 2021-03-18 RX ADMIN — SODIUM CHLORIDE 3 MILLILITER(S): 9 INJECTION INTRAMUSCULAR; INTRAVENOUS; SUBCUTANEOUS at 23:56

## 2021-03-18 RX ADMIN — Medication 40 MILLIEQUIVALENT(S): at 06:31

## 2021-03-18 RX ADMIN — Medication 100 MILLIEQUIVALENT(S): at 01:32

## 2021-03-18 RX ADMIN — Medication 40 MILLIEQUIVALENT(S): at 02:46

## 2021-03-18 RX ADMIN — Medication 6 MILLIGRAM(S): at 06:31

## 2021-03-18 NOTE — ED PROVIDER NOTE - PATIENT PORTAL LINK FT
You can access the FollowMyHealth Patient Portal offered by [facility] by registering at the following website: [email or web address]. By joining [employer]’s FollowMyHealth portal, you will also be able to view your health information using other applications (apps) compatible with our system.

## 2021-03-18 NOTE — ED PROVIDER NOTE - PATIENT PORTAL LINK FT
You can access the FollowMyHealth Patient Portal offered by [facility] by registering at the following website: [email or web address]. By joining [employer]’s FollowMyHealth portal, you will also be able to view your health information using other applications (apps) compatible with our system.

## 2021-03-18 NOTE — ED PROVIDER NOTE - CARE PLAN
Principal Discharge DX:	Feared condition not demonstrated   Principal Discharge DX:	Feared condition not demonstrated  Secondary Diagnosis:	COVID-19  Secondary Diagnosis:	Diarrhea

## 2021-03-18 NOTE — ED PROVIDER NOTE - NSFOLLOWUPINSTRUCTIONS_ED_ALL_ED_FT
Apply topical cream to buttocks after every bowel movement  Follow up with your doctor next 1-2 days  Return sooner for any problems

## 2021-03-18 NOTE — ED PROVIDER NOTE - CLINICAL SUMMARY MEDICAL DECISION MAKING FREE TEXT BOX
Generalized weakness with known COVID-19, noted to be hypokalemic to 2.9, given IV and oral replacement. No respiratory distress or hypoxia require hospitalization. Feels better after potassium replacement, able to ambulate now with assistance which is her baseline. She lives with her daughter and has several other family members that help her at home, so she feels safe to go. Will dc with pulse oximeter. Understands return precautions for COVID symptoms.

## 2021-03-18 NOTE — ED PROVIDER NOTE - NSFOLLOWUPINSTRUCTIONS_ED_ALL_ED_FT
COntinue all home medications as prescribed  Monitor your oxygen saturation with the provided pulse oximeter. Return to the ER immediately for saturation sustained below 92%, worsening shortness of breath or any other new, worsening or progressive symptoms.

## 2021-03-18 NOTE — ED PROVIDER NOTE - OBJECTIVE STATEMENT
81y/o F with PMHx of Afib; on Eliquis; COVID + on Monday presents to the ED c/o BRB rectal bleeding on toilet paper x1 episode a few hrs PTA. Pt had intermittent diarrhea since this morning. Pt has hx of rectal bleeding. She denies fever, chills, abdominal pain. Prior mechanical fall from slipping on water 2 weeks ago which she saw her PCP for.   PCP: [place]; Cardiologist:  81y/o F with PMHx of Afib; on Eliquis; COVID + on Monday presents to the ED c/o BRB rectal bleeding only on toilet paper x1 episode a few hrs PTA. Pt had intermittent diarrhea since this morning. Pt has hx of rectal bleeding. She denies fever, chills, abdominal pain. Prior mechanical fall from slipping on water 2 weeks ago which she saw her PCP for.   PCP: [place]; Cardiologist:

## 2021-03-18 NOTE — ED PROVIDER NOTE - OBJECTIVE STATEMENT
80yof w/ HTN, AF on eliquis p/w generalized weakness, COVID positive. Started having symptoms yesterday, was tested positive the day prior after family sick contacts. Today she feels generally weak, usually walks with a walker but she could not get herself out of the chair. She endorses mild dyspnea with exertion but dyspnea at rest, no chest pain, no palpitations, no nausea, vomiting, or diarrhea. She has bruising to the right eye after a fall 2 weeks but denies any headache, vision changes or other neurologic symptoms. She was seen by her primary doctor after the fall.

## 2021-03-18 NOTE — ED PROVIDER NOTE - PROGRESS NOTE DETAILS
Repeat CBC as noted.  Pt stable for d/c with f/u as outpt Pt's daughter called stating she is unable to care for her mother at home at this time.  States she feels she is too weak and might require HERB.  will hold for SW and fatoumata by case management this AM

## 2021-03-19 DIAGNOSIS — U07.1 COVID-19: ICD-10-CM

## 2021-03-19 LAB
ALBUMIN SERPL ELPH-MCNC: 3.8 G/DL — SIGNIFICANT CHANGE UP (ref 3.3–5.2)
ALP SERPL-CCNC: 68 U/L — SIGNIFICANT CHANGE UP (ref 40–120)
ALT FLD-CCNC: 49 U/L — HIGH
ANION GAP SERPL CALC-SCNC: 11 MMOL/L — SIGNIFICANT CHANGE UP (ref 5–17)
ANION GAP SERPL CALC-SCNC: 14 MMOL/L — SIGNIFICANT CHANGE UP (ref 5–17)
ANISOCYTOSIS BLD QL: SLIGHT — SIGNIFICANT CHANGE UP
ANISOCYTOSIS BLD QL: SLIGHT — SIGNIFICANT CHANGE UP
APPEARANCE UR: CLEAR — SIGNIFICANT CHANGE UP
AST SERPL-CCNC: 51 U/L — HIGH
BACTERIA # UR AUTO: NEGATIVE — SIGNIFICANT CHANGE UP
BASOPHILS # BLD AUTO: 0 K/UL — SIGNIFICANT CHANGE UP (ref 0–0.2)
BASOPHILS # BLD AUTO: 0 K/UL — SIGNIFICANT CHANGE UP (ref 0–0.2)
BASOPHILS NFR BLD AUTO: 0 % — SIGNIFICANT CHANGE UP (ref 0–2)
BASOPHILS NFR BLD AUTO: 0 % — SIGNIFICANT CHANGE UP (ref 0–2)
BILIRUB SERPL-MCNC: 0.4 MG/DL — SIGNIFICANT CHANGE UP (ref 0.4–2)
BILIRUB UR-MCNC: NEGATIVE — SIGNIFICANT CHANGE UP
BLD GP AB SCN SERPL QL: SIGNIFICANT CHANGE UP
BUN SERPL-MCNC: 16 MG/DL — SIGNIFICANT CHANGE UP (ref 8–20)
BUN SERPL-MCNC: 18 MG/DL — SIGNIFICANT CHANGE UP (ref 8–20)
BURR CELLS BLD QL SMEAR: PRESENT — SIGNIFICANT CHANGE UP
BURR CELLS BLD QL SMEAR: PRESENT — SIGNIFICANT CHANGE UP
CALCIUM SERPL-MCNC: 8.6 MG/DL — SIGNIFICANT CHANGE UP (ref 8.6–10.2)
CALCIUM SERPL-MCNC: 8.8 MG/DL — SIGNIFICANT CHANGE UP (ref 8.6–10.2)
CHLORIDE SERPL-SCNC: 100 MMOL/L — SIGNIFICANT CHANGE UP (ref 98–107)
CHLORIDE SERPL-SCNC: 104 MMOL/L — SIGNIFICANT CHANGE UP (ref 98–107)
CK SERPL-CCNC: 96 U/L — SIGNIFICANT CHANGE UP (ref 25–170)
CO2 SERPL-SCNC: 21 MMOL/L — LOW (ref 22–29)
CO2 SERPL-SCNC: 24 MMOL/L — SIGNIFICANT CHANGE UP (ref 22–29)
COLOR SPEC: YELLOW — SIGNIFICANT CHANGE UP
CREAT SERPL-MCNC: 0.6 MG/DL — SIGNIFICANT CHANGE UP (ref 0.5–1.3)
CREAT SERPL-MCNC: 0.69 MG/DL — SIGNIFICANT CHANGE UP (ref 0.5–1.3)
DIFF PNL FLD: ABNORMAL
ELLIPTOCYTES BLD QL SMEAR: SLIGHT — SIGNIFICANT CHANGE UP
ELLIPTOCYTES BLD QL SMEAR: SLIGHT — SIGNIFICANT CHANGE UP
EOSINOPHIL # BLD AUTO: 0 K/UL — SIGNIFICANT CHANGE UP (ref 0–0.5)
EOSINOPHIL # BLD AUTO: 0 K/UL — SIGNIFICANT CHANGE UP (ref 0–0.5)
EOSINOPHIL NFR BLD AUTO: 0 % — SIGNIFICANT CHANGE UP (ref 0–6)
EOSINOPHIL NFR BLD AUTO: 0 % — SIGNIFICANT CHANGE UP (ref 0–6)
EPI CELLS # UR: SIGNIFICANT CHANGE UP
GIANT PLATELETS BLD QL SMEAR: PRESENT — SIGNIFICANT CHANGE UP
GIANT PLATELETS BLD QL SMEAR: PRESENT — SIGNIFICANT CHANGE UP
GLUCOSE SERPL-MCNC: 104 MG/DL — HIGH (ref 70–99)
GLUCOSE SERPL-MCNC: 144 MG/DL — HIGH (ref 70–99)
GLUCOSE UR QL: NEGATIVE MG/DL — SIGNIFICANT CHANGE UP
HCT VFR BLD CALC: 34.7 % — SIGNIFICANT CHANGE UP (ref 34.5–45)
HCT VFR BLD CALC: 35.1 % — SIGNIFICANT CHANGE UP (ref 34.5–45)
HGB BLD-MCNC: 9.7 G/DL — LOW (ref 11.5–15.5)
HGB BLD-MCNC: 9.9 G/DL — LOW (ref 11.5–15.5)
KETONES UR-MCNC: NEGATIVE — SIGNIFICANT CHANGE UP
LEUKOCYTE ESTERASE UR-ACNC: NEGATIVE — SIGNIFICANT CHANGE UP
LYMPHOCYTES # BLD AUTO: 0.08 K/UL — LOW (ref 1–3.3)
LYMPHOCYTES # BLD AUTO: 0.22 K/UL — LOW (ref 1–3.3)
LYMPHOCYTES # BLD AUTO: 2.7 % — LOW (ref 13–44)
LYMPHOCYTES # BLD AUTO: 6.1 % — LOW (ref 13–44)
MANUAL SMEAR VERIFICATION: SIGNIFICANT CHANGE UP
MANUAL SMEAR VERIFICATION: SIGNIFICANT CHANGE UP
MCHC RBC-ENTMCNC: 22.9 PG — LOW (ref 27–34)
MCHC RBC-ENTMCNC: 28.5 GM/DL — LOW (ref 32–36)
MCV RBC AUTO: 80.1 FL — SIGNIFICANT CHANGE UP (ref 80–100)
METAMYELOCYTES # FLD: 0.9 % — HIGH (ref 0–0)
MICROCYTES BLD QL: SLIGHT — SIGNIFICANT CHANGE UP
MICROCYTES BLD QL: SLIGHT — SIGNIFICANT CHANGE UP
MONOCYTES # BLD AUTO: 0.06 K/UL — SIGNIFICANT CHANGE UP (ref 0–0.9)
MONOCYTES # BLD AUTO: 0.1 K/UL — SIGNIFICANT CHANGE UP (ref 0–0.9)
MONOCYTES NFR BLD AUTO: 1.8 % — LOW (ref 2–14)
MONOCYTES NFR BLD AUTO: 3.5 % — SIGNIFICANT CHANGE UP (ref 2–14)
NEUTROPHILS # BLD AUTO: 2.63 K/UL — SIGNIFICANT CHANGE UP (ref 1.8–7.4)
NEUTROPHILS # BLD AUTO: 3.32 K/UL — SIGNIFICANT CHANGE UP (ref 1.8–7.4)
NEUTROPHILS NFR BLD AUTO: 90.4 % — HIGH (ref 43–77)
NEUTROPHILS NFR BLD AUTO: 91.1 % — HIGH (ref 43–77)
NEUTS BAND # BLD: 1.7 % — SIGNIFICANT CHANGE UP (ref 0–8)
NITRITE UR-MCNC: NEGATIVE — SIGNIFICANT CHANGE UP
NRBC # BLD: 2 /100 — HIGH (ref 0–0)
OB PNL STL: NEGATIVE — SIGNIFICANT CHANGE UP
OVALOCYTES BLD QL SMEAR: SLIGHT — SIGNIFICANT CHANGE UP
PH UR: 6.5 — SIGNIFICANT CHANGE UP (ref 5–8)
PLAT MORPH BLD: NORMAL — SIGNIFICANT CHANGE UP
PLAT MORPH BLD: NORMAL — SIGNIFICANT CHANGE UP
PLATELET # BLD AUTO: 257 K/UL — SIGNIFICANT CHANGE UP (ref 150–400)
POIKILOCYTOSIS BLD QL AUTO: SIGNIFICANT CHANGE UP
POIKILOCYTOSIS BLD QL AUTO: SLIGHT — SIGNIFICANT CHANGE UP
POLYCHROMASIA BLD QL SMEAR: SIGNIFICANT CHANGE UP
POLYCHROMASIA BLD QL SMEAR: SLIGHT — SIGNIFICANT CHANGE UP
POTASSIUM SERPL-MCNC: 3.5 MMOL/L — SIGNIFICANT CHANGE UP (ref 3.5–5.3)
POTASSIUM SERPL-MCNC: 4.1 MMOL/L — SIGNIFICANT CHANGE UP (ref 3.5–5.3)
POTASSIUM SERPL-SCNC: 3.5 MMOL/L — SIGNIFICANT CHANGE UP (ref 3.5–5.3)
POTASSIUM SERPL-SCNC: 4.1 MMOL/L — SIGNIFICANT CHANGE UP (ref 3.5–5.3)
PROT SERPL-MCNC: 6.8 G/DL — SIGNIFICANT CHANGE UP (ref 6.6–8.7)
PROT UR-MCNC: 30 MG/DL
RBC # BLD: 4.33 M/UL — SIGNIFICANT CHANGE UP (ref 3.8–5.2)
RBC # FLD: 21.5 % — HIGH (ref 10.3–14.5)
RBC BLD AUTO: ABNORMAL
RBC BLD AUTO: ABNORMAL
RBC CASTS # UR COMP ASSIST: SIGNIFICANT CHANGE UP /HPF (ref 0–4)
SCHISTOCYTES BLD QL AUTO: SLIGHT — SIGNIFICANT CHANGE UP
SCHISTOCYTES BLD QL AUTO: SLIGHT — SIGNIFICANT CHANGE UP
SMUDGE CELLS # BLD: PRESENT — SIGNIFICANT CHANGE UP
SODIUM SERPL-SCNC: 135 MMOL/L — SIGNIFICANT CHANGE UP (ref 135–145)
SODIUM SERPL-SCNC: 138 MMOL/L — SIGNIFICANT CHANGE UP (ref 135–145)
SP GR SPEC: 1.01 — SIGNIFICANT CHANGE UP (ref 1.01–1.02)
UROBILINOGEN FLD QL: NEGATIVE MG/DL — SIGNIFICANT CHANGE UP
VARIANT LYMPHS # BLD: 1.8 % — SIGNIFICANT CHANGE UP (ref 0–6)
WBC # BLD: 2.89 K/UL — LOW (ref 3.8–10.5)
WBC # BLD: 3.61 K/UL — LOW (ref 3.8–10.5)
WBC # FLD AUTO: 2.89 K/UL — LOW (ref 3.8–10.5)
WBC # FLD AUTO: 3.61 K/UL — LOW (ref 3.8–10.5)
WBC UR QL: SIGNIFICANT CHANGE UP

## 2021-03-19 PROCEDURE — 70486 CT MAXILLOFACIAL W/O DYE: CPT | Mod: 26

## 2021-03-19 PROCEDURE — 71250 CT THORAX DX C-: CPT | Mod: 26

## 2021-03-19 PROCEDURE — 70450 CT HEAD/BRAIN W/O DYE: CPT | Mod: 26

## 2021-03-19 PROCEDURE — 99223 1ST HOSP IP/OBS HIGH 75: CPT

## 2021-03-19 RX ORDER — ESCITALOPRAM OXALATE 10 MG/1
10 TABLET, FILM COATED ORAL DAILY
Refills: 0 | Status: DISCONTINUED | OUTPATIENT
Start: 2021-03-20 | End: 2021-03-22

## 2021-03-19 RX ORDER — APIXABAN 2.5 MG/1
5 TABLET, FILM COATED ORAL
Refills: 0 | Status: DISCONTINUED | OUTPATIENT
Start: 2021-03-20 | End: 2021-03-22

## 2021-03-19 RX ORDER — FERROUS SULFATE 325(65) MG
325 TABLET ORAL DAILY
Refills: 0 | Status: DISCONTINUED | OUTPATIENT
Start: 2021-03-19 | End: 2021-03-22

## 2021-03-19 RX ORDER — AMIODARONE HYDROCHLORIDE 400 MG/1
200 TABLET ORAL DAILY
Refills: 0 | Status: DISCONTINUED | OUTPATIENT
Start: 2021-03-19 | End: 2021-03-22

## 2021-03-19 RX ORDER — ACETAMINOPHEN 500 MG
650 TABLET ORAL ONCE
Refills: 0 | Status: COMPLETED | OUTPATIENT
Start: 2021-03-19 | End: 2021-03-19

## 2021-03-19 RX ORDER — ATORVASTATIN CALCIUM 80 MG/1
20 TABLET, FILM COATED ORAL AT BEDTIME
Refills: 0 | Status: DISCONTINUED | OUTPATIENT
Start: 2021-03-19 | End: 2021-03-22

## 2021-03-19 RX ORDER — DEXAMETHASONE 0.5 MG/5ML
6 ELIXIR ORAL DAILY
Refills: 0 | Status: DISCONTINUED | OUTPATIENT
Start: 2021-03-19 | End: 2021-03-22

## 2021-03-19 RX ORDER — LEVOTHYROXINE SODIUM 125 MCG
25 TABLET ORAL DAILY
Refills: 0 | Status: DISCONTINUED | OUTPATIENT
Start: 2021-03-20 | End: 2021-03-22

## 2021-03-19 RX ORDER — APIXABAN 2.5 MG/1
5 TABLET, FILM COATED ORAL EVERY 12 HOURS
Refills: 0 | Status: DISCONTINUED | OUTPATIENT
Start: 2021-03-19 | End: 2021-03-19

## 2021-03-19 RX ORDER — ASCORBIC ACID 60 MG
1500 TABLET,CHEWABLE ORAL DAILY
Refills: 0 | Status: DISCONTINUED | OUTPATIENT
Start: 2021-03-19 | End: 2021-03-22

## 2021-03-19 RX ADMIN — Medication 6 MILLIGRAM(S): at 21:45

## 2021-03-19 RX ADMIN — ATORVASTATIN CALCIUM 20 MILLIGRAM(S): 80 TABLET, FILM COATED ORAL at 21:45

## 2021-03-19 RX ADMIN — Medication 1500 MILLIGRAM(S): at 16:22

## 2021-03-19 RX ADMIN — Medication 20 MILLIGRAM(S): at 16:20

## 2021-03-19 RX ADMIN — Medication 650 MILLIGRAM(S): at 23:16

## 2021-03-19 NOTE — CHART NOTE - NSCHARTNOTEFT_GEN_A_CORE
SOCIAL WORK NOTE:  PATIENT PLACED ON OBSERVATION AND ON SW HOLD FOR SOCIAL WORK AS PLAN IS HERB.  PATIENT WITH NEWLY DIAGNOSED COVID AS OF 3/17/2021. PATIENT IS UNABLE TO GO TO [facility] 3 DAYS + COVID. PATIENT WILL REQUIRE ADMISSION AT THIS TIME AND WILL BE ELIGIBLE FOR HERB 10 DAYS INTO COVID DX.

## 2021-03-19 NOTE — H&P ADULT - ASSESSMENT
79 y/o F with Hx of Afib; on Eliquis, HTN, HLD, iron deficiency anemia, hypothyroidism, depression; who tested COVID + on 3/17  presented to the ED 2X on 3/18 initially for generalized weakness and was discharged home and then returned the same day due to noted increased episodes of diarrhea and 1 episode of BRB per rectum when wiping. Pt was placed in ED obs with difficulty with ambulation, also had recent fall and was evaluated by PT and recommended for DANY.  Also noted with episodes of diarrhea, noted hypokalemia 2.9 which was replenished and one episode of BRB which self resolved. ED called medicine for admission and the pt was admitted with generalized weakness/ debility 2/2 covid 19.     Admit to any bed    Generalized weakness 2/2 covid 19  - Debility/ deconditioned, difficulty with ambulation   - Also fell a week ago and sustained R scalp hematoma for which she saw her pmd but had no additional imaging studies completed  - f/u ct head, maxillofacial and chest   - pt had episodes of diarrhea now resolved   - hypokalemia 2/2 GI losses 2/2 covid  replaced and now resolved  - PT recommended DANY placement but not a candidate for dany     BRB per rectum  - one episode and likely related to diarrheal episodes No further episodes of diarrhea. No noted active bleeding.   - Rectal area is red and irritated.   - hgb stable     Chronic Atrial fibrillation   - rate controlled  -c/w amiodorone po qd   -c/w eliquis po bid     HTN   - bp stable  -c/w enalapril po qd     Iron deficiency anemia  - hgb ~9 and at baseline   - pt was in the ED at the end of february for low hgb 6.6   - anemia panel noted with DORIS at that time and no active bleeding   and occult stool was negative   - pt given 2 u prbc with improvement in sx   - will monitor hgb closely   -c/w ferrous sulfate po qd  -c/w MVI po qd     Hypothyroidisim   -f/u tsh   -c/w synthroid po qd     Depression  -c/w lexapro po qd     DVT ppx  - covered on eliquis    Activity level: Increase as tolerated    Next of kin: Daughter Jazzmine- updated via phone in regards to the plan of care     Dispo: PT consulted and recommended DANY, given pt is positive for covid unable to be elgible for dany until 10 days completed then able to go to [facility] or [facility], aside from this would need negative covid to go to any other DANY. Pts daughter unable to take care of her at home unless the pt becomes more independent.      79 y/o F with Hx of Afib; on Eliquis, HTN, HLD, iron deficiency anemia, hypothyroidism, depression; who tested COVID + on 3/17  presented to the ED 2X on 3/18 initially for generalized weakness and was discharged home and then returned the same day due to noted increased episodes of diarrhea and 1 episode of BRB per rectum when wiping. Pt was placed in ED obs with difficulty with ambulation, also had recent fall and was evaluated by PT and recommended for DANY.  Also noted with episodes of diarrhea, noted hypokalemia 2.9 which was replenished and one episode of BRB which self resolved. ED called medicine for admission and the pt was admitted with generalized weakness/ debility 2/2 covid 19.     Admit to any bed    Generalized weakness 2/2 covid 19  - Debility/ deconditioned, difficulty with ambulation   - Also fell a week ago and sustained R scalp hematoma for which she saw her pmd but had no additional imaging studies completed  - f/u ct head, maxillofacial and chest   - pt had episodes of diarrhea now resolved   - hypokalemia 2/2 GI losses 2/2 covid  replaced and now resolved  - PT recommended DANY placement but not a candidate for dany     BRB per rectum likely secondary to covid diarrhea   - one episode and likely related to diarrheal episodes No further episodes of diarrhea. No noted active bleeding.   - Rectal area is red and irritated.   - hgb was 10-11 now 9 and remains at 9   - FOBT negative   - c/w monitoring hgb closely   - anemia panel noted     Chronic Atrial fibrillation   - rate controlled  -c/w amiodorone po qd   -c/w eliquis po bid     HTN   - bp stable  -c/w enalapril po qd     Iron deficiency anemia  - hgb ~9 and at baseline   - pt was in the ED at the end of february for low hgb 6.6 and at that time - pt given 2 u prbc with improvement in sx. Anemia panel noted with DORIS at that time and no active bleeding and occult stool was negative   - will monitor hgb closely   -c/w ferrous sulfate po qd  -c/w MVI po qd     Hypothyroidisim   -f/u tsh   -c/w synthroid po qd     Depression  -c/w lexapro po qd     DVT ppx  - covered on eliquis    Activity level: Increase as tolerated    Next of kin: Daughter Jazzmine- updated via phone in regards to the plan of care     Dispo: PT consulted and recommended DANY, given pt is positive for covid unable to be elgible for dany until 10 days completed then able to go to [facility] or [place], aside from this would need negative covid to go to any other DANY. Pts daughter unable to take care of her at home unless the pt becomes more independent.           Addendum   Incidental thryoid nodule. Noted >1 week old scalp hematoma.     ct maxillofacial:   IMPRESSION:  1. No acute maxillofacial fracture.  2. 1.5 cm low-density nodule in the right lobe of the thyroid gland. Recommend  further evaluation with nonemergent thyroid ultrasound.  3. 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      ct head:  IMPRESSION: No acute intracranial hemorrhage, mass effect, or shift of the midline structures.Moderate-sized right-sided frontal scalp hematoma.  Moderate severity chronic white matter microvascular type changes.      ct chest:   IMPRESSION:  Patchy bilateral lung opacities consistent with multifocal pneumonia, likely related to Covid 19.  No displaced acute fracture is evident.      Pt was on decadron as an outpt for 1 day, will c/w decadron for now given covid lungs, currently pt on RA at rest o2 ~90+ but not mobilizing.

## 2021-03-19 NOTE — ED CDU PROVIDER DISPOSITION NOTE - CLINICAL COURSE
weakness and diarrhea, likely secondary to COVID. Seen by social work/ case management: cannot be placed in subacute rehab because of acute COVID status.

## 2021-03-19 NOTE — ED CDU PROVIDER INITIAL DAY NOTE - PROGRESS NOTE DETAILS
seen on morning rounds, reports headache.  Has been having diarrhea twice a day since saturday with cough, sorethroat, loss of smell.  tested positive for covid at a pharmacy on Monday.  Also had a fall 3 weeks ago resulting in facial injuries: on eliquis for afib.  PE:  chest clear ras, bruising right periorbital and right forehead, abd soft.  Seen by PT and HERB recommended.  Awaiting social work for HERB placement.

## 2021-03-19 NOTE — ED CDU PROVIDER INITIAL DAY NOTE - OBJECTIVE STATEMENT
81y/o F with PMHx of Afib; on Eliquis; COVID + on Monday presents to the ED c/o BRB rectal bleeding only on toilet paper x1 episode a few hrs PTA. Pt had intermittent diarrhea since this morning. Pt has hx of rectal bleeding. She denies fever, chills, abdominal pain. Prior mechanical fall from slipping on water 2 weeks ago which she saw her PCP for. Pt states she is weak globally. no focal weakness. no cough. no SOB. daughter unable to care for patient in her current state of health.   PCP: [place]; Cardiologist:

## 2021-03-19 NOTE — ED CDU PROVIDER INITIAL DAY NOTE - MEDICAL DECISION MAKING DETAILS
patient evaluated in ED, bleeding from skin breakdown from diarrhea/wiping, no sign of GI bleeding. patient remains hemodynamically stable. daughter unable to care for patient with decompensation in health and fact pt is COVID+ she does not want to get sick. seen by PT needs HERB placement

## 2021-03-19 NOTE — H&P ADULT - HISTORY OF PRESENT ILLNESS
81 y/o F with Hx of Afib; on Eliquis, HTN, HLD, iron deficiency anemia, hypothyroidism, depression; recently discharged from [facility] ED 2/26 for anemia DORIS requiring 2 u prbcx, also pt started with COVID + sx this past Monday and tested positive for covid on wed. Pt came to the ED x2 on 3/18 initially with generalized weakness then discharged home but continued to have diarrhea at home and continued with progressive weakness. Pt reports for the past few days has had no energy, has been minimally able to ambulate with her walker and in the past 2 days had episodes of diarrhea. She feels as if her rectum is raw/ painful. Reports when she wiped herself this morning on the toilet there was a streak of BRB. This only happened today and after using the restroom again she found no evidence of blood. Of not the pt had a prior mechanical fall from slipping on water 2 weeks ago for which she saw her PCP and received no additional work up but has noted periorbital echymosis, R sided frontal scalp hematoma as well as b/l le echymosis. Since that fall and getting covid she reports she has generalized weakness and inability to ambulate and function the same as she used and currently she resides with her daughter and family who is unable to care for her, multiple members of the family tested positive for covid. Pt also reports she slid off of her chair today but did not hurt herself and did not fall. Also has dry cough, no other covid sx. Pt was placed in ED observation but after being evaluated by PT for dispo was deemed a candidate for HERB and medicine was called for admission. Pts daughter unable to care for her at this time. Patient denies chest pain, SOB, abd pain, N/V, fever, chills, dysuria or any other complaints. All remainder ROS negative.

## 2021-03-19 NOTE — PROVIDER CONTACT NOTE (OTHER) - ASSESSMENT
Pt with 0/10 pain before, during or after RX.  Pt will benefit from PT to maximize functional independence. Will continue to follow.  Pt left Supine in bed in no apparent distress and call bell within reach. Nurse aware

## 2021-03-19 NOTE — PHYSICAL THERAPY INITIAL EVALUATION ADULT - ADDITIONAL COMMENTS
Pt is questionable historian, reports living with dtr, and her family in a 1 story house with 2 steps to enter. Reports amb with RW, and being Modified Independent with all ADLs and self care PTA.

## 2021-03-19 NOTE — H&P ADULT - NSICDXFAMILYHX_GEN_ALL_CORE_FT
FAMILY HISTORY:  FH: lung cancer, mother  FH: pancreatic cancer, father, in his 70's     Mother  Still living? Unknown  FH: breast cancer, Age at diagnosis: Age Unknown

## 2021-03-19 NOTE — ED ADULT NURSE REASSESSMENT NOTE - NS ED NURSE REASSESS COMMENT FT1
Spoke with patient's daughter who is aware of her discharge home. Ambulance transportation back home arranged at this time as patient is COVID positive. Patient aware of POC and discharge home.

## 2021-03-19 NOTE — ED CDU PROVIDER INITIAL DAY NOTE - ENMT, MLM
Airway patent. Nasal mucosa clear. Mouth with normal mucosa. Throat has no vesicles, no oropharyngeal exudates and uvula is midline.  Ecchymosis old to rt orbital/periorbital region

## 2021-03-19 NOTE — H&P ADULT - NSICDXPASTMEDICALHX_GEN_ALL_CORE_FT
PAST MEDICAL HISTORY:  Atrial fibrillation     Depression     H/O tubal ligation     Hernia     HLD (hyperlipidemia)     HTN (hypertension)     Hypothyroid     Iron deficiency anemia

## 2021-03-19 NOTE — ED ADULT NURSE NOTE - OBJECTIVE STATEMENT
Pt is A&Ox4, in NAD. Presents to ED c/o diarrhea and small amount of blood in stool x1 day. Pt states she was here yesterday for weakness secondary to being COVID positive. Reports taking Eliquis for a-fib. Denies SOB or chest pain. NSR on CM at this time. ED workup in progress. Will continue to monitor.

## 2021-03-19 NOTE — PHYSICAL THERAPY INITIAL EVALUATION ADULT - LONG TERM MEMORY, REHAB EVAL
Baby Box provided to patient with instructions for safe use including:    -Box should never be used to transport infant.  -Box should always be used on a flat, sturdy surface, preferably the floor.  -General Safe Sleep rules and recommendations reviewed. Baby Box warning labels reviewed with patient. Baby Box [facility] viewed by Socorro Clayton. Patient/family verbalizes understanding, all questions answered at this time. intact

## 2021-03-19 NOTE — PHYSICAL THERAPY INITIAL EVALUATION ADULT - DISCHARGE DISPOSITION, PT EVAL
home/rehabilitation facility Home with 24/7 assist if family willing and able to provide level of assist, vs HERB/home/rehabilitation facility

## 2021-03-19 NOTE — ED ADULT NURSE REASSESSMENT NOTE - NS ED NURSE REASSESS COMMENT FT1
pt resting comfortably on ED stretcher, provided with breakfast tray, tolerating PO intake @ this time. Skin warm and dry, offers no complaints. safety maintained with call bell in reach. iso precautions maintained

## 2021-03-20 LAB
ANION GAP SERPL CALC-SCNC: 15 MMOL/L — SIGNIFICANT CHANGE UP (ref 5–17)
ANISOCYTOSIS BLD QL: SIGNIFICANT CHANGE UP
BASOPHILS # BLD AUTO: 0 K/UL — SIGNIFICANT CHANGE UP (ref 0–0.2)
BASOPHILS NFR BLD AUTO: 0 % — SIGNIFICANT CHANGE UP (ref 0–2)
BUN SERPL-MCNC: 19 MG/DL — SIGNIFICANT CHANGE UP (ref 8–20)
BURR CELLS BLD QL SMEAR: PRESENT — SIGNIFICANT CHANGE UP
CALCIUM SERPL-MCNC: 8.8 MG/DL — SIGNIFICANT CHANGE UP (ref 8.6–10.2)
CHLORIDE SERPL-SCNC: 104 MMOL/L — SIGNIFICANT CHANGE UP (ref 98–107)
CO2 SERPL-SCNC: 23 MMOL/L — SIGNIFICANT CHANGE UP (ref 22–29)
COVID-19 SPIKE DOMAIN AB INTERP: NEGATIVE — SIGNIFICANT CHANGE UP
COVID-19 SPIKE DOMAIN ANTIBODY RESULT: 0.4 U/ML — SIGNIFICANT CHANGE UP
CREAT SERPL-MCNC: 0.63 MG/DL — SIGNIFICANT CHANGE UP (ref 0.5–1.3)
DACRYOCYTES BLD QL SMEAR: SLIGHT — SIGNIFICANT CHANGE UP
ELLIPTOCYTES BLD QL SMEAR: SLIGHT — SIGNIFICANT CHANGE UP
EOSINOPHIL # BLD AUTO: 0 K/UL — SIGNIFICANT CHANGE UP (ref 0–0.5)
EOSINOPHIL NFR BLD AUTO: 0 % — SIGNIFICANT CHANGE UP (ref 0–6)
GIANT PLATELETS BLD QL SMEAR: PRESENT — SIGNIFICANT CHANGE UP
GLUCOSE SERPL-MCNC: 170 MG/DL — HIGH (ref 70–99)
HCT VFR BLD CALC: 37 % — SIGNIFICANT CHANGE UP (ref 34.5–45)
HGB BLD-MCNC: 10.4 G/DL — LOW (ref 11.5–15.5)
HYPOCHROMIA BLD QL: SLIGHT — SIGNIFICANT CHANGE UP
LYMPHOCYTES # BLD AUTO: 0.25 K/UL — LOW (ref 1–3.3)
LYMPHOCYTES # BLD AUTO: 3.6 % — LOW (ref 13–44)
MANUAL SMEAR VERIFICATION: SIGNIFICANT CHANGE UP
MCHC RBC-ENTMCNC: 22.3 PG — LOW (ref 27–34)
MCHC RBC-ENTMCNC: 28.1 GM/DL — LOW (ref 32–36)
MCV RBC AUTO: 79.4 FL — LOW (ref 80–100)
MICROCYTES BLD QL: SLIGHT — SIGNIFICANT CHANGE UP
MONOCYTES # BLD AUTO: 0.06 K/UL — SIGNIFICANT CHANGE UP (ref 0–0.9)
MONOCYTES NFR BLD AUTO: 0.9 % — LOW (ref 2–14)
NEUTROPHILS # BLD AUTO: 6.5 K/UL — SIGNIFICANT CHANGE UP (ref 1.8–7.4)
NEUTROPHILS NFR BLD AUTO: 92.8 % — HIGH (ref 43–77)
NEUTS BAND # BLD: 0.9 % — SIGNIFICANT CHANGE UP (ref 0–8)
OVALOCYTES BLD QL SMEAR: SLIGHT — SIGNIFICANT CHANGE UP
PLAT MORPH BLD: NORMAL — SIGNIFICANT CHANGE UP
PLATELET # BLD AUTO: 297 K/UL — SIGNIFICANT CHANGE UP (ref 150–400)
POIKILOCYTOSIS BLD QL AUTO: SLIGHT — SIGNIFICANT CHANGE UP
POLYCHROMASIA BLD QL SMEAR: SLIGHT — SIGNIFICANT CHANGE UP
POTASSIUM SERPL-MCNC: 4.1 MMOL/L — SIGNIFICANT CHANGE UP (ref 3.5–5.3)
POTASSIUM SERPL-SCNC: 4.1 MMOL/L — SIGNIFICANT CHANGE UP (ref 3.5–5.3)
RBC # BLD: 4.66 M/UL — SIGNIFICANT CHANGE UP (ref 3.8–5.2)
RBC # FLD: 21.9 % — HIGH (ref 10.3–14.5)
RBC BLD AUTO: ABNORMAL
SARS-COV-2 IGG+IGM SERPL QL IA: 0.4 U/ML — SIGNIFICANT CHANGE UP
SARS-COV-2 IGG+IGM SERPL QL IA: NEGATIVE — SIGNIFICANT CHANGE UP
SARS-COV-2 RNA SPEC QL NAA+PROBE: DETECTED
SCHISTOCYTES BLD QL AUTO: SLIGHT — SIGNIFICANT CHANGE UP
SODIUM SERPL-SCNC: 141 MMOL/L — SIGNIFICANT CHANGE UP (ref 135–145)
TSH SERPL-MCNC: 0.74 UIU/ML — SIGNIFICANT CHANGE UP (ref 0.27–4.2)
VARIANT LYMPHS # BLD: 1.8 % — SIGNIFICANT CHANGE UP (ref 0–6)
WBC # BLD: 6.94 K/UL — SIGNIFICANT CHANGE UP (ref 3.8–10.5)
WBC # FLD AUTO: 6.94 K/UL — SIGNIFICANT CHANGE UP (ref 3.8–10.5)

## 2021-03-20 PROCEDURE — 99233 SBSQ HOSP IP/OBS HIGH 50: CPT

## 2021-03-20 PROCEDURE — 74176 CT ABD & PELVIS W/O CONTRAST: CPT | Mod: 26

## 2021-03-20 RX ORDER — HYDRALAZINE HCL 50 MG
10 TABLET ORAL EVERY 6 HOURS
Refills: 0 | Status: DISCONTINUED | OUTPATIENT
Start: 2021-03-20 | End: 2021-03-22

## 2021-03-20 RX ADMIN — ESCITALOPRAM OXALATE 10 MILLIGRAM(S): 10 TABLET, FILM COATED ORAL at 12:04

## 2021-03-20 RX ADMIN — Medication 6 MILLIGRAM(S): at 06:37

## 2021-03-20 RX ADMIN — APIXABAN 5 MILLIGRAM(S): 2.5 TABLET, FILM COATED ORAL at 06:37

## 2021-03-20 RX ADMIN — Medication 20 MILLIGRAM(S): at 06:37

## 2021-03-20 RX ADMIN — AMIODARONE HYDROCHLORIDE 200 MILLIGRAM(S): 400 TABLET ORAL at 06:37

## 2021-03-20 RX ADMIN — Medication 1 TABLET(S): at 12:03

## 2021-03-20 RX ADMIN — ATORVASTATIN CALCIUM 20 MILLIGRAM(S): 80 TABLET, FILM COATED ORAL at 22:45

## 2021-03-20 RX ADMIN — Medication 325 MILLIGRAM(S): at 12:04

## 2021-03-20 RX ADMIN — APIXABAN 5 MILLIGRAM(S): 2.5 TABLET, FILM COATED ORAL at 18:02

## 2021-03-20 RX ADMIN — Medication 25 MICROGRAM(S): at 06:37

## 2021-03-20 RX ADMIN — Medication 1500 MILLIGRAM(S): at 12:03

## 2021-03-20 NOTE — PROGRESS NOTE ADULT - ASSESSMENT
79 y/o F with Hx of Afib; on Eliquis, HTN, HLD, iron deficiency anemia, hypothyroidism, depression; who tested COVID + on 3/17  presented to the ED 2X on 3/18 initially for generalized weakness and was discharged home and then returned the same day due to noted increased episodes of diarrhea and 1 episode of BRB per rectum when wiping. Pt was placed in ED obs with difficulty with ambulation, also had recent fall and was evaluated by PT and recommended for DANY.  Also noted with episodes of diarrhea, noted hypokalemia 2.9 which was replenished and one episode of BRB which self resolved. ED called medicine for admission and the pt was admitted with generalized weakness/ debility 2/2 covid 19.     Bilateral multifocal pneumonia related to COVID 19  Generalized weakness due to above  - COVID PCR positive 3/17/21  - CT chest 3/19/21: bilateral lung opacities consistent with multifocal pneumonia, no acute fractures  - Debility/ deconditioned, difficulty with ambulation   - s/p mechanical fall x 1 week ago, sustained R scalp hematoma for which she saw her pmd but had no additional imaging studies completed  - CT head 3/19/21: +right-sided frontal scalp hematoma with chronic white matter microvascular type changes, no evidence CVA  - CT MF 3/19/21: no MF fractures, +1.5 cm low-density nodule in the right lobe of the thyroid gland  - saturating well on RA, not hypoxic  - afebrile without leukocytosis (mild neutropenia)  - decadron, was on as outpatient, continued as inpatient x 5 days  - PT recommended DANY placement    BRBPR, likely Covid diarrhea   - one episode and likely related to diarrheal episodes No further episodes of diarrhea. No noted active bleeding.   - hgb was 10-11 now 9 and remains at 9   - FOBT negative   - anemia panel noted     Chronic Atrial fibrillation, rate controlled  -c/w amiodorone po qd   -c/w eliquis po bid     HTN, uncontrolled  -pain related? patient uncomfortable in ED, neck/back pain on ED bed  -c/w enalapril po qd   -hydralazine prn added     Iron deficiency anemia  - hgb ~9 and at baseline   - pt was in the ED at the end of february for low hgb 6.6 and at that time - pt given 2 u prbc with improvement in sx. Anemia panel noted with DROIS at that time and no active bleeding and occult stool was negative   - will monitor hgb closely   -c/w ferrous sulfate po qd  -c/w MVI po qd     Hypothyroidisim   -f/u tsh   -c/w synthroid po qd     Depression  -c/w lexapro po qd     DVT ppx: eliquis    Next of kin: Daughter Jazzmine    Dispo: PT consulted and recommended DANY, given pt is positive for covid unable to be elgible for dany until 10 days completed then able to go to [facility] or [facility], aside from this would need negative covid to go to any other DANY. Pts daughter unable to take care of her at home unless the pt becomes more independent.            81 y/o F with Hx of Afib; on Eliquis, HTN, HLD, iron deficiency anemia, hypothyroidism, depression; who tested COVID + on 3/17  presented to the ED 2X on 3/18 initially for generalized weakness and was discharged home and then returned the same day due to noted increased episodes of diarrhea and 1 episode of BRB per rectum when wiping. Pt was placed in ED obs with difficulty with ambulation, also had recent fall and was evaluated by PT and recommended for DANY.  Also noted with episodes of diarrhea, noted hypokalemia 2.9 which was replenished and one episode of BRB which self resolved. ED called medicine for admission and the pt was admitted with generalized weakness/ debility 2/2 covid 19.     Bilateral multifocal pneumonia related to COVID 19  Generalized weakness due to above  - COVID PCR positive 3/17/21  - CT chest 3/19/21: bilateral lung opacities consistent with multifocal pneumonia, no acute fractures  - Debility/ deconditioned, difficulty with ambulation   - s/p mechanical fall x 1 week ago, sustained R scalp hematoma for which she saw her pmd but had no additional imaging studies completed  - CT head 3/19/21: +right-sided frontal scalp hematoma with chronic white matter microvascular type changes, no evidence CVA  - CT MF 3/19/21: no MF fractures, +1.5 cm low-density nodule in the right lobe of the thyroid gland  - saturating well on RA, not hypoxic  - afebrile without leukocytosis (mild neutropenia)  - decadron, was on as outpatient, continued as inpatient x 5 days  - PT recommended DANY placement    BRBPR, likely Covid diarrhea   - one episode and likely related to diarrheal episodes No further episodes of diarrhea. No noted active bleeding.   - hgb was 10-11 now 9 and remains at 9   - FOBT negative   - anemia panel noted     Chronic Atrial fibrillation, rate controlled  -c/w amiodorone po qd   -c/w eliquis po bid     HTN, uncontrolled  -pain related? patient uncomfortable in ED, neck/back pain on ED bed  -c/w enalapril po qd   -hydralazine prn added     Iron deficiency anemia  - hgb ~9 and at baseline   - pt was in the ED at the end of february for low hgb 6.6 and at that time - pt given 2 u prbc with improvement in sx. Anemia panel noted with DORIS at that time and no active bleeding and occult stool was negative   - will monitor hgb closely   -c/w ferrous sulfate po qd  -c/w MVI po qd     Hypothyroidisim   -1.5 cm low-density nodule in the right lobe of the thyroid gland on CT  -f/u tsh + obtain thyroid US  -c/w synthroid po qd     Depression  -c/w lexapro po qd     DVT ppx: eliquis    Next of kin: Daughter Jazzmine    Dispo: PT consulted and recommended DANY, given pt is positive for covid unable to be elgible for dany until 10 days completed then able to go to [facility] [place], aside from this would need negative covid to go to any other DANY. Pts daughter unable to take care of her at home unless the pt becomes more independent.

## 2021-03-20 NOTE — PROGRESS NOTE ADULT - SUBJECTIVE AND OBJECTIVE BOX
[facility] Division of Hospital Medicine    Chief Complaint: tells me she hurts everywhere/ nothing specific. feels well otherwise.    SUBJECTIVE / OVERNIGHT EVENTS: no overnight events noted.    Patient denies chest pain, SOB, abd pain, N/V, fever, chills, dysuria or any other complaints. All remainder ROS negative.     MEDICATIONS  (STANDING):  aMIOdarone    Tablet 200 milliGRAM(s) Oral daily  apixaban 5 milliGRAM(s) Oral two times a day  ascorbic acid 1500 milliGRAM(s) Oral daily  atorvastatin 20 milliGRAM(s) Oral at bedtime  dexAMETHasone  Injectable 6 milliGRAM(s) IV Push daily  enalapril 20 milliGRAM(s) Oral daily  escitalopram 10 milliGRAM(s) Oral daily  ferrous    sulfate 325 milliGRAM(s) Oral daily  levothyroxine 25 MICROGram(s) Oral daily  multivitamin 1 Tablet(s) Oral daily    MEDICATIONS  (PRN):  hydrALAZINE 10 milliGRAM(s) Oral every 6 hours PRN Systolic blood pressure >    PHYSICAL EXAM:  Vital Signs Last 24 Hrs  T(C): 36.6 (20 Mar 2021 08:45), Max: 36.9 (19 Mar 2021 22:01)  T(F): 97.8 (20 Mar 2021 08:45), Max: 98.4 (19 Mar 2021 22:01)  HR: 58 (20 Mar 2021 08:45) (58 - 66)  BP: 200/76 (20 Mar 2021 08:45) (148/68 - 200/76)  RR: 18 (20 Mar 2021 08:45) (18 - 19)  SpO2: 97% (20 Mar 2021 08:45) (94% - 97%)    gen: elderly female aao x3 nad  heent: +ecchymosis forehead and b/l upper cheeks  pulm: reduced breath sounds b/l  card: s1s2 rrr  abd: soft nontender +bs  ext: no pitting edema ble  neuro: face symmetric, speech was clear, no focal deficits  skin: no rashes, ecchymosis at above    LABS:                        9.7    x     )-----------( x        ( 19 Mar 2021 22:16 )             35.1     03-19    135  |  100  |  18.0  ----------------------------<  104<H>  3.5   |  24.0  |  0.69    Ca    8.6      19 Mar 2021 22:16    TPro  6.8  /  Alb  3.8  /  TBili  0.4  /  DBili  x   /  AST  51<H>  /  ALT  49<H>  /  AlkPhos  68  03-18    CARDIAC MARKERS ( 19 Mar 2021 22:16 )  x     / x     / 96 U/L / x     / x          Urinalysis Basic - ( 19 Mar 2021 09:13 )    Color: Yellow / Appearance: Clear / S.015 / pH: x  Gluc: x / Ketone: Negative  / Bili: Negative / Urobili: Negative mg/dL   Blood: x / Protein: 30 mg/dL / Nitrite: Negative   Leuk Esterase: Negative / RBC: 0-2 /HPF / WBC 0-2   Sq Epi: x / Non Sq Epi: Occasional / Bacteria: Negative

## 2021-03-21 ENCOUNTER — TRANSCRIPTION ENCOUNTER (OUTPATIENT)
Age: 81
End: 2021-03-21

## 2021-03-21 PROBLEM — Z98.51 TUBAL LIGATION STATUS: Chronic | Status: ACTIVE | Noted: 2021-03-19

## 2021-03-21 PROBLEM — F32.9 MAJOR DEPRESSIVE DISORDER, SINGLE EPISODE, UNSPECIFIED: Chronic | Status: ACTIVE | Noted: 2021-03-19

## 2021-03-21 LAB
BASOPHILS # BLD AUTO: 0.01 K/UL — SIGNIFICANT CHANGE UP (ref 0–0.2)
BASOPHILS NFR BLD AUTO: 0.1 % — SIGNIFICANT CHANGE UP (ref 0–2)
CRP SERPL-MCNC: 46 MG/L — HIGH
EOSINOPHIL # BLD AUTO: 0 K/UL — SIGNIFICANT CHANGE UP (ref 0–0.5)
EOSINOPHIL NFR BLD AUTO: 0 % — SIGNIFICANT CHANGE UP (ref 0–6)
FERRITIN SERPL-MCNC: 54 NG/ML — SIGNIFICANT CHANGE UP (ref 15–150)
HCT VFR BLD CALC: 37.9 % — SIGNIFICANT CHANGE UP (ref 34.5–45)
HGB BLD-MCNC: 10.6 G/DL — LOW (ref 11.5–15.5)
IMM GRANULOCYTES NFR BLD AUTO: 0.5 % — SIGNIFICANT CHANGE UP (ref 0–1.5)
LDH SERPL L TO P-CCNC: 245 U/L — HIGH (ref 98–192)
LYMPHOCYTES # BLD AUTO: 0.3 K/UL — LOW (ref 1–3.3)
LYMPHOCYTES # BLD AUTO: 2.8 % — LOW (ref 13–44)
MCHC RBC-ENTMCNC: 22.1 PG — LOW (ref 27–34)
MCHC RBC-ENTMCNC: 28 GM/DL — LOW (ref 32–36)
MCV RBC AUTO: 79.1 FL — LOW (ref 80–100)
MONOCYTES # BLD AUTO: 0.52 K/UL — SIGNIFICANT CHANGE UP (ref 0–0.9)
MONOCYTES NFR BLD AUTO: 4.9 % — SIGNIFICANT CHANGE UP (ref 2–14)
NEUTROPHILS # BLD AUTO: 9.84 K/UL — HIGH (ref 1.8–7.4)
NEUTROPHILS NFR BLD AUTO: 91.7 % — HIGH (ref 43–77)
PLATELET # BLD AUTO: 288 K/UL — SIGNIFICANT CHANGE UP (ref 150–400)
RBC # BLD: 4.79 M/UL — SIGNIFICANT CHANGE UP (ref 3.8–5.2)
RBC # FLD: 21.6 % — HIGH (ref 10.3–14.5)
WBC # BLD: 10.72 K/UL — HIGH (ref 3.8–10.5)
WBC # FLD AUTO: 10.72 K/UL — HIGH (ref 3.8–10.5)

## 2021-03-21 PROCEDURE — 76536 US EXAM OF HEAD AND NECK: CPT | Mod: 26

## 2021-03-21 PROCEDURE — 99238 HOSP IP/OBS DSCHRG MGMT 30/<: CPT

## 2021-03-21 RX ORDER — ASCORBIC ACID 60 MG
1 TABLET,CHEWABLE ORAL
Qty: 0 | Refills: 0 | DISCHARGE
Start: 2021-03-21

## 2021-03-21 RX ORDER — ESCITALOPRAM OXALATE 10 MG/1
10 TABLET, FILM COATED ORAL DAILY
Refills: 0 | Status: DISCONTINUED | OUTPATIENT
Start: 2021-03-22 | End: 2021-03-30

## 2021-03-21 RX ORDER — DEXAMETHASONE 0.5 MG/5ML
1 ELIXIR ORAL
Qty: 7 | Refills: 0
Start: 2021-03-21 | End: 2021-03-27

## 2021-03-21 RX ORDER — ACETAMINOPHEN 500 MG
650 TABLET ORAL EVERY 6 HOURS
Refills: 0 | Status: DISCONTINUED | OUTPATIENT
Start: 2021-03-22 | End: 2021-03-22

## 2021-03-21 RX ORDER — ATORVASTATIN CALCIUM 80 MG/1
20 TABLET, FILM COATED ORAL AT BEDTIME
Refills: 0 | Status: DISCONTINUED | OUTPATIENT
Start: 2021-03-22 | End: 2021-03-30

## 2021-03-21 RX ORDER — FERROUS SULFATE 325(65) MG
325 TABLET ORAL DAILY
Refills: 0 | Status: DISCONTINUED | OUTPATIENT
Start: 2021-03-22 | End: 2021-03-30

## 2021-03-21 RX ORDER — DEXAMETHASONE 0.5 MG/5ML
1 ELIXIR ORAL
Qty: 2 | Refills: 0
Start: 2021-03-21 | End: 2021-03-22

## 2021-03-21 RX ORDER — HYDRALAZINE HCL 50 MG
1 TABLET ORAL
Qty: 0 | Refills: 0 | DISCHARGE
Start: 2021-03-21

## 2021-03-21 RX ORDER — LEVOTHYROXINE SODIUM 125 MCG
25 TABLET ORAL DAILY
Refills: 0 | Status: DISCONTINUED | OUTPATIENT
Start: 2021-03-22 | End: 2021-03-30

## 2021-03-21 RX ORDER — OXYCODONE AND ACETAMINOPHEN 5; 325 MG/1; MG/1
1 TABLET ORAL EVERY 6 HOURS
Refills: 0 | Status: DISCONTINUED | OUTPATIENT
Start: 2021-03-22 | End: 2021-03-22

## 2021-03-21 RX ORDER — APIXABAN 2.5 MG/1
5 TABLET, FILM COATED ORAL
Refills: 0 | Status: DISCONTINUED | OUTPATIENT
Start: 2021-03-22 | End: 2021-03-30

## 2021-03-21 RX ORDER — AMIODARONE HYDROCHLORIDE 400 MG/1
200 TABLET ORAL DAILY
Refills: 0 | Status: DISCONTINUED | OUTPATIENT
Start: 2021-03-22 | End: 2021-03-30

## 2021-03-21 RX ADMIN — Medication 25 MICROGRAM(S): at 06:22

## 2021-03-21 RX ADMIN — AMIODARONE HYDROCHLORIDE 200 MILLIGRAM(S): 400 TABLET ORAL at 06:22

## 2021-03-21 RX ADMIN — Medication 325 MILLIGRAM(S): at 10:26

## 2021-03-21 RX ADMIN — APIXABAN 5 MILLIGRAM(S): 2.5 TABLET, FILM COATED ORAL at 06:22

## 2021-03-21 RX ADMIN — ATORVASTATIN CALCIUM 20 MILLIGRAM(S): 80 TABLET, FILM COATED ORAL at 22:59

## 2021-03-21 RX ADMIN — Medication 20 MILLIGRAM(S): at 06:22

## 2021-03-21 RX ADMIN — Medication 1500 MILLIGRAM(S): at 10:26

## 2021-03-21 RX ADMIN — Medication 1 TABLET(S): at 10:26

## 2021-03-21 RX ADMIN — Medication 6 MILLIGRAM(S): at 06:23

## 2021-03-21 RX ADMIN — ESCITALOPRAM OXALATE 10 MILLIGRAM(S): 10 TABLET, FILM COATED ORAL at 10:26

## 2021-03-21 RX ADMIN — APIXABAN 5 MILLIGRAM(S): 2.5 TABLET, FILM COATED ORAL at 18:00

## 2021-03-21 NOTE — H&P ADULT - ASSESSMENT
80W PMH Afib on eliquis, HTN, HLD, DORIS, hypothyroidism, depression who presented to [facility] for weakness and diarrhea, initially admitted to observation unit.  Noted for mechanical falls secondary to weakness and transitioned to admission.  Patient evaluated by PT who recommended HERB placement however patient remains COVID + and needs 10 days total for HERB placement.    COVID 19 infection  no hypoxia  - Initial COVID positive 3/17/21  - CT Chest shows bilateral opacities  - no need for remdesivir or decadron as patient not requiring O2  - will need 10 days total [facility] [place] [facility]    Hypokalemia  - improved  - monitor CMP    Debility/Ambulatory dysfunction  - PT consult  - will need HERB placement    Chronic atrial fibrillation  - hemoglobin now stable  - cont amiodarone/eliquis    Hypothyroidism  - cont levothyroxine    DVT PPx  - on eliquis    CAPRINI SCORE [CLOT]    AGE RELATED RISK FACTORS                                                       MOBILITY RELATED FACTORS  [ ] Age 41-60 years                                            (1 Point)                  [X ] Bed rest                                                        (1 Point)  [ ] Age: 61-74 years                                           (2 Points)                 [ ] Plaster cast                                                   (2 Points)  [X ] Age= 75 years                                              (3 Points)                 [ ] Bed bound for more than 72 hours                 (2 Points)    DISEASE RELATED RISK FACTORS                                               GENDER SPECIFIC FACTORS  [ ] Edema in the lower extremities                       (1 Point)                  [ ] Pregnancy                                                     (1 Point)  [ ] Varicose veins                                               (1 Point)                  [ ] Post-partum < 6 weeks                                   (1 Point)             [ ] BMI > 25 Kg/m2                                            (1 Point)                  [ ] Hormonal therapy  or oral contraception          (1 Point)                 [ ] Sepsis (in the previous month)                        (1 Point)                  [ ] History of pregnancy complications                 (1 point)  [ ] Pneumonia or serious lung disease                                               [ ] Unexplained or recurrent                     (1 Point)           (in the previous month)                               (1 Point)  [ ] Abnormal pulmonary function test                     (1 Point)                 SURGERY RELATED RISK FACTORS  [ ] Acute myocardial infarction                              (1 Point)                 [ ]  Section                                             (1 Point)  [ ] Congestive heart failure (in the previous month)  (1 Point)               [ ] Minor surgery                                                  (1 Point)   [ ] Inflammatory bowel disease                             (1 Point)                 [ ] Arthroscopic surgery                                        (2 Points)  [ ] Central venous access                                      (2 Points)                [ ] General surgery lasting more than 45 minutes   (2 Points)       [ ] Stroke (in the previous month)                          (5 Points)               [ ] Elective arthroplasty                                         (5 Points)                                                                                                                                               HEMATOLOGY RELATED FACTORS                                                 TRAUMA RELATED RISK FACTORS  [ ] Prior episodes of VTE                                     (3 Points)                 [ ] Fracture of the hip, pelvis, or leg                       (5 Points)  [ ] Positive family history for VTE                         (3 Points)                 [ ] Acute spinal cord injury (in the previous month)  (5 Points)  [ ] Prothrombin 42904 A                                     (3 Points)                 [ ] Paralysis  (less than 1 month)                             (5 Points)  [ ] Factor V Leiden                                             (3 Points)                  [ ] Multiple Trauma within 1 month                        (5 Points)  [ ] Lupus anticoagulants                                     (3 Points)                                                           [ ] Anticardiolipin antibodies                               (3 Points)                                                       [ ] High homocysteine in the blood                      (3 Points)                                             [ ] Other congenital or acquired thrombophilia      (3 Points)                                                [ ] Heparin induced thrombocytopenia                  (3 Points)                                          Total Score [     4     ]

## 2021-03-21 NOTE — DISCHARGE NOTE NURSING/CASE MANAGEMENT/SOCIAL WORK - PATIENT PORTAL LINK FT
You can access the FollowMyHealth Patient Portal offered by [facility] by registering at the following website: [email or web address]. By joining [employer]’s FollowMyHealth portal, you will also be able to view your health information using other applications (apps) compatible with our system.

## 2021-03-21 NOTE — H&P ADULT - NSHPLABSRESULTS_GEN_ALL_CORE
LABS:                        10.6   10.72 )-----------( 288      ( 21 Mar 2021 11:22 )             37.9     03-20    141  |  104  |  19.0  ----------------------------<  170<H>  4.1   |  23.0  |  0.63    Ca    8.8      20 Mar 2021 11:30           CAPILLARY BLOOD GLUCOSE              RADIOLOGY & ADDITIONAL TESTS:    Consultant(s) Notes Reviewed:  [x ] YES  [ ] NO  Care Discussed with Consultants/Other Providers [ x] YES  [ ] NO  Imaging Personally Reviewed:  [ ] YES  [ ] NO

## 2021-03-21 NOTE — DISCHARGE NOTE PROVIDER - HOSPITAL COURSE
HPI:   79 y/o F with Hx of Afib; on Eliquis, HTN, HLD, iron deficiency anemia, hypothyroidism, depression; who tested COVID + on 3/17  presented to the ED 2X on 3/18 initially for generalized weakness and was discharged home and then returned the same day due to noted increased episodes of diarrhea and 1 episode of BRB per rectum when wiping. Pt was placed in ED obs with difficulty with ambulation, also had recent fall and was evaluated by PT and recommended for HERB.  Also noted with episodes of diarrhea, noted hypokalemia 2.9 which was replenished and one episode of BRB which self resolved. ED called medicine for admission and the pt was admitted with generalized weakness/ debility 2/2 covid 19.     Hospital course:  Bilateral multifocal pneumonia related to COVID 19  Generalized weakness due to above  - COVID PCR positive 3/17/21  - CT chest 3/19/21: bilateral lung opacities consistent with multifocal pneumonia, no acute fractures  - Debility/ deconditioned, difficulty with ambulation--> rehab placement  - s/p mechanical fall x 1 week ago, sustained R scalp hematoma for which she saw her pmd but had no additional imaging studies completed  - CT head 3/19/21: +right-sided frontal scalp hematoma with chronic white matter microvascular type changes, no evidence CVA  - CT MF 3/19/21: no MF fractures, +1.5 cm low-density nodule in the right lobe of the thyroid gland  - saturating well on RA, not hypoxic  - afebrile without leukocytosis 3/20/21 with mild leukocytosis today, likely steroid induced 3/21/21  - decadron, was on as outpatient, continued as inpatient x 5 days  - PT recommended HERB placement--> [place] placement 3/21/21    BRBPR, likely Covid diarrhea   - one episode and likely related to diarrheal episodes No further episodes of diarrhea. No noted active bleeding.   - hgb was 10-11, at baseline 3/21/21  - FOBT negative   - anemia panel noted     Chronic Atrial fibrillation, rate controlled  -c/w amiodorone po qd   -c/w eliquis po bid     HTN, uncontrolled at times  -pain related? patient uncomfortable in ED, neck/back pain on ED bed  -c/w enalapril po qd   -hydralazine prn added     Iron deficiency anemia  -hct at baseline 3/21/21  -c/w ferrous sulfate po qd  -c/w MVI po qd     Hypothyroidisim   -1.5 cm low-density nodule in the right lobe of the thyroid gland on CT  -clinical exam unimpressive for thyrotoxicosis   -tsh wnl 3/20/21  - recommend outpatient thyroid US  -c/w synthroid po qd     Depression  -c/w lexapro po qd     DVT ppx: eliquis    Next of kin: Daughter Jazzmine   HPI:   81 y/o F with Hx of Afib; on Eliquis, HTN, HLD, iron deficiency anemia, hypothyroidism, depression; who tested COVID + on 3/17  presented to the ED 2X on 3/18 initially for generalized weakness and was discharged home and then returned the same day due to noted increased episodes of diarrhea and 1 episode of BRB per rectum when wiping. Pt was placed in ED obs with difficulty with ambulation, also had recent fall and was evaluated by PT and recommended for HERB.  Also noted with episodes of diarrhea, noted hypokalemia 2.9 which was replenished and one episode of BRB which self resolved. ED called medicine for admission and the pt was admitted with generalized weakness/ debility 2/2 covid 19.     Hospital course:  Bilateral multifocal pneumonia related to COVID 19  Generalized weakness due to above  - COVID PCR positive 3/17/21  - CT chest 3/19/21: bilateral lung opacities consistent with multifocal pneumonia, no acute fractures  - Debility/ deconditioned, difficulty with ambulation--> rehab placement  - s/p mechanical fall x 1 week ago, sustained R scalp hematoma for which she saw her pmd but had no additional imaging studies completed  - CT head 3/19/21: +right-sided frontal scalp hematoma with chronic white matter microvascular type changes, no evidence CVA  - CT MF 3/19/21: no MF fractures, +1.5 cm low-density nodule in the right lobe of the thyroid gland  - saturating well on RA, not hypoxic  - afebrile without leukocytosis 3/20/21 with mild leukocytosis today, likely steroid induced 3/21/21  - decadron, was on as outpatient, continued as inpatient x 5 days  - PT recommended HERB placement--> [facility] 3/21/21    BRBPR, likely Covid diarrhea   - one episode and likely related to diarrheal episodes No further episodes of diarrhea. No noted active bleeding.   - hgb was 10-11, at baseline 3/21/21  - FOBT negative   - anemia panel noted     Chronic Atrial fibrillation, rate controlled  -c/w amiodorone po qd   -c/w eliquis po bid     HTN, uncontrolled at times  -pain related? patient uncomfortable in ED, neck/back pain on ED bed  -c/w enalapril po qd   -hydralazine prn added     Iron deficiency anemia  -hct at baseline 3/21/21  -c/w ferrous sulfate po qd  -c/w MVI po qd     Hypothyroidisim   -1.5 cm low-density nodule in the right lobe of the thyroid gland on CT  -clinical exam unimpressive for thyrotoxicosis   -tsh wnl 3/20/21  - recommend outpatient thyroid US  -c/w synthroid po qd     Depression  -c/w lexapro po qd     DVT ppx: eliquis    Next of kin: Daughter Jazzmine   HPI:   81 y/o F with Hx of Afib; on Eliquis, HTN, HLD, iron deficiency anemia, hypothyroidism, depression; who tested COVID + on 3/17  presented to the ED 2X on 3/18 initially for generalized weakness and was discharged home and then returned the same day due to noted increased episodes of diarrhea and 1 episode of BRB per rectum when wiping. Pt was placed in ED obs with difficulty with ambulation, also had recent fall and was evaluated by PT and recommended for HERB.  Also noted with episodes of diarrhea, noted hypokalemia 2.9 which was replenished and one episode of BRB which self resolved. ED called medicine for admission and the pt was admitted with generalized weakness/ debility 2/2 covid 19.     Hospital course:  Bilateral multifocal pneumonia related to COVID 19  Generalized weakness due to above  - COVID PCR positive 3/17/21  - CT chest 3/19/21: bilateral lung opacities consistent with multifocal pneumonia, no acute fractures  - Debility/ deconditioned, difficulty with ambulation--> rehab placement  - s/p mechanical fall x 1 week ago, sustained R scalp hematoma for which she saw her pmd but had no additional imaging studies completed  - CT head 3/19/21: +right-sided frontal scalp hematoma with chronic white matter microvascular type changes, no evidence CVA  - CT MF 3/19/21: no MF fractures, +1.5 cm low-density nodule in the right lobe of the thyroid gland  - saturating well on RA, not hypoxic  - afebrile without leukocytosis 3/20/21 with mild leukocytosis today, likely steroid induced 3/21/21  - decadron, was on as outpatient, continued as inpatient x 5 days  - PT recommended HERB placement--> [facility] 3/21/21    BRBPR, likely Covid diarrhea   - one episode and likely related to diarrheal episodes No further episodes of diarrhea. No noted active bleeding.   - hgb was 10-11, at baseline 3/21/21  - FOBT negative   - anemia panel noted     Chronic Atrial fibrillation, rate controlled  -c/w amiodorone po qd   -c/w eliquis po bid     HTN, uncontrolled at times  -pain related? patient uncomfortable in ED, neck/back pain on ED bed  -c/w enalapril po qd   -hydralazine prn added     Iron deficiency anemia  -hct at baseline 3/21/21  -c/w ferrous sulfate po qd  -c/w MVI po qd     Hypothyroidisim   -1.5 cm low-density nodule in the right lobe of the thyroid gland on CT  -clinical exam unimpressive for thyrotoxicosis   -tsh wnl 3/20/21  -thyroid US 3/21/21: Multiple bilateral cystic nodules. Subcentimeter peripherally calcified nodule (TI-RAD 4)  -recommend Endocrine consult  -c/w synthroid po qd     Depression  -c/w lexapro po qd     DVT ppx: eliquis    Next of kin: Daughter Jazzmine   HPI:   81 y/o F with Hx of Afib; on Eliquis, HTN, HLD, iron deficiency anemia, hypothyroidism, depression; who tested COVID + on 3/17  presented to the ED 2X on 3/18 initially for generalized weakness and was discharged home and then returned the same day due to noted increased episodes of diarrhea and 1 episode of BRB per rectum when wiping. Pt was placed in ED obs with difficulty with ambulation, also had recent fall and was evaluated by PT and recommended for HERB.  Also noted with episodes of diarrhea, noted hypokalemia 2.9 which was replenished and one episode of BRB which self resolved. ED called medicine for admission and the pt was admitted with generalized weakness/ debility 2/2 covid 19.     Hospital course:  Bilateral multifocal pneumonia related to COVID 19  Generalized weakness due to above  - COVID PCR positive 3/17/21  - CT chest 3/19/21: bilateral lung opacities consistent with multifocal pneumonia, no acute fractures  - Debility/ deconditioned, difficulty with ambulation--> rehab placement  - s/p mechanical fall x 1 week ago, sustained R scalp hematoma for which she saw her pmd but had no additional imaging studies completed  - CT head 3/19/21: +right-sided frontal scalp hematoma with chronic white matter microvascular type changes, no evidence CVA  - CT MF 3/19/21: no MF fractures, +1.5 cm low-density nodule in the right lobe of the thyroid gland  - saturating well on RA, not hypoxic  - afebrile without leukocytosis 3/20/21 with mild leukocytosis today, likely steroid induced 3/21/21  - decadron, was on as outpatient, continued as inpatient x 5 days  - PT recommended HERB placement--> [facility] 3/21/21    BRBPR, likely Covid diarrhea   - one episode and likely related to diarrheal episodes No further episodes of diarrhea. No noted active bleeding.   - hgb was 10-11, at baseline 3/21/21  - FOBT negative   - anemia panel noted     Chronic Atrial fibrillation, rate controlled  -c/w amiodorone po qd   -c/w eliquis po bid     HTN, uncontrolled at times  -pain related? patient uncomfortable in ED, neck/back pain on ED bed  -c/w enalapril po qd   -hydralazine prn added     Iron deficiency anemia  -hct at baseline 3/21/21  -c/w ferrous sulfate po qd  -c/w MVI po qd     Hypothyroidisim   -1.5 cm low-density nodule in the right lobe of the thyroid gland on CT  -clinical exam unimpressive for thyrotoxicosis   -tsh wnl 3/20/21  -thyroid US 3/21/21: Multiple bilateral cystic nodules. Subcentimeter peripherally calcified nodule (TI-RAD 4)  -recommend Endocrine consult  -c/w synthroid po qd     Depression  -c/w lexapro po qd     DVT ppx: eliquis    Next of kin: Daughter Jazzmine      PHYSICAL EXAM:  Vital Signs Last 24 Hrs  T(C): 36.6 (20 Mar 2021 08:45), Max: 36.9 (19 Mar 2021 22:01)  T(F): 97.8 (20 Mar 2021 08:45), Max: 98.4 (19 Mar 2021 22:01)  HR: 58 (20 Mar 2021 08:45) (58 - 66)  BP: 200/76 (20 Mar 2021 08:45) (148/68 - 200/76)  RR: 18 (20 Mar 2021 08:45) (18 - 19)  SpO2: 97% (20 Mar 2021 08:45) (94% - 97%)    gen: elderly female aao x3 nad  heent: +ecchymosis forehead and b/l upper cheeks  pulm: reduced breath sounds b/l  card: s1s2 rrr  abd: soft nontender +bs  ext: no pitting edema ble  neuro: face symmetric, speech was clear, no focal deficits  skin: no rashes, ecchymosis at above

## 2021-03-21 NOTE — DISCHARGE NOTE PROVIDER - NSDCCPCAREPLAN_GEN_ALL_CORE_FT
PRINCIPAL DISCHARGE DIAGNOSIS  Diagnosis: COVID-19  Assessment and Plan of Treatment:       SECONDARY DISCHARGE DIAGNOSES  Diagnosis: Weakness generalized  Assessment and Plan of Treatment:     Diagnosis: Diarrhea due to COVID-19  Assessment and Plan of Treatment:

## 2021-03-21 NOTE — H&P ADULT - NSHPPHYSICALEXAM_GEN_ALL_CORE
T(C): 36.9 (03-21-21 @ 12:18), Max: 37.2 (03-20-21 @ 19:16)  HR: 74 (03-21-21 @ 12:18) (74 - 88)  BP: 195/75 (03-21-21 @ 12:18) (127/73 - 195/75)  RR: 18 (03-21-21 @ 12:18) (18 - 18)  SpO2: 93% (03-21-21 @ 12:18) (93% - 95%)  Wt(kg): --Vital Signs Last 24 Hrs  T(C): 36.9 (21 Mar 2021 12:18), Max: 37.2 (20 Mar 2021 19:16)  T(F): 98.4 (21 Mar 2021 12:18), Max: 99 (20 Mar 2021 19:16)  HR: 74 (21 Mar 2021 12:18) (74 - 88)  BP: 195/75 (21 Mar 2021 12:18) (127/73 - 195/75)  BP(mean): --  RR: 18 (21 Mar 2021 12:18) (18 - 18)  SpO2: 93% (21 Mar 2021 12:18) (93% - 95%)    PHYSICAL EXAM:  GENERAL: NAD, appears tired   HEAD:  Atraumatic, Normocephalic  EYES: EOMI, PERRLA, conjunctiva and sclera clear  ENMT: No tonsillar erythema, exudates, or enlargement; Moist mucous membranes, Good dentition, No lesions  NECK: Supple, No JVD, Normal thyroid  NERVOUS SYSTEM:  Alert & Oriented X3, Good concentration; Motor Strength 5/5 B/L upper and lower extremities; DTRs 2+ intact and symmetric  CHEST/LUNG: Clear to percussion bilaterally; No rales, rhonchi, wheezing, or rubs  HEART: Regular rate and rhythm; No murmurs, rubs, or gallops  ABDOMEN: Soft, Nontender, Nondistended; Bowel sounds present  EXTREMITIES:  2+ Peripheral Pulses, No clubbing, cyanosis, or edema  LYMPH: No lymphadenopathy noted  SKIN: No rashes or lesions

## 2021-03-21 NOTE — H&P ADULT - HISTORY OF PRESENT ILLNESS
79 y/o F with Hx of Afib; on Eliquis, HTN, HLD, iron deficiency anemia, hypothyroidism, depression; who tested COVID + on 3/17  presented to the ED 2X on 3/18 initially for generalized weakness and was discharged home and then returned the same day due to noted increased episodes of diarrhea and 1 episode of BRB per rectum when wiping. Pt was placed in ED obs with difficulty with ambulation, also had recent fall and was evaluated by PT and recommended for HERB.  Also noted with episodes of diarrhea, noted hypokalemia 2.9 which was replenished and one episode of BRB which self resolved. ED called medicine for admission and the pt was admitted with generalized weakness/ debility 2/2 covid 19.     Patient awaiting HERB placement however remains testing positive.    Patient transferred to [facility] per Load Balancing Protocol.

## 2021-03-22 ENCOUNTER — INPATIENT (INPATIENT)
Facility: HOSPITAL | Age: 81
LOS: 7 days | Discharge: NURSING, REHAB OR LONG-TERM CARE | DRG: 177 | End: 2021-03-30
Attending: INTERNAL MEDICINE | Admitting: HOSPITALIST
Payer: MEDICARE

## 2021-03-22 VITALS
DIASTOLIC BLOOD PRESSURE: 67 MMHG | TEMPERATURE: 98 F | HEART RATE: 87 BPM | RESPIRATION RATE: 19 BRPM | SYSTOLIC BLOOD PRESSURE: 147 MMHG | OXYGEN SATURATION: 90 %

## 2021-03-22 VITALS
DIASTOLIC BLOOD PRESSURE: 69 MMHG | HEART RATE: 77 BPM | OXYGEN SATURATION: 93 % | HEIGHT: 61 IN | SYSTOLIC BLOOD PRESSURE: 141 MMHG | TEMPERATURE: 99 F | WEIGHT: 166.23 LBS | RESPIRATION RATE: 13 BRPM

## 2021-03-22 DIAGNOSIS — U07.1 COVID-19: ICD-10-CM

## 2021-03-22 LAB
ALBUMIN SERPL ELPH-MCNC: 2.5 G/DL — LOW (ref 3.3–5)
ALP SERPL-CCNC: 52 U/L — SIGNIFICANT CHANGE UP (ref 40–120)
ALT FLD-CCNC: 31 U/L — SIGNIFICANT CHANGE UP (ref 10–45)
ANION GAP SERPL CALC-SCNC: 8 MMOL/L — SIGNIFICANT CHANGE UP (ref 5–17)
ANISOCYTOSIS BLD QL: SIGNIFICANT CHANGE UP
AST SERPL-CCNC: 21 U/L — SIGNIFICANT CHANGE UP (ref 10–40)
BASOPHILS # BLD AUTO: 0.01 K/UL — SIGNIFICANT CHANGE UP (ref 0–0.2)
BASOPHILS NFR BLD AUTO: 0.1 % — SIGNIFICANT CHANGE UP (ref 0–2)
BILIRUB SERPL-MCNC: 0.6 MG/DL — SIGNIFICANT CHANGE UP (ref 0.2–1.2)
BUN SERPL-MCNC: 17 MG/DL — SIGNIFICANT CHANGE UP (ref 7–23)
CALCIUM SERPL-MCNC: 8.4 MG/DL — SIGNIFICANT CHANGE UP (ref 8.4–10.5)
CHLORIDE SERPL-SCNC: 101 MMOL/L — SIGNIFICANT CHANGE UP (ref 96–108)
CO2 SERPL-SCNC: 27 MMOL/L — SIGNIFICANT CHANGE UP (ref 22–31)
CREAT SERPL-MCNC: 0.68 MG/DL — SIGNIFICANT CHANGE UP (ref 0.5–1.3)
EOSINOPHIL # BLD AUTO: 0 K/UL — SIGNIFICANT CHANGE UP (ref 0–0.5)
EOSINOPHIL NFR BLD AUTO: 0 % — SIGNIFICANT CHANGE UP (ref 0–6)
GLUCOSE SERPL-MCNC: 97 MG/DL — SIGNIFICANT CHANGE UP (ref 70–99)
HCT VFR BLD CALC: 34.6 % — SIGNIFICANT CHANGE UP (ref 34.5–45)
HGB BLD-MCNC: 9.9 G/DL — LOW (ref 11.5–15.5)
HYPOCHROMIA BLD QL: SLIGHT — SIGNIFICANT CHANGE UP
IMM GRANULOCYTES NFR BLD AUTO: 0.5 % — SIGNIFICANT CHANGE UP (ref 0–1.5)
LYMPHOCYTES # BLD AUTO: 0.35 K/UL — LOW (ref 1–3.3)
LYMPHOCYTES # BLD AUTO: 3.8 % — LOW (ref 13–44)
MANUAL SMEAR VERIFICATION: SIGNIFICANT CHANGE UP
MCHC RBC-ENTMCNC: 22.7 PG — LOW (ref 27–34)
MCHC RBC-ENTMCNC: 28.6 GM/DL — LOW (ref 32–36)
MCV RBC AUTO: 79.2 FL — LOW (ref 80–100)
MICROCYTES BLD QL: SLIGHT — SIGNIFICANT CHANGE UP
MONOCYTES # BLD AUTO: 0.45 K/UL — SIGNIFICANT CHANGE UP (ref 0–0.9)
MONOCYTES NFR BLD AUTO: 4.9 % — SIGNIFICANT CHANGE UP (ref 2–14)
NEUTROPHILS # BLD AUTO: 8.28 K/UL — HIGH (ref 1.8–7.4)
NEUTROPHILS NFR BLD AUTO: 90.7 % — HIGH (ref 43–77)
NRBC # BLD: 0 /100 WBCS — SIGNIFICANT CHANGE UP (ref 0–0)
OVALOCYTES BLD QL SMEAR: SLIGHT — SIGNIFICANT CHANGE UP
PLAT MORPH BLD: NORMAL — SIGNIFICANT CHANGE UP
PLATELET # BLD AUTO: 231 K/UL — SIGNIFICANT CHANGE UP (ref 150–400)
POIKILOCYTOSIS BLD QL AUTO: SLIGHT — SIGNIFICANT CHANGE UP
POTASSIUM SERPL-MCNC: 3.1 MMOL/L — LOW (ref 3.5–5.3)
POTASSIUM SERPL-SCNC: 3.1 MMOL/L — LOW (ref 3.5–5.3)
PROT SERPL-MCNC: 6.1 G/DL — SIGNIFICANT CHANGE UP (ref 6–8.3)
RBC # BLD: 4.37 M/UL — SIGNIFICANT CHANGE UP (ref 3.8–5.2)
RBC # FLD: 21.2 % — HIGH (ref 10.3–14.5)
RBC BLD AUTO: ABNORMAL
SODIUM SERPL-SCNC: 136 MMOL/L — SIGNIFICANT CHANGE UP (ref 135–145)
WBC # BLD: 9.14 K/UL — SIGNIFICANT CHANGE UP (ref 3.8–10.5)
WBC # FLD AUTO: 9.14 K/UL — SIGNIFICANT CHANGE UP (ref 3.8–10.5)

## 2021-03-22 PROCEDURE — 82550 ASSAY OF CK (CPK): CPT

## 2021-03-22 PROCEDURE — 36415 COLL VENOUS BLD VENIPUNCTURE: CPT

## 2021-03-22 PROCEDURE — 71250 CT THORAX DX C-: CPT

## 2021-03-22 PROCEDURE — G0378: CPT

## 2021-03-22 PROCEDURE — 83615 LACTATE (LD) (LDH) ENZYME: CPT

## 2021-03-22 PROCEDURE — 70486 CT MAXILLOFACIAL W/O DYE: CPT

## 2021-03-22 PROCEDURE — 85014 HEMATOCRIT: CPT

## 2021-03-22 PROCEDURE — 80048 BASIC METABOLIC PNL TOTAL CA: CPT

## 2021-03-22 PROCEDURE — 85025 COMPLETE CBC W/AUTO DIFF WBC: CPT

## 2021-03-22 PROCEDURE — 86850 RBC ANTIBODY SCREEN: CPT

## 2021-03-22 PROCEDURE — 99233 SBSQ HOSP IP/OBS HIGH 50: CPT

## 2021-03-22 PROCEDURE — 70450 CT HEAD/BRAIN W/O DYE: CPT

## 2021-03-22 PROCEDURE — 86900 BLOOD TYPING SEROLOGIC ABO: CPT

## 2021-03-22 PROCEDURE — 86901 BLOOD TYPING SEROLOGIC RH(D): CPT

## 2021-03-22 PROCEDURE — 82728 ASSAY OF FERRITIN: CPT

## 2021-03-22 PROCEDURE — 82272 OCCULT BLD FECES 1-3 TESTS: CPT

## 2021-03-22 PROCEDURE — U0005: CPT

## 2021-03-22 PROCEDURE — U0003: CPT

## 2021-03-22 PROCEDURE — 36000 PLACE NEEDLE IN VEIN: CPT

## 2021-03-22 PROCEDURE — 76536 US EXAM OF HEAD AND NECK: CPT

## 2021-03-22 PROCEDURE — 99285 EMERGENCY DEPT VISIT HI MDM: CPT

## 2021-03-22 PROCEDURE — 81001 URINALYSIS AUTO W/SCOPE: CPT

## 2021-03-22 PROCEDURE — 80053 COMPREHEN METABOLIC PANEL: CPT

## 2021-03-22 PROCEDURE — 71045 X-RAY EXAM CHEST 1 VIEW: CPT | Mod: 26

## 2021-03-22 PROCEDURE — 84443 ASSAY THYROID STIM HORMONE: CPT

## 2021-03-22 PROCEDURE — 86769 SARS-COV-2 COVID-19 ANTIBODY: CPT

## 2021-03-22 PROCEDURE — 86140 C-REACTIVE PROTEIN: CPT

## 2021-03-22 PROCEDURE — 85018 HEMOGLOBIN: CPT

## 2021-03-22 PROCEDURE — 74176 CT ABD & PELVIS W/O CONTRAST: CPT

## 2021-03-22 RX ORDER — REMDESIVIR 5 MG/ML
INJECTION INTRAVENOUS
Refills: 0 | Status: COMPLETED | OUTPATIENT
Start: 2021-03-22 | End: 2021-03-26

## 2021-03-22 RX ORDER — ACETAMINOPHEN 500 MG
650 TABLET ORAL EVERY 4 HOURS
Refills: 0 | Status: DISCONTINUED | OUTPATIENT
Start: 2021-03-22 | End: 2021-03-30

## 2021-03-22 RX ORDER — POTASSIUM CHLORIDE 20 MEQ
40 PACKET (EA) ORAL EVERY 4 HOURS
Refills: 0 | Status: COMPLETED | OUTPATIENT
Start: 2021-03-22 | End: 2021-03-22

## 2021-03-22 RX ORDER — REMDESIVIR 5 MG/ML
100 INJECTION INTRAVENOUS EVERY 24 HOURS
Refills: 0 | Status: COMPLETED | OUTPATIENT
Start: 2021-03-23 | End: 2021-03-26

## 2021-03-22 RX ORDER — MULTIVIT-MIN/FERROUS GLUCONATE 9 MG/15 ML
1 LIQUID (ML) ORAL DAILY
Refills: 0 | Status: DISCONTINUED | OUTPATIENT
Start: 2021-03-22 | End: 2021-03-22

## 2021-03-22 RX ORDER — DEXAMETHASONE 0.5 MG/5ML
6 ELIXIR ORAL DAILY
Refills: 0 | Status: DISCONTINUED | OUTPATIENT
Start: 2021-03-22 | End: 2021-03-28

## 2021-03-22 RX ORDER — REMDESIVIR 5 MG/ML
200 INJECTION INTRAVENOUS EVERY 24 HOURS
Refills: 0 | Status: COMPLETED | OUTPATIENT
Start: 2021-03-22 | End: 2021-03-22

## 2021-03-22 RX ORDER — ALBUTEROL 90 UG/1
2 AEROSOL, METERED ORAL EVERY 4 HOURS
Refills: 0 | Status: DISCONTINUED | OUTPATIENT
Start: 2021-03-22 | End: 2021-03-30

## 2021-03-22 RX ADMIN — APIXABAN 5 MILLIGRAM(S): 2.5 TABLET, FILM COATED ORAL at 18:21

## 2021-03-22 RX ADMIN — Medication 325 MILLIGRAM(S): at 12:26

## 2021-03-22 RX ADMIN — APIXABAN 5 MILLIGRAM(S): 2.5 TABLET, FILM COATED ORAL at 06:15

## 2021-03-22 RX ADMIN — Medication 650 MILLIGRAM(S): at 16:45

## 2021-03-22 RX ADMIN — Medication 40 MILLIEQUIVALENT(S): at 16:00

## 2021-03-22 RX ADMIN — AMIODARONE HYDROCHLORIDE 200 MILLIGRAM(S): 400 TABLET ORAL at 06:15

## 2021-03-22 RX ADMIN — Medication 6 MILLIGRAM(S): at 18:21

## 2021-03-22 RX ADMIN — Medication 20 MILLIGRAM(S): at 06:15

## 2021-03-22 RX ADMIN — REMDESIVIR 500 MILLIGRAM(S): 5 INJECTION INTRAVENOUS at 22:34

## 2021-03-22 RX ADMIN — Medication 1 TABLET(S): at 12:30

## 2021-03-22 RX ADMIN — ESCITALOPRAM OXALATE 10 MILLIGRAM(S): 10 TABLET, FILM COATED ORAL at 12:26

## 2021-03-22 RX ADMIN — ATORVASTATIN CALCIUM 20 MILLIGRAM(S): 80 TABLET, FILM COATED ORAL at 22:35

## 2021-03-22 RX ADMIN — Medication 650 MILLIGRAM(S): at 16:00

## 2021-03-22 RX ADMIN — Medication 25 MICROGRAM(S): at 06:15

## 2021-03-22 RX ADMIN — Medication 40 MILLIEQUIVALENT(S): at 22:34

## 2021-03-22 NOTE — PROGRESS NOTE ADULT - ASSESSMENT
80W PMH Afib on eliquis, HTN, HLD, DORIS, hypothyroidism, depression who presented to [facility] for weakness and diarrhea, initially admitted to observation unit.  Noted for mechanical falls secondary to weakness and transitioned to admission.  Patient evaluated by PT who recommended HERB placement however patient remains COVID + and needs 10 days total for HERB placement.    COVID 19 infection  - Initial COVID positive 3/17/21  - CT Chest shows bilateral opacities  -Has had dips of spO2 to 80s, not currently requiring oxygen, add supplemental O2 if needed maintain spo2 > 94%  - no need for remdesivir or decadron as patient not requiring O2.   - will need 10 days total positive to be placed at [facility]      Hypokalemia  - replete  - monitor BMP    Debility/Ambulatory dysfunction  - PT consult  -Ambulate with assist, OOB to chair  - will need HERB placement    Chronic atrial fibrillation  - hemoglobin now stable  - cont amiodarone/eliquis    Hypothyroidism  - cont levothyroxine    DVT PPx  - on eliquis    will speak with daughter Jazzmine [phone]   80W PMH Afib on eliquis, HTN, HLD, DORIS, hypothyroidism, depression who presented to [facility] for weakness and diarrhea, initially admitted to observation unit.  Noted for mechanical falls secondary to weakness and transitioned to admission.  Patient evaluated by PT who recommended HERB placement however patient remains COVID + and needs 10 days total for HERB placement.    COVID 19 infection  - Initial COVID positive 3/17/21  - CT Chest shows bilateral opacities  - Has had dips of spO2 to 80s, not currently requiring oxygen, add supplemental O2 if needed maintain spo2 > 94%  - no need for remdesivir or decadron as patient not requiring O2.   - will need 10 days total positive to be placed at [facility]    Hypokalemia  - replete  - monitor BMP    Debility/Ambulatory dysfunction  - PT consult  - Ambulate with assist, OOB to chair  - will need HERB placement    Chronic atrial fibrillation  - hemoglobin now stable  - cont amiodarone/eliquis    Hypothyroidism  - cont levothyroxine    DVT PPx  - on eliquis    will speak with daughter Jazzmine [phone]

## 2021-03-22 NOTE — PROGRESS NOTE ADULT - ATTENDING COMMENTS
I have personally seen and examined patient on the above date.  I discussed the case with Pavel Lam NP and I agree with findings and plan as detailed per note above, which I have amended where appropriate.    COVID-19 with intermittent O2 desaturation  Hypothyroidism  Chronic atrial fibrillation on anticoagulation  hypokalemia  PT consulted    Dispo: HERB when possible

## 2021-03-22 NOTE — PHYSICAL THERAPY INITIAL EVALUATION ADULT - ADDITIONAL COMMENTS
pt reports she lives with her dtr, son in law and 2 grandkids, 3 steps to enter with rail, no steps inside, ambulates with a RW and was previously independent with mobility and ADLs

## 2021-03-22 NOTE — PHYSICAL THERAPY INITIAL EVALUATION ADULT - PERTINENT HX OF CURRENT PROBLEM, REHAB EVAL
pt is a 80 yr old female initially presented to [facility] 3/18 2* generalized weakness (initial covid dx 3/17), sent home and returned same day due to BRB per rectum and increased diarrhea, seen by PT and HERB recommended, transferred to [facility] 3/22 per load balancing protocol

## 2021-03-22 NOTE — PROGRESS NOTE ADULT - SUBJECTIVE AND OBJECTIVE BOX
Patient is a 80y old  Female who presents with a chief complaint of covid/debility       Patient seen and examined at bedside. Pt states she has periods of confusion otherwise feels well, denies overnight events or current complaints including chest pain, shortness of breath, dizziness, nausea, vomiting, diarrhea, fever or chills.      ALLERGIES:  No Known Allergies    MEDICATIONS  (STANDING):  aMIOdarone    Tablet 200 milliGRAM(s) Oral daily  apixaban 5 milliGRAM(s) Oral two times a day  atorvastatin 20 milliGRAM(s) Oral at bedtime  enalapril 20 milliGRAM(s) Oral daily  escitalopram 10 milliGRAM(s) Oral daily  ferrous    sulfate 325 milliGRAM(s) Oral daily  levothyroxine 25 MICROGram(s) Oral daily  multivitamin 1 Tablet(s) Oral daily    MEDICATIONS  (PRN):  acetaminophen   Tablet .. 650 milliGRAM(s) Oral every 6 hours PRN Mild Pain (1 - 3)  oxycodone    5 mG/acetaminophen 325 mG 1 Tablet(s) Oral every 6 hours PRN Moderate Pain (4 - 6)    Vital Signs Last 24 Hrs  T(F): 98.9 (22 Mar 2021 04:52), Max: 98.9 (22 Mar 2021 04:52)  HR: 77 (22 Mar 2021 04:52) (77 - 87)  BP: 141/69 (22 Mar 2021 04:52) (141/69 - 147/67)  RR: 13 (22 Mar 2021 04:52) (13 - 19)  SpO2: 93% (22 Mar 2021 04:52) (90% - 93%)  I&O's Summary    PHYSICAL EXAM:  General: NAD, A/O x 3  ENT: MMM, no oral thrush   Neck: Supple, No JVD  Lungs: Clear to auscultation bilaterally, non labored breathing  Cardio: RRR, S1/S2, No murmurs  Abdomen: Soft, Nontender, Nondistended; Bowel sounds present  Extremities: No calf tenderness, No pitting edema  Skin: Old ecchymosis scattered to face     LABS:                        9.9    9.14  )-----------( 231      ( 22 Mar 2021 08:20 )             34.6     03-22    136  |  101  |  17  ----------------------------<  97  3.1   |  27  |  0.68    Ca    8.4      22 Mar 2021 08:20    TPro  6.1  /  Alb  2.5  /  TBili  0.6  /  DBili  x   /  AST  21  /  ALT  31  /  AlkPhos  52  03-22    eGFR if Non African American: 83 mL/min/1.73M2 (03-22-21 @ 08:20)  eGFR if : 96 mL/min/1.73M2 (03-22-21 @ 08:20)        CARDIAC MARKERS ( 19 Mar 2021 22:16 )  x     / x     / 96 U/L / x     / x            TSH 0.74   TSH with FT4 reflex --  Total T3 --                        RADIOLOGY & ADDITIONAL TESTS:    Care Discussed with Consultants/Other Providers:

## 2021-03-23 LAB
ALBUMIN SERPL ELPH-MCNC: 2.5 G/DL — LOW (ref 3.3–5)
ALP SERPL-CCNC: 55 U/L — SIGNIFICANT CHANGE UP (ref 40–120)
ALT FLD-CCNC: 33 U/L — SIGNIFICANT CHANGE UP (ref 10–45)
ANION GAP SERPL CALC-SCNC: 10 MMOL/L — SIGNIFICANT CHANGE UP (ref 5–17)
AST SERPL-CCNC: 25 U/L — SIGNIFICANT CHANGE UP (ref 10–40)
BASOPHILS # BLD AUTO: 0 K/UL — SIGNIFICANT CHANGE UP (ref 0–0.2)
BASOPHILS NFR BLD AUTO: 0 % — SIGNIFICANT CHANGE UP (ref 0–2)
BILIRUB SERPL-MCNC: 0.4 MG/DL — SIGNIFICANT CHANGE UP (ref 0.2–1.2)
BUN SERPL-MCNC: 28 MG/DL — HIGH (ref 7–23)
CALCIUM SERPL-MCNC: 8.8 MG/DL — SIGNIFICANT CHANGE UP (ref 8.4–10.5)
CHLORIDE SERPL-SCNC: 104 MMOL/L — SIGNIFICANT CHANGE UP (ref 96–108)
CO2 SERPL-SCNC: 24 MMOL/L — SIGNIFICANT CHANGE UP (ref 22–31)
CREAT SERPL-MCNC: 0.87 MG/DL — SIGNIFICANT CHANGE UP (ref 0.5–1.3)
CRP SERPL-MCNC: 200 MG/L — HIGH
D DIMER BLD IA.RAPID-MCNC: 246 NG/ML DDU — HIGH
EOSINOPHIL # BLD AUTO: 0 K/UL — SIGNIFICANT CHANGE UP (ref 0–0.5)
EOSINOPHIL NFR BLD AUTO: 0 % — SIGNIFICANT CHANGE UP (ref 0–6)
FERRITIN SERPL-MCNC: 197 NG/ML — HIGH (ref 15–150)
GLUCOSE SERPL-MCNC: 166 MG/DL — HIGH (ref 70–99)
HCT VFR BLD CALC: 35.1 % — SIGNIFICANT CHANGE UP (ref 34.5–45)
HGB BLD-MCNC: 10 G/DL — LOW (ref 11.5–15.5)
IMM GRANULOCYTES NFR BLD AUTO: 0.4 % — SIGNIFICANT CHANGE UP (ref 0–1.5)
LDH SERPL L TO P-CCNC: 364 U/L — HIGH (ref 50–242)
LYMPHOCYTES # BLD AUTO: 0.18 K/UL — LOW (ref 1–3.3)
LYMPHOCYTES # BLD AUTO: 2.6 % — LOW (ref 13–44)
MAGNESIUM SERPL-MCNC: 2.2 MG/DL — SIGNIFICANT CHANGE UP (ref 1.6–2.6)
MCHC RBC-ENTMCNC: 22.5 PG — LOW (ref 27–34)
MCHC RBC-ENTMCNC: 28.5 GM/DL — LOW (ref 32–36)
MCV RBC AUTO: 78.9 FL — LOW (ref 80–100)
MONOCYTES # BLD AUTO: 0.23 K/UL — SIGNIFICANT CHANGE UP (ref 0–0.9)
MONOCYTES NFR BLD AUTO: 3.3 % — SIGNIFICANT CHANGE UP (ref 2–14)
NEUTROPHILS # BLD AUTO: 6.58 K/UL — SIGNIFICANT CHANGE UP (ref 1.8–7.4)
NEUTROPHILS NFR BLD AUTO: 93.7 % — HIGH (ref 43–77)
NRBC # BLD: 0 /100 WBCS — SIGNIFICANT CHANGE UP (ref 0–0)
PLATELET # BLD AUTO: 223 K/UL — SIGNIFICANT CHANGE UP (ref 150–400)
POTASSIUM SERPL-MCNC: 4.1 MMOL/L — SIGNIFICANT CHANGE UP (ref 3.5–5.3)
POTASSIUM SERPL-SCNC: 4.1 MMOL/L — SIGNIFICANT CHANGE UP (ref 3.5–5.3)
PROT SERPL-MCNC: 6.3 G/DL — SIGNIFICANT CHANGE UP (ref 6–8.3)
RBC # BLD: 4.45 M/UL — SIGNIFICANT CHANGE UP (ref 3.8–5.2)
RBC # FLD: 21.3 % — HIGH (ref 10.3–14.5)
SARS-COV-2 RNA SPEC QL NAA+PROBE: DETECTED
SODIUM SERPL-SCNC: 138 MMOL/L — SIGNIFICANT CHANGE UP (ref 135–145)
WBC # BLD: 7.02 K/UL — SIGNIFICANT CHANGE UP (ref 3.8–10.5)
WBC # FLD AUTO: 7.02 K/UL — SIGNIFICANT CHANGE UP (ref 3.8–10.5)

## 2021-03-23 PROCEDURE — 99233 SBSQ HOSP IP/OBS HIGH 50: CPT

## 2021-03-23 RX ORDER — FUROSEMIDE 40 MG
20 TABLET ORAL ONCE
Refills: 0 | Status: COMPLETED | OUTPATIENT
Start: 2021-03-23 | End: 2021-03-23

## 2021-03-23 RX ORDER — NYSTATIN CREAM 100000 [USP'U]/G
1 CREAM TOPICAL
Refills: 0 | Status: DISCONTINUED | OUTPATIENT
Start: 2021-03-23 | End: 2021-03-30

## 2021-03-23 RX ADMIN — APIXABAN 5 MILLIGRAM(S): 2.5 TABLET, FILM COATED ORAL at 18:06

## 2021-03-23 RX ADMIN — AMIODARONE HYDROCHLORIDE 200 MILLIGRAM(S): 400 TABLET ORAL at 05:53

## 2021-03-23 RX ADMIN — ATORVASTATIN CALCIUM 20 MILLIGRAM(S): 80 TABLET, FILM COATED ORAL at 21:58

## 2021-03-23 RX ADMIN — Medication 1 TABLET(S): at 11:06

## 2021-03-23 RX ADMIN — Medication 6 MILLIGRAM(S): at 05:53

## 2021-03-23 RX ADMIN — ALBUTEROL 2 PUFF(S): 90 AEROSOL, METERED ORAL at 20:35

## 2021-03-23 RX ADMIN — Medication 325 MILLIGRAM(S): at 11:06

## 2021-03-23 RX ADMIN — Medication 650 MILLIGRAM(S): at 21:51

## 2021-03-23 RX ADMIN — Medication 25 MICROGRAM(S): at 05:53

## 2021-03-23 RX ADMIN — Medication 200 MILLIGRAM(S): at 21:51

## 2021-03-23 RX ADMIN — Medication 20 MILLIGRAM(S): at 05:53

## 2021-03-23 RX ADMIN — NYSTATIN CREAM 1 APPLICATION(S): 100000 CREAM TOPICAL at 18:06

## 2021-03-23 RX ADMIN — Medication 20 MILLIGRAM(S): at 22:37

## 2021-03-23 RX ADMIN — ESCITALOPRAM OXALATE 10 MILLIGRAM(S): 10 TABLET, FILM COATED ORAL at 11:06

## 2021-03-23 RX ADMIN — REMDESIVIR 500 MILLIGRAM(S): 5 INJECTION INTRAVENOUS at 21:58

## 2021-03-23 RX ADMIN — APIXABAN 5 MILLIGRAM(S): 2.5 TABLET, FILM COATED ORAL at 05:53

## 2021-03-23 RX ADMIN — Medication 650 MILLIGRAM(S): at 22:51

## 2021-03-23 NOTE — PROGRESS NOTE ADULT - ATTENDING COMMENTS
I have personally seen and examined patient on the above date.  I discussed the case with Tiara RAGLAND and I agree with findings and plan as detailed per note above, which I have amended where appropriate.    COVID-19 with intermittent O2 desaturation  Hypothyroidism  Chronic atrial fibrillation on anticoagulation  PT consulted    Dispo: HERB when possible .     I was physically present for the key portions of the evaluation and management (E/M) service provided.  I agree with the above history, physical, and plan which I have reviewed and edited where appropriate.

## 2021-03-23 NOTE — PROGRESS NOTE ADULT - ASSESSMENT
80W PMH Afib on eliquis, HTN, HLD, DORIS, hypothyroidism, depression who presented to [facility] for weakness and diarrhea, initially admitted to observation unit.  Noted for mechanical falls secondary to weakness and transitioned to admission.  Patient evaluated by PT who recommended HERB placement however patient remains COVID + and needs 10 days total for HERB placement.    COVID 19 infection  - Initial COVID positive 3/17/21  - CT Chest shows bilateral opacities  - Monitor O2 sat--satting 96% on 2 L NC  - Incentive ji   - Day 2 of remdemsivir and decadron  - Repeat Covid swab sent today--last positive on 3/20  - PT rec HERB--can be discharged to [facility] on 10 days after initial positive (3/27)  - Monitor LFTs and renal function on remdemsivir  - Covid labs q 2-3 days     Hypokalemia  - replete  - monitor BMP    Debility/Ambulatory dysfunction  - PT consult rec HERB  - Ambulate with assist, OOB to chair      Chronic atrial fibrillation  - cont amiodarone/eliquis  - rate controlled    Hypothyroidism  - cont levothyroxine    DVT PPx  - on eliquis    3/23: spoke with daughter Jazzmine [phone] and updated her on plan of care   Dispo: HERB --if continues to be positive[facility]. Repeat covid pending today

## 2021-03-23 NOTE — PROGRESS NOTE ADULT - SUBJECTIVE AND OBJECTIVE BOX
Patient is a 80y old  Female who presents with a chief complaint of covid/debility (22 Mar 2021 12:32). No acute issues overnight . Eager to leave the hospital.  Febrile 101 yesterday       Patient seen and examined at bedside.    ALLERGIES:  No Known Allergies    MEDICATIONS  (STANDING):  aMIOdarone    Tablet 200 milliGRAM(s) Oral daily  apixaban 5 milliGRAM(s) Oral two times a day  atorvastatin 20 milliGRAM(s) Oral at bedtime  dexAMETHasone     Tablet 6 milliGRAM(s) Oral daily  enalapril 20 milliGRAM(s) Oral daily  escitalopram 10 milliGRAM(s) Oral daily  ferrous    sulfate 325 milliGRAM(s) Oral daily  levothyroxine 25 MICROGram(s) Oral daily  multivitamin 1 Tablet(s) Oral daily  nystatin Powder 1 Application(s) Topical two times a day  remdesivir  IVPB   IV Intermittent   remdesivir  IVPB 100 milliGRAM(s) IV Intermittent every 24 hours    MEDICATIONS  (PRN):  acetaminophen   Tablet .. 650 milliGRAM(s) Oral every 4 hours PRN Temp greater or equal to 38C (100.4F), Mild Pain (1 - 3)  ALBUTerol    90 MICROgram(s) HFA Inhaler 2 Puff(s) Inhalation every 4 hours PRN Shortness of Breath and/or Wheezing  oxycodone    5 mG/acetaminophen 325 mG 1 Tablet(s) Oral every 6 hours PRN Moderate Pain (4 - 6)    Vital Signs Last 24 Hrs  T(F): 97.7 (23 Mar 2021 05:51), Max: 101 (22 Mar 2021 15:47)  HR: 72 (23 Mar 2021 05:51) (67 - 77)  BP: 148/64 (23 Mar 2021 05:51) (115/48 - 148/64)  RR: 14 (23 Mar 2021 05:51) (14 - 25)  SpO2: 94% (23 Mar 2021 05:51) (91% - 96%)  I&O's Summary    BMI (kg/m2): 31.4 (03-22-21 @ 04:52), 28.9 (03-18-21 @ 23:03)  PHYSICAL EXAM:  General: + generalized healing ecchymosis on face, A/O x 3  ENT: MMM, no thrush  Neck: Supple, No JVD  Lungs: Non labored breathing,  Clear to auscultation bilaterally,   Cardio: RRR, S1/S2, no pitting edema bilaterally  Abdomen: Soft, Nontender, Nondistended; Bowel sounds present  Extremities: No calf tenderness, moves all extremities    LABS:                        10.0   7.02  )-----------( 223      ( 23 Mar 2021 06:56 )             35.1       03-23    138  |  104  |  28  ----------------------------<  166  4.1   |  24  |  0.87    Ca    8.8      23 Mar 2021 06:56  Mg     2.2     03-23    TPro  6.3  /  Alb  2.5  /  TBili  0.4  /  DBili  x   /  AST  25  /  ALT  33  /  AlkPhos  55  03-23     eGFR if Non African American: 63 mL/min/1.73M2 (03-23-21 @ 06:56)  eGFR if : 73 mL/min/1.73M2 (03-23-21 @ 06:56)               TSH 0.74   TSH with FT4 reflex --  Total T3 --                        RADIOLOGY & ADDITIONAL TESTS:    Care Discussed with Consultants/Other Providers:

## 2021-03-23 NOTE — CHART NOTE - NSCHARTNOTEFT_GEN_A_CORE
Nutrition Initial Assessment    Nutrition Consult Received: Yes [ X  ]  No [   ]    Reason for Initial Nutrition Assessment: pressure sore    Source of Information: Unable to conduct a fact to face interview due to limited contact restrictions related to pt's medical condition and isolation precautions. Information obtained from a phone call with the pt and from the EMR.    Admitting Diagnosis: 80y Female admitted for   PAST MEDICAL & SURGICAL HISTORY:  H/O tubal ligation    Depression    Iron deficiency anemia    Hernia    HLD (hyperlipidemia)    Hypothyroid    HTN (hypertension)    Atrial fibrillation    No significant past surgical history        Subjective Information:  unable to obtain hx. from pt. due to poor cognition.. eating poorly this am; stage 2 left buttock, last bm was 3/22.   no n/v/ diarrhea noted. weight hx. unknown.      GI Issues: none    Current Nutrition Order: regular  PO Intake:   Good (%) [   ]    Fair (50-75%) [   ]    Poor (<50%) [  X ]    Skin Integrity: stage 2 left buttock    Labs:   03-23 Na138 mmol/L Glu 166 mg/dL<H> K+ 4.1 mmol/L Cr  0.87 mg/dL BUN 28 mg/dL<H> Phos n/a   Alb 2.5 g/dL<L> PAB n/a             Medications:  MEDICATIONS  (STANDING):  aMIOdarone    Tablet 200 milliGRAM(s) Oral daily  apixaban 5 milliGRAM(s) Oral two times a day  atorvastatin 20 milliGRAM(s) Oral at bedtime  dexAMETHasone     Tablet 6 milliGRAM(s) Oral daily  enalapril 20 milliGRAM(s) Oral daily  escitalopram 10 milliGRAM(s) Oral daily  ferrous    sulfate 325 milliGRAM(s) Oral daily  levothyroxine 25 MICROGram(s) Oral daily  multivitamin 1 Tablet(s) Oral daily  remdesivir  IVPB   IV Intermittent   remdesivir  IVPB 100 milliGRAM(s) IV Intermittent every 24 hours    MEDICATIONS  (PRN):  acetaminophen   Tablet .. 650 milliGRAM(s) Oral every 4 hours PRN Temp greater or equal to 38C (100.4F), Mild Pain (1 - 3)  ALBUTerol    90 MICROgram(s) HFA Inhaler 2 Puff(s) Inhalation every 4 hours PRN Shortness of Breath and/or Wheezing  oxycodone    5 mG/acetaminophen 325 mG 1 Tablet(s) Oral every 6 hours PRN Moderate Pain (4 - 6)    Admitted Anthropometrics:    Height (cm): 154.9 (03-22-21 @ 04:52), 154.9 (03-18-21 @ 23:03), 154.9 (03-17-21 @ 21:50)  Weight (kg): 75.4 (03-22-21 @ 04:52), 69.4 (03-18-21 @ 23:03)  BMI (kg/m2): 31.4 (03-22-21 @ 04:52), 28.9 (03-18-21 @ 23:03)    Nutrition Focused Physical Exam: Unable to complete due to limited isolation contact precautions at this time.     Estimated Energy Needs (__2025 kcal/kg- _27__ kcal/kg):   Estimated Protein Needs (_75_ g/kg- __1.0_ g/kg):   Based on weight of: 75.4 kg.     [ X ] Nutrition Diagnosis: inadequate oral intake  [  ] No active nutrition diagnosis at this time  [  ] Current medical condition precludes nutrition intervention    Goal: consume 50-75% meals w/ supplements    Nutrition Interventions:     Recommendations: add ensure enlive bid      RD to follow-up per protocol.

## 2021-03-23 NOTE — CHART NOTE - NSCHARTNOTEFT_GEN_A_CORE
Interval events: Called by RN to evaluate pt for cough. Chart reviewed, pt evaluated at bedside.  Pt laying in bed, c/o sob due to cough/congestion. Pt was on 4L NC throughout the day and was comfortable. Pt received albuterol treatment. O2 was increased to 4L and was encouraged to prone, however was not comfortable doing so. Pt was then turned on to one side with spo2 improvement from 91% to 93%. Pt denies hx of heart failure, denies noting edematous LE however states her daughter has noticed. Denies chest pain, palpitations, abdominal pain, n/v, headache, lightheadedness.      Review of Systems:  Constitutional: No fever, chills, fatigue  Neuro: No headache, numbness, weakness  Resp: No wheezing, +shortness of breath, +cough  CVS: No chest pain, palpitations, leg swelling  GI: No abdominal pain, nausea, vomiting  : No dysuria, frequency, incontinence    T(F): 98.3 (03-23-21 @ 22:50), Max: 98.3 (03-23-21 @ 22:50)  HR: 74 (03-23-21 @ 20:35) (70 - 75)  BP: 126/54 (03-23-21 @ 20:10) (123/55 - 148/64)  RR: 14 (03-23-21 @ 23:44) (14 - 15)  SpO2: 96% (03-23-21 @ 23:44) (91% - 96%)  Wt(kg): --    I&O's Summary  23 Mar 2021 07:01  -  24 Mar 2021 01:32  --------------------------------------------------------  IN: 320 mL / OUT: 0 mL / NET: 320 mL    Physical Exam:   Gen: nad, appears to be comfortable  Neuro: A&Ox2, states she is at [facility] (where she got transferred from), speaking in full sentences  HEENT: MMM  Resp: nonlabored breathing, rales heard b/l to auscultation, no wheezing or rhonchi appreciated  CVS: S1S2+  Abd: soft, nontender  Ext: no calf tenderness b/l, 1+ pitting edema b/l ankles    Meds:  remdesivir  IVPB IV Intermittent  aMIOdarone    Tablet Oral  enalapril Oral  atorvastatin Oral  dexAMETHasone     Tablet Oral  levothyroxine Oral  ALBUTerol    90 MICROgram(s) HFA Inhaler Inhalation PRN  guaiFENesin   Syrup  (Sugar-Free) Oral PRN  acetaminophen   Tablet .. Oral PRN  escitalopram Oral  oxycodone    5 mG/acetaminophen 325 mG Oral PRN  apixaban Oral  ferrous    sulfate Oral  multivitamin Oral  nystatin Powder Topical                       10.0   7.02  )-----------( 223      ( 23 Mar 2021 06:56 )             35.1     03-23  138  |  104  |  28<H>  ----------------------------<  166<H>  4.1   |  24  |  0.87    Ca    8.8      23 Mar 2021 06:56  Mg     2.2     03-23    TPro  6.3  /  Alb  2.5<L>  /  TBili  0.4  /  DBili  x   /  AST  25  /  ALT  33  /  AlkPhos  55  03-23    Radiology:  < from: Xray Chest 1 View-PORTABLE IMMEDIATE (Xray Chest 1 View-PORTABLE IMMEDIATE .) (03.22.21 @ 16:56) >  IMPRESSION: Increased bilateral lung parenchymal opacities. No pleural effusion.  < end of copied text >    < from: US Thyroid (03.21.21 @ 11:59) >  IMPRESSION:  Multiple bilateral cystic nodules. Subcentimeter peripherally calcified nodule (TI-RAD 4).  < end of copied text >    A&P: 80 year old female with PMHx of Afib on eliquis, HTN, HLD, DORIS, hypothyroidism, depression who presented to [facility] for weakness and diarrhea, initially admitted to observation unit. Noted for mechanical falls secondary to weakness and transitioned to admission. Patient evaluated by PT who recommended HERB placement however patient remains COVID + and needs 10 days total for HERB placement.  SOB likely due to Interval events: Called by RN to evaluate pt for cough. Chart reviewed, pt evaluated at bedside.  Pt laying in bed, c/o sob due to cough/congestion. Pt was on 4L NC throughout the day and was comfortable. Pt received albuterol treatment. O2 was increased to 4L and was encouraged to prone, however was not comfortable doing so. Pt was then turned on to one side with spo2 improvement from 91% to 93%. Pt denies hx of heart failure, denies noting edematous LE however states her daughter has noticed. Denies chest pain, palpitations, abdominal pain, n/v, headache, lightheadedness.      Review of Systems:  Constitutional: No fever, chills, fatigue  Neuro: No headache, numbness, weakness  Resp: No wheezing, +shortness of breath, +cough  CVS: No chest pain, palpitations, leg swelling  GI: No abdominal pain, nausea, vomiting  : No dysuria, frequency, incontinence    T(F): 98.3 (03-23-21 @ 22:50), Max: 98.3 (03-23-21 @ 22:50)  HR: 74 (03-23-21 @ 20:35) (70 - 75)  BP: 126/54 (03-23-21 @ 20:10) (123/55 - 148/64)  RR: 14 (03-23-21 @ 23:44) (14 - 15)  SpO2: 96% (03-23-21 @ 23:44) (91% - 96%)  Wt(kg): --    I&O's Summary  23 Mar 2021 07:01  -  24 Mar 2021 01:32  --------------------------------------------------------  IN: 320 mL / OUT: 0 mL / NET: 320 mL    Physical Exam:   Gen: nad, appears to be comfortable  Neuro: A&Ox2, states she is at [facility] (where she got transferred from), speaking in full sentences  HEENT: MMM  Resp: nonlabored breathing, rales heard b/l to auscultation, no wheezing or rhonchi appreciated  CVS: S1S2+  Abd: soft, nontender  Ext: no calf tenderness b/l, 1+ pitting edema b/l ankles    Meds:  remdesivir  IVPB IV Intermittent  aMIOdarone    Tablet Oral  enalapril Oral  atorvastatin Oral  dexAMETHasone     Tablet Oral  levothyroxine Oral  ALBUTerol    90 MICROgram(s) HFA Inhaler Inhalation PRN  guaiFENesin   Syrup  (Sugar-Free) Oral PRN  acetaminophen   Tablet .. Oral PRN  escitalopram Oral  oxycodone    5 mG/acetaminophen 325 mG Oral PRN  apixaban Oral  ferrous    sulfate Oral  multivitamin Oral  nystatin Powder Topical                       10.0   7.02  )-----------( 223      ( 23 Mar 2021 06:56 )             35.1     03-23  138  |  104  |  28<H>  ----------------------------<  166<H>  4.1   |  24  |  0.87    Ca    8.8      23 Mar 2021 06:56  Mg     2.2     03-23    TPro  6.3  /  Alb  2.5<L>  /  TBili  0.4  /  DBili  x   /  AST  25  /  ALT  33  /  AlkPhos  55  03-23    Radiology:  < from: Xray Chest 1 View-PORTABLE IMMEDIATE (Xray Chest 1 View-PORTABLE IMMEDIATE .) (03.22.21 @ 16:56) >  IMPRESSION: Increased bilateral lung parenchymal opacities. No pleural effusion.  < end of copied text >    < from: CT Chest No Cont (03.19.21 @ 18:00) >  IMPRESSION:  Patchy bilateral lung opacities consistent with multifocal pneumonia, likely related to Covid 19.  < end of copied text >    A&P: 80 year old female with PMHx of Afib on eliquis, HTN, HLD, DORIS, hypothyroidism, depression who presented to [facility] for weakness and diarrhea, initially admitted to observation unit. Noted for mechanical falls secondary to weakness and transitioned to admission. Patient evaluated by PT who recommended HERB placement however patient remains COVID + and needs 10 days total for HERB placement.  SOB likely due to covid-19 and possible pulm edema based on physical exam  -pt received albuterol, increased NC from 2L to 4L  -cxr from yesterday reviewed, as above  -lasix 20mg IV push x1, with improvement of SpO2 to 96%  -robitussin prn added for cough  -cont remdesivir and decadron. cont albuterol prn for sob. cont mvn. incentive spirometry encouraged as well as proning  -maintain strict isolation precautions for covid-19 along with proper PPE  -continuous pulse oximetry  -cont supplemental O2, ween as tolerated.  -vitals per routine  -rest of care per primary team

## 2021-03-24 LAB
ALBUMIN SERPL ELPH-MCNC: 2.4 G/DL — LOW (ref 3.3–5)
ALP SERPL-CCNC: 57 U/L — SIGNIFICANT CHANGE UP (ref 40–120)
ALT FLD-CCNC: 47 U/L — HIGH (ref 10–45)
ANION GAP SERPL CALC-SCNC: 8 MMOL/L — SIGNIFICANT CHANGE UP (ref 5–17)
AST SERPL-CCNC: 35 U/L — SIGNIFICANT CHANGE UP (ref 10–40)
BILIRUB SERPL-MCNC: 0.4 MG/DL — SIGNIFICANT CHANGE UP (ref 0.2–1.2)
BUN SERPL-MCNC: 37 MG/DL — HIGH (ref 7–23)
CALCIUM SERPL-MCNC: 9.2 MG/DL — SIGNIFICANT CHANGE UP (ref 8.4–10.5)
CHLORIDE SERPL-SCNC: 104 MMOL/L — SIGNIFICANT CHANGE UP (ref 96–108)
CO2 SERPL-SCNC: 27 MMOL/L — SIGNIFICANT CHANGE UP (ref 22–31)
CREAT SERPL-MCNC: 0.93 MG/DL — SIGNIFICANT CHANGE UP (ref 0.5–1.3)
GLUCOSE SERPL-MCNC: 138 MG/DL — HIGH (ref 70–99)
HCT VFR BLD CALC: 36.3 % — SIGNIFICANT CHANGE UP (ref 34.5–45)
HGB BLD-MCNC: 10.4 G/DL — LOW (ref 11.5–15.5)
MCHC RBC-ENTMCNC: 22.5 PG — LOW (ref 27–34)
MCHC RBC-ENTMCNC: 28.7 GM/DL — LOW (ref 32–36)
MCV RBC AUTO: 78.6 FL — LOW (ref 80–100)
NRBC # BLD: 0 /100 WBCS — SIGNIFICANT CHANGE UP (ref 0–0)
PLATELET # BLD AUTO: 229 K/UL — SIGNIFICANT CHANGE UP (ref 150–400)
POTASSIUM SERPL-MCNC: 3.9 MMOL/L — SIGNIFICANT CHANGE UP (ref 3.5–5.3)
POTASSIUM SERPL-SCNC: 3.9 MMOL/L — SIGNIFICANT CHANGE UP (ref 3.5–5.3)
PROT SERPL-MCNC: 6.3 G/DL — SIGNIFICANT CHANGE UP (ref 6–8.3)
RBC # BLD: 4.62 M/UL — SIGNIFICANT CHANGE UP (ref 3.8–5.2)
RBC # FLD: 21.3 % — HIGH (ref 10.3–14.5)
SODIUM SERPL-SCNC: 139 MMOL/L — SIGNIFICANT CHANGE UP (ref 135–145)
WBC # BLD: 8.73 K/UL — SIGNIFICANT CHANGE UP (ref 3.8–10.5)
WBC # FLD AUTO: 8.73 K/UL — SIGNIFICANT CHANGE UP (ref 3.8–10.5)

## 2021-03-24 PROCEDURE — 99233 SBSQ HOSP IP/OBS HIGH 50: CPT

## 2021-03-24 RX ADMIN — Medication 650 MILLIGRAM(S): at 18:16

## 2021-03-24 RX ADMIN — ESCITALOPRAM OXALATE 10 MILLIGRAM(S): 10 TABLET, FILM COATED ORAL at 12:16

## 2021-03-24 RX ADMIN — Medication 1 TABLET(S): at 12:16

## 2021-03-24 RX ADMIN — APIXABAN 5 MILLIGRAM(S): 2.5 TABLET, FILM COATED ORAL at 06:38

## 2021-03-24 RX ADMIN — Medication 20 MILLIGRAM(S): at 06:38

## 2021-03-24 RX ADMIN — Medication 25 MICROGRAM(S): at 06:38

## 2021-03-24 RX ADMIN — ATORVASTATIN CALCIUM 20 MILLIGRAM(S): 80 TABLET, FILM COATED ORAL at 22:21

## 2021-03-24 RX ADMIN — Medication 325 MILLIGRAM(S): at 12:16

## 2021-03-24 RX ADMIN — APIXABAN 5 MILLIGRAM(S): 2.5 TABLET, FILM COATED ORAL at 17:39

## 2021-03-24 RX ADMIN — AMIODARONE HYDROCHLORIDE 200 MILLIGRAM(S): 400 TABLET ORAL at 06:41

## 2021-03-24 RX ADMIN — NYSTATIN CREAM 1 APPLICATION(S): 100000 CREAM TOPICAL at 06:38

## 2021-03-24 RX ADMIN — NYSTATIN CREAM 1 APPLICATION(S): 100000 CREAM TOPICAL at 17:39

## 2021-03-24 RX ADMIN — REMDESIVIR 500 MILLIGRAM(S): 5 INJECTION INTRAVENOUS at 22:22

## 2021-03-24 RX ADMIN — Medication 6 MILLIGRAM(S): at 06:38

## 2021-03-24 RX ADMIN — Medication 650 MILLIGRAM(S): at 17:39

## 2021-03-24 NOTE — PROGRESS NOTE ADULT - ATTENDING COMMENTS
I have personally seen and examined patient on the above date.  I discussed the case with Tiara RAGLAND and I agree with findings and plan as detailed per note above, which I have amended where appropriate.    COVID-19 pneumonia  Hypothyroidism  Chronic atrial fibrillation on anticoagulation      I was physically present for the key portions of the evaluation and management (E/M) service provided.  I agree with the above history, physical, and plan which I have reviewed and edited where appropriate.

## 2021-03-24 NOTE — PROGRESS NOTE ADULT - ASSESSMENT
80W PMH Afib on eliquis, HTN, HLD, DORIS, hypothyroidism, depression who presented to [facility] for weakness and diarrhea, initially admitted to observation unit.  Noted for mechanical falls secondary to weakness and transitioned to admission.  Patient evaluated by PT who recommended HERB placement however patient remains COVID + and needs 10 days total for HERB placement.    COVID 19 infection  - Initial COVID positive 3/17/21  - CT Chest shows bilateral opacities  - Monitor O2 sat--satting 96% on 2 L NC  - Incentive ji   - Day 3 of remdemsivir and decadron  - Repeat Covid swab 3/23 positive--plan to [facility] 3/27 [facility]  - PT rec HERB[facility]  - Monitor LFTs and renal function on remdemsivir  - Covid labs q 2-3 days     Hypokalemia  - repleted    Debility/Ambulatory dysfunction  - PT consult rec HERB  - Ambulate with assist, OOB to chair      Chronic atrial fibrillation  - cont amiodarone/eliquis  - rate controlled    Hypothyroidism  - cont levothyroxine    DVT PPx  - on eliquis    3/24: spoke with daughter Jazzmine [phone] and updated her on plan of care   [facility] (3/[facility])

## 2021-03-24 NOTE — PROGRESS NOTE ADULT - SUBJECTIVE AND OBJECTIVE BOX
Patient is a 80y old  Female who presents with a chief complaint of covid/debility (23 Mar 2021 11:02)    Afebrile overnight   Patient seen and examined at bedside.    ALLERGIES:  No Known Allergies    MEDICATIONS  (STANDING):  aMIOdarone    Tablet 200 milliGRAM(s) Oral daily  apixaban 5 milliGRAM(s) Oral two times a day  atorvastatin 20 milliGRAM(s) Oral at bedtime  dexAMETHasone     Tablet 6 milliGRAM(s) Oral daily  enalapril 20 milliGRAM(s) Oral daily  escitalopram 10 milliGRAM(s) Oral daily  ferrous    sulfate 325 milliGRAM(s) Oral daily  levothyroxine 25 MICROGram(s) Oral daily  multivitamin 1 Tablet(s) Oral daily  nystatin Powder 1 Application(s) Topical two times a day  remdesivir  IVPB   IV Intermittent   remdesivir  IVPB 100 milliGRAM(s) IV Intermittent every 24 hours    MEDICATIONS  (PRN):  acetaminophen   Tablet .. 650 milliGRAM(s) Oral every 4 hours PRN Temp greater or equal to 38C (100.4F), Mild Pain (1 - 3)  ALBUTerol    90 MICROgram(s) HFA Inhaler 2 Puff(s) Inhalation every 4 hours PRN Shortness of Breath and/or Wheezing  guaiFENesin   Syrup  (Sugar-Free) 200 milliGRAM(s) Oral every 6 hours PRN Cough  oxycodone    5 mG/acetaminophen 325 mG 1 Tablet(s) Oral every 6 hours PRN Moderate Pain (4 - 6)    Vital Signs Last 24 Hrs  T(F): 97.1 (24 Mar 2021 05:57), Max: 98.3 (23 Mar 2021 22:50)  HR: 59 (24 Mar 2021 05:57) (59 - 75)  BP: 136/41 (24 Mar 2021 05:57) (123/55 - 136/41)  RR: 20 (24 Mar 2021 05:57) (14 - 20)  SpO2: 95% (24 Mar 2021 05:57) (91% - 96%)  I&O's Summary    23 Mar 2021 07:01  -  24 Mar 2021 07:00  --------------------------------------------------------  IN: 320 mL / OUT: 0 mL / NET: 320 mL      BMI (kg/m2): 31.4 (03-22-21 @ 04:52)  PHYSICAL EXAM:  General: NAD, A/O x 3, facial ecchymosis healing   ENT: MMM, no thrush, on 3L NC   Neck: Supple, No JVD  Lungs: Non labored breathing, + rhonchi at bases   Cardio: RRR, S1/S2, no pitting edema bilaterally  Abdomen: Soft, Nontender, Nondistended; Bowel sounds present  Extremities: No calf tenderness, moves all extremities    LABS:                        10.4   8.73  )-----------( 229      ( 24 Mar 2021 07:21 )             36.3       03-24    139  |  104  |  37  ----------------------------<  138  3.9   |  27  |  0.93    Ca    9.2      24 Mar 2021 07:21  Mg     2.2     03-23    TPro  6.3  /  Alb  2.4  /  TBili  0.4  /  DBili  x   /  AST  35  /  ALT  47  /  AlkPhos  57  03-24     eGFR if Non African American: 58 mL/min/1.73M2 (03-24-21 @ 07:21)  eGFR if : 68 mL/min/1.73M2 (03-24-21 @ 07:21)                                     RADIOLOGY & ADDITIONAL TESTS:    Care Discussed with Consultants/Other Providers:

## 2021-03-24 NOTE — PROVIDER CONTACT NOTE (OTHER) - BACKGROUND
80 y.o female s/p fall at home with an admitting dx: covid. Pt on 2 L NC throughout the day satting in the low 90's.

## 2021-03-25 LAB
ANION GAP SERPL CALC-SCNC: 7 MMOL/L — SIGNIFICANT CHANGE UP (ref 5–17)
BUN SERPL-MCNC: 39 MG/DL — HIGH (ref 7–23)
CALCIUM SERPL-MCNC: 9 MG/DL — SIGNIFICANT CHANGE UP (ref 8.4–10.5)
CHLORIDE SERPL-SCNC: 109 MMOL/L — HIGH (ref 96–108)
CO2 SERPL-SCNC: 27 MMOL/L — SIGNIFICANT CHANGE UP (ref 22–31)
CREAT SERPL-MCNC: 0.76 MG/DL — SIGNIFICANT CHANGE UP (ref 0.5–1.3)
GLUCOSE SERPL-MCNC: 124 MG/DL — HIGH (ref 70–99)
HCT VFR BLD CALC: 34.9 % — SIGNIFICANT CHANGE UP (ref 34.5–45)
HGB BLD-MCNC: 9.8 G/DL — LOW (ref 11.5–15.5)
MCHC RBC-ENTMCNC: 22 PG — LOW (ref 27–34)
MCHC RBC-ENTMCNC: 28.1 GM/DL — LOW (ref 32–36)
MCV RBC AUTO: 78.4 FL — LOW (ref 80–100)
NRBC # BLD: 0 /100 WBCS — SIGNIFICANT CHANGE UP (ref 0–0)
PLATELET # BLD AUTO: 280 K/UL — SIGNIFICANT CHANGE UP (ref 150–400)
POTASSIUM SERPL-MCNC: 3.9 MMOL/L — SIGNIFICANT CHANGE UP (ref 3.5–5.3)
POTASSIUM SERPL-SCNC: 3.9 MMOL/L — SIGNIFICANT CHANGE UP (ref 3.5–5.3)
RBC # BLD: 4.45 M/UL — SIGNIFICANT CHANGE UP (ref 3.8–5.2)
RBC # FLD: 20.8 % — HIGH (ref 10.3–14.5)
SODIUM SERPL-SCNC: 143 MMOL/L — SIGNIFICANT CHANGE UP (ref 135–145)
WBC # BLD: 7.32 K/UL — SIGNIFICANT CHANGE UP (ref 3.8–10.5)
WBC # FLD AUTO: 7.32 K/UL — SIGNIFICANT CHANGE UP (ref 3.8–10.5)

## 2021-03-25 PROCEDURE — 99233 SBSQ HOSP IP/OBS HIGH 50: CPT

## 2021-03-25 RX ADMIN — NYSTATIN CREAM 1 APPLICATION(S): 100000 CREAM TOPICAL at 06:12

## 2021-03-25 RX ADMIN — Medication 20 MILLIGRAM(S): at 07:12

## 2021-03-25 RX ADMIN — ESCITALOPRAM OXALATE 10 MILLIGRAM(S): 10 TABLET, FILM COATED ORAL at 12:29

## 2021-03-25 RX ADMIN — Medication 6 MILLIGRAM(S): at 07:12

## 2021-03-25 RX ADMIN — Medication 325 MILLIGRAM(S): at 12:29

## 2021-03-25 RX ADMIN — Medication 1 TABLET(S): at 12:29

## 2021-03-25 RX ADMIN — REMDESIVIR 500 MILLIGRAM(S): 5 INJECTION INTRAVENOUS at 22:50

## 2021-03-25 RX ADMIN — APIXABAN 5 MILLIGRAM(S): 2.5 TABLET, FILM COATED ORAL at 17:46

## 2021-03-25 RX ADMIN — ATORVASTATIN CALCIUM 20 MILLIGRAM(S): 80 TABLET, FILM COATED ORAL at 23:01

## 2021-03-25 RX ADMIN — NYSTATIN CREAM 1 APPLICATION(S): 100000 CREAM TOPICAL at 17:46

## 2021-03-25 RX ADMIN — Medication 25 MICROGRAM(S): at 07:12

## 2021-03-25 RX ADMIN — AMIODARONE HYDROCHLORIDE 200 MILLIGRAM(S): 400 TABLET ORAL at 07:11

## 2021-03-25 RX ADMIN — APIXABAN 5 MILLIGRAM(S): 2.5 TABLET, FILM COATED ORAL at 07:11

## 2021-03-25 NOTE — PROGRESS NOTE ADULT - ATTENDING COMMENTS
#acute hypoxic resp failsure 2/2 COVID 19 infection  - remdesivir and decadron day 4  - cont 4L NC, wean as tolerated. keep spO2> 92%

## 2021-03-25 NOTE — PROGRESS NOTE ADULT - SUBJECTIVE AND OBJECTIVE BOX
Patient is a 80y old  Female who presents with a chief complaint of covid/debility (24 Mar 2021 11:10)    Patient seen and examined at bedside. Patient is resting comfortably, denies pain. C/o mild SOB    ALLERGIES:  No Known Allergies    MEDICATIONS  (STANDING):  aMIOdarone    Tablet 200 milliGRAM(s) Oral daily  apixaban 5 milliGRAM(s) Oral two times a day  atorvastatin 20 milliGRAM(s) Oral at bedtime  dexAMETHasone     Tablet 6 milliGRAM(s) Oral daily  enalapril 20 milliGRAM(s) Oral daily  escitalopram 10 milliGRAM(s) Oral daily  ferrous    sulfate 325 milliGRAM(s) Oral daily  levothyroxine 25 MICROGram(s) Oral daily  multivitamin 1 Tablet(s) Oral daily  nystatin Powder 1 Application(s) Topical two times a day  remdesivir  IVPB   IV Intermittent   remdesivir  IVPB 100 milliGRAM(s) IV Intermittent every 24 hours    MEDICATIONS  (PRN):  acetaminophen   Tablet .. 650 milliGRAM(s) Oral every 4 hours PRN Temp greater or equal to 38C (100.4F), Mild Pain (1 - 3)  ALBUTerol    90 MICROgram(s) HFA Inhaler 2 Puff(s) Inhalation every 4 hours PRN Shortness of Breath and/or Wheezing  guaiFENesin   Syrup  (Sugar-Free) 200 milliGRAM(s) Oral every 6 hours PRN Cough  oxycodone    5 mG/acetaminophen 325 mG 1 Tablet(s) Oral every 6 hours PRN Moderate Pain (4 - 6)    Vital Signs Last 24 Hrs  T(F): 97.8 (25 Mar 2021 05:16), Max: 98.2 (24 Mar 2021 15:32)  HR: 57 (25 Mar 2021 05:16) (57 - 66)  BP: 159/57 (25 Mar 2021 05:16) (119/44 - 162/63)  RR: 18 (25 Mar 2021 05:16) (18 - 19)  SpO2: 99% (25 Mar 2021 05:16) (94% - 99%)    I&O's Summary  24 Mar 2021 07:01  -  25 Mar 2021 07:00  --------------------------------------------------------  IN: 250 mL / OUT: 0 mL / NET: 250 mL            LABS:                        9.8    7.32  )-----------( 280      ( 25 Mar 2021 07:30 )             34.9     03-25    x   |  x   |  x   ----------------------------<  x   x    |  27  |  x     Ca    9.2      24 Mar 2021 07:21  Mg     2.2     03-23    TPro  6.3  /  Alb  2.4  /  TBili  0.4  /  DBili  x   /  AST  35  /  ALT  47  /  AlkPhos  57  03-24    eGFR if Non African American: 58 mL/min/1.73M2 (03-24-21 @ 07:21)  eGFR if : 68 mL/min/1.73M2 (03-24-21 @ 07:21)                                    RADIOLOGY & ADDITIONAL TESTS:    Care Discussed with Consultants/Other Providers:    Patient is a 80y old  Female who presents with a chief complaint of covid/debility (24 Mar 2021 11:10)    Patient seen and examined at bedside. Patient is resting comfortably, denies pain. C/o mild SOB    ALLERGIES:  No Known Allergies    MEDICATIONS  (STANDING):  aMIOdarone    Tablet 200 milliGRAM(s) Oral daily  apixaban 5 milliGRAM(s) Oral two times a day  atorvastatin 20 milliGRAM(s) Oral at bedtime  dexAMETHasone     Tablet 6 milliGRAM(s) Oral daily  enalapril 20 milliGRAM(s) Oral daily  escitalopram 10 milliGRAM(s) Oral daily  ferrous    sulfate 325 milliGRAM(s) Oral daily  levothyroxine 25 MICROGram(s) Oral daily  multivitamin 1 Tablet(s) Oral daily  nystatin Powder 1 Application(s) Topical two times a day  remdesivir  IVPB   IV Intermittent   remdesivir  IVPB 100 milliGRAM(s) IV Intermittent every 24 hours    MEDICATIONS  (PRN):  acetaminophen   Tablet .. 650 milliGRAM(s) Oral every 4 hours PRN Temp greater or equal to 38C (100.4F), Mild Pain (1 - 3)  ALBUTerol    90 MICROgram(s) HFA Inhaler 2 Puff(s) Inhalation every 4 hours PRN Shortness of Breath and/or Wheezing  guaiFENesin   Syrup  (Sugar-Free) 200 milliGRAM(s) Oral every 6 hours PRN Cough  oxycodone    5 mG/acetaminophen 325 mG 1 Tablet(s) Oral every 6 hours PRN Moderate Pain (4 - 6)    Vital Signs Last 24 Hrs  T(F): 97.8 (25 Mar 2021 05:16), Max: 98.2 (24 Mar 2021 15:32)  HR: 57 (25 Mar 2021 05:16) (57 - 66)  BP: 159/57 (25 Mar 2021 05:16) (119/44 - 162/63)  RR: 18 (25 Mar 2021 05:16) (18 - 19)  SpO2: 99% (25 Mar 2021 05:16) (94% - 99%)    I&O's Summary  24 Mar 2021 07:01  -  25 Mar 2021 07:00  --------------------------------------------------------  IN: 250 mL / OUT: 0 mL / NET: 250 mL    PHYSICAL EXAM:  GENERAL: NAD, supplemental O2 via nasal cannula  HEAD:  Ecchymosis to right face and forehead, dark purple, healing. S/p fall 3 weeks ago.   ENMT: Moist mucous membranes  NECK: Supple, No JVD  CHEST/LUNG: Clear to auscultation bilaterally, good air entry, non-labored breathing  HEART: RRR; S1/S2, No murmur  ABDOMEN: Soft, Nontender, Nondistended; Bowel sounds present  VASCULAR: Normal pulses, Normal capillary refill  EXTREMITIES: No calf tenderness, No cyanosis, No edema  SKIN: Warm, Intact apart from facial ecchymosis   PSYCH: Normal mood, Normal affect  NERVOUS SYSTEM:  A/O x3, No focal deficits    LABS:                        9.8    7.32  )-----------( 280      ( 25 Mar 2021 07:30 )             34.9     03-25    x   |  x   |  x   ----------------------------<  x   x    |  27  |  x     Ca    9.2      24 Mar 2021 07:21  Mg     2.2     03-23    TPro  6.3  /  Alb  2.4  /  TBili  0.4  /  DBili  x   /  AST  35  /  ALT  47  /  AlkPhos  57  03-24    eGFR if Non African American: 58 mL/min/1.73M2 (03-24-21 @ 07:21)  eGFR if : 68 mL/min/1.73M2 (03-24-21 @ 07:21)    RADIOLOGY & ADDITIONAL TESTS:    < from: CT Chest No Cont (03.19.21 @ 18:00) >  IMPRESSION:  Patchy bilateral lung opacities consistent with multifocal pneumonia, likely related to Covid 19.    No displaced acute fracture is evident.          < end of copied text >      Care Discussed with Consultants/Other Providers:    Patient is a 80y old  Female who presents with a chief complaint of covid/debility (24 Mar 2021 11:10)    Patient seen and examined at bedside. Patient is resting comfortably, denies pain. C/O mild SOB    ALLERGIES:  No Known Allergies    MEDICATIONS  (STANDING):  aMIOdarone    Tablet 200 milliGRAM(s) Oral daily  apixaban 5 milliGRAM(s) Oral two times a day  atorvastatin 20 milliGRAM(s) Oral at bedtime  dexAMETHasone     Tablet 6 milliGRAM(s) Oral daily  enalapril 20 milliGRAM(s) Oral daily  escitalopram 10 milliGRAM(s) Oral daily  ferrous    sulfate 325 milliGRAM(s) Oral daily  levothyroxine 25 MICROGram(s) Oral daily  multivitamin 1 Tablet(s) Oral daily  nystatin Powder 1 Application(s) Topical two times a day  remdesivir  IVPB   IV Intermittent   remdesivir  IVPB 100 milliGRAM(s) IV Intermittent every 24 hours    MEDICATIONS  (PRN):  acetaminophen   Tablet .. 650 milliGRAM(s) Oral every 4 hours PRN Temp greater or equal to 38C (100.4F), Mild Pain (1 - 3)  ALBUTerol    90 MICROgram(s) HFA Inhaler 2 Puff(s) Inhalation every 4 hours PRN Shortness of Breath and/or Wheezing  guaiFENesin   Syrup  (Sugar-Free) 200 milliGRAM(s) Oral every 6 hours PRN Cough  oxycodone    5 mG/acetaminophen 325 mG 1 Tablet(s) Oral every 6 hours PRN Moderate Pain (4 - 6)    Vital Signs Last 24 Hrs  T(F): 97.8 (25 Mar 2021 05:16), Max: 98.2 (24 Mar 2021 15:32)  HR: 57 (25 Mar 2021 05:16) (57 - 66)  BP: 159/57 (25 Mar 2021 05:16) (119/44 - 162/63)  RR: 18 (25 Mar 2021 05:16) (18 - 19)  SpO2: 99% (25 Mar 2021 05:16) (94% - 99%)    I&O's Summary  24 Mar 2021 07:01  -  25 Mar 2021 07:00  --------------------------------------------------------  IN: 250 mL / OUT: 0 mL / NET: 250 mL    PHYSICAL EXAM:  GENERAL: NAD, supplemental O2 via nasal cannula  HEAD:  Ecchymosis to right face and forehead, dark purple, healing. S/p fall 3 weeks ago.   ENMT: Moist mucous membranes  NECK: Supple, No JVD  CHEST/LUNG: Clear to auscultation bilaterally, good air entry, non-labored breathing  HEART: RRR; S1/S2, No murmur  ABDOMEN: Soft, Nontender, Nondistended; Bowel sounds present  VASCULAR: Normal pulses, Normal capillary refill  EXTREMITIES: No calf tenderness, No cyanosis, No edema  SKIN: Warm, Intact apart from facial ecchymosis   PSYCH: Normal mood, Normal affect  NERVOUS SYSTEM:  A/O x3, No focal deficits    LABS:                        9.8    7.32  )-----------( 280      ( 25 Mar 2021 07:30 )             34.9     03-25    x   |  x   |  x   ----------------------------<  x   x    |  27  |  x     Ca    9.2      24 Mar 2021 07:21  Mg     2.2     03-23    TPro  6.3  /  Alb  2.4  /  TBili  0.4  /  DBili  x   /  AST  35  /  ALT  47  /  AlkPhos  57  03-24    eGFR if Non African American: 58 mL/min/1.73M2 (03-24-21 @ 07:21)  eGFR if : 68 mL/min/1.73M2 (03-24-21 @ 07:21)    RADIOLOGY & ADDITIONAL TESTS:    < from: CT Chest No Cont (03.19.21 @ 18:00) >  IMPRESSION:  Patchy bilateral lung opacities consistent with multifocal pneumonia, likely related to Covid 19.    No displaced acute fracture is evident.          < end of copied text >      Care Discussed with Consultants/Other Providers:

## 2021-03-25 NOTE — PROGRESS NOTE ADULT - ASSESSMENT
80W PMH Afib on eliquis, HTN, HLD, DORIS, hypothyroidism, depression who presented to [facility] for weakness and diarrhea, initially admitted to observation unit.  Noted for mechanical falls secondary to weakness and transitioned to admission.  Patient evaluated by PT who recommended HERB placement however patient remains COVID + and needs 10 days total for HERB placement.    #COVID 19 infection  - Initial COVID positive 3/17/21  - CT Chest shows bilateral opacities  - Monitor O2 sat--satting 99% on 4L NC --> will titrate down as tolerated   - Incentive ji   - Day 4 of remdemsivir and decadron  - Repeat Covid swab 3/23 positive --> plan to discharge 3/27 to [facility]  - [facility]  - Monitor LFTs and renal function on remdemsivir  - Covid labs q 2-3 days     #Hypokalemia - resolved     #Debility/Ambulatory dysfunction  - PT consult rec HERB  - Ambulate with assist, OOB to chair    #Chronic atrial fibrillation  - Continue amiodarone and eliquis  - Rate is controlled    #HTN  - Continue enalapril    #HLD  - Continue statin     #Depression  - Continue escitalopram     #Hypothyroidism  - Continue levothyroxine    #DVT PPx  - On eliquis    3/24: spoke with daughter Jazzmine [phone] and updated her on plan of care   [facility] ([facility]   80W PMH Afib on eliquis, HTN, HLD, DORIS, hypothyroidism, depression who presented to [facility] for weakness and diarrhea, initially admitted to observation unit.  Noted for mechanical falls secondary to weakness and transitioned to admission.  Patient evaluated by PT who recommended HERB placement however patient remains COVID + and needs 10 days total for HERB placement.    #COVID 19 infection  - Initial COVID positive 3/17/21  - CT Chest shows bilateral opacities  - Monitor O2 sat--satting 99% on 4L NC --> will titrate down as tolerated   - Incentive ji   - Day 4 of remdemsivir and decadron  - Repeat Covid swab 3/23 positive --> [facility]  - [facility]  - Monitor LFTs and renal function on remdemsivir  - Covid labs q 2-3 days     #Hypokalemia - resolved     #Debility/Ambulatory dysfunction  - PT consult rec HERB  - Ambulate with assist, OOB to chair    #Chronic atrial fibrillation  - Continue amiodarone and eliquis  - Rate is controlled    #HTN  - Continue enalapril    #HLD  - Continue statin     #Depression  - Continue escitalopram     #Hypothyroidism  - Continue levothyroxine    #DVT PPx  - On eliquis    3/25: Left voicemail with callback # for daughter Jazzmine [phone]    [facility] ([facility]   80W PMH Afib on eliquis, HTN, HLD, DORIS, hypothyroidism, depression who presented to [facility] for weakness and diarrhea, initially admitted to observation unit.  Noted for mechanical falls secondary to weakness and transitioned to admission.  Patient evaluated by PT who recommended HERB placement however patient remains COVID + and needs 10 days total for HERB placement.    #COVID 19 infection  - Initial COVID positive 3/17/21  - CT Chest shows bilateral opacities  - Monitor O2 sat--satting 99% on 4L NC --> will titrate down as tolerated   - Incentive ji   - Day 4 of remdemsivir and decadron  - Repeat Covid swab 3/23 positive --> plan to discharge 3/27 to [facility]  - [facility]  - Monitor LFTs and renal function on remdemsivir  - Covid labs q 2-3 days     #Hypokalemia - resolved     #Debility/Ambulatory dysfunction  - PT consult rec HERB  - Ambulate with assist, OOB to chair    #Chronic atrial fibrillation  - Continue amiodarone and eliquis  - Rate is controlled    #HTN  - Continue enalapril    #HLD  - Continue statin     #Depression  - Continue escitalopram     #Hypothyroidism  - Continue levothyroxine    #DVT PPx  - On eliquis    3/25: Updated patient's daughter Jazzmine [phone]    [facility] ([facility]   80W PMH Afib on eliquis, HTN, HLD, DORIS, hypothyroidism, depression who presented to [facility] for weakness and diarrhea, initially admitted to observation unit.  Noted for mechanical falls secondary to weakness and transitioned to admission.  Patient evaluated by PT who recommended HERB placement however patient remains COVID + and needs 10 days total for HERB placement.    #acute hypoxic resp failsure 2/2 COVID 19 infection  - Initial COVID positive 3/17/21  - CT Chest shows bilateral opacities  - Monitor O2 sat--satting 99% on 4L NC --> will titrate down as tolerated   - Incentive ji   - Day 4 of remdemsivir and decadron  - Repeat Covid swab 3/23 positive --> [facility]  - [facility] ([facility]  - Monitor LFTs and renal function on remdemsivir  - Covid labs q 2-3 days     #Hypokalemia - resolved     #Debility/Ambulatory dysfunction  - PT consult rec HERB  - Ambulate with assist, OOB to chair    #Chronic atrial fibrillation  - Continue amiodarone and eliquis  - Rate is controlled    #HTN  - Continue enalapril    #HLD  - Continue statin     #Depression  - Continue escitalopram     #Hypothyroidism  - Continue levothyroxine    #DVT PPx  - On eliquis    3/25: Updated patient's daughter Jazzmine [phone]    [facility] ([facility])

## 2021-03-26 LAB
ALBUMIN SERPL ELPH-MCNC: 2.3 G/DL — LOW (ref 3.3–5)
ALP SERPL-CCNC: 64 U/L — SIGNIFICANT CHANGE UP (ref 40–120)
ALT FLD-CCNC: 44 U/L — SIGNIFICANT CHANGE UP (ref 10–45)
ANION GAP SERPL CALC-SCNC: 4 MMOL/L — LOW (ref 5–17)
AST SERPL-CCNC: 25 U/L — SIGNIFICANT CHANGE UP (ref 10–40)
BILIRUB SERPL-MCNC: 0.3 MG/DL — SIGNIFICANT CHANGE UP (ref 0.2–1.2)
BUN SERPL-MCNC: 34 MG/DL — HIGH (ref 7–23)
CALCIUM SERPL-MCNC: 8.7 MG/DL — SIGNIFICANT CHANGE UP (ref 8.4–10.5)
CHLORIDE SERPL-SCNC: 107 MMOL/L — SIGNIFICANT CHANGE UP (ref 96–108)
CO2 SERPL-SCNC: 31 MMOL/L — SIGNIFICANT CHANGE UP (ref 22–31)
CREAT SERPL-MCNC: 0.75 MG/DL — SIGNIFICANT CHANGE UP (ref 0.5–1.3)
CRP SERPL-MCNC: 22 MG/L — HIGH
FERRITIN SERPL-MCNC: 96 NG/ML — SIGNIFICANT CHANGE UP (ref 15–150)
GLUCOSE SERPL-MCNC: 129 MG/DL — HIGH (ref 70–99)
HCT VFR BLD CALC: 35.6 % — SIGNIFICANT CHANGE UP (ref 34.5–45)
HGB BLD-MCNC: 9.9 G/DL — LOW (ref 11.5–15.5)
LDH SERPL L TO P-CCNC: 320 U/L — HIGH (ref 50–242)
MCHC RBC-ENTMCNC: 22.1 PG — LOW (ref 27–34)
MCHC RBC-ENTMCNC: 27.8 GM/DL — LOW (ref 32–36)
MCV RBC AUTO: 79.6 FL — LOW (ref 80–100)
NRBC # BLD: 0 /100 WBCS — SIGNIFICANT CHANGE UP (ref 0–0)
PLATELET # BLD AUTO: 275 K/UL — SIGNIFICANT CHANGE UP (ref 150–400)
POTASSIUM SERPL-MCNC: 4.3 MMOL/L — SIGNIFICANT CHANGE UP (ref 3.5–5.3)
POTASSIUM SERPL-SCNC: 4.3 MMOL/L — SIGNIFICANT CHANGE UP (ref 3.5–5.3)
PROT SERPL-MCNC: 5.8 G/DL — LOW (ref 6–8.3)
RBC # BLD: 4.47 M/UL — SIGNIFICANT CHANGE UP (ref 3.8–5.2)
RBC # FLD: 20.6 % — HIGH (ref 10.3–14.5)
SARS-COV-2 RNA SPEC QL NAA+PROBE: DETECTED
SODIUM SERPL-SCNC: 142 MMOL/L — SIGNIFICANT CHANGE UP (ref 135–145)
WBC # BLD: 6.77 K/UL — SIGNIFICANT CHANGE UP (ref 3.8–10.5)
WBC # FLD AUTO: 6.77 K/UL — SIGNIFICANT CHANGE UP (ref 3.8–10.5)

## 2021-03-26 PROCEDURE — 99233 SBSQ HOSP IP/OBS HIGH 50: CPT

## 2021-03-26 RX ORDER — NIFEDIPINE 30 MG
30 TABLET, EXTENDED RELEASE 24 HR ORAL DAILY
Refills: 0 | Status: DISCONTINUED | OUTPATIENT
Start: 2021-03-26 | End: 2021-03-30

## 2021-03-26 RX ADMIN — Medication 6 MILLIGRAM(S): at 06:26

## 2021-03-26 RX ADMIN — Medication 325 MILLIGRAM(S): at 11:47

## 2021-03-26 RX ADMIN — Medication 1 TABLET(S): at 11:47

## 2021-03-26 RX ADMIN — APIXABAN 5 MILLIGRAM(S): 2.5 TABLET, FILM COATED ORAL at 18:01

## 2021-03-26 RX ADMIN — ATORVASTATIN CALCIUM 20 MILLIGRAM(S): 80 TABLET, FILM COATED ORAL at 21:40

## 2021-03-26 RX ADMIN — NYSTATIN CREAM 1 APPLICATION(S): 100000 CREAM TOPICAL at 06:26

## 2021-03-26 RX ADMIN — AMIODARONE HYDROCHLORIDE 200 MILLIGRAM(S): 400 TABLET ORAL at 06:26

## 2021-03-26 RX ADMIN — REMDESIVIR 500 MILLIGRAM(S): 5 INJECTION INTRAVENOUS at 21:40

## 2021-03-26 RX ADMIN — Medication 25 MICROGRAM(S): at 06:26

## 2021-03-26 RX ADMIN — Medication 30 MILLIGRAM(S): at 11:47

## 2021-03-26 RX ADMIN — APIXABAN 5 MILLIGRAM(S): 2.5 TABLET, FILM COATED ORAL at 06:26

## 2021-03-26 RX ADMIN — NYSTATIN CREAM 1 APPLICATION(S): 100000 CREAM TOPICAL at 18:01

## 2021-03-26 RX ADMIN — ESCITALOPRAM OXALATE 10 MILLIGRAM(S): 10 TABLET, FILM COATED ORAL at 11:47

## 2021-03-26 RX ADMIN — Medication 20 MILLIGRAM(S): at 06:26

## 2021-03-26 NOTE — CHART NOTE - NSCHARTNOTEFT_GEN_A_CORE
NUTRITION FOLLOW UP    SOURCE: Patient [X)   Family [ ]    Medical Record (X)    DIET:  Diet, Regular:   Supplement Feeding Modality:  Oral  Ensure Enlive Cans or Servings Per Day:  2       Frequency:  Two Times a day (03-23-21 @ 10:28) [Active]    PATIENT REPORT[ ] nausea  [ ] vomiting [X] diarrhea [ ] constipation  [ ]chewing problems [ ] swallowing issues  [ ] other: no GI distress    PO INTAKE: % of meals as per RN documentation     Spoke w/ pt over the phone today due to limited contact restrictions related to pt's current medical condition and isolation precautions. Information obtained from phone call w/ pt and from the EMR. Pt reports that her appetite is good, it is better today. PO intake 100% of meal today as per RN documentation. Although PO intake documented yesterday was 25% of meal. No chewing/swallowing difficulties. Denies N/V/C. Pt states that she is having some diarrhea. Pt reports that her meals are good and she is enjoying the food. Pt also reports that she is drinking and enjoying her ensure enlive supplements. Will continue to monitor/follow as indicated.     CURRENT WEIGHT: 166# (3/22)    PERTINENT MEDS:   Pertinent Medications: MEDICATIONS  (STANDING):  aMIOdarone    Tablet 200 milliGRAM(s) Oral daily  apixaban 5 milliGRAM(s) Oral two times a day  atorvastatin 20 milliGRAM(s) Oral at bedtime  dexAMETHasone     Tablet 6 milliGRAM(s) Oral daily  enalapril 20 milliGRAM(s) Oral daily  escitalopram 10 milliGRAM(s) Oral daily  ferrous    sulfate 325 milliGRAM(s) Oral daily  levothyroxine 25 MICROGram(s) Oral daily  multivitamin 1 Tablet(s) Oral daily  NIFEdipine XL 30 milliGRAM(s) Oral daily  nystatin Powder 1 Application(s) Topical two times a day  remdesivir  IVPB   IV Intermittent   remdesivir  IVPB 100 milliGRAM(s) IV Intermittent every 24 hours    MEDICATIONS  (PRN):  acetaminophen   Tablet .. 650 milliGRAM(s) Oral every 4 hours PRN Temp greater or equal to 38C (100.4F), Mild Pain (1 - 3)  ALBUTerol    90 MICROgram(s) HFA Inhaler 2 Puff(s) Inhalation every 4 hours PRN Shortness of Breath and/or Wheezing  guaiFENesin   Syrup  (Sugar-Free) 200 milliGRAM(s) Oral every 6 hours PRN Cough  oxycodone    5 mG/acetaminophen 325 mG 1 Tablet(s) Oral every 6 hours PRN Moderate Pain (4 - 6)    PERTINENT LABS:  03-26 Na142 mmol/L Glu 129 mg/dL<H> K+ 4.3 mmol/L Cr  0.75 mg/dL BUN 34 mg/dL<H> 03-26 Alb 2.3 g/dL<L>    SKIN: Stage II pressure sore, left buttocks  EDEMA: none noted  LAST BM: 3/26    ESTIMATED NEEDS:   [X] no change since previous assessment  [ ] recalculated:     PREVIOUS NUTRITION DIAGNOSIS: Inadequate oral intake    NUTRITION DIAGNOSIS is :  (X)  Ongoing      (    ) Resolved,  RD will follow as per nutrition department protocol.    NUTRITION RECOMMENDATIONS:   1. Continue current diet order. [place] pt's preferences as able.   1. Continue Ensure Enlive supplements BID.     MONITORING AND EVALUATION:   1. Tolerance to diet prescription   2. PO intake  3. Weights  4. Labs  5. Follow Up per protocol     RD to remain available  TREY Singh [employer]etic Intern

## 2021-03-26 NOTE — PROGRESS NOTE ADULT - ASSESSMENT
80W PMH Afib on eliquis, HTN, HLD, DORIS, hypothyroidism, depression who presented to [facility] for weakness and diarrhea, initially admitted to observation unit.  Noted for mechanical falls secondary to weakness and transitioned to admission.  Patient evaluated by PT who recommended HERB placement however patient remains COVID + and needs 10 days total for HERB placement.    #acute hypoxic resp failsure 2/2 COVID 19 infection  - Initial COVID positive 3/17, 3/23  - CT Chest shows bilateral opacities  - Monitor O2 sat; on 2L nc sats 93%; continue to wean as tolerated  - Incentive spirometry  - Day 5 of remdemsivir and decadron  - PT rec HERB--can be discharged to [facility] on 10 days after initial positive (3/27)  - Monitor LFTs (in normal range) and renal function on remdemsivir  - Covid labs q 2-3 days, pcr pending for today    #Hypokalemia - resolved   K 4.3 today, continue to monitor    #Debility/Ambulatory dysfunction  - PT consult rec HERB  - Ambulate with assist, OOB to chair    #Chronic atrial fibrillation  - Continue amiodarone and eliquis  - Rate is controlled    #HTN  -BP's on higher end, continue enalapril and Nifedipine started today    #HLD  - Continue statin     #Depression  - Continue escitalopram     #Hypothyroidism  - Continue levothyroxine    #DVT PPx  - On eliquis    [facility] ([facility]), daughter Jazzmine updated at [phone].

## 2021-03-26 NOTE — PROGRESS NOTE ADULT - SUBJECTIVE AND OBJECTIVE BOX
Patient is a 80y old  Female who presents with a chief complaint of covid/debility (25 Mar 2021 08:51)    Patient seen and examined at bedside.  Pt. feels better, no shortness of breath, no cough.    ALLERGIES:  No Known Allergies    MEDICATIONS  (STANDING):  aMIOdarone    Tablet 200 milliGRAM(s) Oral daily  apixaban 5 milliGRAM(s) Oral two times a day  atorvastatin 20 milliGRAM(s) Oral at bedtime  dexAMETHasone     Tablet 6 milliGRAM(s) Oral daily  enalapril 20 milliGRAM(s) Oral daily  escitalopram 10 milliGRAM(s) Oral daily  ferrous    sulfate 325 milliGRAM(s) Oral daily  levothyroxine 25 MICROGram(s) Oral daily  multivitamin 1 Tablet(s) Oral daily  NIFEdipine XL 30 milliGRAM(s) Oral daily  nystatin Powder 1 Application(s) Topical two times a day  remdesivir  IVPB   IV Intermittent   remdesivir  IVPB 100 milliGRAM(s) IV Intermittent every 24 hours    MEDICATIONS  (PRN):  acetaminophen   Tablet .. 650 milliGRAM(s) Oral every 4 hours PRN Temp greater or equal to 38C (100.4F), Mild Pain (1 - 3)  ALBUTerol    90 MICROgram(s) HFA Inhaler 2 Puff(s) Inhalation every 4 hours PRN Shortness of Breath and/or Wheezing  guaiFENesin   Syrup  (Sugar-Free) 200 milliGRAM(s) Oral every 6 hours PRN Cough  oxycodone    5 mG/acetaminophen 325 mG 1 Tablet(s) Oral every 6 hours PRN Moderate Pain (4 - 6)    Vital Signs Last 24 Hrs  T(F): 97.8 (26 Mar 2021 09:13), Max: 97.9 (25 Mar 2021 23:00)  HR: 64 (26 Mar 2021 09:13) (54 - 65)  BP: 167/64 (26 Mar 2021 09:13) (150/77 - 192/73)  RR: 12 (26 Mar 2021 09:13) (12 - 17)  SpO2: 94% (26 Mar 2021 09:13) (94% - 95%)  I&O's Summary    25 Mar 2021 07:01  -  26 Mar 2021 07:00  --------------------------------------------------------  IN: 250 mL / OUT: 0 mL / NET: 250 mL      PHYSICAL EXAM:  General: NAD, A/O x 3  HEENT: periorbital and forehead area with resolving ecchymoses, no thrush  Neck: Supple, No JVD  Lungs: non-labored breathing, Clear to auscultation bilaterally, no w/r/r  Cardio: RRR, S1/S2, No murmurs  Abdomen: Soft, Nontender, Nondistended; Bowel sounds present  Extremities: No calf tenderness, No pitting edema  Neuro:  alert, no focal deficits    LABS:                        9.9    6.77  )-----------( 275      ( 26 Mar 2021 05:15 )             35.6     03-26    142  |  107  |  34  ----------------------------<  129  4.3   |  31  |  0.75    Ca    8.7      26 Mar 2021 05:15    TPro  5.8  /  Alb  2.3  /  TBili  0.3  /  DBili  x   /  AST  25  /  ALT  44  /  AlkPhos  64  03-26    eGFR if Non African American: 75 mL/min/1.73M2 (03-26-21 @ 05:15)  eGFR if African American: 87 mL/min/1.73M2 (03-26-21 @ 05:15)                                    RADIOLOGY & ADDITIONAL TESTS:    Care Discussed with Consultants/Other Providers:

## 2021-03-26 NOTE — PROGRESS NOTE ADULT - ATTENDING COMMENTS
#acute hypoxic resp failsure 2/2 COVID 19 infection   - oxygen needs stable  - saturating 93% on 2L NC  - Day 5 of remdemsivir and decadron  - pending authorizatio, possibly DC to [facility] tomorrow #acute hypoxic resp failsure 2/2 COVID 19 infection   - oxygen needs stable  - saturating 93% on 2L NC  - Day 5 of remdemsivir and decadron  - pending authorizatio, possibly DC to [facility] tomorrow    # HTN  - cont enalapril   - added nifedipine for better BP control    # chronic A fib  - Eliquis  - HR controlled  - amiodarone

## 2021-03-27 PROCEDURE — 99233 SBSQ HOSP IP/OBS HIGH 50: CPT

## 2021-03-27 RX ADMIN — NYSTATIN CREAM 1 APPLICATION(S): 100000 CREAM TOPICAL at 05:59

## 2021-03-27 RX ADMIN — ESCITALOPRAM OXALATE 10 MILLIGRAM(S): 10 TABLET, FILM COATED ORAL at 12:51

## 2021-03-27 RX ADMIN — Medication 6 MILLIGRAM(S): at 05:59

## 2021-03-27 RX ADMIN — Medication 325 MILLIGRAM(S): at 12:51

## 2021-03-27 RX ADMIN — ATORVASTATIN CALCIUM 20 MILLIGRAM(S): 80 TABLET, FILM COATED ORAL at 22:03

## 2021-03-27 RX ADMIN — Medication 20 MILLIGRAM(S): at 05:59

## 2021-03-27 RX ADMIN — NYSTATIN CREAM 1 APPLICATION(S): 100000 CREAM TOPICAL at 18:11

## 2021-03-27 RX ADMIN — Medication 25 MICROGRAM(S): at 05:59

## 2021-03-27 RX ADMIN — Medication 30 MILLIGRAM(S): at 05:59

## 2021-03-27 RX ADMIN — APIXABAN 5 MILLIGRAM(S): 2.5 TABLET, FILM COATED ORAL at 18:11

## 2021-03-27 RX ADMIN — AMIODARONE HYDROCHLORIDE 200 MILLIGRAM(S): 400 TABLET ORAL at 05:59

## 2021-03-27 RX ADMIN — Medication 1 TABLET(S): at 12:51

## 2021-03-27 RX ADMIN — APIXABAN 5 MILLIGRAM(S): 2.5 TABLET, FILM COATED ORAL at 05:59

## 2021-03-27 NOTE — PROGRESS NOTE ADULT - SUBJECTIVE AND OBJECTIVE BOX
Patient is a 80y old  Female who presents with a chief complaint of covid/debility (26 Mar 2021 12:50)    Patient seen and examined at bedside.  S: w/o new complaints. Denies SOB/Cough. No f/c. Reports feeling well.     ALLERGIES:  No Known Allergies    MEDICATIONS:  acetaminophen   Tablet .. 650 milliGRAM(s) Oral every 4 hours PRN  ALBUTerol    90 MICROgram(s) HFA Inhaler 2 Puff(s) Inhalation every 4 hours PRN  dexAMETHasone     Tablet 6 milliGRAM(s) Oral daily  guaiFENesin   Syrup  (Sugar-Free) 200 milliGRAM(s) Oral every 6 hours PRN  multivitamin 1 Tablet(s) Oral daily  NIFEdipine XL 30 milliGRAM(s) Oral daily  nystatin Powder 1 Application(s) Topical two times a day    Vital Signs Last 24 Hrs  T(F): 97.7 (27 Mar 2021 06:29), Max: 98.1 (26 Mar 2021 16:40)  HR: 56 (27 Mar 2021 06:29) (56 - 63)  BP: 168/69 (27 Mar 2021 06:29) (126/41 - 168/69)  RR: 13 (27 Mar 2021 06:29) (13 - 18)  SpO2: 93% (27 Mar 2021 06:29) (93% - 94%)  I&O's Summary    26 Mar 2021 07:01  -  27 Mar 2021 07:00  --------------------------------------------------------  IN: 640 mL / OUT: 950 mL / NET: -310 mL      PHYSICAL EXAM:  General: NAD, A/O x 3. OOB, Sitting in chair.   HENT: MMM. Facial ecchymosis noted- R sided. 02 via NC in place.   Lungs: Respirations regular, relaxed   Cardio: RR  Abdomen: Soft, NT/ND, Normal active Bowel Sounds   Extremities: No cyanosis, No edema    LABS:                        9.9    6.77  )-----------( 275      ( 26 Mar 2021 05:15 )             35.6     03-26    142  |  107  |  34  ----------------------------<  129  4.3   |  31  |  0.75    Ca    8.7      26 Mar 2021 05:15    TPro  5.8  /  Alb  2.3  /  TBili  0.3  /  DBili  x   /  AST  25  /  ALT  44  /  AlkPhos  64  03-26    eGFR if Non African American: 75 mL/min/1.73M2 (03-26-21 @ 05:15)  eGFR if African American: 87 mL/min/1.73M2 (03-26-21 @ 05:15)            RADIOLOGY & ADDITIONAL TESTS:  < from: Xray Chest 1 View-PORTABLE IMMEDIATE (Xray Chest 1 View-PORTABLE IMMEDIATE .) (03.22.21 @ 16:56) >  IMPRESSION: Increased bilateral lung parenchymal opacities. No pleural effusion.      Care Discussed with Consultants/Other Providers:   AARTI Lucas discussed case and plan with Dr. Jean Baptiste.

## 2021-03-27 NOTE — PROGRESS NOTE ADULT - ATTENDING COMMENTS
#acute hypoxic resp failsure 2/2 COVID 19 infection  - SpO2 93% on 2L NC. stable. cont to wean down as tolerated  - cont decadron 10 days course  - completed 5 days of remdesivir   - pending auth to go to [facility]    HTN  - highest   - cont nifedipine 30mg   - monitor closely, if BP persistently > 150s, increase nifedipine to 60mg #acute hypoxic resp failsure 2/2 COVID 19 infection  - SpO2 93% on 2L NC. stable. cont to wean down as tolerated  - cont decadron 10 days course  - completed 5 days of remdesivir   - pending auth to go to [facility]    HTN  - highest   - cont nifedipine 30mg   - monitor closely, if BP persistently > 150s, increase nifedipine to 60mg    recent fall  - PT rec [facility]

## 2021-03-27 NOTE — PROGRESS NOTE ADULT - ASSESSMENT
A/P: 79yo female w. PMH Afib on Eliquis, HTN, HLD, DORIS, Hypothyroidism, Depression who presented to [facility] for weakness and diarrhea, initially admitted to observation unit.  Noted for mechanical falls secondary to weakness and transitioned to admission.  Patient evaluated by PT who recommended [facility] placement however patient remains COVID + and needs 10 days total for [facility] placement. Today 3.27 now 10 days from first + test 3.17.     #acute hypoxic resp failsure 2/2 COVID 19 infection  - Initial COVID positive 3/17, 3/23. 3/26  - CT Chest shows bilateral opacities  - Monitor O2 sat; on 2L nc sats 93%; continue to wean as tolerated  - Incentive spirometry  - s/p remdemsivir and decadron  - PT rec [facility]--[facility] be discharged to [facility]     #Hypokalemia - resolved   Monitor     #Debility/Ambulatory dysfunction  - PT consult rec [facility]  - Ambulate with assist, OOB to chair    #Chronic atrial fibrillation  - Continue amiodarone and Eliquis  - Rate is controlled    #HTN  -BP's on higher end, continue Enalapril and Nifedipine also started 3.26     #HLD  - Continue statin     #Depression  - Continue escitalopram     #Hypothyroidism  - Continue levothyroxine    #DVT PPx  - On Eliquis    Dispo: [facility]- Cleared today 3.27 (since 10 days since initial positive test on 3.17)   3.27 Update daughter Jazzmine [phone] regarding pt's current status and plan of care. Aware and in agreement pt will d/c to [facility]- pending Auth.    A/P: 79yo female w. PMH Afib on Eliquis, HTN, HLD, DORIS, Hypothyroidism, Depression who presented to [facility] for weakness and diarrhea, initially admitted to observation unit.  Noted for mechanical falls secondary to weakness and transitioned to admission.  Patient evaluated by PT who recommended [facility] placement however patient remains COVID + and needs 10 days total for [facility] placement. Today 3.27 now 10 days from first + test 3.17.     #acute hypoxic resp failsure 2/2 COVID 19 infection  - Initial COVID positive 3/17, 3/23. 3/26  - CT Chest shows bilateral opacities  - Monitor O2 sat; on 2L nc sats 93%; continue to wean as tolerated  - Incentive spirometry  - s/p remdemsivir   - On Decadron course   - PT rec [facility]--can be discharged to [facility]     #Hypokalemia - resolved   Monitor     #Debility/Ambulatory dysfunction  - PT consult rec [facility]  - Ambulate with assist, OOB to chair    #Chronic atrial fibrillation  - Continue amiodarone and Eliquis  - Rate is controlled    #HTN  -BP's on higher end, continue Enalapril and Nifedipine also started 3.26     #HLD  - Continue statin     #Depression  - Continue escitalopram     #Hypothyroidism  - Continue levothyroxine    #DVT PPx  - On Eliquis    Dispo: [facility]- Cleared today 3.27 (since 10 days since initial positive test on 3.17)   3.27 Update daughter Jazzmine [phone] regarding pt's current status and plan of care. Aware and in agreement pt will d/c to [facility]- pending Auth.

## 2021-03-28 LAB
ANION GAP SERPL CALC-SCNC: 5 MMOL/L — SIGNIFICANT CHANGE UP (ref 5–17)
BUN SERPL-MCNC: 36 MG/DL — HIGH (ref 7–23)
CALCIUM SERPL-MCNC: 8.6 MG/DL — SIGNIFICANT CHANGE UP (ref 8.4–10.5)
CHLORIDE SERPL-SCNC: 105 MMOL/L — SIGNIFICANT CHANGE UP (ref 96–108)
CO2 SERPL-SCNC: 28 MMOL/L — SIGNIFICANT CHANGE UP (ref 22–31)
CREAT SERPL-MCNC: 0.72 MG/DL — SIGNIFICANT CHANGE UP (ref 0.5–1.3)
GLUCOSE SERPL-MCNC: 131 MG/DL — HIGH (ref 70–99)
HCT VFR BLD CALC: 37.1 % — SIGNIFICANT CHANGE UP (ref 34.5–45)
HGB BLD-MCNC: 10.6 G/DL — LOW (ref 11.5–15.5)
MCHC RBC-ENTMCNC: 22.4 PG — LOW (ref 27–34)
MCHC RBC-ENTMCNC: 28.6 GM/DL — LOW (ref 32–36)
MCV RBC AUTO: 78.4 FL — LOW (ref 80–100)
NRBC # BLD: 0 /100 WBCS — SIGNIFICANT CHANGE UP (ref 0–0)
PLATELET # BLD AUTO: 324 K/UL — SIGNIFICANT CHANGE UP (ref 150–400)
POTASSIUM SERPL-MCNC: 4.3 MMOL/L — SIGNIFICANT CHANGE UP (ref 3.5–5.3)
POTASSIUM SERPL-SCNC: 4.3 MMOL/L — SIGNIFICANT CHANGE UP (ref 3.5–5.3)
RBC # BLD: 4.73 M/UL — SIGNIFICANT CHANGE UP (ref 3.8–5.2)
RBC # FLD: 20.7 % — HIGH (ref 10.3–14.5)
SODIUM SERPL-SCNC: 138 MMOL/L — SIGNIFICANT CHANGE UP (ref 135–145)
WBC # BLD: 12.41 K/UL — HIGH (ref 3.8–10.5)
WBC # FLD AUTO: 12.41 K/UL — HIGH (ref 3.8–10.5)

## 2021-03-28 PROCEDURE — 99233 SBSQ HOSP IP/OBS HIGH 50: CPT

## 2021-03-28 RX ORDER — DEXAMETHASONE 0.5 MG/5ML
3 ELIXIR ORAL DAILY
Refills: 0 | Status: DISCONTINUED | OUTPATIENT
Start: 2021-03-29 | End: 2021-03-30

## 2021-03-28 RX ADMIN — ATORVASTATIN CALCIUM 20 MILLIGRAM(S): 80 TABLET, FILM COATED ORAL at 23:19

## 2021-03-28 RX ADMIN — ESCITALOPRAM OXALATE 10 MILLIGRAM(S): 10 TABLET, FILM COATED ORAL at 12:19

## 2021-03-28 RX ADMIN — Medication 20 MILLIGRAM(S): at 06:00

## 2021-03-28 RX ADMIN — Medication 1 TABLET(S): at 12:19

## 2021-03-28 RX ADMIN — APIXABAN 5 MILLIGRAM(S): 2.5 TABLET, FILM COATED ORAL at 17:23

## 2021-03-28 RX ADMIN — AMIODARONE HYDROCHLORIDE 200 MILLIGRAM(S): 400 TABLET ORAL at 06:00

## 2021-03-28 RX ADMIN — Medication 325 MILLIGRAM(S): at 12:19

## 2021-03-28 RX ADMIN — NYSTATIN CREAM 1 APPLICATION(S): 100000 CREAM TOPICAL at 17:23

## 2021-03-28 RX ADMIN — Medication 6 MILLIGRAM(S): at 06:00

## 2021-03-28 RX ADMIN — APIXABAN 5 MILLIGRAM(S): 2.5 TABLET, FILM COATED ORAL at 06:00

## 2021-03-28 RX ADMIN — Medication 30 MILLIGRAM(S): at 06:00

## 2021-03-28 RX ADMIN — NYSTATIN CREAM 1 APPLICATION(S): 100000 CREAM TOPICAL at 06:02

## 2021-03-28 RX ADMIN — Medication 25 MICROGRAM(S): at 06:00

## 2021-03-28 NOTE — PROGRESS NOTE ADULT - ASSESSMENT
A/P: 79yo female w. PMH Afib on Eliquis, HTN, HLD, DORIS, Hypothyroidism, Depression who presented to [facility] for weakness and diarrhea, initially admitted to observation unit.  Noted for mechanical falls secondary to weakness and transitioned to admission.  Patient evaluated by PT who recommended [facility] placement however patient remains COVID+.  Now since 3.27 (10 days from first + test 3.17), able to d/c to [facility] when medically cleared.     #acute hypoxic resp failure 2/2 COVID 19 infection  - Initial COVID positive 3/17, 3/23. 3/26  - CT Chest shows bilateral opacities  - Monitor O2 sat; on 2L nc sats 93%; continue to wean as tolerated  - Incentive spirometry  - s/p remdemsivir   - Decadron- wean tomorrow to 3mg x 3 days then off (Per chart review noted on 3.20 pt. was on as outpt prior as well)  - PT rec [facility]--can be discharged to [facility] starting 3.27    #Leukocytosis  WBC today noted 12.41  Remains Afebrile, no worsening hypoxia, no new complaints  Will obtain CXR & Send UA  Is on steroids, however recent WBC wnl   Will trend    #Hypokalemia - resolved   Monitor     #Debility/Ambulatory dysfunction  - PT consult rec [facility]  - Ambulate with assist, OOB to chair    #Chronic atrial fibrillation  - Continue amiodarone and Eliquis  - Rate is controlled    #HTN  -BP's on higher end, continue Enalapril and Nifedipine also started 3.26     #HLD  - Continue statin     #Depression  - Continue escitalopram     #Hypothyroidism  - Continue levothyroxine    #DVT PPx  - On Eliquis    Dispo: Plan for d/c to [facility] when medically cleared (also pending Auth & bed availability).    3.28 Update daughter Jazzmine [phone] regarding pt's current status and plan of care. Aware and in agreement pt will d/c to [facility]- pending Auth.

## 2021-03-28 NOTE — PROGRESS NOTE ADULT - ATTENDING COMMENTS
leukocytosis   - no signs of active infection. suspect steroid induced  - check UA, CXR  - monitor off abx    COVID -19 PNA  - s/p remdesivir  - wean decadron to 3mg x3 days and then stop  - DC to [facility] once authorization obtained

## 2021-03-28 NOTE — PROGRESS NOTE ADULT - SUBJECTIVE AND OBJECTIVE BOX
Patient is a 80y old  Female who presents with a chief complaint of covid/debility (27 Mar 2021 11:55)    Patient seen and examined at bedside.  S: No new complaints. Reports breathing w/o issues, no new SOB. Denies cough. No f/c.     ALLERGIES:  No Known Allergies    MEDICATIONS:  acetaminophen   Tablet .. 650 milliGRAM(s) Oral every 4 hours PRN  ALBUTerol    90 MICROgram(s) HFA Inhaler 2 Puff(s) Inhalation every 4 hours PRN  guaiFENesin   Syrup  (Sugar-Free) 200 milliGRAM(s) Oral every 6 hours PRN  multivitamin 1 Tablet(s) Oral daily  NIFEdipine XL 30 milliGRAM(s) Oral daily  nystatin Powder 1 Application(s) Topical two times a day    Vital Signs Last 24 Hrs  T(F): 98 (28 Mar 2021 05:43), Max: 98.1 (27 Mar 2021 15:27)  HR: 58 (28 Mar 2021 05:43) (58 - 63)  BP: 149/57 (28 Mar 2021 05:43) (121/63 - 149/57)  RR: 18 (28 Mar 2021 05:43) (18 - 20)  SpO2: 95% (28 Mar 2021 05:43) (92% - 95%)  I&O's Summary    27 Mar 2021 07:01  -  28 Mar 2021 07:00  --------------------------------------------------------  IN: 1040 mL / OUT: 1100 mL / NET: -60 mL      PHYSICAL EXAM:  General: NAD, A/O x 3  HENT: MMM. 02 via NC in place.   Lungs: Clear to auscultation bilaterally (Anteriorly)   Cardio: RR, S1/S2, No murmurs  Abdomen: Soft, NT/ND, Normal active Bowel Sounds   Extremities: No cyanosis, No edema    LABS:                        10.6   12.41 )-----------( 324      ( 28 Mar 2021 07:15 )             37.1     03-28    138  |  105  |  36  ----------------------------<  131  4.3   |  28  |  0.72    Ca    8.6      28 Mar 2021 07:15    TPro  5.8  /  Alb  2.3  /  TBili  0.3  /  DBili  x   /  AST  25  /  ALT  44  /  AlkPhos  64  03-26    eGFR if Non African American: 80 mL/min/1.73M2 (03-28-21 @ 07:15)  eGFR if : 93 mL/min/1.73M2 (03-28-21 @ 07:15)          RADIOLOGY & ADDITIONAL TESTS:  < from: Xray Chest 1 View-PORTABLE IMMEDIATE (Xray Chest 1 View-PORTABLE IMMEDIATE .) (03.22.21 @ 16:56) >  IMPRESSION: Increased bilateral lung parenchymal opacities. No pleural effusion.    Care Discussed with Consultants/Other Providers:   AARTI Lucas discussed case and plan with Dr. Casper

## 2021-03-29 ENCOUNTER — TRANSCRIPTION ENCOUNTER (OUTPATIENT)
Age: 81
End: 2021-03-29

## 2021-03-29 LAB
ANION GAP SERPL CALC-SCNC: 5 MMOL/L — SIGNIFICANT CHANGE UP (ref 5–17)
APPEARANCE UR: CLEAR — SIGNIFICANT CHANGE UP
BASOPHILS # BLD AUTO: 0.02 K/UL — SIGNIFICANT CHANGE UP (ref 0–0.2)
BASOPHILS NFR BLD AUTO: 0.1 % — SIGNIFICANT CHANGE UP (ref 0–2)
BILIRUB UR-MCNC: NEGATIVE — SIGNIFICANT CHANGE UP
BUN SERPL-MCNC: 41 MG/DL — HIGH (ref 7–23)
CALCIUM SERPL-MCNC: 8.9 MG/DL — SIGNIFICANT CHANGE UP (ref 8.4–10.5)
CHLORIDE SERPL-SCNC: 105 MMOL/L — SIGNIFICANT CHANGE UP (ref 96–108)
CO2 SERPL-SCNC: 30 MMOL/L — SIGNIFICANT CHANGE UP (ref 22–31)
COLOR SPEC: YELLOW — SIGNIFICANT CHANGE UP
CREAT SERPL-MCNC: 0.85 MG/DL — SIGNIFICANT CHANGE UP (ref 0.5–1.3)
DIFF PNL FLD: NEGATIVE — SIGNIFICANT CHANGE UP
EOSINOPHIL # BLD AUTO: 0.01 K/UL — SIGNIFICANT CHANGE UP (ref 0–0.5)
EOSINOPHIL NFR BLD AUTO: 0.1 % — SIGNIFICANT CHANGE UP (ref 0–6)
GLUCOSE SERPL-MCNC: 147 MG/DL — HIGH (ref 70–99)
GLUCOSE UR QL: NEGATIVE — SIGNIFICANT CHANGE UP
HCT VFR BLD CALC: 37.9 % — SIGNIFICANT CHANGE UP (ref 34.5–45)
HCT VFR BLD CALC: 39.9 % — SIGNIFICANT CHANGE UP (ref 34.5–45)
HGB BLD-MCNC: 10.9 G/DL — LOW (ref 11.5–15.5)
HGB BLD-MCNC: 11.4 G/DL — LOW (ref 11.5–15.5)
IMM GRANULOCYTES NFR BLD AUTO: 1.8 % — HIGH (ref 0–1.5)
KETONES UR-MCNC: NEGATIVE — SIGNIFICANT CHANGE UP
LEUKOCYTE ESTERASE UR-ACNC: NEGATIVE — SIGNIFICANT CHANGE UP
LYMPHOCYTES # BLD AUTO: 0.69 K/UL — LOW (ref 1–3.3)
LYMPHOCYTES # BLD AUTO: 4.2 % — LOW (ref 13–44)
MCHC RBC-ENTMCNC: 22.3 PG — LOW (ref 27–34)
MCHC RBC-ENTMCNC: 22.4 PG — LOW (ref 27–34)
MCHC RBC-ENTMCNC: 28.6 GM/DL — LOW (ref 32–36)
MCHC RBC-ENTMCNC: 28.8 GM/DL — LOW (ref 32–36)
MCV RBC AUTO: 77.7 FL — LOW (ref 80–100)
MCV RBC AUTO: 78.5 FL — LOW (ref 80–100)
MONOCYTES # BLD AUTO: 0.47 K/UL — SIGNIFICANT CHANGE UP (ref 0–0.9)
MONOCYTES NFR BLD AUTO: 2.9 % — SIGNIFICANT CHANGE UP (ref 2–14)
NEUTROPHILS # BLD AUTO: 14.94 K/UL — HIGH (ref 1.8–7.4)
NEUTROPHILS NFR BLD AUTO: 90.9 % — HIGH (ref 43–77)
NITRITE UR-MCNC: NEGATIVE — SIGNIFICANT CHANGE UP
NRBC # BLD: 0 /100 WBCS — SIGNIFICANT CHANGE UP (ref 0–0)
NRBC # BLD: 0 /100 WBCS — SIGNIFICANT CHANGE UP (ref 0–0)
PH UR: 8 — SIGNIFICANT CHANGE UP (ref 5–8)
PLATELET # BLD AUTO: 378 K/UL — SIGNIFICANT CHANGE UP (ref 150–400)
PLATELET # BLD AUTO: 382 K/UL — SIGNIFICANT CHANGE UP (ref 150–400)
POTASSIUM SERPL-MCNC: 4.7 MMOL/L — SIGNIFICANT CHANGE UP (ref 3.5–5.3)
POTASSIUM SERPL-SCNC: 4.7 MMOL/L — SIGNIFICANT CHANGE UP (ref 3.5–5.3)
PROCALCITONIN SERPL-MCNC: 0.11 NG/ML — HIGH
PROT UR-MCNC: NEGATIVE — SIGNIFICANT CHANGE UP
RBC # BLD: 4.88 M/UL — SIGNIFICANT CHANGE UP (ref 3.8–5.2)
RBC # BLD: 5.08 M/UL — SIGNIFICANT CHANGE UP (ref 3.8–5.2)
RBC # FLD: 21.2 % — HIGH (ref 10.3–14.5)
RBC # FLD: 21.3 % — HIGH (ref 10.3–14.5)
SARS-COV-2 RNA SPEC QL NAA+PROBE: DETECTED
SODIUM SERPL-SCNC: 140 MMOL/L — SIGNIFICANT CHANGE UP (ref 135–145)
SP GR SPEC: 1.01 — SIGNIFICANT CHANGE UP (ref 1.01–1.02)
UROBILINOGEN FLD QL: NEGATIVE — SIGNIFICANT CHANGE UP
WBC # BLD: 15.08 K/UL — HIGH (ref 3.8–10.5)
WBC # BLD: 16.43 K/UL — HIGH (ref 3.8–10.5)
WBC # FLD AUTO: 15.08 K/UL — HIGH (ref 3.8–10.5)
WBC # FLD AUTO: 16.43 K/UL — HIGH (ref 3.8–10.5)

## 2021-03-29 PROCEDURE — 71045 X-RAY EXAM CHEST 1 VIEW: CPT | Mod: 26

## 2021-03-29 PROCEDURE — 99233 SBSQ HOSP IP/OBS HIGH 50: CPT

## 2021-03-29 RX ADMIN — Medication 1 TABLET(S): at 12:02

## 2021-03-29 RX ADMIN — ATORVASTATIN CALCIUM 20 MILLIGRAM(S): 80 TABLET, FILM COATED ORAL at 22:38

## 2021-03-29 RX ADMIN — APIXABAN 5 MILLIGRAM(S): 2.5 TABLET, FILM COATED ORAL at 17:59

## 2021-03-29 RX ADMIN — APIXABAN 5 MILLIGRAM(S): 2.5 TABLET, FILM COATED ORAL at 06:58

## 2021-03-29 RX ADMIN — ESCITALOPRAM OXALATE 10 MILLIGRAM(S): 10 TABLET, FILM COATED ORAL at 12:02

## 2021-03-29 RX ADMIN — Medication 20 MILLIGRAM(S): at 06:58

## 2021-03-29 RX ADMIN — NYSTATIN CREAM 1 APPLICATION(S): 100000 CREAM TOPICAL at 17:59

## 2021-03-29 RX ADMIN — Medication 30 MILLIGRAM(S): at 06:59

## 2021-03-29 RX ADMIN — Medication 325 MILLIGRAM(S): at 12:02

## 2021-03-29 RX ADMIN — NYSTATIN CREAM 1 APPLICATION(S): 100000 CREAM TOPICAL at 06:59

## 2021-03-29 RX ADMIN — Medication 3 MILLIGRAM(S): at 06:58

## 2021-03-29 RX ADMIN — AMIODARONE HYDROCHLORIDE 200 MILLIGRAM(S): 400 TABLET ORAL at 06:58

## 2021-03-29 RX ADMIN — Medication 25 MICROGRAM(S): at 06:58

## 2021-03-29 NOTE — PROGRESS NOTE ADULT - SUBJECTIVE AND OBJECTIVE BOX
Patient is a 80y old  Female who presents with a chief complaint of covid/debility (28 Mar 2021 12:16)    No acute issues. Eager to go to [facility]   Patient seen and examined at bedside.    ALLERGIES:  No Known Allergies    MEDICATIONS  (STANDING):  aMIOdarone    Tablet 200 milliGRAM(s) Oral daily  apixaban 5 milliGRAM(s) Oral two times a day  atorvastatin 20 milliGRAM(s) Oral at bedtime  dexAMETHasone     Tablet 3 milliGRAM(s) Oral daily  enalapril 20 milliGRAM(s) Oral daily  escitalopram 10 milliGRAM(s) Oral daily  ferrous    sulfate 325 milliGRAM(s) Oral daily  levothyroxine 25 MICROGram(s) Oral daily  multivitamin 1 Tablet(s) Oral daily  NIFEdipine XL 30 milliGRAM(s) Oral daily  nystatin Powder 1 Application(s) Topical two times a day    MEDICATIONS  (PRN):  acetaminophen   Tablet .. 650 milliGRAM(s) Oral every 4 hours PRN Temp greater or equal to 38C (100.4F), Mild Pain (1 - 3)  ALBUTerol    90 MICROgram(s) HFA Inhaler 2 Puff(s) Inhalation every 4 hours PRN Shortness of Breath and/or Wheezing  guaiFENesin   Syrup  (Sugar-Free) 200 milliGRAM(s) Oral every 6 hours PRN Cough  oxycodone    5 mG/acetaminophen 325 mG 1 Tablet(s) Oral every 6 hours PRN Moderate Pain (4 - 6)    Vital Signs Last 24 Hrs  T(F): 98 (29 Mar 2021 07:59), Max: 98.4 (28 Mar 2021 17:11)  HR: 60 (29 Mar 2021 07:59) (52 - 60)  BP: 147/62 (29 Mar 2021 07:59) (123/44 - 147/62)  RR: 18 (29 Mar 2021 07:59) (17 - 18)  SpO2: 98% (29 Mar 2021 07:59) (94% - 98%)  I&O's Summary    28 Mar 2021 07:01  -  29 Mar 2021 07:00  --------------------------------------------------------  IN: 440 mL / OUT: 500 mL / NET: -60 mL        PHYSICAL EXAM:  General: NAD, A/O x 3  ENT: MMM, no thrush, on 2L NC   Neck: Supple, No JVD  Lungs: Non labored breathing,  Clear to auscultation bilaterally,   Cardio: RRR, S1/S2,  no pitting edema bilaterally  Abdomen: Soft, Nontender, Nondistended; Bowel sounds present  Extremities: No calf tenderness, moves all extremities    LABS:                        10.9   16.43 )-----------( 382      ( 29 Mar 2021 10:12 )             37.9       03-29    140  |  105  |  41  ----------------------------<  147  4.7   |  30  |  0.85    Ca    8.9      29 Mar 2021 05:00       eGFR if Non African American: 65 mL/min/1.73M2 (03-29-21 @ 05:00)  eGFR if : 76 mL/min/1.73M2 (03-29-21 @ 05:00)                                     RADIOLOGY & ADDITIONAL TESTS:    Care Discussed with Consultants/Other Providers:    Patient is a 80y old  Female who presents with a chief complaint of covid/debility (28 Mar 2021 12:16)    No acute issues. Eager to go to [facility]   Patient seen and examined at bedside.    ALLERGIES:  No Known Allergies    MEDICATIONS  (STANDING):  aMIOdarone    Tablet 200 milliGRAM(s) Oral daily  apixaban 5 milliGRAM(s) Oral two times a day  atorvastatin 20 milliGRAM(s) Oral at bedtime  dexAMETHasone     Tablet 3 milliGRAM(s) Oral daily  enalapril 20 milliGRAM(s) Oral daily  escitalopram 10 milliGRAM(s) Oral daily  ferrous    sulfate 325 milliGRAM(s) Oral daily  levothyroxine 25 MICROGram(s) Oral daily  multivitamin 1 Tablet(s) Oral daily  NIFEdipine XL 30 milliGRAM(s) Oral daily  nystatin Powder 1 Application(s) Topical two times a day    MEDICATIONS  (PRN):  acetaminophen   Tablet .. 650 milliGRAM(s) Oral every 4 hours PRN Temp greater or equal to 38C (100.4F), Mild Pain (1 - 3)  ALBUTerol    90 MICROgram(s) HFA Inhaler 2 Puff(s) Inhalation every 4 hours PRN Shortness of Breath and/or Wheezing  guaiFENesin   Syrup  (Sugar-Free) 200 milliGRAM(s) Oral every 6 hours PRN Cough  oxycodone    5 mG/acetaminophen 325 mG 1 Tablet(s) Oral every 6 hours PRN Moderate Pain (4 - 6)    Vital Signs Last 24 Hrs  T(F): 98 (29 Mar 2021 07:59), Max: 98.4 (28 Mar 2021 17:11)  HR: 60 (29 Mar 2021 07:59) (52 - 60)  BP: 147/62 (29 Mar 2021 07:59) (123/44 - 147/62)  RR: 18 (29 Mar 2021 07:59) (17 - 18)  SpO2: 98% (29 Mar 2021 07:59) (94% - 98%)  I&O's Summary    28 Mar 2021 07:01  -  29 Mar 2021 07:00  --------------------------------------------------------  IN: 440 mL / OUT: 500 mL / NET: -60 mL        PHYSICAL EXAM:  General: NAD, A/O x 3  ENT: MMM, no thrush, on 2L NC. right facial ecchymosis  Neck: Supple, No JVD  Lungs: Non labored breathing,  Clear to auscultation bilaterally,   Cardio: RRR, S1/S2,  no pitting edema bilaterally  Abdomen: Soft, Nontender, Nondistended; Bowel sounds present  Extremities: No calf tenderness, moves all extremities    LABS:                        10.9   16.43 )-----------( 382      ( 29 Mar 2021 10:12 )             37.9       03-29    140  |  105  |  41  ----------------------------<  147  4.7   |  30  |  0.85    Ca    8.9      29 Mar 2021 05:00       eGFR if Non African American: 65 mL/min/1.73M2 (03-29-21 @ 05:00)  eGFR if : 76 mL/min/1.73M2 (03-29-21 @ 05:00)                                     RADIOLOGY & ADDITIONAL TESTS:    Care Discussed with Consultants/Other Providers:    Patient is a 80y old  Female who presents with a chief complaint of covid/debility (28 Mar 2021 12:16)    No acute issues. Eager to go to [facility]   Patient seen and examined at bedside. pt reports no complaints denies fever, chills, sob, cough, dysuria, CP. denies abd pain, n/v/d.    ALLERGIES:  No Known Allergies    MEDICATIONS  (STANDING):  aMIOdarone    Tablet 200 milliGRAM(s) Oral daily  apixaban 5 milliGRAM(s) Oral two times a day  atorvastatin 20 milliGRAM(s) Oral at bedtime  dexAMETHasone     Tablet 3 milliGRAM(s) Oral daily  enalapril 20 milliGRAM(s) Oral daily  escitalopram 10 milliGRAM(s) Oral daily  ferrous    sulfate 325 milliGRAM(s) Oral daily  levothyroxine 25 MICROGram(s) Oral daily  multivitamin 1 Tablet(s) Oral daily  NIFEdipine XL 30 milliGRAM(s) Oral daily  nystatin Powder 1 Application(s) Topical two times a day    MEDICATIONS  (PRN):  acetaminophen   Tablet .. 650 milliGRAM(s) Oral every 4 hours PRN Temp greater or equal to 38C (100.4F), Mild Pain (1 - 3)  ALBUTerol    90 MICROgram(s) HFA Inhaler 2 Puff(s) Inhalation every 4 hours PRN Shortness of Breath and/or Wheezing  guaiFENesin   Syrup  (Sugar-Free) 200 milliGRAM(s) Oral every 6 hours PRN Cough  oxycodone    5 mG/acetaminophen 325 mG 1 Tablet(s) Oral every 6 hours PRN Moderate Pain (4 - 6)    Vital Signs Last 24 Hrs  T(F): 98 (29 Mar 2021 07:59), Max: 98.4 (28 Mar 2021 17:11)  HR: 60 (29 Mar 2021 07:59) (52 - 60)  BP: 147/62 (29 Mar 2021 07:59) (123/44 - 147/62)  RR: 18 (29 Mar 2021 07:59) (17 - 18)  SpO2: 98% (29 Mar 2021 07:59) (94% - 98%)  I&O's Summary    28 Mar 2021 07:01  -  29 Mar 2021 07:00  --------------------------------------------------------  IN: 440 mL / OUT: 500 mL / NET: -60 mL        PHYSICAL EXAM:  General: NAD, A/O x 3  ENT: MMM, no thrush, on 2L NC. right facial ecchymosis  Neck: Supple, No JVD  Lungs: Non labored breathing,  Clear to auscultation bilaterally,   Cardio: RRR, S1/S2,  no pitting edema bilaterally  Abdomen: Soft, Nontender, Nondistended; Bowel sounds present  Extremities: No calf tenderness, moves all extremities    LABS:                        10.9   16.43 )-----------( 382      ( 29 Mar 2021 10:12 )             37.9       03-29    140  |  105  |  41  ----------------------------<  147  4.7   |  30  |  0.85    Ca    8.9      29 Mar 2021 05:00       eGFR if Non African American: 65 mL/min/1.73M2 (03-29-21 @ 05:00)  eGFR if : 76 mL/min/1.73M2 (03-29-21 @ 05:00)                                     RADIOLOGY & ADDITIONAL TESTS:    Care Discussed with Consultants/Other Providers:

## 2021-03-29 NOTE — DISCHARGE NOTE PROVIDER - NSDCCPCAREPLAN_GEN_ALL_CORE_FT
PRINCIPAL DISCHARGE DIAGNOSIS  Diagnosis: COVID-19  Assessment and Plan of Treatment: you were treated with remdemsivir and decadron  You were on 4 L of oxygen which weas downtitrated to 2L NC   PT consulted and recommneded subacute rehab  Discharged to [facility]

## 2021-03-29 NOTE — CONSULT NOTE ADULT - SUBJECTIVE AND OBJECTIVE BOX
HPI:   Patient is a 80y female with a past history of A Fib managed with Eliquis, HTN, and HLD who was admitted 3/21 with fatigue and weakness.  She was hospitalized from 3/19-3/21 for Covid, managed conservatively without any Covid  specific treatment.She was seen in ER on 3/17 for weakness at which point the diagnosis was made.She has been managed with steroids without RDV, initially did not require treatment, now on 2 liters.She is weak and debilitated and is awaiting rehab.She has a leukocystosis, her wbc is 16,000.She denies any chest pain, GI or Gu complaints.She is a poor historian.    REVIEW OF SYSTEMS:  All other review of systems negative (Comprehensive ROS)    PAST MEDICAL & SURGICAL HISTORY:  H/O tubal ligation    Depression    Iron deficiency anemia    Hernia    HLD (hyperlipidemia)    Hypothyroid    HTN (hypertension)    Atrial fibrillation    No significant past surgical history        Allergies    No Known Allergies    Intolerances        Antimicrobials Day #  :    Other Medications:  acetaminophen   Tablet .. 650 milliGRAM(s) Oral every 4 hours PRN  ALBUTerol    90 MICROgram(s) HFA Inhaler 2 Puff(s) Inhalation every 4 hours PRN  aMIOdarone    Tablet 200 milliGRAM(s) Oral daily  apixaban 5 milliGRAM(s) Oral two times a day  atorvastatin 20 milliGRAM(s) Oral at bedtime  dexAMETHasone     Tablet 3 milliGRAM(s) Oral daily  enalapril 20 milliGRAM(s) Oral daily  escitalopram 10 milliGRAM(s) Oral daily  ferrous    sulfate 325 milliGRAM(s) Oral daily  guaiFENesin   Syrup  (Sugar-Free) 200 milliGRAM(s) Oral every 6 hours PRN  levothyroxine 25 MICROGram(s) Oral daily  multivitamin 1 Tablet(s) Oral daily  NIFEdipine XL 30 milliGRAM(s) Oral daily  nystatin Powder 1 Application(s) Topical two times a day  oxycodone    5 mG/acetaminophen 325 mG 1 Tablet(s) Oral every 6 hours PRN      FAMILY HISTORY:  FH: lung cancer  mother    FH: breast cancer (Mother)  mother,  at age 50&#x27;s    FH: pancreatic cancer  father, in his 70&#x27;s         SOCIAL HISTORY:  Smoking:  x   ETOH: xx Drug Use: x    Single     T(F): 97.2 (21 @ 13:54), Max: 98.4 (21 @ 17:11)  HR: 66 (21 @ 14:46)  BP: 143/62 (21 @ 14:46)  RR: 17 (21 @ 14:46)  SpO2: 95% (21 @ 14:46)  Wt(kg): --    PHYSICAL EXAM:  General: alert, no acute distress  Eyes:  anicteric, no conjunctival injection, no discharge  Oropharynx: no lesions or injection 	  Neck: supple, without adenopathy  Lungs: clear to auscultation  Heart: irregular rate and rhythm; no murmur,  Abdomen: soft, nondistended, nontender, without mass or organomegaly  Skin: no lesions  Extremities: no clubbing, cyanosis, or edema  Neurologic: alert, oriented, moves all extremities    LAB RESULTS:                        10.9   16.43 )-----------( 382      ( 29 Mar 2021 10:12 )             37.9         140  |  105  |  41<H>  ----------------------------<  147<H>  4.7   |  30  |  0.85    Ca    8.9      29 Mar 2021 05:00        Urinalysis Basic - ( 29 Mar 2021 13:05 )    Color: Yellow / Appearance: Clear / S.015 / pH: x  Gluc: x / Ketone: Negative  / Bili: Negative / Urobili: Negative   Blood: x / Protein: Negative / Nitrite: Negative   Leuk Esterase: Negative / RBC: x / WBC x   Sq Epi: x / Non Sq Epi: x / Bacteria: x        MICROBIOLOGY:  RECENT CULTURES:        RADIOLOGY REVIEWED:  < from: Xray Chest 1 View- PORTABLE-Routine (Xray Chest 1 View- PORTABLE-Routine .) (21 @ 09:16) >  IMPRESSION: Bilateral lung parenchymal infiltrates without significant change.    < from: CT Abdomen and Pelvis No Cont (21 @ 14:58) >  IMPRESSION:  No retroperitoneal hematoma.    Trace new pelvic ascites, of unknown etiology.    Left flank intramuscular lipoma, slightly enlarged since 2019.    Unchanged 3 cm right adnexal cyst.    `< from: CT Chest No Cont (21 @ 18:00) >  IMPRESSION:  Patchy bilateral lung opacities consistent with multifocal pneumonia, likely related to Covid 19.    No displaced acute fracture is evident.    < end of copied text >

## 2021-03-29 NOTE — PROGRESS NOTE ADULT - ASSESSMENT
A/P: 81yo female w. PMH Afib on Eliquis, HTN, HLD, DORIS, Hypothyroidism, Depression who presented to [facility] for weakness and diarrhea, initially admitted to observation unit.  Noted for mechanical falls secondary to weakness and transitioned to admission.  Patient evaluated by PT who recommended [facility] placement however patient remains COVID+.  Now since 3.27 (10 days from first + test 3.17), able to d/c to [facility] -awaiting insueance auth     #acute hypoxic resp failure 2/2 COVID 19 infection  - Initial COVID positive 3/17, 3/23. 3/26, repeat to be sent today  - CT Chest shows bilateral opacities  - Monitor O2 sat; on 2L nc sats 93%; continue to wean as tolerated  - Incentive spirometry  - s/p remdemsivir   - Decadron- wean  3mg x 3 days then off (Per chart review noted on 3.20 pt. was on as outpt prior as well)  - PT rec [facility]--can be discharged to [facility] starting 3.27--awaiting auth     #Leukocytosis  WBC today noted 16  Remains Afebrile,and hypoxia improving   CXR unchanged  WIll send UA to be thorough   Is on steroids  Will trend    #Hypokalemia - resolved   Monitor     #Debility/Ambulatory dysfunction  - PT consult rec [facility]  - Ambulate with assist, OOB to chair    #Chronic atrial fibrillation  - Continue amiodarone and Eliquis  - Rate is controlled    #HTN  -BP's on higher end, continue Enalapril and Nifedipine also started 3.26     #HLD  - Continue statin     #Depression  - Continue escitalopram     #Hypothyroidism  - Continue levothyroxine    #DVT PPx  - On Eliquis    Dispo: Plan for d/c to [facility] pending Auth & bed availability).    3.29 Left message with callback number with daughter Jazzmine [phone] . WIll repeat Covid swab today   A/P: 81yo female w. PMH Afib on Eliquis, HTN, HLD, DORIS, Hypothyroidism, Depression who presented to [facility] for weakness and diarrhea, initially admitted to observation unit.  Noted for mechanical falls secondary to weakness and transitioned to admission.  Patient evaluated by PT who recommended [facility] placement however patient remains COVID+.  Now since 3.27 (10 days from first + test 3.17), able to d/c to [facility] -awaiting insueance auth     #acute hypoxic resp failure 2/2 COVID 19 infection - improving  - Initial COVID positive 3/17, 3/23. 3/26, repeat to be sent today  - CT Chest shows bilateral opacities  - Monitor O2 sat; on 2L nc sats 93%; continue to wean as tolerated  - Incentive spirometry  - s/p remdemsivir   - Decadron- wean  3mg x 3 days then off (Per chart review noted on 3.20 pt. was on as outpt prior as well)  - PT rec [facility]--can be discharged to [facility] starting 3.27--awaiting auth     #Leukocytosis  WBC today noted 16  Remains Afebrile,and hypoxia improving   CXR unchanged  WIll send UA to be thorough   Is on steroids  Will trend    #Hypokalemia - resolved   Monitor     #Debility/Ambulatory dysfunction  - PT consult rec [facility]  - Ambulate with assist, OOB to chair    #Chronic atrial fibrillation  - Continue amiodarone and Eliquis  - Rate is controlled    #HTN  -BP's on higher end, continue Enalapril and Nifedipine also started 3.26     #HLD  - Continue statin     #Depression  - Continue escitalopram     #Hypothyroidism  - Continue levothyroxine    #DVT PPx  - On Eliquis    Dis[facility]: [facility] & [facility]).    3.29 Left message with callback number with daughter Jazzmine [phone] . WIll repeat Covid swab today   No

## 2021-03-29 NOTE — CONSULT NOTE ADULT - ASSESSMENT
81 yo female with a fib on eliquis, HTN,HLD, Fe def anemia, and HTN who was admitted 3/21 with "failure to thrive".  She was in ER 3/17 and sent home, was in observation from 3/19-3/21, and has been in hospital since 3/21 on decadron for Covid pneumonia.  Her decadron is being tapered, she is now on 3 mg daily of decadron.  .She is now on 2 liters of nasal oxygen.Her UA is not very impressive.  Her leukocytosis may simply be steroid related.  She has received steroids which could mask signs of underlying infection.  PC level is .11, recent inflammatory markers on 3/26 - ferritin normal, CRP 22, .  she has been on apixiban for DVT prophylaxis.  Suggest:  1.Surveillance blood cultures x 2 sets  2.Monitor off antibiotics  3.Follow wbc  4.No indications for antibiotics

## 2021-03-29 NOTE — DISCHARGE NOTE PROVIDER - NSDCMRMEDTOKEN_GEN_ALL_CORE_FT
amiodarone 200 mg oral tablet: 1 tab(s) orally once a day  atorvastatin 20 mg oral tablet: 1 tab(s) orally once a day  Colace 100 mg oral capsule: 1 cap(s) orally once a day   dexamethasone 6 mg oral tablet: 1 tab(s) orally once a day   next dose 3/22/21.  Eliquis 5 mg oral tablet: 1 tab(s) orally 2 times a day  enalapril 20 mg oral tablet: 1 tab(s) orally once a day  escitalopram 10 mg oral tablet: 1 tab(s) orally once a day  ferrous sulfate 325 mg (65 mg elemental iron) oral tablet: 1 tab(s) orally once a day   Multiple Vitamins oral tablet: 1 tab(s) orally once a day  Synthroid 25 mcg (0.025 mg) oral tablet: 1 tab(s) orally once a day   amiodarone 200 mg oral tablet: 1 tab(s) orally once a day  atorvastatin 20 mg oral tablet: 1 tab(s) orally once a day  dexamethasone 6 mg oral tablet: 0.5 tab(s) orally once a day  .   Eliquis 5 mg oral tablet: 1 tab(s) orally 2 times a day  enalapril 20 mg oral tablet: 1 tab(s) orally once a day  escitalopram 10 mg oral tablet: 1 tab(s) orally once a day  ferrous sulfate 325 mg (65 mg elemental iron) oral tablet: 1 tab(s) orally once a day   Multiple Vitamins oral tablet: 1 tab(s) orally once a day  NIFEdipine 30 mg oral tablet, extended release: 1 tab(s) orally once a day  nystatin 100,000 units/g topical powder: 1 application topically 2 times a day  Synthroid 25 mcg (0.025 mg) oral tablet: 1 tab(s) orally once a day   amiodarone 200 mg oral tablet: 1 tab(s) orally once a day  atorvastatin 20 mg oral tablet: 1 tab(s) orally once a day  dexamethasone 0.5 mg oral tablet: 1 tab(s) orally once a day for one more day. last day 3/31/2021.  Eliquis 5 mg oral tablet: 1 tab(s) orally 2 times a day  enalapril 20 mg oral tablet: 1 tab(s) orally once a day  escitalopram 10 mg oral tablet: 1 tab(s) orally once a day  ferrous sulfate 325 mg (65 mg elemental iron) oral tablet: 1 tab(s) orally once a day   Multiple Vitamins oral tablet: 1 tab(s) orally once a day  NIFEdipine 30 mg oral tablet, extended release: 1 tab(s) orally once a day  nystatin 100,000 units/g topical powder: 1 application topically 2 times a day  Synthroid 25 mcg (0.025 mg) oral tablet: 1 tab(s) orally once a day

## 2021-03-29 NOTE — DISCHARGE NOTE PROVIDER - HOSPITAL COURSE
79 y/o F with Hx of Afib; on Eliquis, HTN, HLD, iron deficiency anemia, hypothyroidism, depression; who tested COVID + on 3/17  presented to the ED 2X on 3/18 initially for generalized weakness and was discharged home and then returned the same day due to noted increased episodes of diarrhea and 1 episode of BRB per rectum when wiping. Pt was placed in ED obs with difficulty with ambulation, also had recent fall and was evaluated by PT and recommended for [facility].  Also noted with episodes of diarrhea, noted hypokalemia 2.9 which was replenished and one episode of BRB which self resolved. ED called medicine for admission and the pt was admitted with generalized weakness/ debility 2/2 covid 19.         Patient transferred to [facility] per Load Balancing Protocol.  Patient remained hemodynamically stable  PT recommended HERB  Discharged to [facility]      79 y/o F with Hx of Afib; on Eliquis, HTN, HLD, iron deficiency anemia, hypothyroidism, depression; who tested COVID + on 3/17  presented to the ED 2X on 3/18 initially for generalized weakness and was discharged home and then returned the same day due to noted increased episodes of diarrhea and 1 episode of BRB per rectum when wiping. Pt was placed in ED obs with difficulty with ambulation, also had recent fall and was evaluated by PT and recommended for [facility].  Also noted with episodes of diarrhea, noted hypokalemia 2.9 which was replenished and one episode of BRB which self resolved. ED called medicine for admission and the pt was admitted with generalized weakness/ debility 2/2 covid 19.         Patient transferred to [facility] per Load Balancing Protocol.  Patient remained hemodynamically stable  PT recommended [facility]  Discharged to [facility] . Last covid positive 3/29     79 y/o F with Hx of Afib; on Eliquis, HTN, HLD, iron deficiency anemia, hypothyroidism, depression; who tested COVID + on 3/17  presented to the ED 2X on 3/18 initially for generalized weakness and was discharged home and then returned the same day due to noted increased episodes of diarrhea and 1 episode of BRB per rectum when wiping. Pt was placed in ED obs with difficulty with ambulation, also had recent fall and was evaluated by PT and recommended for [facility].  Also noted with episodes of diarrhea, noted hypokalemia 2.9 which was replenished and one episode of BRB which self resolved. ED called medicine for admission and the pt was admitted with generalized weakness/ debility 2/2 covid 19.     Of note, patient with mild luekocytosis at discharge (14) WBC 16 the day prior. UA unremarkable. CXR unchanged.  Patient remains afebrile and hemodynamically stable. Blood cx 3/29 NGTD. Infectious disease consulted and felt there is no indication for abx.  Leukocytosis is most likely steroid driven.    Patient transferred to [facility] per Load Balancing Protocol.  Patient remained hemodynamically stable  PT recommended [facility]  Discharged to [facility] . Last covid positive 3/29     81 y/o F with Hx of Afib; on Eliquis, HTN, HLD, iron deficiency anemia, hypothyroidism, depression; who tested COVID + on 3/17  presented to the ED 2X on 3/18 initially for generalized weakness and was discharged home and then returned the same day due to noted increased episodes of diarrhea and 1 episode of BRB per rectum when wiping. Pt was placed in ED obs with difficulty with ambulation, also had recent fall and was evaluated by PT and recommended for HER[facility].  Also noted with episodes of diarrhea, noted hypokalemia 2.9 which was replenished and one episode of BRB which self resolved. ED called medicine for admission and the pt was admitted with generalized weakness/ debility 2/2 covid 19.   Patient transferred to [facility] per Load Balancing Protocol.  Of note, patient with mild leukocytosis at discharge (14) WBC 16 the day prior. UA unremarkable. CXR unchanged.  Patient remains afebrile and hemodynamically stable. Blood cx 3/29 NGTD. Infectious disease consulted and felt there is no indication for abx.  Leukocytosis is most likely steroid driven.      Patient remained hemodynamically stable  PT recommended HERB  Discharged to [facility] . Last covid positive 3/29     79 y/o F with Hx of Afib; on Eliquis, HTN, HLD, iron deficiency anemia, hypothyroidism, depression; who tested COVID + on 3/17  presented to the ED 2X on 3/18 initially for generalized weakness and was discharged home and then returned the same day due to noted increased episodes of diarrhea and 1 episode of BRB per rectum when wiping. Pt was placed in ED obs with difficulty with ambulation, also had recent fall and was evaluated by PT and recommended for [facility].  Also noted with episodes of diarrhea, noted hypokalemia 2.9 which was replenished and one episode of BRB which self resolved. ED called medicine for admission and the pt was admitted with generalized weakness/ debility 2/2 covid 19.   Patient transferred to [facility] per Load Balancing Protocol.  Of note, patient with mild leukocytosis at discharge (14) WBC 16 the day prior. UA unremarkable. CXR unchanged. Patient remains afebrile and hemodynamically stable. Blood cx 3/29 NGTD. Infectious disease consulted and felt there is no indication for abx.  Leukocytosis is most likely steroid driven.      Patient remained hemodynamically stable  PT recommended [facility]  Discharged to [facility] . Last covid positive 3/29    spend 33 min on discharge

## 2021-03-29 NOTE — PROGRESS NOTE ADULT - ATTENDING COMMENTS
acute hypoxic resp failure- improving  COVID -19  - completed remdesivir  - on decadron taper now    Leukocytosis- likely steroid induced  - pt has no signs of active infection and has been afebrile  - hypoxia improving  - CXR unchanged.  - UA negative  - will obtain ID consult to be complete

## 2021-03-30 ENCOUNTER — TRANSCRIPTION ENCOUNTER (OUTPATIENT)
Age: 81
End: 2021-03-30

## 2021-03-30 VITALS
HEART RATE: 70 BPM | DIASTOLIC BLOOD PRESSURE: 62 MMHG | OXYGEN SATURATION: 97 % | SYSTOLIC BLOOD PRESSURE: 142 MMHG | RESPIRATION RATE: 18 BRPM

## 2021-03-30 LAB
ALBUMIN SERPL ELPH-MCNC: 2.5 G/DL — LOW (ref 3.3–5)
ALP SERPL-CCNC: 71 U/L — SIGNIFICANT CHANGE UP (ref 40–120)
ALT FLD-CCNC: 35 U/L — SIGNIFICANT CHANGE UP (ref 10–45)
ANION GAP SERPL CALC-SCNC: 8 MMOL/L — SIGNIFICANT CHANGE UP (ref 5–17)
AST SERPL-CCNC: 13 U/L — SIGNIFICANT CHANGE UP (ref 10–40)
BILIRUB SERPL-MCNC: 0.4 MG/DL — SIGNIFICANT CHANGE UP (ref 0.2–1.2)
BUN SERPL-MCNC: 45 MG/DL — HIGH (ref 7–23)
CALCIUM SERPL-MCNC: 8.9 MG/DL — SIGNIFICANT CHANGE UP (ref 8.4–10.5)
CHLORIDE SERPL-SCNC: 104 MMOL/L — SIGNIFICANT CHANGE UP (ref 96–108)
CO2 SERPL-SCNC: 30 MMOL/L — SIGNIFICANT CHANGE UP (ref 22–31)
CREAT SERPL-MCNC: 0.8 MG/DL — SIGNIFICANT CHANGE UP (ref 0.5–1.3)
GLUCOSE SERPL-MCNC: 123 MG/DL — HIGH (ref 70–99)
HCT VFR BLD CALC: 37.8 % — SIGNIFICANT CHANGE UP (ref 34.5–45)
HGB BLD-MCNC: 11 G/DL — LOW (ref 11.5–15.5)
MCHC RBC-ENTMCNC: 22.4 PG — LOW (ref 27–34)
MCHC RBC-ENTMCNC: 29.1 GM/DL — LOW (ref 32–36)
MCV RBC AUTO: 77.1 FL — LOW (ref 80–100)
NRBC # BLD: 0 /100 WBCS — SIGNIFICANT CHANGE UP (ref 0–0)
PLATELET # BLD AUTO: 353 K/UL — SIGNIFICANT CHANGE UP (ref 150–400)
POTASSIUM SERPL-MCNC: 4.6 MMOL/L — SIGNIFICANT CHANGE UP (ref 3.5–5.3)
POTASSIUM SERPL-SCNC: 4.6 MMOL/L — SIGNIFICANT CHANGE UP (ref 3.5–5.3)
PROT SERPL-MCNC: 5.7 G/DL — LOW (ref 6–8.3)
RBC # BLD: 4.9 M/UL — SIGNIFICANT CHANGE UP (ref 3.8–5.2)
RBC # FLD: 21.5 % — HIGH (ref 10.3–14.5)
SODIUM SERPL-SCNC: 142 MMOL/L — SIGNIFICANT CHANGE UP (ref 135–145)
WBC # BLD: 14.98 K/UL — HIGH (ref 3.8–10.5)
WBC # FLD AUTO: 14.98 K/UL — HIGH (ref 3.8–10.5)

## 2021-03-30 PROCEDURE — 81001 URINALYSIS AUTO W/SCOPE: CPT

## 2021-03-30 PROCEDURE — 82728 ASSAY OF FERRITIN: CPT

## 2021-03-30 PROCEDURE — 84145 PROCALCITONIN (PCT): CPT

## 2021-03-30 PROCEDURE — 80053 COMPREHEN METABOLIC PANEL: CPT

## 2021-03-30 PROCEDURE — 71045 X-RAY EXAM CHEST 1 VIEW: CPT

## 2021-03-30 PROCEDURE — 87635 SARS-COV-2 COVID-19 AMP PRB: CPT

## 2021-03-30 PROCEDURE — U0005: CPT

## 2021-03-30 PROCEDURE — 87040 BLOOD CULTURE FOR BACTERIA: CPT

## 2021-03-30 PROCEDURE — 99239 HOSP IP/OBS DSCHRG MGMT >30: CPT

## 2021-03-30 PROCEDURE — 80048 BASIC METABOLIC PNL TOTAL CA: CPT

## 2021-03-30 PROCEDURE — 85027 COMPLETE CBC AUTOMATED: CPT

## 2021-03-30 PROCEDURE — 85025 COMPLETE CBC W/AUTO DIFF WBC: CPT

## 2021-03-30 PROCEDURE — 86140 C-REACTIVE PROTEIN: CPT

## 2021-03-30 PROCEDURE — 94640 AIRWAY INHALATION TREATMENT: CPT

## 2021-03-30 PROCEDURE — 36415 COLL VENOUS BLD VENIPUNCTURE: CPT

## 2021-03-30 PROCEDURE — 85379 FIBRIN DEGRADATION QUANT: CPT

## 2021-03-30 PROCEDURE — U0003: CPT

## 2021-03-30 PROCEDURE — 83735 ASSAY OF MAGNESIUM: CPT

## 2021-03-30 PROCEDURE — 83615 LACTATE (LD) (LDH) ENZYME: CPT

## 2021-03-30 RX ORDER — DEXAMETHASONE 0.5 MG/5ML
1 ELIXIR ORAL
Qty: 0 | Refills: 0 | DISCHARGE
Start: 2021-03-30

## 2021-03-30 RX ORDER — DEXAMETHASONE 0.5 MG/5ML
0.5 ELIXIR ORAL
Qty: 0.5 | Refills: 0
Start: 2021-03-30 | End: 2021-03-30

## 2021-03-30 RX ORDER — NIFEDIPINE 30 MG
1 TABLET, EXTENDED RELEASE 24 HR ORAL
Qty: 0 | Refills: 0 | DISCHARGE
Start: 2021-03-30

## 2021-03-30 RX ORDER — NYSTATIN CREAM 100000 [USP'U]/G
1 CREAM TOPICAL
Qty: 0 | Refills: 0 | DISCHARGE
Start: 2021-03-30

## 2021-03-30 RX ADMIN — APIXABAN 5 MILLIGRAM(S): 2.5 TABLET, FILM COATED ORAL at 06:38

## 2021-03-30 RX ADMIN — ESCITALOPRAM OXALATE 10 MILLIGRAM(S): 10 TABLET, FILM COATED ORAL at 13:01

## 2021-03-30 RX ADMIN — Medication 20 MILLIGRAM(S): at 06:45

## 2021-03-30 RX ADMIN — NYSTATIN CREAM 1 APPLICATION(S): 100000 CREAM TOPICAL at 06:40

## 2021-03-30 RX ADMIN — AMIODARONE HYDROCHLORIDE 200 MILLIGRAM(S): 400 TABLET ORAL at 06:37

## 2021-03-30 RX ADMIN — Medication 25 MICROGRAM(S): at 06:38

## 2021-03-30 RX ADMIN — Medication 1 TABLET(S): at 13:00

## 2021-03-30 RX ADMIN — Medication 3 MILLIGRAM(S): at 06:38

## 2021-03-30 RX ADMIN — NYSTATIN CREAM 1 APPLICATION(S): 100000 CREAM TOPICAL at 17:48

## 2021-03-30 RX ADMIN — Medication 30 MILLIGRAM(S): at 06:45

## 2021-03-30 RX ADMIN — Medication 325 MILLIGRAM(S): at 13:01

## 2021-03-30 RX ADMIN — APIXABAN 5 MILLIGRAM(S): 2.5 TABLET, FILM COATED ORAL at 17:48

## 2021-03-30 NOTE — PROVIDER CONTACT NOTE (OTHER) - SITUATION
pt has a bradycardia 51/min at this AM
Patient has fever of 101
Pt c/o SOB due to cough and congestion. Pt had already received PRN albuterol tx about an hour before pts c/o.

## 2021-03-30 NOTE — PROGRESS NOTE ADULT - ASSESSMENT
79 yo female with a fib on eliquis, HTN,HLD, Fe def anemia, and HTN who was admitted 3/21 with "failure to thrive".  She was in ER 3/17 and sent home, was in observation from 3/19-3/21, and has been in hospital since 3/21 on decadron for Covid pneumonia.  Her decadron is being tapered, she is now on 3 mg daily of decadron.  .She is now on 2 liters of nasal oxygen.Her UA is not very impressive.  Her leukocytosis may simply be steroid related.  She has received steroids which could mask signs of underlying infection.  PC level is .11, recent inflammatory markers on 3/26 - ferritin normal, CRP 22, .  she has been on apixiban for DVT prophylaxis.  She appears clinically stable off antibiotics, blood cultures sent 3/29 are pending and UA is benign.  Suggest:  1.Monitor off antibiotics  2.Await surveillance blood cultures  4.No indications for antibiotics, leukocytosis may be steroid driven

## 2021-03-30 NOTE — PROGRESS NOTE ADULT - ASSESSMENT
A/P: 79yo female w. PMH Afib on Eliquis, HTN, HLD, DORIS, Hypothyroidism, Depression who presented to [facility] for weakness and diarrhea, initially admitted to observation unit.  Noted for mechanical falls secondary to weakness and transitioned to admission.  Patient evaluated by PT who recommended [facility] placement however patient remains COVID+.  Now since 3.27 (10 days from first + test 3.17), able to d/c to [facility] -awaiting insueance auth     #acute hypoxic resp failure 2/2 COVID 19 infection - improving  - Initial COVID positive 3/17, 3/23. 3/26, repeat to be sent today  - CT Chest shows bilateral opacities  - Monitor O2 sat; on 2L nc sats 93%; continue to wean as tolerated  - Incentive spirometry  - s/p remdemsivir   - Decadron- wean  3mg x 3 days then off (Per chart review noted on 3.20 pt. was on as outpt prior as well)  - PT rec [facility]--can be discharged to [facility] starting 3.27--awaiting auth     #Leukocytosis  WBC today noted 14   Remains Afebrile,and hypoxia improving   IF consult appreciated  Follow up repeat blood cx 3/29  suspect steroid driven   Will trend    #Hypokalemia - resolved   Monitor     #Debility/Ambulatory dysfunction  - PT consult rec [facility]  - Ambulate with assist, OOB to chair    #Chronic atrial fibrillation  - Continue amiodarone and Eliquis  - Rate is controlled    #HTN  stable     #HLD  - Continue statin     #Depression  - Continue escitalopram     #Hypothyroidism  - Continue levothyroxine    #DVT PPx  - On Eliquis    Dispo: Plan for d/c to [facility] pending Auth & bed availability).  Repeat Covid PCR 3/29 positive   3.30 Left message with callback number with daughter Jazzmine [phone] .    A/P: 79yo female w. PMH Afib on Eliquis, HTN, HLD, DORIS, Hypothyroidism, Depression who presented to [facility] for weakness and diarrhea, initially admitted to observation unit.  Noted for mechanical falls secondary to weakness and transitioned to admission.  Patient evaluated by PT who recommended [facility] placement however patient remains COVID+.  Now since 3.27 (10 days from first + test 3.17), able to d/c to [facility] -awaiting insueance auth     #acute hypoxic resp failure 2/2 COVID 19 infection - improving  - Initial COVID positive 3/17, 3/23. 3/26, repeat to be sent today  - CT Chest shows bilateral opacities  - Monitor O2 sat; on 2L nc sats 93%; continue to wean as tolerated  - Incentive spirometry  - s/p remdemsivir   - Decadron- wean 3mg x 3 days then off (Per chart review noted on 3.20 pt. was on as outpt prior as well)  - PT rec [facility]--can be discharged to [facility] starting 3.27--awaiting auth     #Leukocytosis  WBC today noted 14   Remains Afebrile,and hypoxia improving   IF consult appreciated  Follow up repeat blood cx 3/29  suspect steroid driven   Will trend    #Hypokalemia - resolved   Monitor     #Debility/Ambulatory dysfunction  - PT consult rec [facility]  - Ambulate with assist, OOB to chair    #Chronic atrial fibrillation  - Continue amiodarone and Eliquis  - Rate is controlled    #HTN  stable     #HLD  - Continue statin     #Depression  - Continue escitalopram     #Hypothyroidism  - Continue levothyroxine    #DVT PPx  - On Eliquis    Dispo: Plan for d/c to [facility] pending Auth & bed availability).  Repeat Covid PCR 3/29 positive   3.30 Left message with callback number with daughter Jazzmine [phone] .

## 2021-03-30 NOTE — PROGRESS NOTE ADULT - REASON FOR ADMISSION
covid/debility

## 2021-03-30 NOTE — PROVIDER CONTACT NOTE (OTHER) - ASSESSMENT
pt is resting in the bed. no c/o made. had a  two times loose BM at this shift,
Pt a &o x 3, sitting up in bed with a c/o of SOB b/c of congestion. Pt noted to have a cough and on 2 L NC, all vital signs stable.

## 2021-03-30 NOTE — PROVIDER CONTACT NOTE (OTHER) - ACTION/TREATMENT ORDERED:
decadron, remdesivir, awaiting orders
Notified [facility], hold amiodarone 200mg po 6am dose.
Stat order of robitussin ordered, continuous pulse ox monitoring, lasix 20 mg IVP, and proning PRN

## 2021-03-30 NOTE — PROGRESS NOTE ADULT - SUBJECTIVE AND OBJECTIVE BOX
Patient is a 80y old  Female who presents with a chief complaint of covid/debility (30 Mar 2021 11:42). No acute issues. Eager for discharge       Patient seen and examined at bedside.    ALLERGIES:  No Known Allergies    MEDICATIONS  (STANDING):  aMIOdarone    Tablet 200 milliGRAM(s) Oral daily  apixaban 5 milliGRAM(s) Oral two times a day  atorvastatin 20 milliGRAM(s) Oral at bedtime  dexAMETHasone     Tablet 3 milliGRAM(s) Oral daily  enalapril 20 milliGRAM(s) Oral daily  escitalopram 10 milliGRAM(s) Oral daily  ferrous    sulfate 325 milliGRAM(s) Oral daily  levothyroxine 25 MICROGram(s) Oral daily  multivitamin 1 Tablet(s) Oral daily  NIFEdipine XL 30 milliGRAM(s) Oral daily  nystatin Powder 1 Application(s) Topical two times a day    MEDICATIONS  (PRN):  acetaminophen   Tablet .. 650 milliGRAM(s) Oral every 4 hours PRN Temp greater or equal to 38C (100.4F), Mild Pain (1 - 3)  ALBUTerol    90 MICROgram(s) HFA Inhaler 2 Puff(s) Inhalation every 4 hours PRN Shortness of Breath and/or Wheezing  guaiFENesin   Syrup  (Sugar-Free) 200 milliGRAM(s) Oral every 6 hours PRN Cough    Vital Signs Last 24 Hrs  T(F): 98.1 (30 Mar 2021 09:26), Max: 98.1 (30 Mar 2021 09:26)  HR: 71 (30 Mar 2021 09:26) (51 - 71)  BP: 113/49 (30 Mar 2021 09:26) (113/49 - 155/61)  RR: 18 (30 Mar 2021 09:26) (17 - 20)  SpO2: 95% (30 Mar 2021 09:26) (95% - 97%)  I&O's Summary    29 Mar 2021 07:01  -  30 Mar 2021 07:00  --------------------------------------------------------  IN: 236 mL / OUT: 1150 mL / NET: -914 mL    30 Mar 2021 07:01  -  30 Mar 2021 11:49  --------------------------------------------------------  IN: 220 mL / OUT: 200 mL / NET: 20 mL        PHYSICAL EXAM:  General: NAD, A/O x 3, facial ecchymosis improving   ENT: MMM, no thrush, on NC   Neck: Supple, No JVD  Lungs: Non labored breathing,  Clear to auscultation bilaterally,   Cardio: RRR, S1/S2,  no pitting edema bilaterally  Abdomen: Soft, Nontender, Nondistended; Bowel sounds present  Extremities: No calf tenderness, moves all extremities    LABS:                        11.0   14.98 )-----------( 353      ( 30 Mar 2021 06:20 )             37.8           142  |  104  |  45  ----------------------------<  123  4.6   |  30  |  0.80    Ca    8.9      30 Mar 2021 06:20    TPro  5.7  /  Alb  2.5  /  TBili  0.4  /  DBili  x   /  AST  13  /  ALT  35  /  AlkPhos  71       eGFR if Non African American: 70 mL/min/1.73M2 (21 @ 06:20)  eGFR if African American: 82 mL/min/1.73M2 (21 @ 06:20)                               Urinalysis Basic - ( 29 Mar 2021 13:05 )    Color: Yellow / Appearance: Clear / S.015 / pH: x  Gluc: x / Ketone: Negative  / Bili: Negative / Urobili: Negative   Blood: x / Protein: Negative / Nitrite: Negative   Leuk Esterase: Negative / RBC: x / WBC x   Sq Epi: x / Non Sq Epi: x / Bacteria: x          RADIOLOGY & ADDITIONAL TESTS:    Care Discussed with Consultants/Other Providers:

## 2021-03-30 NOTE — PROGRESS NOTE ADULT - SUBJECTIVE AND OBJECTIVE BOX
CC: f/u for leukocytosis    Patient reports: she appears comfortable in a chair.She denies any respiratory, GI or  complaints.    REVIEW OF SYSTEMS:  All other review of systems negative (Comprehensive ROS): negative    Antimicrobials Day #  :off    Other Medications Reviewed  MEDICATIONS  (STANDING):  aMIOdarone    Tablet 200 milliGRAM(s) Oral daily  apixaban 5 milliGRAM(s) Oral two times a day  atorvastatin 20 milliGRAM(s) Oral at bedtime  dexAMETHasone     Tablet 3 milliGRAM(s) Oral daily  enalapril 20 milliGRAM(s) Oral daily  escitalopram 10 milliGRAM(s) Oral daily  ferrous    sulfate 325 milliGRAM(s) Oral daily  levothyroxine 25 MICROGram(s) Oral daily  multivitamin 1 Tablet(s) Oral daily  NIFEdipine XL 30 milliGRAM(s) Oral daily  nystatin Powder 1 Application(s) Topical two times a day      T(F): 98.1 (21 @ 09:26), Max: 98.1 (21 @ 09:26)  HR: 71 (21 @ 09:26)  BP: 113/49 (21 @ 09:26)  RR: 18 (21 @ 09:26)  SpO2: 95% (21 @ 09:26)  Wt(kg): --    PHYSICAL EXAM:  General: alert, no acute distress  Eyes:  anicteric, no conjunctival injection, no discharge  Oropharynx: no lesions or injection 	  Neck: supple, without adenopathy  Lungs: coarse BS  Heart: regular rate and rhythm; no murmur, rubs or gallops  Abdomen: soft, nondistended, nontender, without mass or organomegaly  Skin: no lesions  Extremities: no clubbing, cyanosis, or edema  Neurologic: alert, oriented, moves all extremities    LAB RESULTS:                        11.0   14.98 )-----------( 353      ( 30 Mar 2021 06:20 )             37.8         142  |  104  |  45<H>  ----------------------------<  123<H>  4.6   |  30  |  0.80    Ca    8.9      30 Mar 2021 06:20    TPro  5.7<L>  /  Alb  2.5<L>  /  TBili  0.4  /  DBili  x   /  AST  13  /  ALT  35  /  AlkPhos  71  03-30    LIVER FUNCTIONS - ( 30 Mar 2021 06:20 )  Alb: 2.5 g/dL / Pro: 5.7 g/dL / ALK PHOS: 71 U/L / ALT: 35 U/L / AST: 13 U/L / GGT: x           Urinalysis Basic - ( 29 Mar 2021 13:05 )    Color: Yellow / Appearance: Clear / S.015 / pH: x  Gluc: x / Ketone: Negative  / Bili: Negative / Urobili: Negative   Blood: x / Protein: Negative / Nitrite: Negative   Leuk Esterase: Negative / RBC: x / WBC x   Sq Epi: x / Non Sq Epi: x / Bacteria: x      MICROBIOLOGY:  RECENT CULTURES:      RADIOLOGY REVIEWED:    < from: Xray Chest 1 View- PORTABLE-Routine (Xray Chest 1 View- PORTABLE-Routine .) (21 @ 09:16) >  IMPRESSION: Bilateral lung parenchymal infiltrates without significant change.

## 2021-03-30 NOTE — PROGRESS NOTE ADULT - ATTENDING COMMENTS
#acute hypoxic resp failure 2/2 COVID 19 infection - improving  - completed remdesivir course  - on decadron taper    # leukocytosis likely steroid induced  - blood cx result pending  - UA negative  - CXR stable infiltrates from COVID    # chronic a fib, cont eliquis and metoprolol    # debility  - pending [facility]    dispo: pending auth for [facility]

## 2021-03-30 NOTE — DISCHARGE NOTE NURSING/CASE MANAGEMENT/SOCIAL WORK - PATIENT PORTAL LINK FT
You can access the FollowMyHealth Patient Portal offered by Health system by registering at the following website: [email or web address]. By joining [employer]’s FollowMyHealth portal, you will also be able to view your health information using other applications (apps) compatible with our system.

## 2021-03-30 NOTE — PROGRESS NOTE ADULT - PROVIDER SPECIALTY LIST ADULT
Hospitalist
Infectious Disease
Hospitalist
Hospitalist

## 2021-04-21 NOTE — ED PROVIDER NOTE - GASTROINTESTINAL, MLM
RAPID Covid 19 swab taken from right nare, labeled, placed in red dot bag, and handed off to second healthcare worker outside of room for transport to laboratory per hospital policy and procedure. Patient tolerated procedure well. Abdomen soft, non-tender Abdomen soft, non-tender. no stool in vault, no gross blood

## 2021-05-12 NOTE — PHYSICAL THERAPY INITIAL EVALUATION ADULT - GAIT PATTERN USED, PT EVAL
# Acne vulgaris, improving on oral doxycycline, tretinoin, topical clindamycin and benzyl peroxide 10% wash.  Continue doxycycline 100 mg twice daily.  Discussed potential sun sensitivity and GI upset.  Patient is tolerating this well and using sunscreen.  Continue clindamycin 1% lotion every morning  Continue benzyl peroxide 10% wash 1-2 times daily  Increase the strength of tretinoin 0.1% cream at bedtime.  Discussed retinoid application process and potential side effects.  Patient is tolerating this well..          3-point gait

## 2021-09-10 NOTE — ED ADULT TRIAGE NOTE - CHIEF COMPLAINT QUOTE
non traumatic left leg pain. reports swelling in feet x 1 year. no other complaints. right knee arthroscopy no

## 2022-04-03 ENCOUNTER — INPATIENT (INPATIENT)
Facility: HOSPITAL | Age: 82
LOS: 8 days | Discharge: NURSING, REHAB OR LONG-TERM CARE | DRG: 65 | End: 2022-04-12
Attending: HOSPITALIST | Admitting: INTERNAL MEDICINE
Payer: MEDICARE

## 2022-04-03 VITALS
RESPIRATION RATE: 18 BRPM | HEIGHT: 61 IN | OXYGEN SATURATION: 96 % | TEMPERATURE: 98 F | SYSTOLIC BLOOD PRESSURE: 217 MMHG | WEIGHT: 143.08 LBS | HEART RATE: 76 BPM | DIASTOLIC BLOOD PRESSURE: 84 MMHG

## 2022-04-03 DIAGNOSIS — I63.9 CEREBRAL INFARCTION, UNSPECIFIED: ICD-10-CM

## 2022-04-03 LAB
ALBUMIN SERPL ELPH-MCNC: 4.2 G/DL — SIGNIFICANT CHANGE UP (ref 3.3–5.2)
ALP SERPL-CCNC: 98 U/L — SIGNIFICANT CHANGE UP (ref 40–120)
ALT FLD-CCNC: 14 U/L — SIGNIFICANT CHANGE UP
ANION GAP SERPL CALC-SCNC: 14 MMOL/L — SIGNIFICANT CHANGE UP (ref 5–17)
APPEARANCE UR: CLEAR — SIGNIFICANT CHANGE UP
AST SERPL-CCNC: 18 U/L — SIGNIFICANT CHANGE UP
BACTERIA # UR AUTO: ABNORMAL
BASOPHILS # BLD AUTO: 0.06 K/UL — SIGNIFICANT CHANGE UP (ref 0–0.2)
BASOPHILS NFR BLD AUTO: 0.8 % — SIGNIFICANT CHANGE UP (ref 0–2)
BILIRUB SERPL-MCNC: 0.3 MG/DL — LOW (ref 0.4–2)
BILIRUB UR-MCNC: NEGATIVE — SIGNIFICANT CHANGE UP
BUN SERPL-MCNC: 15.6 MG/DL — SIGNIFICANT CHANGE UP (ref 8–20)
CALCIUM SERPL-MCNC: 9.5 MG/DL — SIGNIFICANT CHANGE UP (ref 8.6–10.2)
CHLORIDE SERPL-SCNC: 107 MMOL/L — SIGNIFICANT CHANGE UP (ref 98–107)
CO2 SERPL-SCNC: 23 MMOL/L — SIGNIFICANT CHANGE UP (ref 22–29)
COLOR SPEC: YELLOW — SIGNIFICANT CHANGE UP
COMMENT - URINE: PRESENT — SIGNIFICANT CHANGE UP
COMMENT - URINE: SIGNIFICANT CHANGE UP
CREAT SERPL-MCNC: 0.81 MG/DL — SIGNIFICANT CHANGE UP (ref 0.5–1.3)
DIFF PNL FLD: NEGATIVE — SIGNIFICANT CHANGE UP
EGFR: 73 ML/MIN/1.73M2 — SIGNIFICANT CHANGE UP
EOSINOPHIL # BLD AUTO: 0.2 K/UL — SIGNIFICANT CHANGE UP (ref 0–0.5)
EOSINOPHIL NFR BLD AUTO: 2.7 % — SIGNIFICANT CHANGE UP (ref 0–6)
EPI CELLS # UR: SIGNIFICANT CHANGE UP
GLUCOSE SERPL-MCNC: 113 MG/DL — HIGH (ref 70–99)
GLUCOSE UR QL: NEGATIVE MG/DL — SIGNIFICANT CHANGE UP
HCT VFR BLD CALC: 43.4 % — SIGNIFICANT CHANGE UP (ref 34.5–45)
HGB BLD-MCNC: 12.5 G/DL — SIGNIFICANT CHANGE UP (ref 11.5–15.5)
IMM GRANULOCYTES NFR BLD AUTO: 0.4 % — SIGNIFICANT CHANGE UP (ref 0–1.5)
KETONES UR-MCNC: NEGATIVE — SIGNIFICANT CHANGE UP
LEUKOCYTE ESTERASE UR-ACNC: NEGATIVE — SIGNIFICANT CHANGE UP
LYMPHOCYTES # BLD AUTO: 0.96 K/UL — LOW (ref 1–3.3)
LYMPHOCYTES # BLD AUTO: 13.1 % — SIGNIFICANT CHANGE UP (ref 13–44)
MAGNESIUM SERPL-MCNC: 2.1 MG/DL — SIGNIFICANT CHANGE UP (ref 1.6–2.6)
MCHC RBC-ENTMCNC: 22.4 PG — LOW (ref 27–34)
MCHC RBC-ENTMCNC: 28.8 GM/DL — LOW (ref 32–36)
MCV RBC AUTO: 77.9 FL — LOW (ref 80–100)
MONOCYTES # BLD AUTO: 0.43 K/UL — SIGNIFICANT CHANGE UP (ref 0–0.9)
MONOCYTES NFR BLD AUTO: 5.9 % — SIGNIFICANT CHANGE UP (ref 2–14)
NEUTROPHILS # BLD AUTO: 5.66 K/UL — SIGNIFICANT CHANGE UP (ref 1.8–7.4)
NEUTROPHILS NFR BLD AUTO: 77.1 % — HIGH (ref 43–77)
NITRITE UR-MCNC: NEGATIVE — SIGNIFICANT CHANGE UP
PH UR: 7 — SIGNIFICANT CHANGE UP (ref 5–8)
PLATELET # BLD AUTO: 301 K/UL — SIGNIFICANT CHANGE UP (ref 150–400)
POTASSIUM SERPL-MCNC: 4.2 MMOL/L — SIGNIFICANT CHANGE UP (ref 3.5–5.3)
POTASSIUM SERPL-SCNC: 4.2 MMOL/L — SIGNIFICANT CHANGE UP (ref 3.5–5.3)
PROT SERPL-MCNC: 7.2 G/DL — SIGNIFICANT CHANGE UP (ref 6.6–8.7)
PROT UR-MCNC: 15
RAPID RVP RESULT: SIGNIFICANT CHANGE UP
RBC # BLD: 5.57 M/UL — HIGH (ref 3.8–5.2)
RBC # FLD: 16.4 % — HIGH (ref 10.3–14.5)
RBC CASTS # UR COMP ASSIST: SIGNIFICANT CHANGE UP /HPF (ref 0–4)
SARS-COV-2 RNA SPEC QL NAA+PROBE: SIGNIFICANT CHANGE UP
SODIUM SERPL-SCNC: 144 MMOL/L — SIGNIFICANT CHANGE UP (ref 135–145)
SP GR SPEC: 1.01 — SIGNIFICANT CHANGE UP (ref 1.01–1.02)
TROPONIN T SERPL-MCNC: <0.01 NG/ML — SIGNIFICANT CHANGE UP (ref 0–0.06)
UROBILINOGEN FLD QL: NEGATIVE MG/DL — SIGNIFICANT CHANGE UP
WBC # BLD: 7.34 K/UL — SIGNIFICANT CHANGE UP (ref 3.8–10.5)
WBC # FLD AUTO: 7.34 K/UL — SIGNIFICANT CHANGE UP (ref 3.8–10.5)
WBC UR QL: SIGNIFICANT CHANGE UP /HPF (ref 0–5)

## 2022-04-03 PROCEDURE — 93010 ELECTROCARDIOGRAM REPORT: CPT

## 2022-04-03 PROCEDURE — 72148 MRI LUMBAR SPINE W/O DYE: CPT | Mod: 26,MA

## 2022-04-03 PROCEDURE — 99236 HOSP IP/OBS SAME DATE HI 85: CPT

## 2022-04-03 PROCEDURE — 71045 X-RAY EXAM CHEST 1 VIEW: CPT | Mod: 26

## 2022-04-03 PROCEDURE — 70450 CT HEAD/BRAIN W/O DYE: CPT | Mod: 26,MA

## 2022-04-03 PROCEDURE — 72125 CT NECK SPINE W/O DYE: CPT | Mod: 26,MA

## 2022-04-03 PROCEDURE — 70551 MRI BRAIN STEM W/O DYE: CPT | Mod: 26,MA

## 2022-04-03 PROCEDURE — 72141 MRI NECK SPINE W/O DYE: CPT | Mod: 26,MA

## 2022-04-03 PROCEDURE — 99223 1ST HOSP IP/OBS HIGH 75: CPT

## 2022-04-03 PROCEDURE — 99221 1ST HOSP IP/OBS SF/LOW 40: CPT

## 2022-04-03 RX ORDER — AMIODARONE HYDROCHLORIDE 400 MG/1
200 TABLET ORAL DAILY
Refills: 0 | Status: DISCONTINUED | OUTPATIENT
Start: 2022-04-03 | End: 2022-04-12

## 2022-04-03 RX ORDER — LEVOTHYROXINE SODIUM 125 MCG
50 TABLET ORAL DAILY
Refills: 0 | Status: DISCONTINUED | OUTPATIENT
Start: 2022-04-03 | End: 2022-04-12

## 2022-04-03 RX ORDER — LABETALOL HCL 100 MG
10 TABLET ORAL ONCE
Refills: 0 | Status: COMPLETED | OUTPATIENT
Start: 2022-04-03 | End: 2022-04-03

## 2022-04-03 RX ORDER — ATORVASTATIN CALCIUM 80 MG/1
40 TABLET, FILM COATED ORAL AT BEDTIME
Refills: 0 | Status: DISCONTINUED | OUTPATIENT
Start: 2022-04-03 | End: 2022-04-06

## 2022-04-03 RX ORDER — ATORVASTATIN CALCIUM 80 MG/1
20 TABLET, FILM COATED ORAL DAILY
Refills: 0 | Status: DISCONTINUED | OUTPATIENT
Start: 2022-04-03 | End: 2022-04-03

## 2022-04-03 RX ORDER — ASPIRIN/CALCIUM CARB/MAGNESIUM 324 MG
81 TABLET ORAL DAILY
Refills: 0 | Status: DISCONTINUED | OUTPATIENT
Start: 2022-04-03 | End: 2022-04-06

## 2022-04-03 RX ORDER — APIXABAN 2.5 MG/1
5 TABLET, FILM COATED ORAL
Refills: 0 | Status: DISCONTINUED | OUTPATIENT
Start: 2022-04-03 | End: 2022-04-12

## 2022-04-03 RX ORDER — ESCITALOPRAM OXALATE 10 MG/1
10 TABLET, FILM COATED ORAL DAILY
Refills: 0 | Status: DISCONTINUED | OUTPATIENT
Start: 2022-04-03 | End: 2022-04-12

## 2022-04-03 RX ADMIN — Medication 20 MILLIGRAM(S): at 14:44

## 2022-04-03 RX ADMIN — ATORVASTATIN CALCIUM 40 MILLIGRAM(S): 80 TABLET, FILM COATED ORAL at 23:49

## 2022-04-03 RX ADMIN — Medication 10 MILLIGRAM(S): at 12:00

## 2022-04-03 RX ADMIN — APIXABAN 5 MILLIGRAM(S): 2.5 TABLET, FILM COATED ORAL at 20:05

## 2022-04-03 NOTE — ED PROVIDER NOTE - PHYSICAL EXAMINATION
General: nontoxic appearing in no acute distress. Alert and cooperative.   Head: Normocephalic, atraumatic.  Eyes: PERRLA. No conjunctival injection. No scleral icterus. EOMI  ENMT: Atraumatic external nose and ears.   Neck: Soft and supple.   Cardiac: Regular rate and regular rhythm. No murmurs. Peripheral pulses 2+ and symmetric in all extremities. No LE edema.  Resp: Unlabored respiratory effort. Lungs CTAB.  Abd: Soft, non-tender, non-distended.   MSK: Spine midline and non-tender. No CVA tenderness.    Skin: Warm and dry.   Neuro: AO x 3. Moves all extremities symmetrically. Motor strength and sensation grossly intact. General: nontoxic appearing in no acute distress. Alert and cooperative.   Head: Normocephalic, atraumatic.  Eyes: PERRLA. No conjunctival injection. No scleral icterus. EOMI  ENMT: Atraumatic external nose and ears.   Neck: Soft and supple. No spine ttp.   Cardiac: Regular rate and regular rhythm. No murmurs. Peripheral pulses 2+ and symmetric in all extremities. No LE edema.  Resp: Unlabored respiratory effort. Lungs CTAB.  Abd: Soft, non-tender, non-distended.   MSK: Spine midline and non-tender. No CVA tenderness.    Skin: Warm and dry.   Neuro: AO x 3. Moves all extremities symmetrically. Motor strength and sensation grossly intact.

## 2022-04-03 NOTE — CONSULT NOTE ADULT - SUBJECTIVE AND OBJECTIVE BOX
Patient is a 81y old  Female who presents with a chief complaint of Urinary Incontinence.    HPI/Interval Events: 81F PMH A fib on Eliquis, HTN, Hypothyroidism, Fe def. Anemia, Depression presented to [facility] after waking up covered in urine. States this has never happened to her before. ED ordered CTH & CT C spine due to h/o recent falls. Patient states she fell out of bed trying to get away from her cat over a week ago, but has not had any pain or deficits since then. CT C spine revealed Dens fracture Type 2, possibly chronic in nature. Patient seen at bedside with C-Collar on, hypertensive to mid 200s upon examination, afebrile. Patient complaining she is thirsty, but remainder of ROS negative.      PAST MEDICAL & SURGICAL HISTORY:  Atrial fibrillation  HTN (hypertension)  Hypothyroid  HLD (hyperlipidemia)  Hernia  Iron deficiency anemia  Depression  H/O tubal ligation    No significant past surgical history      FAMILY HISTORY:  FH: pancreatic cancer  father, in his 70&#x27;s   FH: breast cancer (Mother)  mother,  at age 50&#x27;s  FH: lung cancer  mother      SOCIAL HISTORY:  Tobacco Use: unknown  EtOH use: unknown  Substance: unknown    Allergies  No Known Allergies      REVIEW OF SYSTEMS  Negative except as noted in HPI      HOME MEDICATIONS:  Home Medications:  amiodarone 200 mg oral tablet: 1 tab(s) orally once a day (07 Dec 2019 18:06)  atorvastatin 20 mg oral tablet: 1 tab(s) orally once a day (09 Dec 2019 11:55)  dexamethasone 0.5 mg oral tablet: 1 tab(s) orally once a day for one more day. last day 3/31/2021. (30 Mar 2021 15:43)  Eliquis 5 mg oral tablet: 1 tab(s) orally 2 times a day (07 Dec 2019 18:06)  enalapril 20 mg oral tablet: 1 tab(s) orally once a day (07 Dec 2019 18:06)  escitalopram 10 mg oral tablet: 1 tab(s) orally once a day (09 Dec 2019 11:55)  Multiple Vitamins oral tablet: 1 tab(s) orally once a day (21 Mar 2021 13:20)  NIFEdipine 30 mg oral tablet, extended release: 1 tab(s) orally once a day (30 Mar 2021 15:28)  nystatin 100,000 units/g topical powder: 1 application topically 2 times a day (30 Mar 2021 15:28)  Synthroid 25 mcg (0.025 mg) oral tablet: 1 tab(s) orally once a day (07 Dec 2019 18:32)      Vital Signs Last 24 Hrs  T(C): 36.7 (2022 09:36), Max: 36.7 (2022 09:36)  T(F): 98.1 (2022 09:36), Max: 98.1 (2022 09:36)  HR: 63 (2022 11:53) (63 - 76)  BP: 205/92 (2022 11:53) (205/92 - 217/84)  BP(mean): --  RR: 18 (2022 09:36) (18 - 18)  SpO2: 96% (2022 09:36) (96% - 96%)      PHYSICAL EXAM:  GENERAL: NAD, well-groomed, C-Collar in place  HEAD:  Atraumatic, normocephalic  GABY COMA SCORE: E4 V5 M6 =15  MENTAL STATUS: AAO x3; Awake. Opens eyes spontaneously. Appropriately conversant without aphasia, following simple commands.  CRANIAL NERVES: Visual acuity normal for age, visual fields full to confrontation, PERRL. EOMI without nystagmus. Facial sensation intact V1-3 distribution b/l. Face symmetric w/ normal eye closure and smile, tongue midline. Hearing grossly intact. Speech clear.   MOTOR: strength 5/5 b/l upper and lower extremities  SENSATION: grossly intact to light touch all extremities      LABS:                        12.5   7.34  )-----------( 301      ( 2022 10:41 )             43.4     04-03    144  |  107  |  15.6  ----------------------------<  113<H>  4.2   |  23.0  |  0.81    Ca    9.5      2022 10:41  Mg     2.1     04-03    TPro  7.2  /  Alb  4.2  /  TBili  0.3<L>  /  DBili  x   /  AST  18  /  ALT  14  /  AlkPhos  98  04-03      Urinalysis Basic - ( 2022 10:57 )    Color: Yellow / Appearance: Clear / S.010 / pH: x  Gluc: x / Ketone: Negative  / Bili: Negative / Urobili: Negative mg/dL   Blood: x / Protein: 15 / Nitrite: Negative   Leuk Esterase: Negative / RBC: 0-2 /HPF / WBC 0-2 /HPF   Sq Epi: x / Non Sq Epi: Occasional / Bacteria: Occasional      RADIOLOGY & ADDITIONAL STUDIES:  < from: CT Cervical Spine No Cont (22 @ 11:46) >  IMPRESSION: No evidence of acute hemorrhage in the posterior fossa or   supratentorial region.    Fracture involving C2 as described above.      < end of copied text >    < from: CT Head No Cont (22 @ 11:45) >  IMPRESSION: No evidence of acute hemorrhage in the posterior fossa or   supratentorial region.    Fracture involving C2 as described above.    < end of copied text >      CAPRINI SCORE [CLOT]:  Patient has an estimated Caprini score of greater than 5.  However, the patient's unique clinical situation will be addressed in an individual manner to determine appropriate anticoagulation treatment, if any.

## 2022-04-03 NOTE — ED CDU PROVIDER INITIAL DAY NOTE - OBJECTIVE STATEMENT
82 y/o F c/o 1 episode of urinary incontinence which occurred overnight.  Patient states that she woke up this morning soaked in urine.  Denies any prior episodes of incontinence.   Denies headache, back pain, fever, chest pain, neck pain.  Patient states that she had a mechanical fall last week.

## 2022-04-03 NOTE — H&P ADULT - NSHPPHYSICALEXAM_GEN_ALL_CORE
Vital Signs Last 24 Hrs  T(C): 36.7 (03 Apr 2022 09:36), Max: 36.7 (03 Apr 2022 09:36)  T(F): 98.1 (03 Apr 2022 09:36), Max: 98.1 (03 Apr 2022 09:36)  HR: 66 (03 Apr 2022 21:23) (50 - 76)  BP: 140/65 (03 Apr 2022 21:23) (140/65 - 217/84)  BP(mean): --  RR: 18 (03 Apr 2022 21:23) (18 - 18)  SpO2: 95% (03 Apr 2022 21:23) (95% - 97%)

## 2022-04-03 NOTE — ED ADULT NURSE NOTE - NSIMPLEMENTINTERV_GEN_ALL_ED
Implemented All Universal Safety Interventions:  [place] to call system. Call bell, personal items and telephone within reach. Instruct patient to call for assistance. Room bathroom lighting operational. Non-slip footwear when patient is off stretcher. Physically safe environment: no spills, clutter or unnecessary equipment. Stretcher in lowest position, wheels locked, appropriate side rails in place.

## 2022-04-03 NOTE — ED PROVIDER NOTE - ATTENDING CONTRIBUTION TO CARE
I personally saw the patient with the resident, and completed the key components of the history and physical exam. I then discussed the management plan with the resident.    80 y/o F with PMH A fib on Eliquis, HTN, HLD, anemia presents for urinary incontinence, lethargy, non-compliance with medications and a fall out of bed 2 days ago. Patient has no complaints, but patient's daughter notes a decline in patient's functionality. Patient has a walker that she states she uses. Daughter states that patient frequently does not take her medication. Patient states she took her medication this morning, but daughter is unsure. Patient is hypertensive in triage without obvious symptoms.    PE - NAD, NC/AT, no midline TTP or cervical step offs, chest wall stable, RRR, bradycardic, lungs CTA B/L, abd soft, mild suprapubic TTP, pelvis stable, full ROM bilateral hips/knees, ankles, 2+ distal pulses, no LE edema, clear speech.    CT head/C/S due to recent fall, labs, EKG, UA/UCx. Will control blood pressure. Patient will require PT consult prior to discharge. Patient found to have C2 fracture, collar placed, trauma and spine consulted.

## 2022-04-03 NOTE — ED ADULT NURSE NOTE - OBJECTIVE STATEMENT
pt BIBA from home with reports of wetting the bed this morning. pt awake and alert, even and unlabored resps present, as per daughter pt with potential dementia, states she has been forgetful more lately as time progresses. pt hypertensive on arrival, states she has been taking her BP meds but daughter reports she has not been. pt reports she fell out of bed a few weeks ago. offers no reports of pain. skin warm dry and intact. pt states she usually ambulates with a walker.

## 2022-04-03 NOTE — ED CDU PROVIDER DISPOSITION NOTE - ATTENDING CONTRIBUTION TO CARE
Althea FINLEY= 82 Y/O F with h/o htn, hld p/w walking up in pool of urine at home, no fall or trauma. No weakness, numbness, Pt had a med alert , she pressed it and ems brought her to ED. No headache, nausea, vomiting    Pt is alert, well appearing female, s1s2 normal reg, b/l clear breath sounds, abd soft, nt, nd, neuro exam aox3, cn 2-12 intact, no focal deficits, skin warm dry, good turgor, pt poor historian and does not delineate much information, pt wearing c spine     mr head, c spine and ls spine ordered, mri brain showed aucte stroke, c spine showed chronic c2 fx and ls spine showed disc bulging but no cord compression, pt admitted for stroke.

## 2022-04-03 NOTE — ED CDU PROVIDER INITIAL DAY NOTE - NS ED ATTENDING STATEMENT MOD
This was a shared visit with the JAKE. I reviewed and verified the documentation and independently performed the documented:

## 2022-04-03 NOTE — PATIENT PROFILE ADULT - FALL HARM RISK - HARM RISK INTERVENTIONS

## 2022-04-03 NOTE — ED ADULT NURSE REASSESSMENT NOTE - NS ED NURSE REASSESS COMMENT FT1
Report received at 1915, care assumed.  Patient resting comfortably on stretcher in no apparent distress.  A&Ox3  81 year old female presents to ED from home with complaint of urinary incontinence.  As per family, patient has been falling frequently lately.  Patient found to have C2 fracture on CT scan ? old vs. new.  As per MRI, patient with 2 acute strokes noted.  NIH scale zero.  No residual noted.  CM-NSR->SB  No complaint of chest pain.  C-collar remains in place as ordered.  Dysphagia screen passed.  Telephone call to Dr. Abdullahi requesting diet order, order obtained and noted.  Patient tolerated 1/2 sandwich without difficulty.  IV #20 gauge intact to LAC no S/S of infiltration.  Premafit draining clear yellow urine.  Offers no complaints at this time.

## 2022-04-03 NOTE — CONSULT NOTE ADULT - ASSESSMENT
ASSESSMENT  81F PMH A fib on Eliquis, HTN, Hypothyroidism, Fe def. Anemia, Depression presented to [facility] after waking up covered in urine this morning. ED ordered CTH/CT C-spine for recent fall out of bed, found to have C2 fracture, likely chronic in nature.   - Patient with no neurologic deficit, or complaint of neck pain or weakness.       PLAN  -case d/w team  - no acute neurosurgical intervention indicated at this time  - Recommend MR C-spine w/o madhu to determine acuity of fracture  - C-Collar at all times until MR reviewed  - further w/u regarding urinary incontinence per primary team  - will continue to follow  ASSESSMENT  81F PMH A fib on Eliquis, HTN, Hypothyroidism, Fe def. Anemia, Depression presented to [facility] after waking up covered in urine this morning. ED ordered CTH/CT C-spine for recent fall out of bed, found to have C2 fracture, likely chronic in nature. Hydrocephalus noted on CTH,  - Patient with no neurologic deficit, or complaint of neck pain or weakness.       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

## 2022-04-03 NOTE — ED ADULT NURSE REASSESSMENT NOTE - NS ED NURSE REASSESS COMMENT FT1
pt remains AOX3 with no change in mental status at this time. report handed off to night RN at this time and care assumed.

## 2022-04-03 NOTE — ED ADULT NURSE REASSESSMENT NOTE - NS ED NURSE REASSESS COMMENT FT1
pt returned from MRI without issues, pt awake and alert, c-collar remains in place. no reports of pain, prima-fit in place for urinary needs. even and unlabored resps present, skin warm dry and intact. NSR on moniotr with more stable BP at this time. awaiting results. daughter at bedside and pt/family made aware of plan.

## 2022-04-03 NOTE — ED PROVIDER NOTE - OBJECTIVE STATEMENT
82yo female with history of Afib; on Eliquis, HTN, HLD, iron deficiency anemia, hypothyroidism, depression presenting with urinary incontinence. Patient states that she woke up this morning and noticed she urinated herself and has chills but denies burning with urination, blood in urine, fevers. Patient is unsure of her medical history. Denies any other symptoms. Denies fevers, headache, chest pain, palpitations, shortness of breath, cough, nausea, vomiting, diarrhea, dark stools, focal neurologic symptoms.

## 2022-04-03 NOTE — ED CDU PROVIDER INITIAL DAY NOTE - MEDICAL DECISION MAKING DETAILS
chronic C2 fracture, urinary incontinence - obs for MRI head, cervical spine, lumbar spine - neurosurgery following

## 2022-04-03 NOTE — ED ADULT TRIAGE NOTE - CHIEF COMPLAINT QUOTE
Pt brought in from home for eval of chills and urinary incontinence. Pt states that she is typically incontinent but has never happened in bed. Pt shivering at triage. Daughter states that she has been having lethargy and malaise at home. Pt states that she has been taking her meds.

## 2022-04-03 NOTE — ED CDU PROVIDER INITIAL DAY NOTE - ATTENDING CONTRIBUTION TO CARE
Althea FINLEY= 82 Y/O F with h/o htn, hld p/w walking up in pool of urine at home, no fall or trauma. No weakness, numbness, Pt had a med alert , she pressed it and ems brought her to ED. No headache, nausea, vomiting    Pt is alert, well appearing female, s1s2 normal reg, b/l clear breath sounds, abd soft, nt, nd, neuro exam aox3, cn 2-12 intact, no focal deficits, skinw arm dry, good turgor, pt poor historian and does not delineate much information, pt wearing c spine     mr head, c spine and ls spine ordered, mri brain showed aucte stroke, c spine showed chronic c2 fx and ls spine showed disc bulging but no cord compression, pt admitted for stroke

## 2022-04-03 NOTE — ED PROVIDER NOTE - PROGRESS NOTE DETAILS
spoke with daughter for collateral who states that patient has appeared weak for the past 3 days, unsure if she is taking medications, not drinking water and fell out of bed 2 days ago, found on the floor with unknown head strike. -Nikki, DO labs, urine, cxr reviewed. C2 fx on CT. spine consulted. MJ collar placed. MRI pending. stable on reassessment. -DO Nikki obs for MRI, PT. -Nikki, DO

## 2022-04-03 NOTE — H&P ADULT - ASSESSMENT
80 y/o female with PMH of afib on Eliquis, HTN, hypothyroidism, HLD, depression came to the ED complaining of urinary incontinence. Patient said she woke up and noticed she was wet, covered in urine which is unusual for her.  In the ED, CT head with no significant finding; CT cervical with C2 fracture. Initially placed in observation, neurosurgery consulted. MRI ordered. Medicine called for admission when acute stroke was seen on MRI.     Acute stroke   Admit to stroke unit   MRI: 2 small foci with the right cerebral white matter compatible with small acute stroke   Neuro checks q2h   Echo ordered   Lipid panel and HbA1C with AM lab   Atorvastatin 40mg (patient on 20mg prior to admission)  Aspirin 81mg   Neurology consulted     C2 fracture   CT neck noted   MRI cervical: chronic non-displaced type 2 dens fracture   Neurosurgery following     HTN/HLD  BP was elevated on arrival and treated prior to MRI showing acute stroke   Giving acute stroke, will hold BP medications for now to allow for permissive HTN   Monitor BP and resume meds as needed   Atorvastatin increased to 40mg     Afib   Rate controlled   Eliquis 5mg bid   Amiodarone 200mg     Hypothyroidism   Synthroid 50mcg     Depression  Escitalopram 10mg    Supportive   DVT prophylaxis: Eliquis 5mg bid   Diet: DASH (passed dysphagia screening as per ED nurse)    Plan of care discussed with patient and ED nurse

## 2022-04-03 NOTE — ED PROVIDER NOTE - CLINICAL SUMMARY MEDICAL DECISION MAKING FREE TEXT BOX
82yo female with history of Afib; on Eliquis, HTN, HLD, iron deficiency anemia, hypothyroidism, depression presenting with urinary incontinence this AM, per daughter weak for 3 days and noncompliance with home medications. Recent fall 2 days ago. Afebrile, hypertensive, RRR, lungs clear, abdomen soft, nontender, neurovascularly intact. Labs, CT, meds, urine, reassess.

## 2022-04-03 NOTE — H&P ADULT - HISTORY OF PRESENT ILLNESS
80 y/o female with PMH of afib on Eliquis, HTN, hypothyroidism, HLD, depression came to the ED complaining of urinary incontinence. Patient said she woke up and noticed she was wet, covered in urine which is unusual for her. She reported hx of fall about 2 weeks ago, did not seek any medical intervention. She has no blurry vision, dizziness, chest pain, shortness of breath, palpitation, nausea, vomiting, abdominal pain, change in bowel habit, weakness, numbness, fever, chills.

## 2022-04-03 NOTE — ED ADULT NURSE REASSESSMENT NOTE - NS ED NURSE REASSESS COMMENT FT1
pt to be admitted at this time, pt remains in no apparent distress. even and unlabored resps present, no change in mental status since arrival. remains in c-collar, awaiting hospitalist for admit orders and awaiting bed assignment.

## 2022-04-04 LAB
A1C WITH ESTIMATED AVERAGE GLUCOSE RESULT: 6.3 % — HIGH (ref 4–5.6)
CHOLEST SERPL-MCNC: 174 MG/DL — SIGNIFICANT CHANGE UP
CULTURE RESULTS: SIGNIFICANT CHANGE UP
ESTIMATED AVERAGE GLUCOSE: 134 MG/DL — HIGH (ref 68–114)
HDLC SERPL-MCNC: 41 MG/DL — LOW
LIPID PNL WITH DIRECT LDL SERPL: 105 MG/DL — HIGH
NON HDL CHOLESTEROL: 133 MG/DL — HIGH
SPECIMEN SOURCE: SIGNIFICANT CHANGE UP
TRIGL SERPL-MCNC: 139 MG/DL — SIGNIFICANT CHANGE UP

## 2022-04-04 PROCEDURE — 99233 SBSQ HOSP IP/OBS HIGH 50: CPT

## 2022-04-04 PROCEDURE — 99222 1ST HOSP IP/OBS MODERATE 55: CPT

## 2022-04-04 RX ORDER — ACETAMINOPHEN 500 MG
650 TABLET ORAL EVERY 6 HOURS
Refills: 0 | Status: DISCONTINUED | OUTPATIENT
Start: 2022-04-04 | End: 2022-04-12

## 2022-04-04 RX ADMIN — Medication 650 MILLIGRAM(S): at 04:12

## 2022-04-04 RX ADMIN — Medication 81 MILLIGRAM(S): at 08:04

## 2022-04-04 RX ADMIN — APIXABAN 5 MILLIGRAM(S): 2.5 TABLET, FILM COATED ORAL at 18:19

## 2022-04-04 RX ADMIN — APIXABAN 5 MILLIGRAM(S): 2.5 TABLET, FILM COATED ORAL at 05:26

## 2022-04-04 RX ADMIN — ATORVASTATIN CALCIUM 40 MILLIGRAM(S): 80 TABLET, FILM COATED ORAL at 22:18

## 2022-04-04 RX ADMIN — AMIODARONE HYDROCHLORIDE 200 MILLIGRAM(S): 400 TABLET ORAL at 08:05

## 2022-04-04 RX ADMIN — Medication 650 MILLIGRAM(S): at 03:12

## 2022-04-04 RX ADMIN — ESCITALOPRAM OXALATE 10 MILLIGRAM(S): 10 TABLET, FILM COATED ORAL at 08:05

## 2022-04-04 RX ADMIN — Medication 50 MICROGRAM(S): at 05:27

## 2022-04-04 NOTE — CONSULT NOTE ADULT - SUBJECTIVE AND OBJECTIVE BOX
Neurology consult    DONNA HYATT 81y Female       HPI:  80 y/o female with PMH of afib on Eliquis, HTN, hypothyroidism, HLD, depression came to the ED complaining of urinary incontinence. Patient said she woke up and noticed she was wet, covered in urine which is unusual for her. She reported hx of fall about 2 weeks ago, did not seek any medical intervention.  MRI brain revealed 2 punctate infarcts in the right cerebral hemisphere.    PMH: Atrial fibrillation    HTN (hypertension)    Hypothyroid    HLD (hyperlipidemia)    Hernia    Iron deficiency anemia    Depression    H/O tubal ligation         PSH: No significant past surgical history    No significant past surgical history    No significant past surgical history        FAMILY HISTORY:  FH: pancreatic cancer  father, in his 70&#x27;s     FH: breast cancer (Mother)  mother,  at age 50&#x27;s    FH: lung cancer  mother    SOCIAL HISTORY:  No history of tobacco or alcohol use     Allergies    No Known Allergies    Intolerances    Vital Signs Last 24 Hrs  T(C): 36.4 (2022 19:46), Max: 36.8 (2022 05:06)  T(F): 97.6 (2022 19:46), Max: 98.2 (2022 05:06)  HR: 68 (2022 19:46) (57 - 93)  BP: 164/70 (2022 19:46) (136/72 - 169/72)  BP(mean): --  RR: 18 (2022 19:46) (18 - 18)  SpO2: 93% (2022 19:46) (93% - 97%)  MEDICATIONS  acetaminophen     Tablet .. 650 milliGRAM(s) Oral every 6 hours PRN  aMIOdarone    Tablet 200 milliGRAM(s) Oral daily  apixaban 5 milliGRAM(s) Oral two times a day  aspirin  chewable 81 milliGRAM(s) Oral daily  atorvastatin 40 milliGRAM(s) Oral at bedtime  escitalopram 10 milliGRAM(s) Oral daily  levothyroxine 50 MICROGram(s) Oral daily      LABS:  CBC Full  -  ( 2022 10:41 )  WBC Count : 7.34 K/uL  RBC Count : 5.57 M/uL  Hemoglobin : 12.5 g/dL  Hematocrit : 43.4 %  Platelet Count - Automated : 301 K/uL  Mean Cell Volume : 77.9 fl  Mean Cell Hemoglobin : 22.4 pg  Mean Cell Hemoglobin Concentration : 28.8 gm/dL  Auto Neutrophil # : 5.66 K/uL  Auto Lymphocyte # : 0.96 K/uL  Auto Monocyte # : 0.43 K/uL  Auto Eosinophil # : 0.20 K/uL  Auto Basophil # : 0.06 K/uL  Auto Neutrophil % : 77.1 %  Auto Lymphocyte % : 13.1 %  Auto Monocyte % : 5.9 %  Auto Eosinophil % : 2.7 %  Auto Basophil % : 0.8 %    Urinalysis Basic - ( 2022 10:57 )    Color: Yellow / Appearance: Clear / S.010 / pH: x  Gluc: x / Ketone: Negative  / Bili: Negative / Urobili: Negative mg/dL   Blood: x / Protein: 15 / Nitrite: Negative   Leuk Esterase: Negative / RBC: 0-2 /HPF / WBC 0-2 /HPF   Sq Epi: x / Non Sq Epi: Occasional / Bacteria: Occasional      -    144  |  107  |  15.6  ----------------------------<  113<H>  4.2   |  23.0  |  0.81    Ca    9.5      2022 10:41  Mg     2.1     04-03    TPro  7.2  /  Alb  4.2  /  TBili  0.3<L>  /  DBili  x   /  AST  18  /  ALT  14  /  AlkPhos  98  04-03    LIVER FUNCTIONS - ( 2022 10:41 )  Alb: 4.2 g/dL / Pro: 7.2 g/dL / ALK PHOS: 98 U/L / ALT: 14 U/L / AST: 18 U/L / GGT: x           Hemoglobin A1C:   Lipid Panel - @ 03:30  Total Cholesterol, Serum 174  LDL --  Triglycerides 139    On Neurological Examination:    Mental Status - Patient is  awake, alert and oriented X2. ( Did not know the year. Said it was !) Speech is fluent. Patient can name, repeat and follow commands correctly  There is no dysarthria.    Cranial Nerves - PERRL, EOMI,  Visual fields are full to finger counting, no gross facial asymmetry, tongue/uvula midline    Motor Exam -   Right upper ---5/5 No drift  Left upper ---5/5 No drift  Right lower ---5/5 No drift  Left lower  ---5/5 No drift     nml bulk/tone    Sensory    Intact to light touch and pinprick bilaterally    Coord: FTN intact bilaterally     Gait -  Not assessed.        [facility] SS:   Date: 2022  Time: 1130  1a) Level of consciousness (0-3):   1b) Questions (0-2):   1c) Commands (0-2):   2  ) Gaze (0-2):   3  ) Visual field (0-3):   4  ) Facial palsy (0-3):   Motor  5a) Left arm (0-4):   5b) Right arm (0-4):   6a) Left leg (0-4):   6b) Right leg (0-4):   7  ) Ataxia (0-2):   Sensory  8  ) Sensory (0-2):   Speech  9  ) Language (0-3):   10) Dysarthria (0-2):   Extinction  11) Extinction/inattention (0-2):     Total score: = 0    Patient was last seen well at 2022 night  Prestroke Modified [place]: = 2     RADIOLOGY ( All neurological imaging studies were independently reviewed and interpreted by me)  CTH   CTA  CTP  CT Head No Cont (22 @ 11:45) >  IMPRESSION: No evidence of acute hemorrhage in the posterior fossa or   supratentorial region.    Fracture involving C2 as described above.     CT Cervical Spine No Cont (22 @ 11:46) >  IMPRESSION: No evidence of acute hemorrhage in the posterior fossa or   supratentorial region.    Fracture involving C2 as described above.    MRI:     MR Head No Cont (22 @ 16:38) >  IMPRESSION:  2 SMALL FOCI OF DIFFUSION RESTRICTION WITHIN THE RIGHT CEREBRALWHITE   MATTER COMPATIBLE WITH SMALL ACUTE INFARCTS.     MR Cervical Spine No Cont (22 @ 16:39) >    IMPRESSION:  *  CHRONIC APPEARING NONDISPLACED TYPE II DENS FRACTURE.  *  MILD TO MODERATE MULTILEVEL DEGENERATIVE CHANGES.    ISABELLA BOB MD; Attending Radiologist    MR Lumbar Spine No Cont (22 @ 16:39) >  IMPRESSION:  *  SMALL TO MODERATE RIGHT PARACENTRAL DISC EXTRUSION T11-12.  *  SMALL TO MODERATE RIGHT PARACENTRAL DISC EXTRUSION L4-5 WITH SUPERIOR   MIGRATION.  *  MODERATE LEFT PARACENTRAL DISC EXTRUSION L5-S1  *  SCOLIOSIS WITH ASSOCIATED DEGENERATIVE CHANGES.  *  LOW-LYING CORD WITH TIP OF THE CONUS ATTHE L3-4 LEVEL.      ISABELLA BOB MD; Attending Radiologist    TTE

## 2022-04-04 NOTE — PROGRESS NOTE ADULT - ASSESSMENT
80 y/o female with PMH of afib on Eliquis, HTN, hypothyroidism, HLD, depression came to the ED complaining of urinary incontinence. Patient said she woke up and noticed she was wet, covered in urine which is unusual for her.  In the ED, CT head with no significant finding; CT cervical with C2 fracture. Initially placed in observation, neurosurgery consulted. MRI ordered. Medicine called for admission when acute stroke was seen on MRI.     Acute stroke   MRI: 2 small foci with the right cerebral white matter compatible with small acute stroke   Neuro checks q2h   Awaiting TTE   Atorvastatin 40mg (patient on 20mg prior to admission)  Aspirin 81mg   Neurology on board, will leave current regimen of ASA and Eliquis for now. Reevaluate fall risk and adjust accordingly     C2 fracture   CT neck noted   MRI cervical: chronic non-displaced type 2 dens fracture   Neurosurgery following     HTN/HLD  BP was elevated on arrival and treated prior to MRI showing acute stroke   Giving acute stroke, will hold BP medications for now to allow for permissive HTN   Monitor BP and resume meds as needed   Atorvastatin increased to 40mg     Afib   Rate controlled   Eliquis 5mg bid   Amiodarone 200mg     Hypothyroidism   Synthroid 50mcg     Depression  Escitalopram 10mg    Supportive   DVT prophylaxis: Eliquis 5mg bid   Diet: DASH (passed dysphagia screening as per ED nurse)

## 2022-04-04 NOTE — PHYSICAL THERAPY INITIAL EVALUATION ADULT - PERTINENT HX OF CURRENT PROBLEM, REHAB EVAL
82 y/o female with c/o urinary incontinence (not typical). Also reports fall 2 weeks PTA. Found to have chronic C2 dens fx, non-displaced.  Pt also found to have small acute CVA on CT.

## 2022-04-04 NOTE — PHYSICAL THERAPY INITIAL EVALUATION ADULT - GENERAL OBSERVATIONS, REHAB EVAL
Pt received lying in bed in ED holding room, (+) IV lock, (+) purewick, (+) c-collar, NAD. Agreeable to PT evaluation.

## 2022-04-04 NOTE — PHYSICAL THERAPY INITIAL EVALUATION ADULT - ADDITIONAL COMMENTS
Pt reports living with daughter, son-in-law, and 10 y/o grandchildren in a private house, 2 steps to enter with rail. None indoors. Ambulates with rollator at baseline, independently.  Assist for stairs. Has a shower chair, rolling walker, stand assist recliner chair. Sleeps in a regular bed.  Pt reports that she is very rarely home alone.  There is about 1.5 hours a day when she does not have her son-in-law or daughter at home.  They are able to assist as needed.

## 2022-04-04 NOTE — CONSULT NOTE ADULT - ASSESSMENT
IMPRESSION:    Anatomical dx.  Etiology  Risk Factors.    ASSESSMENT/ PLAN:     - Admit to inpatient hospitalist service.  - Neuro checks and vital signs Q 2 hours.  - SBP goal permissive upto 180 for 24 hours and then normotensive.  - Continue Eliquis 5 BID and ASA 81 mg PO or 300 NM QD.  - Lipitor for LDL goal of < 70 .  - Telemetry monitoring.  - CT Head an CSpine. - images and report were reviewed.  -  MRI Brain C spine and LS Spine-- images and report were reviewed.  - Check fasting Lipid panel and HbA1c  - TTE with bubble study.  - Check routine EEG  ( R/O seizure )  - Neurosurgery is following for possible NPH  - PT OT SLP evaluation.  - SCD/ Eliquis for DVT prophylaxis.         IMPRESSION:    Right hemispheric punctate infarcts.  Mechanism embolic versus small vessel disease  Risk Factors.  Afib, HLD, HTN      ASSESSMENT/ PLAN:     - Admit to inpatient hospitalist service.  - Neuro checks and vital signs Q 2 hours.  - SBP goal permissive upto 180 for 24 hours and then normotensive.  - Continue Eliquis 5 BID and ASA 81 mg PO or 300 MA QD.  - Lipitor for LDL goal of < 70 .  - Telemetry monitoring.  - CT Head an CSpine. - images and report were reviewed.  -  MRI Brain C spine and LS Spine-- images and report were reviewed.  - Check fasting Lipid panel and HbA1c  - TTE with bubble study.  - Check routine EEG  ( R/O seizure )  - Neurosurgery is following for possible NPH  - PT OT SLP evaluation.  - SCD/ Eliquis for DVT prophylaxis.

## 2022-04-04 NOTE — PHYSICAL THERAPY INITIAL EVALUATION ADULT - LEVEL OF INDEPENDENCE: STAIR NEGOTIATION, REHAB EVAL
Pt requesting to return to bed and neurologist arrived upon return to bed.  Plan for stairs assessment next session if appropriate.

## 2022-04-04 NOTE — PROGRESS NOTE ADULT - SUBJECTIVE AND OBJECTIVE BOX
[facility] Division of Hospital Medicine    Chief Complaint:  CVA     SUBJECTIVE / OVERNIGHT EVENTS:  Pt examined lying in bed with C Collar  D/w Neuro. given fall risks for now will continue current management, will   Patient denies chest pain, SOB, abd pain, N/V, fever, chills, dysuria or any other complaints. All remainder ROS negative.     MEDICATIONS  (STANDING):  aMIOdarone    Tablet 200 milliGRAM(s) Oral daily  apixaban 5 milliGRAM(s) Oral two times a day  aspirin  chewable 81 milliGRAM(s) Oral daily  atorvastatin 40 milliGRAM(s) Oral at bedtime  escitalopram 10 milliGRAM(s) Oral daily  levothyroxine 50 MICROGram(s) Oral daily    MEDICATIONS  (PRN):  acetaminophen     Tablet .. 650 milliGRAM(s) Oral every 6 hours PRN Temp greater or equal to 38C (100.4F), Mild Pain (1 - 3)        I&O's Summary    2022 07:01  -  2022 17:04  --------------------------------------------------------  IN: 0 mL / OUT: 175 mL / NET: -175 mL        PHYSICAL EXAM:  Vital Signs Last 24 Hrs  T(C): 36.7 (2022 15:48), Max: 36.8 (2022 05:06)  T(F): 98 (2022 15:48), Max: 98.2 (2022 05:06)  HR: 57 (2022 15:48) (57 - 93)  BP: 169/72 (2022 15:48) (136/72 - 169/72)  BP(mean): --  RR: 18 (2022 15:48) (18 - 18)  SpO2: 97% (2022 15:48) (94% - 97%)        CONSTITUTIONAL: Non toxic appearing, lying in bed  ENMT: Moist oral mucosa, no pharyngeal injection or exudates; normal dentition with C collar   RESPIRATORY: Normal respiratory effort; lungs are clear to auscultation bilaterally  CARDIOVASCULAR: Regular rate and rhythm, normal S1 and S2, no murmur/rub/gallop; No lower extremity edema; Peripheral pulses are 2+ bilaterally  ABDOMEN: Nontender to palpation, normoactive bowel sounds, no rebound/guarding; No hepatosplenomegaly  MUSCLOSKELETAL : no clubbing or cyanosis of digits; no joint swelling or tenderness to palpation  PSYCH: A+O to person, place, and time; affect appropriate  NEUROLOGY: CN 2-12 are intact and symmetric; no gross sensory deficits;   SKIN: No rashes; no palpable lesions    LABS:                        12.5   7.34  )-----------( 301      ( 2022 10:41 )             43.4     04-03    144  |  107  |  15.6  ----------------------------<  113<H>  4.2   |  23.0  |  0.81    Ca    9.5      2022 10:41  Mg     2.1     04-03    TPro  7.2  /  Alb  4.2  /  TBili  0.3<L>  /  DBili  x   /  AST  18  /  ALT  14  /  AlkPhos  98  04-03      CARDIAC MARKERS ( 2022 10:41 )  x     / <0.01 ng/mL / x     / x     / x          Urinalysis Basic - ( 2022 10:57 )    Color: Yellow / Appearance: Clear / S.010 / pH: x  Gluc: x / Ketone: Negative  / Bili: Negative / Urobili: Negative mg/dL   Blood: x / Protein: 15 / Nitrite: Negative   Leuk Esterase: Negative / RBC: 0-2 /HPF / WBC 0-2 /HPF   Sq Epi: x / Non Sq Epi: Occasional / Bacteria: Occasional        Culture - Urine (collected 2022 15:00)  Source: Clean Catch Clean Catch (Midstream)  Final Report (2022 12:07):    <10,000 CFU/mL Normal Urogenital Ann      CAPILLARY BLOOD GLUCOSE            RADIOLOGY & ADDITIONAL TESTS:    4/3/22 MRI Brain   IMPRESSION:  2 SMALL FOCI OF DIFFUSION RESTRICTION WITHIN THE RIGHT CEREBRAL WHITE   MATTER COMPATIBLE WITH SMALL ACUTE INFARCTS.    4/3/22 MRI C Spine   IMPRESSION:   CHRONIC APPEARING NONDISPLACED TYPE II DENS FRACTURE.   MILD TO MODERATE MULTILEVEL DEGENERATIVE CHANGES.      22 MRI L spine   IMPRESSION:  SMALL TO MODERATE RIGHT PARACENTRAL DISC EXTRUSION T11-12.  SMALL TO MODERATE RIGHT PARACENTRAL DISC EXTRUSION L4-5 WITH SUPERIOR   MIGRATION.  MODERATE LEFT PARACENTRAL DISC EXTRUSION L5-S1  SCOLIOSIS WITH ASSOCIATED DEGENERATIVE CHANGES.  LOW-LYING CORD WITH TIP OF THE CONUS AT THE L3-4 LEVEL.

## 2022-04-05 LAB
ANION GAP SERPL CALC-SCNC: 14 MMOL/L — SIGNIFICANT CHANGE UP (ref 5–17)
BUN SERPL-MCNC: 24.7 MG/DL — HIGH (ref 8–20)
CALCIUM SERPL-MCNC: 8.9 MG/DL — SIGNIFICANT CHANGE UP (ref 8.6–10.2)
CHLORIDE SERPL-SCNC: 104 MMOL/L — SIGNIFICANT CHANGE UP (ref 98–107)
CO2 SERPL-SCNC: 22 MMOL/L — SIGNIFICANT CHANGE UP (ref 22–29)
CREAT SERPL-MCNC: 0.88 MG/DL — SIGNIFICANT CHANGE UP (ref 0.5–1.3)
EGFR: 66 ML/MIN/1.73M2 — SIGNIFICANT CHANGE UP
GLUCOSE SERPL-MCNC: 105 MG/DL — HIGH (ref 70–99)
HCT VFR BLD CALC: 39.1 % — SIGNIFICANT CHANGE UP (ref 34.5–45)
HGB BLD-MCNC: 11.2 G/DL — LOW (ref 11.5–15.5)
MCHC RBC-ENTMCNC: 22.4 PG — LOW (ref 27–34)
MCHC RBC-ENTMCNC: 28.6 GM/DL — LOW (ref 32–36)
MCV RBC AUTO: 78.4 FL — LOW (ref 80–100)
PLATELET # BLD AUTO: 271 K/UL — SIGNIFICANT CHANGE UP (ref 150–400)
POTASSIUM SERPL-MCNC: 3.7 MMOL/L — SIGNIFICANT CHANGE UP (ref 3.5–5.3)
POTASSIUM SERPL-SCNC: 3.7 MMOL/L — SIGNIFICANT CHANGE UP (ref 3.5–5.3)
RBC # BLD: 4.99 M/UL — SIGNIFICANT CHANGE UP (ref 3.8–5.2)
RBC # FLD: 16.7 % — HIGH (ref 10.3–14.5)
SODIUM SERPL-SCNC: 140 MMOL/L — SIGNIFICANT CHANGE UP (ref 135–145)
WBC # BLD: 9.1 K/UL — SIGNIFICANT CHANGE UP (ref 3.8–10.5)
WBC # FLD AUTO: 9.1 K/UL — SIGNIFICANT CHANGE UP (ref 3.8–10.5)

## 2022-04-05 PROCEDURE — 99232 SBSQ HOSP IP/OBS MODERATE 35: CPT

## 2022-04-05 PROCEDURE — 93306 TTE W/DOPPLER COMPLETE: CPT | Mod: 26

## 2022-04-05 PROCEDURE — 99233 SBSQ HOSP IP/OBS HIGH 50: CPT

## 2022-04-05 RX ORDER — HYDRALAZINE HCL 50 MG
10 TABLET ORAL EVERY 6 HOURS
Refills: 0 | Status: DISCONTINUED | OUTPATIENT
Start: 2022-04-05 | End: 2022-04-12

## 2022-04-05 RX ADMIN — Medication 650 MILLIGRAM(S): at 07:00

## 2022-04-05 RX ADMIN — APIXABAN 5 MILLIGRAM(S): 2.5 TABLET, FILM COATED ORAL at 05:50

## 2022-04-05 RX ADMIN — ESCITALOPRAM OXALATE 10 MILLIGRAM(S): 10 TABLET, FILM COATED ORAL at 08:05

## 2022-04-05 RX ADMIN — ATORVASTATIN CALCIUM 40 MILLIGRAM(S): 80 TABLET, FILM COATED ORAL at 22:22

## 2022-04-05 RX ADMIN — AMIODARONE HYDROCHLORIDE 200 MILLIGRAM(S): 400 TABLET ORAL at 05:49

## 2022-04-05 RX ADMIN — APIXABAN 5 MILLIGRAM(S): 2.5 TABLET, FILM COATED ORAL at 16:33

## 2022-04-05 RX ADMIN — Medication 650 MILLIGRAM(S): at 05:50

## 2022-04-05 RX ADMIN — Medication 50 MICROGRAM(S): at 05:50

## 2022-04-05 RX ADMIN — Medication 81 MILLIGRAM(S): at 08:05

## 2022-04-05 NOTE — PROGRESS NOTE ADULT - SUBJECTIVE AND OBJECTIVE BOX
[facility] Division of Hospital Medicine    Chief Complaint:  CVA     SUBJECTIVE / OVERNIGHT EVENTS:  Pt examined lying in bed with C Collar  Detailed d/w pt regarding risks of CVA vs fall risk regarding AC   Cardiology consulted   Patient denies chest pain, SOB, abd pain, N/V, fever, chills, dysuria or any other complaints. All remainder ROS negative.     MEDICATIONS  (STANDING):  aMIOdarone    Tablet 200 milliGRAM(s) Oral daily  apixaban 5 milliGRAM(s) Oral two times a day  aspirin  chewable 81 milliGRAM(s) Oral daily  atorvastatin 40 milliGRAM(s) Oral at bedtime  escitalopram 10 milliGRAM(s) Oral daily  levothyroxine 50 MICROGram(s) Oral daily    MEDICATIONS  (PRN):  acetaminophen     Tablet .. 650 milliGRAM(s) Oral every 6 hours PRN Temp greater or equal to 38C (100.4F), Mild Pain (1 - 3)        I&O's Summary    2022 07:01  -  2022 17:04  --------------------------------------------------------  IN: 0 mL / OUT: 175 mL / NET: -175 mL        PHYSICAL EXAM:  Vital Signs Last 24 Hrs  T(C): 36.5 (2022 17:48), Max: 36.9 (2022 07:52)  T(F): 97.7 (2022 17:48), Max: 98.5 (2022 07:52)  HR: 62 (2022 17:48) (58 - 73)  BP: 175/71 (2022 17:48) (128/88 - 192/75)  BP(mean): --  RR: 18 (2022 17:48) (18 - 18)  SpO2: 93% (2022 17:48) (92% - 97%)      CONSTITUTIONAL: Non toxic appearing, lying in bed  ENMT: Moist oral mucosa, no pharyngeal injection or exudates; normal dentition with C collar   RESPIRATORY: Normal respiratory effort; lungs are clear to auscultation bilaterally  CARDIOVASCULAR: Regular rate and rhythm, normal S1 and S2, no murmur/rub/gallop; No lower extremity edema; Peripheral pulses are 2+ bilaterally  ABDOMEN: Nontender to palpation, normoactive bowel sounds, no rebound/guarding; No hepatosplenomegaly  MUSCLOSKELETAL : no clubbing or cyanosis of digits; no joint swelling or tenderness to palpation  PSYCH: A+O to person, place, and time; affect appropriate  NEUROLOGY: CN 2-12 are intact and symmetric; no gross sensory deficits;   SKIN: No rashes; no palpable lesions    LABS:                                     11.2   9.10  )-----------( 271      ( 2022 03:00 )             39.1   04-05    140  |  104  |  24.7<H>  ----------------------------<  105<H>  3.7   |  22.0  |  0.88    Ca    8.9      2022 03:00        CARDIAC MARKERS ( 2022 10:41 )  x     / <0.01 ng/mL / x     / x     / x          Urinalysis Basic - ( 2022 10:57 )    Color: Yellow / Appearance: Clear / S.010 / pH: x  Gluc: x / Ketone: Negative  / Bili: Negative / Urobili: Negative mg/dL   Blood: x / Protein: 15 / Nitrite: Negative   Leuk Esterase: Negative / RBC: 0-2 /HPF / WBC 0-2 /HPF   Sq Epi: x / Non Sq Epi: Occasional / Bacteria: Occasional        Culture - Urine (collected 2022 15:00)  Source: Clean Catch Clean Catch (Midstream)  Final Report (2022 12:07):    <10,000 CFU/mL Normal Urogenital Ann      CAPILLARY BLOOD GLUCOSE            RADIOLOGY & ADDITIONAL TESTS:    4/3/22 MRI Brain   IMPRESSION:  2 SMALL FOCI OF DIFFUSION RESTRICTION WITHIN THE RIGHT CEREBRAL WHITE   MATTER COMPATIBLE WITH SMALL ACUTE INFARCTS.    4/3/22 MRI C Spine   IMPRESSION:   CHRONIC APPEARING NONDISPLACED TYPE II DENS FRACTURE.   MILD TO MODERATE MULTILEVEL DEGENERATIVE CHANGES.      22 MRI L spine   IMPRESSION:  SMALL TO MODERATE RIGHT PARACENTRAL DISC EXTRUSION T11-12.  SMALL TO MODERATE RIGHT PARACENTRAL DISC EXTRUSION L4-5 WITH SUPERIOR   MIGRATION.  MODERATE LEFT PARACENTRAL DISC EXTRUSION L5-S1  SCOLIOSIS WITH ASSOCIATED DEGENERATIVE CHANGES.  LOW-LYING CORD WITH TIP OF THE CONUS AT THE L3-4 LEVEL.

## 2022-04-05 NOTE — PROGRESS NOTE ADULT - ASSESSMENT
IMPRESSION:    Right hemispheric punctate infarcts.  Mechanism embolic versus small vessel disease  Risk Factors.  Afib, HLD, HTN    ASSESSMENT/ PLAN:     - Admit to inpatient hospitalist service.  - Neuro checks and vital signs Q 2 hours.  - SBP goal  normotensive.  - Continue Eliquis 5 BID and ASA 81 mg PO or 300 ND QD.  - Lipitor for LDL goal of < 70 .  - Telemetry monitoring.  - CT Head an CSpine. - images and report were reviewed.  -  MRI Brain C spine and LS Spine-- images and report were reviewed.  - Check fasting Lipid panel and HbA1c  - TTE with bubble study.  - Check routine EEG  ( R/O seizure )  - Neurosurgery is following for possible NPH  - PT OT SLP evaluation.  - SCD/ Eliquis for DVT prophylaxis.

## 2022-04-05 NOTE — PROGRESS NOTE ADULT - SUBJECTIVE AND OBJECTIVE BOX
INTERVAL HPI/OVERNIGHT EVENTS:  81yF presented after falling out of bed.   Pt is awake, alert, resp, following commands throughout.       MEDICATIONS  (STANDING):  aMIOdarone    Tablet 200 milliGRAM(s) Oral daily  apixaban 5 milliGRAM(s) Oral two times a day  aspirin  chewable 81 milliGRAM(s) Oral daily  atorvastatin 40 milliGRAM(s) Oral at bedtime  escitalopram 10 milliGRAM(s) Oral daily  levothyroxine 50 MICROGram(s) Oral daily    MEDICATIONS  (PRN):  acetaminophen     Tablet .. 650 milliGRAM(s) Oral every 6 hours PRN Temp greater or equal to 38C (100.4F), Mild Pain (1 - 3)      Allergies  No Known Allergies  Intolerances      Vital Signs Last 24 Hrs  T(C): 36.9 (05 Apr 2022 07:52), Max: 36.9 (05 Apr 2022 07:52)  T(F): 98.5 (05 Apr 2022 07:52), Max: 98.5 (05 Apr 2022 07:52)  HR: 58 (05 Apr 2022 07:52) (57 - 73)  BP: 128/88 (05 Apr 2022 07:52) (128/88 - 184/70)  BP(mean): --  RR: 18 (05 Apr 2022 07:52) (18 - 18)  SpO2: 96% (05 Apr 2022 07:52) (92% - 97%) BMI (kg/m2): 27 (04-03-22 @ 09:36)      PHYSICAL EXAM  GENERAL: NAD, well-groomed, well-developed  HEAD:  Atraumatic, Normocephalic  EYES: EOMI, PERRLA, conjunctiva and sclera clear  ENMT: No tonsillar erythema, exudates, or enlargement; Moist mucous membranes,   NECK: collar  NERVOUS SYSTEM:  Alert & Oriented X3, Good concentration; Motor Strength 5/5 B/L upper and lower extremities; DTRs 2+ intact and symmetric  CHEST/LUNG: Clear BS bilaterally; No rales, rhonchi, wheezing, or rubs  HEART: Regular rate and rhythm; No murmurs, rubs, or gallops  ABDOMEN: Soft, Nontender, Nondistended; Bowel sounds present  EXTREMITIES:  2+ Peripheral Pulses, No  edema      LABS:                          11.2   9.10  )-----------( 271      ( 05 Apr 2022 03:00 )             39.1     04-05    140  |  104  |  24.7<H>  ----------------------------<  105<H>  3.7   |  22.0  |  0.88    Ca    8.9      05 Apr 2022 03:00          I&O's Detail    04 Apr 2022 07:01  -  05 Apr 2022 07:00  --------------------------------------------------------  IN:  Total IN: 0 mL    OUT:    Voided (mL): 175 mL  Total OUT: 175 mL    Total NET: -175 mL        RADIOLOGY & ADDITIONAL TESTS:

## 2022-04-05 NOTE — PROGRESS NOTE ADULT - SUBJECTIVE AND OBJECTIVE BOX
Neurology     DONNA HYATT 81y Female       HPI:  82 y/o female with PMH of afib on Eliquis, HTN, hypothyroidism, HLD, depression came to the ED complaining of urinary incontinence. Patient said she woke up and noticed she was wet, covered in urine which is unusual for her. She reported hx of fall about 2 weeks ago, did not seek any medical intervention.  MRI brain revealed 2 punctate infarcts in the right cerebral hemisphere.    PMH: Atrial fibrillation    HTN (hypertension)    Hypothyroid    HLD (hyperlipidemia)    Hernia    Iron deficiency anemia    Depression    H/O tubal ligation         PSH: No significant past surgical history    No significant past surgical history    No significant past surgical history        FAMILY HISTORY:  FH: pancreatic cancer  father, in his 70&#x27;s     FH: breast cancer (Mother)  mother,  at age 50&#x27;s    FH: lung cancer  mother    SOCIAL HISTORY:  No history of tobacco or alcohol use     Allergies    No Known Allergies    Intolerances        Vital Signs Last 24 Hrs  T(C): 36.5 (2022 17:48), Max: 36.9 (2022 07:52)  T(F): 97.7 (2022 17:48), Max: 98.5 (2022 07:52)  HR: 62 (2022 17:48) (58 - 73)  BP: 175/71 (2022 17:48) (128/88 - 192/75)  BP(mean): --  RR: 18 (2022 17:48) (18 - 18)  SpO2: 93% (2022 17:48) (92% - 97%)    MEDICATIONS    acetaminophen     Tablet .. 650 milliGRAM(s) Oral every 6 hours PRN  aMIOdarone    Tablet 200 milliGRAM(s) Oral daily  apixaban 5 milliGRAM(s) Oral two times a day  aspirin  chewable 81 milliGRAM(s) Oral daily  atorvastatin 40 milliGRAM(s) Oral at bedtime  escitalopram 10 milliGRAM(s) Oral daily  hydrALAZINE Injectable 10 milliGRAM(s) IV Push every 6 hours PRN  levothyroxine 50 MICROGram(s) Oral daily         LABS:  CBC Full  -  ( 2022 03:00 )  WBC Count : 9.10 K/uL  RBC Count : 4.99 M/uL  Hemoglobin : 11.2 g/dL  Hematocrit : 39.1 %  Platelet Count - Automated : 271 K/uL  Mean Cell Volume : 78.4 fl  Mean Cell Hemoglobin : 22.4 pg  Mean Cell Hemoglobin Concentration : 28.6 gm/dL  Auto Neutrophil # : x  Auto Lymphocyte # : x  Auto Monocyte # : x  Auto Eosinophil # : x  Auto Basophil # : x  Auto Neutrophil % : x  Auto Lymphocyte % : x  Auto Monocyte % : x  Auto Eosinophil % : x  Auto Basophil % : x          140  |  104  |  24.7<H>  ----------------------------<  105<H>  3.7   |  22.0  |  0.88    Ca    8.9      2022 03:00        STROKE CORE LABS:  Hemoglobin A1C:   Lipid Panel  @ 03:30  Total Cholesterol, Serum 174  LDL --  Triglycerides 139    On Neurological Examination:    Mental Status - Patient is  awake, alert and oriented X2. ( Did not know the year. Said it was !) Speech is fluent. Patient can name, repeat and follow commands correctly  There is no dysarthria.    Cranial Nerves - PERRL, EOMI,  Visual fields are full to finger counting, no gross facial asymmetry, tongue/uvula midline    Motor Exam -   Right upper ---5/5 No drift  Left upper ---5/5 No drift  Right lower ---5/5 No drift  Left lower  ---5/5 No drift     nml bulk/tone    Sensory    Intact to light touch and pinprick bilaterally    Coord: FTN intact bilaterally     Gait -  Not assessed.          RADIOLOGY ( All neurological imaging studies were independently reviewed and interpreted by me)  CTH   CTA  CTP  CT Head No Cont (22 @ 11:45) >  IMPRESSION: No evidence of acute hemorrhage in the posterior fossa or   supratentorial region.    Fracture involving C2 as described above.     CT Cervical Spine No Cont (22 @ 11:46) >  IMPRESSION: No evidence of acute hemorrhage in the posterior fossa or   supratentorial region.    Fracture involving C2 as described above.    MRI:     MR Head No Cont (22 @ 16:38) >  IMPRESSION:  2 SMALL FOCI OF DIFFUSION RESTRICTION WITHIN THE RIGHT CEREBRALWHITE   MATTER COMPATIBLE WITH SMALL ACUTE INFARCTS.     MR Cervical Spine No Cont (22 @ 16:39) >    IMPRESSION:  *  CHRONIC APPEARING NONDISPLACED TYPE II DENS FRACTURE.  *  MILD TO MODERATE MULTILEVEL DEGENERATIVE CHANGES.    ISABELLA BOB MD; Attending Radiologist    MR Lumbar Spine No Cont (22 @ 16:39) >  IMPRESSION:  *  SMALL TO MODERATE RIGHT PARACENTRAL DISC EXTRUSION T11-12.  *  SMALL TO MODERATE RIGHT PARACENTRAL DISC EXTRUSION L4-5 WITH SUPERIOR   MIGRATION.  *  MODERATE LEFT PARACENTRAL DISC EXTRUSION L5-S1  *  SCOLIOSIS WITH ASSOCIATED DEGENERATIVE CHANGES.  *  LOW-LYING CORD WITH TIP OF THE CONUS ATTHE L3-4 LEVEL.      ISABELLA BOB MD; Attending Radiologist    TTE

## 2022-04-05 NOTE — CONSULT NOTE ADULT - SUBJECTIVE AND OBJECTIVE BOX
DONNA HYATT  [id number]    Patient is a 80yo F with HTN, HLD, PAF, moderate aortic stenosis admitted after fall and urinary incontinence. Found to have CVA, ? embolic. Also C2 spinal fracture. Echo still pending. Given spinal fracture I do not recommend DE At this time. Will follow up echo and can reconsider as outpatient. Currently in SR on ECG, continue eliquis. Full consult to follow.     Goran Mendieta MD

## 2022-04-05 NOTE — PROGRESS NOTE ADULT - ASSESSMENT
80 y/o female with PMH of afib on Eliquis, HTN, hypothyroidism, HLD, depression came to the ED complaining of urinary incontinence. Patient said she woke up and noticed she was wet, covered in urine which is unusual for her.  In the ED, CT head with no significant finding; CT cervical with C2 fracture. Initially placed in observation, neurosurgery consulted. MRI ordered. Medicine called for admission when acute stroke was seen on MRI.     Acute stroke   MRI: 2 small foci with the right cerebral white matter compatible with small acute stroke   Neuro checks Q4 hours   Awaiting TTE   Atorvastatin 40mg (patient on 20mg prior to admission)  Aspirin 81mg   Neurology on board, will leave current regimen of ASA and Eliquis for now. Reevaluate fall risk and adjust accordingly   High risk for ED complications given C2 fracture at this time  Cardiology consulted,     C2 fracture   CT neck noted   MRI cervical: chronic non-displaced type 2 dens fracture   Neurosurgery following     HTN/HLD  BP was elevated on arrival and treated prior to MRI showing acute stroke   Giving acute stroke, will hold BP medications for now to allow for permissive HTN  Hydralazine 10 mg Q6 IV PRN for SBP > 160 from now  Monitor BP and resume meds as needed   Atorvastatin increased to 40mg     Afib   Rate controlled   Eliquis 5mg bid   Amiodarone 200mg     Hypothyroidism   Synthroid 50mcg     Depression  Escitalopram 10mg    Supportive   DVT prophylaxis: Eliquis 5mg bid   Diet: DASH

## 2022-04-06 ENCOUNTER — TRANSCRIPTION ENCOUNTER (OUTPATIENT)
Age: 82
End: 2022-04-06

## 2022-04-06 LAB
ANION GAP SERPL CALC-SCNC: 13 MMOL/L — SIGNIFICANT CHANGE UP (ref 5–17)
BUN SERPL-MCNC: 25 MG/DL — HIGH (ref 8–20)
CALCIUM SERPL-MCNC: 9.3 MG/DL — SIGNIFICANT CHANGE UP (ref 8.6–10.2)
CHLORIDE SERPL-SCNC: 105 MMOL/L — SIGNIFICANT CHANGE UP (ref 98–107)
CO2 SERPL-SCNC: 21 MMOL/L — LOW (ref 22–29)
CREAT SERPL-MCNC: 0.8 MG/DL — SIGNIFICANT CHANGE UP (ref 0.5–1.3)
EGFR: 74 ML/MIN/1.73M2 — SIGNIFICANT CHANGE UP
GLUCOSE SERPL-MCNC: 95 MG/DL — SIGNIFICANT CHANGE UP (ref 70–99)
HCT VFR BLD CALC: 41 % — SIGNIFICANT CHANGE UP (ref 34.5–45)
HGB BLD-MCNC: 11.7 G/DL — SIGNIFICANT CHANGE UP (ref 11.5–15.5)
MCHC RBC-ENTMCNC: 22.3 PG — LOW (ref 27–34)
MCHC RBC-ENTMCNC: 28.5 GM/DL — LOW (ref 32–36)
MCV RBC AUTO: 78.2 FL — LOW (ref 80–100)
PLATELET # BLD AUTO: 305 K/UL — SIGNIFICANT CHANGE UP (ref 150–400)
POTASSIUM SERPL-MCNC: 4 MMOL/L — SIGNIFICANT CHANGE UP (ref 3.5–5.3)
POTASSIUM SERPL-SCNC: 4 MMOL/L — SIGNIFICANT CHANGE UP (ref 3.5–5.3)
RBC # BLD: 5.24 M/UL — HIGH (ref 3.8–5.2)
RBC # FLD: 16.9 % — HIGH (ref 10.3–14.5)
SODIUM SERPL-SCNC: 139 MMOL/L — SIGNIFICANT CHANGE UP (ref 135–145)
WBC # BLD: 9.16 K/UL — SIGNIFICANT CHANGE UP (ref 3.8–10.5)
WBC # FLD AUTO: 9.16 K/UL — SIGNIFICANT CHANGE UP (ref 3.8–10.5)

## 2022-04-06 PROCEDURE — 93308 TTE F-UP OR LMTD: CPT | Mod: 26

## 2022-04-06 PROCEDURE — 99223 1ST HOSP IP/OBS HIGH 75: CPT

## 2022-04-06 PROCEDURE — 99233 SBSQ HOSP IP/OBS HIGH 50: CPT

## 2022-04-06 PROCEDURE — 99232 SBSQ HOSP IP/OBS MODERATE 35: CPT

## 2022-04-06 RX ORDER — ATORVASTATIN CALCIUM 80 MG/1
80 TABLET, FILM COATED ORAL AT BEDTIME
Refills: 0 | Status: DISCONTINUED | OUTPATIENT
Start: 2022-04-06 | End: 2022-04-12

## 2022-04-06 RX ORDER — HYDRALAZINE HCL 50 MG
10 TABLET ORAL ONCE
Refills: 0 | Status: COMPLETED | OUTPATIENT
Start: 2022-04-06 | End: 2022-04-06

## 2022-04-06 RX ADMIN — Medication 50 MICROGRAM(S): at 05:13

## 2022-04-06 RX ADMIN — Medication 81 MILLIGRAM(S): at 12:52

## 2022-04-06 RX ADMIN — Medication 650 MILLIGRAM(S): at 02:18

## 2022-04-06 RX ADMIN — ESCITALOPRAM OXALATE 10 MILLIGRAM(S): 10 TABLET, FILM COATED ORAL at 12:52

## 2022-04-06 RX ADMIN — APIXABAN 5 MILLIGRAM(S): 2.5 TABLET, FILM COATED ORAL at 17:29

## 2022-04-06 RX ADMIN — AMIODARONE HYDROCHLORIDE 200 MILLIGRAM(S): 400 TABLET ORAL at 05:13

## 2022-04-06 RX ADMIN — Medication 10 MILLIGRAM(S): at 02:17

## 2022-04-06 RX ADMIN — Medication 10 MILLIGRAM(S): at 05:13

## 2022-04-06 RX ADMIN — ATORVASTATIN CALCIUM 80 MILLIGRAM(S): 80 TABLET, FILM COATED ORAL at 21:08

## 2022-04-06 RX ADMIN — APIXABAN 5 MILLIGRAM(S): 2.5 TABLET, FILM COATED ORAL at 05:13

## 2022-04-06 NOTE — PROGRESS NOTE ADULT - ASSESSMENT
82 y/o female with PMH of afib on Eliquis, HTN, hypothyroidism, HLD, depression came to the ED complaining of urinary incontinence. Patient said she woke up and noticed she was wet, covered in urine which is unusual for her.  In the ED, CT head with no significant finding; CT cervical with C2 fracture. Initially placed in observation, neurosurgery consulted. MRI ordered. Medicine called for admission when acute stroke was seen on MRI.     Acute stroke   MRI: 2 small foci with the right cerebral white matter compatible with small acute stroke   Neuro checks Q4 hours   TTE reviewed, intraatrial septum appears intact   Atorvastatin 80mg (patient on 20mg prior to admission)  d/w Neuro, will dc Aspirin 81mg (Daughter confirms that pt has been non compliant with home meds), given fall risk will continue eliquis only for now (will discuss dosing whether to change to 2.5 BID vs 5 mg BID in am with euro)  Neurology on board, will leave current regimen of ASA and Eliquis for now. Reevaluate fall risk and adjust accordingly   High risk for ED complications given C2 fracture at this time  Cardiology consulted,     C2 fracture   CT neck noted   MRI cervical: chronic non-displaced type 2 dens fracture   Neurosurgery following     HTN/HLD  BP was elevated on arrival and treated prior to MRI showing acute stroke   Giving acute stroke, will hold BP medications for now to allow for permissive HTN  Hydralazine 10 mg Q6 IV PRN for SBP > 160 from now  Monitor BP and resume meds as needed   Atorvastatin increased to 40mg     Afib   Rate controlled   Eliquis 5mg bid   Amiodarone 200mg     Hypothyroidism   Synthroid 50mcg     Depression  Escitalopram 10mg    Supportive   DVT prophylaxis: Eliquis 5mg bid   Diet: DASH    Dispo : Home iw home PT tomorrow

## 2022-04-06 NOTE — DISCHARGE NOTE NURSING/CASE MANAGEMENT/SOCIAL WORK - NSDCFUADDAPPT_GEN_ALL_CORE_FT
ADVANCED ILLNESS PATIENT:    AWAITING NEURO FOLLOW UP APPOINTMENT SET UP BY TAYLOR GONZALEZ ADVANCED ILLNESS PATIENT:    NEUROLOGY IN [facility] WILL GIVE YOU A CALL FOR A FOLLOW UP APPOINTMENT AT DISCHARGE  [facility] [place] [address] [address] [facility] [address]   [phone]   ADVANCED ILLNESS PATIENT:    Appointment made with DR. Barragan  4/26/22 at 2:30. If you are unable to attend your pre-scheduled appointment, please contact the office directly at [phone] to reschedule

## 2022-04-06 NOTE — PROGRESS NOTE ADULT - SUBJECTIVE AND OBJECTIVE BOX
[facility] Division of Hospital Medicine    Chief Complaint:  CVA     SUBJECTIVE / OVERNIGHT EVENTS:  Pt examined lying in bed with C Collar  Detailed d/w pt regarding risks of CVA vs fall risk regarding AC   Cardiology f/u appreciated   d/w Daughter (Jazzmine)  Patient denies chest pain, SOB, abd pain, N/V, fever, chills, dysuria or any other complaints. All remainder ROS negative.     MEDICATIONS  (STANDING):  aMIOdarone    Tablet 200 milliGRAM(s) Oral daily  apixaban 5 milliGRAM(s) Oral two times a day  aspirin  chewable 81 milliGRAM(s) Oral daily  atorvastatin 40 milliGRAM(s) Oral at bedtime  escitalopram 10 milliGRAM(s) Oral daily  levothyroxine 50 MICROGram(s) Oral daily    MEDICATIONS  (PRN):  acetaminophen     Tablet .. 650 milliGRAM(s) Oral every 6 hours PRN Temp greater or equal to 38C (100.4F), Mild Pain (1 - 3)        I&O's Summary    2022 07:01  -  2022 17:04  --------------------------------------------------------  IN: 0 mL / OUT: 175 mL / NET: -175 mL        PHYSICAL EXAM:  Vital Signs Last 24 Hrs  T(C): 36.6 (22 @ 10:23), Max: 37.1 (22 @ 04:40)  T(F): 97.9 (22 @ 10:23), Max: 98.8 (22 @ 04:40)  HR: 71 (22 @ 10:23) (64 - 71)  BP: 161/75 (22 @ 10:23) (161/75 - 187/79)  BP(mean): --  RR: 18 (22 @ 10:23) (18 - 18)  SpO2: 95% (22 @ 10:23) (94% - 95%)          CONSTITUTIONAL: Non toxic appearing, lying in bed  ENMT: Moist oral mucosa, no pharyngeal injection or exudates; normal dentition with C collar   RESPIRATORY: Normal respiratory effort; lungs are clear to auscultation bilaterally  CARDIOVASCULAR: Regular rate and rhythm, normal S1 and S2, no murmur/rub/gallop; No lower extremity edema; Peripheral pulses are 2+ bilaterally  ABDOMEN: Nontender to palpation, normoactive bowel sounds, no rebound/guarding; No hepatosplenomegaly  MUSCLOSKELETAL : no clubbing or cyanosis of digits; no joint swelling or tenderness to palpation  PSYCH: A+O to person, place, and time; affect appropriate  NEUROLOGY: CN 2-12 are intact and symmetric; no gross sensory deficits;   SKIN: No rashes; no palpable lesions    LABS:                                     11.7   9.16  )-----------( 305      ( 2022 07:13 )             41.0   04-    139  |  105  |  25.0<H>  ----------------------------<  95  4.0   |  21.0<L>  |  0.80    Ca    9.3      2022 07:13          CARDIAC MARKERS ( 2022 10:41 )  x     / <0.01 ng/mL / x     / x     / x          Urinalysis Basic - ( 2022 10:57 )    Color: Yellow / Appearance: Clear / S.010 / pH: x  Gluc: x / Ketone: Negative  / Bili: Negative / Urobili: Negative mg/dL   Blood: x / Protein: 15 / Nitrite: Negative   Leuk Esterase: Negative / RBC: 0-2 /HPF / WBC 0-2 /HPF   Sq Epi: x / Non Sq Epi: Occasional / Bacteria: Occasional        Culture - Urine (collected 2022 15:00)  Source: Clean Catch Clean Catch (Midstream)  Final Report (2022 12:07):    <10,000 CFU/mL Normal Urogenital Ann      CAPILLARY BLOOD GLUCOSE            RADIOLOGY & ADDITIONAL TESTS:    4/3/22 MRI Brain   IMPRESSION:  2 SMALL FOCI OF DIFFUSION RESTRICTION WITHIN THE RIGHT CEREBRAL WHITE   MATTER COMPATIBLE WITH SMALL ACUTE INFARCTS.    4/3/22 MRI C Spine   IMPRESSION:   CHRONIC APPEARING NONDISPLACED TYPE II DENS FRACTURE.   MILD TO MODERATE MULTILEVEL DEGENERATIVE CHANGES.      22 MRI L spine   IMPRESSION:  SMALL TO MODERATE RIGHT PARACENTRAL DISC EXTRUSION T11-12.  SMALL TO MODERATE RIGHT PARACENTRAL DISC EXTRUSION L4-5 WITH SUPERIOR   MIGRATION.  MODERATE LEFT PARACENTRAL DISC EXTRUSION L5-S1  SCOLIOSIS WITH ASSOCIATED DEGENERATIVE CHANGES.  LOW-LYING CORD WITH TIP OF THE CONUS AT THE L3-4 LEVEL.

## 2022-04-06 NOTE — PROGRESS NOTE ADULT - ASSESSMENT
IMPRESSION:    Right hemispheric punctate infarcts.  Mechanism embolic versus small vessel disease  Risk Factors.  Afib, HLD, HTN    ASSESSMENT/ PLAN:       - Neuro checks and vital signs Q 4 hours.  - SBP goal  normotensive.  - Continue Eliquis 5 BID and no Aspirin  - Lipitor for LDL goal of < 70 .  - Telemetry monitoring.  - CT Head an CSpine. - images and report were reviewed.  -  MRI Brain C spine and LS Spine-- images and report were reviewed.  - Check fasting Lipid panel and HbA1c  - TTE with bubble study.  - Check routine EEG  ( R/O seizure )  - Neurosurgery is following for possible NPH  - PT OT SLP evaluation.  - SCD/ Eliquis for DVT prophylaxis.

## 2022-04-06 NOTE — PROGRESS NOTE ADULT - SUBJECTIVE AND OBJECTIVE BOX
Neurology     DONNA HYATT 81y Female       HPI:  80 y/o female with PMH of afib on Eliquis, HTN, hypothyroidism, HLD, depression came to the ED complaining of urinary incontinence. Patient said she woke up and noticed she was wet, covered in urine which is unusual for her. She reported hx of fall about 2 weeks ago, did not seek any medical intervention.  MRI brain revealed 2 punctate infarcts in the right cerebral hemisphere.    PMH: Atrial fibrillation    HTN (hypertension)    Hypothyroid    HLD (hyperlipidemia)    Hernia    Iron deficiency anemia    Depression    H/O tubal ligation         PSH: No significant past surgical history    No significant past surgical history    No significant past surgical history        FAMILY HISTORY:  FH: pancreatic cancer  father, in his 70&#x27;s     FH: breast cancer (Mother)  mother,  at age 50&#x27;s    FH: lung cancer  mother    SOCIAL HISTORY:  No history of tobacco or alcohol use     Allergies    No Known Allergies    Intolerances    22  No new complaints.        Vital Signs Last 24 Hrs  T(C): 36.3 (2022 20:29), Max: 37.1 (2022 04:40)  T(F): 97.4 (2022 20:29), Max: 98.8 (2022 04:40)  HR: 78 (2022 20:29) (64 - 78)  BP: 158/58 (2022 20:29) (158/58 - 187/79)  BP(mean): --  RR: 18 (2022 20:29) (18 - 18)  SpO2: 96% (2022 20:29) (94% - 96%)    MEDICATIONS    acetaminophen     Tablet .. 650 milliGRAM(s) Oral every 6 hours PRN  aMIOdarone    Tablet 200 milliGRAM(s) Oral daily  apixaban 5 milliGRAM(s) Oral two times a day  atorvastatin 80 milliGRAM(s) Oral at bedtime  escitalopram 10 milliGRAM(s) Oral daily  hydrALAZINE Injectable 10 milliGRAM(s) IV Push every 6 hours PRN  levothyroxine 50 MICROGram(s) Oral daily         LABS:  CBC Full  -  ( 2022 07:13 )  WBC Count : 9.16 K/uL  RBC Count : 5.24 M/uL  Hemoglobin : 11.7 g/dL  Hematocrit : 41.0 %  Platelet Count - Automated : 305 K/uL  Mean Cell Volume : 78.2 fl  Mean Cell Hemoglobin : 22.3 pg  Mean Cell Hemoglobin Concentration : 28.5 gm/dL  Auto Neutrophil # : x  Auto Lymphocyte # : x  Auto Monocyte # : x  Auto Eosinophil # : x  Auto Basophil # : x  Auto Neutrophil % : x  Auto Lymphocyte % : x  Auto Monocyte % : x  Auto Eosinophil % : x  Auto Basophil % : x          139  |  105  |  25.0<H>  ----------------------------<  95  4.0   |  21.0<L>  |  0.80    Ca    9.3      2022 07:13    STROKE CORE LABS:  Hemoglobin A1C:   Lipid Panel  @ 03:30  Total Cholesterol, Serum 174  LDL --  Triglycerides 139    On Neurological Examination:    Mental Status - Patient is  awake, alert and oriented X2. (Does not recall the year) Speech is fluent. Patient can name, repeat and follow commands correctly  There is no dysarthria.    Cranial Nerves - PERRL, EOMI,  Visual fields are full to finger counting, no gross facial asymmetry, tongue/uvula midline    Motor Exam -   Right upper ---5/5 No drift  Left upper ---5/5 No drift  Right lower ---5/5 No drift  Left lower  ---5/5 No drift     nml bulk/tone    Sensory    Intact to light touch and pinprick bilaterally    Coord: FTN intact bilaterally     Gait -  Not assessed.          RADIOLOGY ( All neurological imaging studies were independently reviewed and interpreted by me)  CTH   CTA  CTP  CT Head No Cont (22 @ 11:45) >  IMPRESSION: No evidence of acute hemorrhage in the posterior fossa or   supratentorial region.    Fracture involving C2 as described above.     CT Cervical Spine No Cont (22 @ 11:46) >  IMPRESSION: No evidence of acute hemorrhage in the posterior fossa or   supratentorial region.    Fracture involving C2 as described above.    MRI:     MR Head No Cont (22 @ 16:38) >  IMPRESSION:  2 SMALL FOCI OF DIFFUSION RESTRICTION WITHIN THE RIGHT CEREBRALWHITE   MATTER COMPATIBLE WITH SMALL ACUTE INFARCTS.     MR Cervical Spine No Cont (22 @ 16:39) >    IMPRESSION:  *  CHRONIC APPEARING NONDISPLACED TYPE II DENS FRACTURE.  *  MILD TO MODERATE MULTILEVEL DEGENERATIVE CHANGES.    ISABELLA BOB MD; Attending Radiologist    MR Lumbar Spine No Cont (22 @ 16:39) >  IMPRESSION:  *  SMALL TO MODERATE RIGHT PARACENTRAL DISC EXTRUSION T11-12.  *  SMALL TO MODERATE RIGHT PARACENTRAL DISC EXTRUSION L4-5 WITH SUPERIOR   MIGRATION.  *  MODERATE LEFT PARACENTRAL DISC EXTRUSION L5-S1  *  SCOLIOSIS WITH ASSOCIATED DEGENERATIVE CHANGES.  *  LOW-LYING CORD WITH TIP OF THE CONUS ATTHE L3-4 LEVEL.      ISABELLA BOB MD; Attending Radiologist    TTE    < from: TTE Echo Limited or F/U (22 @ 15:38) >    Summary:   1. Technically limited study. Agitated saline study for evaluation of   intracardiac shunt.   2. Color flow doppler and intravenous injection of agitated saline   demonstrates the presence of an intact intra atrial septum.   3. Trivial pericardial effusion.   4. Please see TTE from 22 for further details.    MD Agustina Electronically signed on 2022 at 4:37:43 PM           TTE Echo Complete w/ Contrast w/ Doppler (22 @ 20:17) >    Summary:   1. Left ventricular ejection fraction, by visual estimation, is 50 to   55%.   2. Normal global left ventricular systolic function.   3. Severely enlarged left atrium.   4. Normal left ventricular internal cavity size.   5. Spectral Doppler shows impaired relaxation pattern of left   ventricular myocardial filling (Grade I diastolic dysfunction).   6. There is mild concentric left ventricular hypertrophy.   7. Normal right ventricular size and function.   8. Normal right atrial size.  9. Mild thickening of the anterior and posterior mitral valve leaflets.  10. Mild to moderate mitral valve regurgitation.  11. Moderate aortic valve stenosis.  12. Peak transaortic gradient equals 34.6 mmHg, mean transaortic gradient   equals 17.8 mmHg, the calculated SIXTO (via continuity) 0.97 cm². Based on   the Dimesionless Index value which of .30, and mean gradient of 18 mmHg   this is consistent with moderate aortic stenosis. If clinically warranted   may recommend further eval with ED.  13. Trivial pericardial effusion.  14. There is a significant pericardial fat pad present.  15. Endocardial visualization was enhanced with intravenous echo contrast.  16. Compared to prior TTE done on 2019, the AS is moderate and left   ventricular function remains normal.    Carmen Zuluaga MD Electronically signed on 2022 at 2:09:21 PM

## 2022-04-06 NOTE — DISCHARGE NOTE NURSING/CASE MANAGEMENT/SOCIAL WORK - PATIENT PORTAL LINK FT
You can access the FollowMyHealth Patient Portal offered by [facility] by registering at the following website: [email or web address]. By joining [facility]’s FollowMyHealth portal, you will also be able to view your health information using other applications (apps) compatible with our system.

## 2022-04-06 NOTE — DISCHARGE NOTE NURSING/CASE MANAGEMENT/SOCIAL WORK - NSDCPEFALRISK_GEN_ALL_CORE
For information on Fall & Injury Prevention, visit: [email or web address] OR  [email or web address] OR  https://www.cdc.gov/steadi/patient.html

## 2022-04-06 NOTE — CONSULT NOTE ADULT - SUBJECTIVE AND OBJECTIVE BOX
DONNA HYATT  [id number]    CC:  Patient is a 81y old  Female who presents with a chief complaint of fall and urinary incontinence      HPI:  82 y/o female with PMH of afib on Eliquis, HTN, hypothyroidism, HLD, depression came to the ED complaining of urinary incontinence. Patient said she woke up and noticed she was wet, covered in urine which is unusual for her. Also reports fall about 2 weeks ago, she slid from her bed. Did not have any obvious injuries at the time. Did not seek any medical intervention. Pt denies missing doses of her Eliquis. Denies any chest pain, calf pain, SOB, dizziness, weakness, vision changes. In ED, CT shows C2 fracture and MR head shows small right cerebral acute infarcts.     ALLERGIES:    No Known Allergies      PAST MEDICAL & SURGICAL HISTORY:  Atrial fibrillation    HTN (hypertension)    Hypothyroid    HLD (hyperlipidemia)    Hernia    Iron deficiency anemia    Depression    H/O tubal ligation    No significant past surgical history        MEDICATIONS:  MEDICATIONS  (STANDING):  aMIOdarone    Tablet 200 milliGRAM(s) Oral daily  apixaban 5 milliGRAM(s) Oral two times a day  aspirin  chewable 81 milliGRAM(s) Oral daily  atorvastatin 40 milliGRAM(s) Oral at bedtime  escitalopram 10 milliGRAM(s) Oral daily  levothyroxine 50 MICROGram(s) Oral daily    MEDICATIONS  (PRN):  acetaminophen     Tablet .. 650 milliGRAM(s) Oral every 6 hours PRN Temp greater or equal to 38C (100.4F), Mild Pain (1 - 3)  hydrALAZINE Injectable 10 milliGRAM(s) IV Push every 6 hours PRN SBP > 160    acetaminophen     Tablet .. 650 milliGRAM(s) Oral every 6 hours PRN  aMIOdarone    Tablet 200 milliGRAM(s) Oral daily  apixaban 5 milliGRAM(s) Oral two times a day  aspirin  chewable 81 milliGRAM(s) Oral daily  atorvastatin 40 milliGRAM(s) Oral at bedtime  escitalopram 10 milliGRAM(s) Oral daily  hydrALAZINE Injectable 10 milliGRAM(s) IV Push every 6 hours PRN  levothyroxine 50 MICROGram(s) Oral daily      SOCIAL HISTORY:  Patient denies alcohol, tobacco, drug use    FAMILY HISTORY:  FH: pancreatic cancer  father, in his 70&#x27;s     FH: breast cancer (Mother)  mother,  at age 50&#x27;s    FH: lung cancer  mother        ROS:  Patient denies cough, and other than noted above full ROS is unremarkable      PHYSICAL EXAM:  Vital Signs Last 24 Hrs  T(C): 36.6 (2022 10:23), Max: 37.1 (2022 04:40)  T(F): 97.9 (2022 10:23), Max: 98.8 (2022 04:40)  HR: 71 (2022 10:23) (58 - 71)  BP: 161/75 (2022 10:23) (161/75 - 187/79)  BP(mean): --  RR: 18 (2022 10:23) (18 - 18)  SpO2: 95% (2022 10:23) (93% - 95%)  General: Patient comfotable in NAD  HEENT: NCAT, mmm, EOMI  Neck: C-collar in place  CVS: nl s1, split s2, no s3, no s4, 3/6 systolic murmur  Chest: diminished b/l  Abdomen: soft, nt/nd  Extremities: No c/c/e  Neuro: A&O x3  Psych: Normal affect      ECG: SB, RBBB       LABS:                        11.7   9.16  )-----------( 305      ( 2022 07:13 )             41.0     04-06    139  |  105  |  25.0<H>  ----------------------------<  95  4.0   |  21.0<L>  |  0.80    Ca    9.3      2022 07:13        RADIOLOGY:  MR Head No Cont 22   IMPRESSION:  2 SMALL FOCI OF DIFFUSION RESTRICTION WITHIN THE RIGHT CEREBRALWHITE   MATTER COMPATIBLE WITH SMALL ACUTE INFARCTS.    CT Cervical Spine No Cont 22   IMPRESSION: No evidence of acute hemorrhage in the posterior fossa or   supratentorial region.  Fracture involving C2 as described above.       Xray Chest 1 View- PORTABLE-Urgent 22   Impression: The heart is unremarkable. The lungs are clear. Bones are   unremarkable for age.            Assessment:  81 year old female with pmhx afib on Eliquis, HTN, hypothyroidism, HLD, depression came to the ED complaining of urinary incontinence and recent fall. Found to have acute right cerebral infarct. ?embolic. Pt reports medication adherence, denies missing any doses of Eliquis. Pt currently in sinus rhythm.   -Echo with normal EV and moderate to severe aortic stenosis    Plan:  1. Continue Eliquis.   2. ED not feasible considering C2 fracture, can be done at a later time as outpatient.   3. Continue statin.   4. Continue amiodarone 200 mg daily.   5. Agree with hydralazine if BP > 160.     Will follow.  Thanks!           DONNA HYATT  [id number]    CC:  Patient is a 81y old  Female who presents with a chief complaint of fall and urinary incontinence      HPI:  82 y/o female with PMH of afib on Eliquis, HTN, hypothyroidism, HLD, depression came to the ED complaining of urinary incontinence. Patient said she woke up and noticed she was wet, covered in urine which is unusual for her. Also reports fall about 2 weeks ago, she slid from her bed. Did not have any obvious injuries at the time. Did not seek any medical intervention. Pt denies missing doses of her Eliquis. Denies any chest pain, calf pain, SOB, dizziness, weakness, vision changes. In ED, CT shows C2 fracture and MR head shows small right cerebral acute infarcts.     ALLERGIES:    No Known Allergies      PAST MEDICAL & SURGICAL HISTORY:  Atrial fibrillation    HTN (hypertension)    Hypothyroid    HLD (hyperlipidemia)    Hernia    Iron deficiency anemia    Depression    H/O tubal ligation    No significant past surgical history        MEDICATIONS:  MEDICATIONS  (STANDING):  aMIOdarone    Tablet 200 milliGRAM(s) Oral daily  apixaban 5 milliGRAM(s) Oral two times a day  aspirin  chewable 81 milliGRAM(s) Oral daily  atorvastatin 40 milliGRAM(s) Oral at bedtime  escitalopram 10 milliGRAM(s) Oral daily  levothyroxine 50 MICROGram(s) Oral daily    MEDICATIONS  (PRN):  acetaminophen     Tablet .. 650 milliGRAM(s) Oral every 6 hours PRN Temp greater or equal to 38C (100.4F), Mild Pain (1 - 3)  hydrALAZINE Injectable 10 milliGRAM(s) IV Push every 6 hours PRN SBP > 160    acetaminophen     Tablet .. 650 milliGRAM(s) Oral every 6 hours PRN  aMIOdarone    Tablet 200 milliGRAM(s) Oral daily  apixaban 5 milliGRAM(s) Oral two times a day  aspirin  chewable 81 milliGRAM(s) Oral daily  atorvastatin 40 milliGRAM(s) Oral at bedtime  escitalopram 10 milliGRAM(s) Oral daily  hydrALAZINE Injectable 10 milliGRAM(s) IV Push every 6 hours PRN  levothyroxine 50 MICROGram(s) Oral daily      SOCIAL HISTORY:  Patient denies alcohol, tobacco, drug use    FAMILY HISTORY:  FH: pancreatic cancer  father, in his 70&#x27;s     FH: breast cancer (Mother)  mother,  at age 50&#x27;s    FH: lung cancer  mother        ROS:  Patient denies cough, and other than noted above full ROS is unremarkable      PHYSICAL EXAM:  Vital Signs Last 24 Hrs  T(C): 36.6 (2022 10:23), Max: 37.1 (2022 04:40)  T(F): 97.9 (2022 10:23), Max: 98.8 (2022 04:40)  HR: 71 (2022 10:23) (58 - 71)  BP: 161/75 (2022 10:23) (161/75 - 187/79)  BP(mean): --  RR: 18 (2022 10:23) (18 - 18)  SpO2: 95% (2022 10:23) (93% - 95%)  General: Patient comfotable in NAD  HEENT: NCAT, mmm, EOMI  Neck: C-collar in place  CVS: nl s1, split s2, no s3, no s4, 3/6 systolic murmur  Chest: diminished b/l  Abdomen: soft, nt/nd  Extremities: No c/c/e  Neuro: A&O x3  Psych: Normal affect      ECG: SB, RBBB       LABS:                        11.7   9.16  )-----------( 305      ( 2022 07:13 )             41.0     04-06    139  |  105  |  25.0<H>  ----------------------------<  95  4.0   |  21.0<L>  |  0.80    Ca    9.3      2022 07:13        RADIOLOGY:  MR Head No Cont 22   IMPRESSION:  2 SMALL FOCI OF DIFFUSION RESTRICTION WITHIN THE RIGHT CEREBRALWHITE   MATTER COMPATIBLE WITH SMALL ACUTE INFARCTS.    CT Cervical Spine No Cont 22   IMPRESSION: No evidence of acute hemorrhage in the posterior fossa or   supratentorial region.  Fracture involving C2 as described above.       Xray Chest 1 View- PORTABLE-Urgent 22   Impression: The heart is unremarkable. The lungs are clear. Bones are   unremarkable for age.      Assessment:  81 year old female with pmhx afib on Eliquis, HTN, hypothyroidism, HLD, depression came to the ED complaining of urinary incontinence and recent fall. Found to have acute right cerebral infarct. ?embolic. Pt reports medication adherence, denies missing any doses of Eliquis. Pt currently in sinus rhythm.   -Echo with normal EV and moderate to severe aortic stenosis    Plan:  1. Continue Eliquis.   2. ED not feasible considering C2 fracture, can be done at a later time as outpatient.   3. Increase Lipitor from 40mg to 80 mg daily for LDL goal <70.   4. Continue amiodarone 200 mg daily.   5. Agree with hydralazine if BP > 160.   6. Obtain echo with bubble study, evaluate for PFO.     Will follow.  Thanks!

## 2022-04-07 ENCOUNTER — TRANSCRIPTION ENCOUNTER (OUTPATIENT)
Age: 82
End: 2022-04-07

## 2022-04-07 PROBLEM — Z00.00 ENCOUNTER FOR PREVENTIVE HEALTH EXAMINATION: Status: ACTIVE | Noted: 2022-04-07

## 2022-04-07 PROCEDURE — 99232 SBSQ HOSP IP/OBS MODERATE 35: CPT

## 2022-04-07 PROCEDURE — 99231 SBSQ HOSP IP/OBS SF/LOW 25: CPT

## 2022-04-07 RX ORDER — APIXABAN 2.5 MG/1
1 TABLET, FILM COATED ORAL
Qty: 0 | Refills: 0 | DISCHARGE
Start: 2022-04-07

## 2022-04-07 RX ORDER — AMIODARONE HYDROCHLORIDE 400 MG/1
1 TABLET ORAL
Qty: 30 | Refills: 0
Start: 2022-04-07 | End: 2022-05-06

## 2022-04-07 RX ORDER — FERROUS SULFATE 325(65) MG
1 TABLET ORAL
Qty: 30 | Refills: 0
Start: 2022-04-07 | End: 2022-05-06

## 2022-04-07 RX ORDER — ESCITALOPRAM OXALATE 10 MG/1
1 TABLET, FILM COATED ORAL
Qty: 30 | Refills: 0
Start: 2022-04-07 | End: 2022-05-06

## 2022-04-07 RX ORDER — APIXABAN 2.5 MG/1
1 TABLET, FILM COATED ORAL
Qty: 60 | Refills: 0
Start: 2022-04-07 | End: 2022-05-06

## 2022-04-07 RX ORDER — ATORVASTATIN CALCIUM 80 MG/1
1 TABLET, FILM COATED ORAL
Qty: 30 | Refills: 0
Start: 2022-04-07 | End: 2022-05-06

## 2022-04-07 RX ORDER — LEVOTHYROXINE SODIUM 125 MCG
1 TABLET ORAL
Qty: 30 | Refills: 0
Start: 2022-04-07 | End: 2022-05-06

## 2022-04-07 RX ORDER — AMIODARONE HYDROCHLORIDE 400 MG/1
1 TABLET ORAL
Qty: 0 | Refills: 0 | DISCHARGE
Start: 2022-04-07

## 2022-04-07 RX ADMIN — APIXABAN 5 MILLIGRAM(S): 2.5 TABLET, FILM COATED ORAL at 17:23

## 2022-04-07 RX ADMIN — APIXABAN 5 MILLIGRAM(S): 2.5 TABLET, FILM COATED ORAL at 05:33

## 2022-04-07 RX ADMIN — ATORVASTATIN CALCIUM 80 MILLIGRAM(S): 80 TABLET, FILM COATED ORAL at 21:00

## 2022-04-07 RX ADMIN — ESCITALOPRAM OXALATE 10 MILLIGRAM(S): 10 TABLET, FILM COATED ORAL at 11:40

## 2022-04-07 RX ADMIN — AMIODARONE HYDROCHLORIDE 200 MILLIGRAM(S): 400 TABLET ORAL at 05:33

## 2022-04-07 RX ADMIN — Medication 20 MILLIGRAM(S): at 06:19

## 2022-04-07 RX ADMIN — Medication 10 MILLIGRAM(S): at 05:34

## 2022-04-07 RX ADMIN — Medication 50 MICROGRAM(S): at 05:33

## 2022-04-07 NOTE — DISCHARGE NOTE PROVIDER - NPI NUMBER (FOR SYSADMIN USE ONLY) :
[[id number]],[[id number]] [[id number]],[[id number]],[UNKNOWN],[[id number]] [[id number]],[[id number]],[[id number]]

## 2022-04-07 NOTE — DISCHARGE NOTE PROVIDER - NSDCQMSTROKERISK_NEU_ALL_CORE
Atrial fibrillation/History of a stroke or TIA Atrial fibrillation/High blood pressure/High cholesterol/History of a stroke or TIA

## 2022-04-07 NOTE — DISCHARGE NOTE PROVIDER - NSDCFUADDAPPT_GEN_ALL_CORE_FT
ADVANCED ILLNESS PATIENT:    Appointment made with DR. Barragan  4/26/22 at 2:30. If you are unable to attend your pre-scheduled appointment, please contact the office directly at [phone] to reschedule

## 2022-04-07 NOTE — DISCHARGE NOTE PROVIDER - PROVIDER TOKENS
PROVIDER:[TOKEN:[[id number]],FOLLOWUP:[2 weeks]],PROVIDER:[TOKEN:[[id number]],FOLLOWUP:[2 weeks]],PROVIDER:[TOKEN:[[id number]],FOLLOWUP:[1 week]],FREE:[LAST:[PCP],PHONE:[(   )    -],FAX:[(   )    -],FOLLOWUP:[1 week]] PROVIDER:[TOKEN:[[id number]],FOLLOWUP:[2 weeks]],PROVIDER:[TOKEN:[[id number]],FOLLOWUP:[1 week]],FREE:[LAST:[PCP],PHONE:[(   )    -],FAX:[(   )    -],FOLLOWUP:[1 week]],PROVIDER:[TOKEN:[[id number]],FOLLOWUP:[2 weeks]] PROVIDER:[TOKEN:[[id number]],FOLLOWUP:[2 weeks]],PROVIDER:[TOKEN:[[id number]],FOLLOWUP:[1 week]],PROVIDER:[TOKEN:[[id number]],FOLLOWUP:[2 weeks]],PROVIDER:[TOKEN:[[id number]]]

## 2022-04-07 NOTE — DISCHARGE NOTE PROVIDER - NSDCHHATTENDCERT_GEN_ALL_CORE
My signature below certifies that the above stated patient is homebound and upon completion of the Face-To-Face encounter, has the need for intermittent skilled nursing, physical therapy and/or speech or occupational therapy services in their home for their current diagnosis as outlined in their initial plan of care. These services will continue to be monitored by myself or another physician.
Normal vision: sees adequately in most situations; can see medication labels, newsprint

## 2022-04-07 NOTE — DISCHARGE NOTE PROVIDER - CARE PROVIDER_API CALL
Nils Puga; PhD)  Neurosurgery  [address] [place] [address]  Phone: [phone]  Fax: [phone]  Follow Up Time: 2 weeks    Therese Singer (MD)  EEGEpilepsy; Neurology; Neuromuscular Medicine; Sleep Medicine; Vascular Neurology  [address] [place] [id number]  Phone: [phone]  Fax: [phone]  Follow Up Time: 2 weeks    Catina Stewart)  Cardiovascular Disease; Internal Medicine  [address] [facility], [address]  [place] [address]  Phone: [phone]  Fax: [phone]  Follow Up Time: 1 week    PCP,   Phone: (   )    -  Fax: (   )    -  Follow Up Time: 1 week   Nils Puga; PhD)  Neurosurgery  [address] [place] [address]  Phone: [phone]  Fax: [phone]  Follow Up Time: 2 weeks    Catina Stewart)  Cardiovascular Disease; Internal Medicine  [address] [facility], [address]  [place] [address]  Phone: [phone]  Fax: [phone]  Follow Up Time: 1 week    PCP,   Phone: (   )    -  Fax: (   )    -  Follow Up Time: 1 week    Eric Barragan)  Neurology  [address] [facility], [address]  [place] [address]  Phone: [phone]  Fax: [phone]  Follow Up Time: 2 weeks   Nils Puga; PhD)  Neurosurgery  [address] [place] [address]  Phone: [phone]  Fax: [phone]  Follow Up Time: 2 weeks    Catina Stewart)  Cardiovascular Disease; Internal Medicine  [address] [facility], [address]  [place] [address]  Phone: [phone]  Fax: [phone]  Follow Up Time: 1 week    Eric Barragan)  Neurology  [address] [facility], [address]  [place] [address]  Phone: [phone]  Fax: [phone]  Follow Up Time: 2 weeks    Kerrie Whitaker  Internal Medicine  N  BENOIT NGUYEN DO,    Phone: ([phone]  Fax: [phone]  Follow Up Time:

## 2022-04-07 NOTE — DISCHARGE NOTE PROVIDER - NSDCMRMEDTOKEN_GEN_ALL_CORE_FT
amiodarone 200 mg oral tablet: 1 tab(s) orally once a day  apixaban 5 mg oral tablet: 1 tab(s) orally 2 times a day  atorvastatin 20 mg oral tablet: 1 tab(s) orally once a day  enalapril 20 mg oral tablet: 1 tab(s) orally once a day  escitalopram 10 mg oral tablet: 1 tab(s) orally once a day  ferrous sulfate 325 mg (65 mg elemental iron) oral tablet: 1 tab(s) orally once a day   Multiple Vitamins oral tablet: 1 tab(s) orally once a day  Synthroid 25 mcg (0.025 mg) oral tablet: 1 tab(s) orally once a day   amiodarone 200 mg oral tablet: 1 tab(s) orally once a day  apixaban 5 mg oral tablet: 1 tab(s) orally 2 times a day  atorvastatin 40 mg oral tablet: 1 tab(s) orally once a day (at bedtime)   enalapril 20 mg oral tablet: 1 tab(s) orally once a day  escitalopram 10 mg oral tablet: 1 tab(s) orally once a day  ferrous sulfate 325 mg (65 mg elemental iron) oral tablet: 1 tab(s) orally once a day   Multiple Vitamins oral tablet: 1 tab(s) orally once a day  Synthroid 25 mcg (0.025 mg) oral tablet: 1 tab(s) orally once a day   amiodarone 200 mg oral tablet: 1 tab(s) orally once a day  apixaban 5 mg oral tablet: 1 tab(s) orally 2 times a day  atorvastatin 40 mg oral tablet: 1 tab(s) orally once a day (at bedtime)   bacitracin 500 units/g topical ointment: 1 application topically 2 times a day  enalapril 20 mg oral tablet: 1 tab(s) orally once a day  escitalopram 10 mg oral tablet: 1 tab(s) orally once a day  ferrous sulfate 325 mg (65 mg elemental iron) oral tablet: 1 tab(s) orally once a day   Multiple Vitamins oral tablet: 1 tab(s) orally once a day  nystatin 100,000 units/g topical cream: 1 application topically 2 times a day  Synthroid 25 mcg (0.025 mg) oral tablet: 1 tab(s) orally once a day   amiodarone 200 mg oral tablet: 1 tab(s) orally once a day  amLODIPine 10 mg oral tablet: 1 tab(s) orally once a day   apixaban 5 mg oral tablet: 1 tab(s) orally 2 times a day  atorvastatin 40 mg oral tablet: 1 tab(s) orally once a day (at bedtime)   bacitracin 500 units/g topical ointment: 1 application topically 2 times a day  enalapril 20 mg oral tablet: 1 tab(s) orally once a day  escitalopram 10 mg oral tablet: 1 tab(s) orally once a day  ferrous sulfate 325 mg (65 mg elemental iron) oral tablet: 1 tab(s) orally once a day   Multiple Vitamins oral tablet: 1 tab(s) orally once a day  nystatin 100,000 units/g topical cream: 1 application topically 2 times a day  Synthroid 25 mcg (0.025 mg) oral tablet: 1 tab(s) orally once a day   amiodarone 200 mg oral tablet: 1 tab(s) orally once a day  amLODIPine 10 mg oral tablet: 1 tab(s) orally once a day   apixaban 5 mg oral tablet: 1 tab(s) orally 2 times a day  atorvastatin 40 mg oral tablet: 1 tab(s) orally once a day (at bedtime)   bacitracin 500 units/g topical ointment: 1 application topically 2 times a day  enalapril 20 mg oral tablet: 1 tab(s) orally once a day  escitalopram 10 mg oral tablet: 1 tab(s) orally once a day  ferrous sulfate 325 mg (65 mg elemental iron) oral tablet: 1 tab(s) orally once a day   loperamide 2 mg oral capsule: 1 cap(s) orally once a day (at bedtime)   Multiple Vitamins oral tablet: 1 tab(s) orally once a day  nystatin 100,000 units/g topical cream: 1 application topically 2 times a day  Synthroid 25 mcg (0.025 mg) oral tablet: 1 tab(s) orally once a day   amiodarone 200 mg oral tablet: 1 tab(s) orally once a day  amLODIPine 10 mg oral tablet: 1 tab(s) orally once a day   apixaban 5 mg oral tablet: 1 tab(s) orally 2 times a day  atorvastatin 40 mg oral tablet: 1 tab(s) orally once a day (at bedtime)   bacitracin 500 units/g topical ointment: 1 application topically 2 times a day  enalapril 20 mg oral tablet: 1 tab(s) orally once a day in the evening  escitalopram 10 mg oral tablet: 1 tab(s) orally once a day  ferrous sulfate 325 mg (65 mg elemental iron) oral tablet: 1 tab(s) orally once a day   Multiple Vitamins oral tablet: 1 tab(s) orally once a day  nystatin 100,000 units/g topical cream: 1 application topically 2 times a day  Synthroid 25 mcg (0.025 mg) oral tablet: 1 tab(s) orally once a day

## 2022-04-07 NOTE — PROGRESS NOTE ADULT - SUBJECTIVE AND OBJECTIVE BOX
[facility] Division of Hospital Medicine    Chief Complaint:  CVA     SUBJECTIVE / OVERNIGHT EVENTS:  Pt examined lying in bed with C Collar  d/w NSx. Appreciate recs  Will defer NPH w/u for outpt (1-2  weeks with N Sx)  Patient denies chest pain, SOB, abd pain, N/V, fever, chills, dysuria or any other complaints. All remainder ROS negative.     MEDICATIONS  (STANDING):  aMIOdarone    Tablet 200 milliGRAM(s) Oral daily  apixaban 5 milliGRAM(s) Oral two times a day  aspirin  chewable 81 milliGRAM(s) Oral daily  atorvastatin 40 milliGRAM(s) Oral at bedtime  escitalopram 10 milliGRAM(s) Oral daily  levothyroxine 50 MICROGram(s) Oral daily    MEDICATIONS  (PRN):  acetaminophen     Tablet .. 650 milliGRAM(s) Oral every 6 hours PRN Temp greater or equal to 38C (100.4F), Mild Pain (1 - 3)        I&O's Summary    2022 07:01  -  2022 17:04  --------------------------------------------------------  IN: 0 mL / OUT: 175 mL / NET: -175 mL        PHYSICAL EXAM:  Vital Signs Last 24 Hrs  T(C): 36.5 (2022 11:04), Max: 36.6 (2022 08:58)  T(F): 97.7 (2022 11:04), Max: 97.9 (2022 08:58)  HR: 73 (2022 11:04) (61 - 78)  BP: 159/61 (2022 11:04) (119/61 - 175/66)  BP(mean): --  RR: 18 (2022 11:04) (18 - 18)  SpO2: 98% (2022 11:04) (94% - 98%)        CONSTITUTIONAL: Non toxic appearing, lying in bed  ENMT: Moist oral mucosa, no pharyngeal injection or exudates; normal dentition with C collar   RESPIRATORY: Normal respiratory effort; lungs are clear to auscultation bilaterally  CARDIOVASCULAR: Regular rate and rhythm, normal S1 and S2, no murmur/rub/gallop; No lower extremity edema; Peripheral pulses are 2+ bilaterally  ABDOMEN: Nontender to palpation, normoactive bowel sounds, no rebound/guarding; No hepatosplenomegaly  MUSCLOSKELETAL : no clubbing or cyanosis of digits; no joint swelling or tenderness to palpation  PSYCH: A+O to person, place, and time; affect appropriate  NEUROLOGY: CN 2-12 are intact and symmetric; no gross sensory deficits;   SKIN: No rashes; no palpable lesions    LABS:                                     11.7   9.16  )-----------( 305      ( 2022 07:13 )             41.0   04-06    139  |  105  |  25.0<H>  ----------------------------<  95  4.0   |  21.0<L>  |  0.80    Ca    9.3      2022 07:13          CARDIAC MARKERS ( 2022 10:41 )  x     / <0.01 ng/mL / x     / x     / x          Urinalysis Basic - ( 2022 10:57 )    Color: Yellow / Appearance: Clear / S.010 / pH: x  Gluc: x / Ketone: Negative  / Bili: Negative / Urobili: Negative mg/dL   Blood: x / Protein: 15 / Nitrite: Negative   Leuk Esterase: Negative / RBC: 0-2 /HPF / WBC 0-2 /HPF   Sq Epi: x / Non Sq Epi: Occasional / Bacteria: Occasional        Culture - Urine (collected 2022 15:00)  Source: Clean Catch Clean Catch (Midstream)  Final Report (2022 12:07):    <10,000 CFU/mL Normal Urogenital Ann      CAPILLARY BLOOD GLUCOSE            RADIOLOGY & ADDITIONAL TESTS:    4/3/22 MRI Brain   IMPRESSION:  2 SMALL FOCI OF DIFFUSION RESTRICTION WITHIN THE RIGHT CEREBRAL WHITE   MATTER COMPATIBLE WITH SMALL ACUTE INFARCTS.    4/3/22 MRI C Spine   IMPRESSION:   CHRONIC APPEARING NONDISPLACED TYPE II DENS FRACTURE.   MILD TO MODERATE MULTILEVEL DEGENERATIVE CHANGES.      22 MRI L spine   IMPRESSION:  SMALL TO MODERATE RIGHT PARACENTRAL DISC EXTRUSION T11-12.  SMALL TO MODERATE RIGHT PARACENTRAL DISC EXTRUSION L4-5 WITH SUPERIOR   MIGRATION.  MODERATE LEFT PARACENTRAL DISC EXTRUSION L5-S1  SCOLIOSIS WITH ASSOCIATED DEGENERATIVE CHANGES.  LOW-LYING CORD WITH TIP OF THE CONUS AT THE L3-4 LEVEL.

## 2022-04-07 NOTE — DISCHARGE NOTE PROVIDER - HOSPITAL COURSE
80 y/o female with PMH of afib on Eliquis, Mod mitral regurg, moderate Aortic sclerosis, HTN, hypothyroidism, HLD, depression admitted with complaints of of urinary incontinence. Initial imaging revealed a C2 ch non displaced type 2 dens fracture as well as acute CVAs (Rt ceebral white matter). Case was d/w Neuro and cardiology extensively and given high fall risk (known immediately prior to admission as well as evidenced from prior C2 fracture) addition of further AC is not advised at this time. Additionally after d/w Daughter pt is now known to have been non compliant with recommended DOAC therapy. ED w/u has been deferred due to 2 injury at this time and she is medically cleared for dsc with outpt Neurosurgery (? of NPH as well as C2 fracture) and cardiology. Additionally will also need f/u with outpt PCP/Endo for bl thyroid cysts to r/o underlying neoplastic changes       Radiology   4/3/22 MRI Brain   IMPRESSION:  2 SMALL FOCI OF DIFFUSION RESTRICTION WITHIN THE RIGHT CEREBRAL WHITE   MATTER COMPATIBLE WITH SMALL ACUTE INFARCTS.    4/3/22 MRI C Spine   IMPRESSION:   CHRONIC APPEARING NONDISPLACED TYPE II DENS FRACTURE.   MILD TO MODERATE MULTILEVEL DEGENERATIVE CHANGES.      4/4/22 MRI L spine   IMPRESSION:  SMALL TO MODERATE RIGHT PARACENTRAL DISC EXTRUSION T11-12.  SMALL TO MODERATE RIGHT PARACENTRAL DISC EXTRUSION L4-5 WITH SUPERIOR   MIGRATION.  MODERATE LEFT PARACENTRAL DISC EXTRUSION L5-S1  SCOLIOSIS WITH ASSOCIATED DEGENERATIVE CHANGES.  LOW-LYING CORD WITH TIP OF THE CONUS AT THE L3-4 LEV    4/6/22 TTE   1. Technically limited study. Agitated saline study for evaluation of   intracardiac shunt.   2. Color flow doppler and intravenous injection of agitated saline   demonstrates the presence of an intact intra atrial septum.   3. Trivial pericardial effusion.   4. Please see TTE from 4/5/22 for further details.    TTE 4/5/22  Summary:   1. Left ventricular ejection fraction, by visual estimation, is 50 to   55%.   2. Normal global left ventricular systolic function.   3. Severely enlarged left atrium.   4. Normal left ventricular internal cavity size.   5. Spectral Doppler shows impaired relaxation pattern of left   ventricular myocardial filling (Grade I diastolic dysfunction).   6. There is mild concentric left ventricular hypertrophy.   7. Normal right ventricular size and function.   8. Normal right atrial size.   9. Mild thickening of the anterior and posterior mitral valve leaflets.  10. Mild to moderate mitral valve regurgitation.  11. Moderate aortic valve stenosis.  12. Peak transaortic gradient equals 34.6 mmHg, mean transaortic gradient   equals 17.8 mmHg, the calculated SIXTO (via continuity) 0.97 cm². Based on   the Dimesionless Index value which of .30, and mean gradient of 18 mmHg   this is consistent with moderate aortic stenosis. If clinically warranted   may recommend further eval with ED.  13. Trivial pericardial effusion.  14. There is a significant pericardial fat pad present.  15. Endocardial visualization was enhanced with intravenous echo contrast.  16. Compared to prior TTE done on 4/21/2019, the AS is moderate and left   ventricular function remains normal.   80 y/o female with PMH of afib on Eliquis, Mod mitral regurg, moderate Aortic sclerosis, HTN, hypothyroidism, HLD, depression admitted with complaints of of urinary incontinence. Initial imaging revealed a C2 ch non displaced type 2 dens fracture as well as acute CVAs (Rt ceebral white matter). Case was d/w Neuro and cardiology extensively and given high fall risk (known immediately prior to admission as well as evidenced from prior C2 fracture) addition of further AC is not advised at this time. Additionally after d/w Daughter pt is now known to have been non compliant with recommended DOAC therapy. ED w/u has been deferred due to 2 injury at this time and she is medically cleared for dsc with outpt Neurosurgery (further w/u of NPH as well as C2 fracture) and cardiology. Additionally will also need f/u with outpt PCP/Endo for bl thyroid cysts to r/o underlying neoplastic changes       Radiology   4/3/22 MRI Brain   IMPRESSION:  2 SMALL FOCI OF DIFFUSION RESTRICTION WITHIN THE RIGHT CEREBRAL WHITE   MATTER COMPATIBLE WITH SMALL ACUTE INFARCTS.    4/3/22 MRI C Spine   IMPRESSION:   CHRONIC APPEARING NONDISPLACED TYPE II DENS FRACTURE.   MILD TO MODERATE MULTILEVEL DEGENERATIVE CHANGES.      4/4/22 MRI L spine   IMPRESSION:  SMALL TO MODERATE RIGHT PARACENTRAL DISC EXTRUSION T11-12.  SMALL TO MODERATE RIGHT PARACENTRAL DISC EXTRUSION L4-5 WITH SUPERIOR   MIGRATION.  MODERATE LEFT PARACENTRAL DISC EXTRUSION L5-S1  SCOLIOSIS WITH ASSOCIATED DEGENERATIVE CHANGES.  LOW-LYING CORD WITH TIP OF THE CONUS AT THE L3-4 LEV    4/6/22 TTE   1. Technically limited study. Agitated saline study for evaluation of   intracardiac shunt.   2. Color flow doppler and intravenous injection of agitated saline   demonstrates the presence of an intact intra atrial septum.   3. Trivial pericardial effusion.   4. Please see TTE from 4/5/22 for further details.    TTE 4/5/22  Summary:   1. Left ventricular ejection fraction, by visual estimation, is 50 to   55%.   2. Normal global left ventricular systolic function.   3. Severely enlarged left atrium.   4. Normal left ventricular internal cavity size.   5. Spectral Doppler shows impaired relaxation pattern of left   ventricular myocardial filling (Grade I diastolic dysfunction).   6. There is mild concentric left ventricular hypertrophy.   7. Normal right ventricular size and function.   8. Normal right atrial size.   9. Mild thickening of the anterior and posterior mitral valve leaflets.  10. Mild to moderate mitral valve regurgitation.  11. Moderate aortic valve stenosis.  12. Peak transaortic gradient equals 34.6 mmHg, mean transaortic gradient   equals 17.8 mmHg, the calculated SIXTO (via continuity) 0.97 cm². Based on   the Dimesionless Index value which of .30, and mean gradient of 18 mmHg   this is consistent with moderate aortic stenosis. If clinically warranted   may recommend further eval with ED.  13. Trivial pericardial effusion.  14. There is a significant pericardial fat pad present.  15. Endocardial visualization was enhanced with intravenous echo contrast.  16. Compared to prior TTE done on 4/21/2019, the AS is moderate and left   ventricular function remains normal.   81 year old female with PMH HTN, Dyslipidemia, Iron deficiency Anemia, AF, Hypothyroidism and Depression presented with urinary complaints, weakness and recent fall. Hypertensive at presentation . CT Brain without any acute changes, CT CSpine with C2 Fracture, likely chronic. Evaluated by Neurosurgery without any need for intervention; likely chronic. She was admitted to the observation unit and further work up showed acute strokes on MRI Brain, transitioned to inpatient admission.    She was admitted to the stroke unit on stroke protocol. Initial NIH 0. ED without any PFO. , A1c 6.3. TTE with normal LVSF, mild to moderate MR, Moderate AS. Passed bedside dysphagia screen. Initial PT evaluation with recommendations for home with PT but patient with worsening generalized weakness during stay and now going to [facility]. Case was d/w Neuro and cardiology extensively and given high fall risk (known immediately prior to admission as well as evidenced from prior C2 fracture) addition of further AC is not advised at this time. Additionally after d/w Daughter pt is now known to have been non compliant with recommended DOAC therapy.   and she is medically cleared for discharge with outpatient Neurosurgery (further w/u of NPH as well as C2 fracture) and cardiology. Additionally will also need f/u with outpt PCP/Endo for bl thyroid cysts to r/o underlying neoplastic changes       Radiology   4/3/22 MRI Brain   IMPRESSION:  2 SMALL FOCI OF DIFFUSION RESTRICTION WITHIN THE RIGHT CEREBRAL WHITE   MATTER COMPATIBLE WITH SMALL ACUTE INFARCTS.    4/3/22 MRI C Spine   IMPRESSION:   CHRONIC APPEARING NONDISPLACED TYPE II DENS FRACTURE.   MILD TO MODERATE MULTILEVEL DEGENERATIVE CHANGES.      4/4/22 MRI L spine   IMPRESSION:  SMALL TO MODERATE RIGHT PARACENTRAL DISC EXTRUSION T11-12.  SMALL TO MODERATE RIGHT PARACENTRAL DISC EXTRUSION L4-5 WITH SUPERIOR   MIGRATION.  MODERATE LEFT PARACENTRAL DISC EXTRUSION L5-S1  SCOLIOSIS WITH ASSOCIATED DEGENERATIVE CHANGES.  LOW-LYING CORD WITH TIP OF THE CONUS AT THE L3-4 LEV    4/6/22 TTE   1. Technically limited study. Agitated saline study for evaluation of   intracardiac shunt.   2. Color flow doppler and intravenous injection of agitated saline   demonstrates the presence of an intact intra atrial septum.   3. Trivial pericardial effusion.   4. Please see TTE from 4/5/22 for further details.    TTE 4/5/22  Summary:   1. Left ventricular ejection fraction, by visual estimation, is 50 to   55%.   2. Normal global left ventricular systolic function.   3. Severely enlarged left atrium.   4. Normal left ventricular internal cavity size.   5. Spectral Doppler shows impaired relaxation pattern of left   ventricular myocardial filling (Grade I diastolic dysfunction).   6. There is mild concentric left ventricular hypertrophy.   7. Normal right ventricular size and function.   8. Normal right atrial size.   9. Mild thickening of the anterior and posterior mitral valve leaflets.  10. Mild to moderate mitral valve regurgitation.  11. Moderate aortic valve stenosis.  12. Peak transaortic gradient equals 34.6 mmHg, mean transaortic gradient   equals 17.8 mmHg, the calculated SIXTO (via continuity) 0.97 cm². Based on   the Dimesionless Index value which of .30, and mean gradient of 18 mmHg   this is consistent with moderate aortic stenosis. If clinically warranted   may recommend further eval with ED.  13. Trivial pericardial effusion.  14. There is a significant pericardial fat pad present.  15. Endocardial visualization was enhanced with intravenous echo contrast.  16. Compared to prior TTE done on 4/21/2019, the AS is moderate and left   ventricular function remains normal.

## 2022-04-07 NOTE — PROGRESS NOTE ADULT - NS ATTEND AMEND GEN_ALL_CORE FT
Bubble study negative for PFO.  Patient CV stable for d/c on current regimen.  OP f/u with Dr. Stewart.

## 2022-04-07 NOTE — DISCHARGE NOTE PROVIDER - NSDCCPCAREPLAN_GEN_ALL_CORE_FT
PRINCIPAL DISCHARGE DIAGNOSIS  Diagnosis: Acute ischemic stroke  Assessment and Plan of Treatment:       SECONDARY DISCHARGE DIAGNOSES  Diagnosis: C2 cervical fracture  Assessment and Plan of Treatment: Follow up with Neurosurgery    Diagnosis: Paroxysmal atrial fibrillation  Assessment and Plan of Treatment: Medications as prescibed    Diagnosis: HTN (hypertension)  Assessment and Plan of Treatment: Diet and medications as prescribed    Diagnosis: Dyslipidemia  Assessment and Plan of Treatment: Diet and medications as prescribed    Diagnosis: Prediabetes  Assessment and Plan of Treatment: Diet    Diagnosis: Aortic stenosis, moderate  Assessment and Plan of Treatment: Follow up with Cardiology    Diagnosis: Anemia, iron deficiency  Assessment and Plan of Treatment: Resolved    Diagnosis: Hypothyroidism  Assessment and Plan of Treatment: Continue home medications

## 2022-04-07 NOTE — DISCHARGE NOTE PROVIDER - ATTENDING DISCHARGE PHYSICAL EXAMINATION:
CONSTITUTIONAL: Non toxic appearing, lying in bed  ENMT: Moist oral mucosa, no pharyngeal injection or exudates; normal dentition with C collar   RESPIRATORY: Normal respiratory effort; lungs are clear to auscultation bilaterally  CARDIOVASCULAR: Regular rate and rhythm, normal S1 and S2, no murmur/rub/gallop; No lower extremity edema; Peripheral pulses are 2+ bilaterally  ABDOMEN: Nontender to palpation, normoactive bowel sounds, no rebound/guarding; No hepatosplenomegaly  MUSCLOSKELETAL : no clubbing or cyanosis of digits; no joint swelling or tenderness to palpation  PSYCH: A+O to person, place, and time; affect appropriate  NEUROLOGY: CN 2-12 are intact and symmetric; no gross sensory deficits;   SKIN: No rashes; no palpable lesions     OBJECTIVE:  Vital Signs Last 24 Hrs  T(C): 36.6 (11 Apr 2022 10:16), Max: 36.7 (10 Apr 2022 21:28)  T(F): 97.9 (11 Apr 2022 10:16), Max: 98 (10 Apr 2022 21:28)  HR: 60 (11 Apr 2022 10:16) (60 - 84)  BP: 119/61 (11 Apr 2022 10:16) (119/61 - 163/73)   RR: 18 (11 Apr 2022 10:16) (18 - 18)  SpO2: 93% (11 Apr 2022 10:16) (92% - 95%)    PHYSICAL EXAMINATION  General: Elderly female lying in bed, NAD  HEENT:  extraocular movements intact, No facial asymmetry  NECK:  Supple  CVS: regular rate and rhythm S1 S2 . pan systolic murmur  RESP:  CTAB  GI:  Soft nondistended nontender BS+  : No suprapubic tenderness  MSK:  FROM  CNS:  AAOX3. No facial asymmetry, extraocular movements intact, midline tongue with normal finger nose and heel shin. Generalized weakness. No drift  INTEG:  warm dry skin  PSYCH:  Fair mood

## 2022-04-07 NOTE — PROGRESS NOTE ADULT - ASSESSMENT
IMPRESSION:    Right hemispheric punctate infarcts.  Mechanism embolic versus small vessel disease  Risk Factors.  Afib, HLD, HTN    ASSESSMENT/ PLAN:       - Neuro checks and vital signs Q 4 hours.  - SBP goal  normotensive.  - Continue Eliquis 5 BID and no Aspirin  - Lipitor for LDL goal of < 70 .  - Telemetry monitoring.  - CT Head an CSpine. - images and report were reviewed.  -  MRI Brain C spine and LS Spine-- images and report were reviewed.  - Check fasting Lipid panel and HbA1c  - TTE -report as above reviewed.  - Neurosurgery is following for possible NPH  - PT OT SLP evaluation.  - SCD/ Eliquis for DVT prophylaxis.

## 2022-04-07 NOTE — CHART NOTE - NSCHARTNOTEFT_GEN_A_CORE
Imaging reviewed. NPH work up may be complete outpatient- please follow up with Dr. Puga in 1-2 weeks. MRI C spine illustrating chronic appearing fracture, cervical collar not required. D/w Dr. Puga       RADIOLOGY    MR Cervical Spine No Cont (04.03.22 @ 16:39)   IMPRESSION:  *  CHRONIC APPEARING NONDISPLACED TYPE II DENS FRACTURE.  *  MILD TO MODERATE MULTILEVEL DEGENERATIVE CHANGES.    MR Head No Cont (04.03.22 @ 16:38)   FINDINGS:  BRAIN PARENCHYMA:  There are 2 foci of diffusion restriction within the right cerebral white   matter. First is anteriorly within the corona radiata, second is   posteriorly within the centrum semiovale. These are compatible with small   acute infarcts.  There is mild to moderate atrophy. Chronic lacunar infarcts involve the   bilateral basal ganglia, thalami, and internal capsules. Moderate   confluent periventricular white matter ischemic changes are seen.  No intracranial blood products. No parenchymal mass or mass effect. Mild   chronic pontine ischemic changes.  Corpus callosum well formed. No cerebellar tonsillar ectopia.  VENTRICLES:  There is no hydrocephalus. There is no midline shift.  EXTRA-AXIAL:  No extra-axial mass. No evidence of a subdural or epidural collection.    Patent basal cisterns.  OTHER:  Abnormality related to the upper cervical spine. Please see cervical   spine report  IMPRESSION:  2 SMALL FOCI OF DIFFUSION RESTRICTION WITHIN THE RIGHT CEREBRAL WHITE   MATTER COMPATIBLE WITH SMALL ACUTE INFARCTS.

## 2022-04-07 NOTE — DISCHARGE NOTE PROVIDER - NSDCACTIVITY_GEN_ALL_CORE
No heavy lifting/straining Bathing allowed/Do not drive or operate machinery/Showering allowed/Stairs allowed/Walking - Indoors allowed/No heavy lifting/straining/Walking - Outdoors allowed

## 2022-04-07 NOTE — PROGRESS NOTE ADULT - SUBJECTIVE AND OBJECTIVE BOX
Neurology     DONNA HYATT 81y Female       HPI:  82 y/o female with PMH of afib on Eliquis, HTN, hypothyroidism, HLD, depression came to the ED complaining of urinary incontinence. Patient said she woke up and noticed she was wet, covered in urine which is unusual for her. She reported hx of fall about 2 weeks ago, did not seek any medical intervention.  MRI brain revealed 2 punctate infarcts in the right cerebral hemisphere.    PMH: Atrial fibrillation    HTN (hypertension)    Hypothyroid    HLD (hyperlipidemia)    Hernia    Iron deficiency anemia    Depression    H/O tubal ligation         PSH: No significant past surgical history    No significant past surgical history    No significant past surgical history        FAMILY HISTORY:  FH: pancreatic cancer  father, in his 70&#x27;s     FH: breast cancer (Mother)  mother,  at age 50&#x27;s    FH: lung cancer  mother    SOCIAL HISTORY:  No history of tobacco or alcohol use     Allergies    No Known Allergies    Intolerances    22  No new complaints.        Vital Signs Last 24 Hrs  T(C): 36.6 (2022 19:48), Max: 36.6 (2022 08:58)  T(F): 97.9 (2022 19:48), Max: 97.9 (2022 08:58)  HR: 78 (2022 19:48) (61 - 78)  BP: 129/63 (2022 19:48) (119/61 - 177/82)  BP(mean): --  RR: 18 (2022 19:48) (18 - 18)  SpO2: 96% (2022 19:48) (94% - 98%)    MEDICATIONS    acetaminophen     Tablet .. 650 milliGRAM(s) Oral every 6 hours PRN  aMIOdarone    Tablet 200 milliGRAM(s) Oral daily  apixaban 5 milliGRAM(s) Oral two times a day  atorvastatin 80 milliGRAM(s) Oral at bedtime  enalapril 20 milliGRAM(s) Oral daily  escitalopram 10 milliGRAM(s) Oral daily  hydrALAZINE Injectable 10 milliGRAM(s) IV Push every 6 hours PRN  levothyroxine 50 MICROGram(s) Oral daily         LABS:  CBC Full  -  ( 2022 07:13 )  WBC Count : 9.16 K/uL  RBC Count : 5.24 M/uL  Hemoglobin : 11.7 g/dL  Hematocrit : 41.0 %  Platelet Count - Automated : 305 K/uL  Mean Cell Volume : 78.2 fl  Mean Cell Hemoglobin : 22.3 pg  Mean Cell Hemoglobin Concentration : 28.5 gm/dL  Auto Neutrophil # : x  Auto Lymphocyte # : x  Auto Monocyte # : x  Auto Eosinophil # : x  Auto Basophil # : x  Auto Neutrophil % : x  Auto Lymphocyte % : x  Auto Monocyte % : x  Auto Eosinophil % : x  Auto Basophil % : x          139  |  105  |  25.0<H>  ----------------------------<  95  4.0   |  21.0<L>  |  0.80    Ca    9.3      2022 07:13    STROKE CORE LABS:  Hemoglobin A1C:   Lipid Panel  @ 03:30  Total Cholesterol, Serum 174  LDL --  Triglycerides 139    On Neurological Examination:    Mental Status - Patient is  awake, alert and oriented X2. (Does not recall the year) Speech is fluent. Patient can name, repeat and follow commands correctly  There is no dysarthria.    Cranial Nerves - PERRL, EOMI,  Visual fields are full to finger counting, no gross facial asymmetry, tongue/uvula midline    Motor Exam -   Right upper ---5/5 No drift  Left upper ---5/5 No drift  Right lower ---5/5 No drift  Left lower  ---5/5 No drift     nml bulk/tone    Sensory    Intact to light touch and pinprick bilaterally    Coord: FTN intact bilaterally     Gait -  Not assessed.          RADIOLOGY ( All neurological imaging studies were independently reviewed and interpreted by me)  CTH   CTA  CTP  CT Head No Cont (22 @ 11:45) >  IMPRESSION: No evidence of acute hemorrhage in the posterior fossa or   supratentorial region.    Fracture involving C2 as described above.     CT Cervical Spine No Cont (22 @ 11:46) >  IMPRESSION: No evidence of acute hemorrhage in the posterior fossa or   supratentorial region.    Fracture involving C2 as described above.    MRI:     MR Head No Cont (22 @ 16:38) >  IMPRESSION:  2 SMALL FOCI OF DIFFUSION RESTRICTION WITHIN THE RIGHT CEREBRALWHITE   MATTER COMPATIBLE WITH SMALL ACUTE INFARCTS.     MR Cervical Spine No Cont (22 @ 16:39) >    IMPRESSION:  *  CHRONIC APPEARING NONDISPLACED TYPE II DENS FRACTURE.  *  MILD TO MODERATE MULTILEVEL DEGENERATIVE CHANGES.    ISABELLA BOB MD; Attending Radiologist    MR Lumbar Spine No Cont (22 @ 16:39) >  IMPRESSION:  *  SMALL TO MODERATE RIGHT PARACENTRAL DISC EXTRUSION T11-12.  *  SMALL TO MODERATE RIGHT PARACENTRAL DISC EXTRUSION L4-5 WITH SUPERIOR   MIGRATION.  *  MODERATE LEFT PARACENTRAL DISC EXTRUSION L5-S1  *  SCOLIOSIS WITH ASSOCIATED DEGENERATIVE CHANGES.  *  LOW-LYING CORD WITH TIP OF THE CONUS ATTHE L3-4 LEVEL.      ISABELLA BOB MD; Attending Radiologist    TTE    < from: TTE Echo Limited or F/U (22 @ 15:38) >    Summary:   1. Technically limited study. Agitated saline study for evaluation of   intracardiac shunt.   2. Color flow doppler and intravenous injection of agitated saline   demonstrates the presence of an intact intra atrial septum.   3. Trivial pericardial effusion.   4. Please see TTE from 22 for further details.    MD Agustina Electronically signed on 2022 at 4:37:43 PM           TTE Echo Complete w/ Contrast w/ Doppler (22 @ 20:17) >    Summary:   1. Left ventricular ejection fraction, by visual estimation, is 50 to   55%.   2. Normal global left ventricular systolic function.   3. Severely enlarged left atrium.   4. Normal left ventricular internal cavity size.   5. Spectral Doppler shows impaired relaxation pattern of left   ventricular myocardial filling (Grade I diastolic dysfunction).   6. There is mild concentric left ventricular hypertrophy.   7. Normal right ventricular size and function.   8. Normal right atrial size.  9. Mild thickening of the anterior and posterior mitral valve leaflets.  10. Mild to moderate mitral valve regurgitation.  11. Moderate aortic valve stenosis.  12. Peak transaortic gradient equals 34.6 mmHg, mean transaortic gradient   equals 17.8 mmHg, the calculated SIXTO (via continuity) 0.97 cm². Based on   the Dimesionless Index value which of .30, and mean gradient of 18 mmHg   this is consistent with moderate aortic stenosis. If clinically warranted   may recommend further eval with ED.  13. Trivial pericardial effusion.  14. There is a significant pericardial fat pad present.  15. Endocardial visualization was enhanced with intravenous echo contrast.  16. Compared to prior TTE done on 2019, the AS is moderate and left   ventricular function remains normal.    Carmen Zuluaga MD Electronically signed on 2022 at 2:09:21 PM

## 2022-04-07 NOTE — PROGRESS NOTE ADULT - ASSESSMENT
82 y/o female with PMH of afib on Eliquis, HTN, hypothyroidism, HLD, depression came to the ED complaining of urinary incontinence. Patient said she woke up and noticed she was wet, covered in urine which is unusual for her.  In the ED, CT head with no significant finding; CT cervical with C2 fracture. Initially placed in observation, neurosurgery consulted. MRI ordered. Medicine called for admission when acute stroke was seen on MRI.     Acute stroke   MRI: 2 small foci with the right cerebral white matter compatible with small acute stroke   Neuro checks Q4 hours   TTE reviewed, intraatrial septum appears intact   Atorvastatin 80mg (patient on 20mg prior to admission)  d/w Neuro, will dc Aspirin 81mg (Daughter confirms that pt has been non compliant with home meds), given fall risk will continue eliquis 5 mg BID for now  High risk for ED complications given C2 fracture at this time  Cardiology consulted,     C2 fracture   CT neck noted   MRI cervical: chronic non-displaced type 2 dens fracture   Neurosurgery following     HTN/HLD  BP was elevated on arrival and treated prior to MRI showing acute stroke   Giving acute stroke, will hold BP medications for now to allow for permissive HTN  Hydralazine 10 mg Q6 IV PRN for SBP > 160 from now  Monitor BP and resume meds as needed   Atorvastatin increased to 40mg     Afib   Rate controlled   Eliquis 5mg bid   Amiodarone 200mg     Hypothyroidism   Synthroid 50mcg     Depression  Escitalopram 10mg    Supportive   DVT prophylaxis: Eliquis 5mg bid   Diet: DASH    Dispo : Home in am

## 2022-04-07 NOTE — DISCHARGE NOTE PROVIDER - NSDCQMANTITHROMCONTRAOTHER_GEN_A_CORE_FT
Fall risk, already on DOAC Patient already on Apixaban. No additional antithrombotic prescribed due to fall risk

## 2022-04-07 NOTE — DISCHARGE NOTE PROVIDER - CARE PROVIDERS DIRECT ADDRESSES
,DirectAddress_Unknown,[email or web address],DirectAddress_Unknown,DirectAddress_Unknown ,DirectAddress_Unknown,DirectAddress_Unknown,DirectAddress_Unknown,[email or web address] ,DirectAddress_Unknown,DirectAddress_Unknown,[email or web address],DirectAddress_Unknown

## 2022-04-07 NOTE — PROGRESS NOTE ADULT - SUBJECTIVE AND OBJECTIVE BOX
DONNA HYATT  [id number]      Chief Complaint:  fall and urinary incontinence    Subjective:   C/o discomfort related to C-collar. Denies chest pain, headache, SOB, palpitation, weakness or vision changes.     Tele:  SR, no sig events      acetaminophen     Tablet .. 650 milliGRAM(s) Oral every 6 hours PRN  aMIOdarone    Tablet 200 milliGRAM(s) Oral daily  apixaban 5 milliGRAM(s) Oral two times a day  atorvastatin 80 milliGRAM(s) Oral at bedtime  enalapril 20 milliGRAM(s) Oral daily  escitalopram 10 milliGRAM(s) Oral daily  hydrALAZINE Injectable 10 milliGRAM(s) IV Push every 6 hours PRN  levothyroxine 50 MICROGram(s) Oral daily      Physical Exam:  T(C): 36.5 (04-07-22 @ 11:04), Max: 36.6 (04-07-22 @ 08:58)  HR: 73 (04-07-22 @ 11:04) (61 - 78)  BP: 159/61 (04-07-22 @ 11:04) (119/61 - 175/66)  RR: 18 (04-07-22 @ 11:04) (18 - 18)  SpO2: 98% (04-07-22 @ 11:04) (94% - 98%)  Wt(kg): --  General: Comfortable in NAD  Neck: C-collar in place  CVS: nl s1s2, no s3 3/6 systolic murmur  Pulm: diminished at bases  Abd: soft, non-tender  Ext: No c/c/e  Neuro A&O x3  Psych: Normal affect      Labs:   06 Apr 2022 07:13    139    |  105    |  25.0   ----------------------------<  95     4.0     |  21.0   |  0.80     Ca    9.3        06 Apr 2022 07:13                            11.7   9.16  )-----------( 305      ( 06 Apr 2022 07:13 )             41.0         RADIOLOGY:  MR Head No Cont 04.03.22   IMPRESSION:  2 SMALL FOCI OF DIFFUSION RESTRICTION WITHIN THE RIGHT CEREBRALWHITE   MATTER COMPATIBLE WITH SMALL ACUTE INFARCTS.    CT Cervical Spine No Cont 04.03.22   IMPRESSION: No evidence of acute hemorrhage in the posterior fossa or   supratentorial region.  Fracture involving C2 as described above.      Xray Chest 1 View 04.03.22   Impression: The heart is unremarkable. The lungs are clear. Bones are   unremarkable for age.    TTE Echo Complete w/ Contrast w/ Doppler 04.05.22   Summary:   1. Left ventricular ejection fraction, by visual estimation, is 50 to   55%.   2. Normal global left ventricular systolic function.   3. Severely enlarged left atrium.   4. Normal left ventricular internal cavity size.   5. Spectral Doppler shows impaired relaxation pattern of left   ventricular myocardial filling (Grade I diastolic dysfunction).   6. There is mild concentric left ventricular hypertrophy.   7. Normal right ventricular size and function.   8. Normal right atrial size.  9. Mild thickening of the anterior and posterior mitral valve leaflets.  10. Mild to moderate mitral valve regurgitation.  11. Moderate aortic valve stenosis.  12. Peak transaortic gradient equals 34.6 mmHg, mean transaortic gradient   equals 17.8 mmHg, the calculated SIXTO (via continuity) 0.97 cm². Based on   the Dimesionless Index value which of .30, and mean gradient of 18 mmHg   this is consistent with moderate aortic stenosis. If clinically warranted   may recommend further eval with ED.  13. Trivial pericardial effusion.  14. There is a significant pericardial fat pad present.  15. Endocardial visualization was enhanced with intravenous echo contrast.  16. Compared to prior TTE done on 4/21/2019, the AS is moderate and left   ventricular function remains normal.    TTE Echo Limited or F/U 04.06.22   Summary:   1. Technically limited study. Agitated saline study for evaluation of   intracardiac shunt.   2. Color flow doppler and intravenous injection of agitated saline   demonstrates the presence of an intact intra atrial septum.   3. Trivial pericardial effusion.   4. Please see TTE from 4/5/22 for further details.      Assessment:  81 year old female with pmhx afib on Eliquis, HTN, hypothyroidism, HLD, depression came to the ED complaining of urinary incontinence and recent fall. Found to have acute right cerebral infarct. ?embolic. Pt reports medication adherence, denies missing any doses of Eliquis. Pt currently in sinus rhythm.   -Echo with normal EV and moderate to severe aortic stenosis  - Agitated saline study negative for PFO.       Plan:  1. Continue Eliquis.   2. ED not feasible considering C2 fracture, can be done at a later time as outpatient.   3. Continue Lipitor 80 mg daily for LDL goal <70.   4. Continue amiodarone 200 mg daily.   5. Obtain echo with bubble study, evaluate for PFO.   6. Stable for discharge from CV standpoint.

## 2022-04-08 LAB — SARS-COV-2 RNA SPEC QL NAA+PROBE: SIGNIFICANT CHANGE UP

## 2022-04-08 PROCEDURE — 99232 SBSQ HOSP IP/OBS MODERATE 35: CPT

## 2022-04-08 RX ORDER — NYSTATIN CREAM 100000 [USP'U]/G
1 CREAM TOPICAL
Qty: 1 | Refills: 0
Start: 2022-04-08 | End: 2022-04-10

## 2022-04-08 RX ORDER — BACITRACIN ZINC 500 UNIT/G
1 OINTMENT IN PACKET (EA) TOPICAL
Qty: 1 | Refills: 0
Start: 2022-04-08 | End: 2022-04-10

## 2022-04-08 RX ORDER — BACITRACIN ZINC 500 UNIT/G
1 OINTMENT IN PACKET (EA) TOPICAL
Refills: 0 | Status: DISCONTINUED | OUTPATIENT
Start: 2022-04-08 | End: 2022-04-12

## 2022-04-08 RX ORDER — NYSTATIN CREAM 100000 [USP'U]/G
1 CREAM TOPICAL
Refills: 0 | Status: DISCONTINUED | OUTPATIENT
Start: 2022-04-08 | End: 2022-04-12

## 2022-04-08 RX ADMIN — NYSTATIN CREAM 1 APPLICATION(S): 100000 CREAM TOPICAL at 17:01

## 2022-04-08 RX ADMIN — APIXABAN 5 MILLIGRAM(S): 2.5 TABLET, FILM COATED ORAL at 19:05

## 2022-04-08 RX ADMIN — ATORVASTATIN CALCIUM 80 MILLIGRAM(S): 80 TABLET, FILM COATED ORAL at 21:56

## 2022-04-08 RX ADMIN — Medication 1 APPLICATION(S): at 18:45

## 2022-04-08 RX ADMIN — Medication 50 MICROGRAM(S): at 05:30

## 2022-04-08 RX ADMIN — AMIODARONE HYDROCHLORIDE 200 MILLIGRAM(S): 400 TABLET ORAL at 05:30

## 2022-04-08 RX ADMIN — Medication 10 MILLIGRAM(S): at 05:30

## 2022-04-08 RX ADMIN — Medication 20 MILLIGRAM(S): at 05:31

## 2022-04-08 RX ADMIN — NYSTATIN CREAM 1 APPLICATION(S): 100000 CREAM TOPICAL at 19:05

## 2022-04-08 RX ADMIN — Medication 1 APPLICATION(S): at 13:05

## 2022-04-08 RX ADMIN — ESCITALOPRAM OXALATE 10 MILLIGRAM(S): 10 TABLET, FILM COATED ORAL at 13:06

## 2022-04-08 RX ADMIN — APIXABAN 5 MILLIGRAM(S): 2.5 TABLET, FILM COATED ORAL at 05:31

## 2022-04-08 NOTE — PROGRESS NOTE ADULT - SUBJECTIVE AND OBJECTIVE BOX
[facility] Division of Hospital Medicine    Chief Complaint:  CVA     SUBJECTIVE / OVERNIGHT EVENTS:  Pt examined lying in bed with C Collar  d/w NSx. Appreciate recs  Will defer NPH w/u for outpt (1-2  weeks with N Sx)  Patient denies chest pain, SOB, abd pain, N/V, fever, chills, dysuria or any other complaints. All remainder ROS negative.     MEDICATIONS  (STANDING):  aMIOdarone    Tablet 200 milliGRAM(s) Oral daily  apixaban 5 milliGRAM(s) Oral two times a day  aspirin  chewable 81 milliGRAM(s) Oral daily  atorvastatin 40 milliGRAM(s) Oral at bedtime  escitalopram 10 milliGRAM(s) Oral daily  levothyroxine 50 MICROGram(s) Oral daily    MEDICATIONS  (PRN):  acetaminophen     Tablet .. 650 milliGRAM(s) Oral every 6 hours PRN Temp greater or equal to 38C (100.4F), Mild Pain (1 - 3)        I&O's Summary    2022 07:01  -  2022 17:04  --------------------------------------------------------  IN: 0 mL / OUT: 175 mL / NET: -175 mL        PHYSICAL EXAM:  Vital Signs Last 24 Hrs  T(C): 36.5 (2022 11:04), Max: 36.6 (2022 08:58)  T(F): 97.7 (2022 11:04), Max: 97.9 (2022 08:58)  HR: 73 (2022 11:04) (61 - 78)  BP: 159/61 (2022 11:04) (119/61 - 175/66)  BP(mean): --  RR: 18 (2022 11:04) (18 - 18)  SpO2: 98% (2022 11:04) (94% - 98%)        CONSTITUTIONAL: Non toxic appearing, lying in bed  ENMT: Moist oral mucosa, no pharyngeal injection or exudates; normal dentition with C collar   RESPIRATORY: Normal respiratory effort; lungs are clear to auscultation bilaterally  CARDIOVASCULAR: Regular rate and rhythm, normal S1 and S2, no murmur/rub/gallop; No lower extremity edema; Peripheral pulses are 2+ bilaterally  ABDOMEN: Nontender to palpation, normoactive bowel sounds, no rebound/guarding; No hepatosplenomegaly  MUSCLOSKELETAL : no clubbing or cyanosis of digits; no joint swelling or tenderness to palpation  PSYCH: A+O to person, place, and time; affect appropriate  NEUROLOGY: CN 2-12 are intact and symmetric; no gross sensory deficits;   SKIN: No rashes; no palpable lesions    LABS:                                     11.7   9.16  )-----------( 305      ( 2022 07:13 )             41.0   04-06    139  |  105  |  25.0<H>  ----------------------------<  95  4.0   |  21.0<L>  |  0.80    Ca    9.3      2022 07:13          CARDIAC MARKERS ( 2022 10:41 )  x     / <0.01 ng/mL / x     / x     / x          Urinalysis Basic - ( 2022 10:57 )    Color: Yellow / Appearance: Clear / S.010 / pH: x  Gluc: x / Ketone: Negative  / Bili: Negative / Urobili: Negative mg/dL   Blood: x / Protein: 15 / Nitrite: Negative   Leuk Esterase: Negative / RBC: 0-2 /HPF / WBC 0-2 /HPF   Sq Epi: x / Non Sq Epi: Occasional / Bacteria: Occasional        Culture - Urine (collected 2022 15:00)  Source: Clean Catch Clean Catch (Midstream)  Final Report (2022 12:07):    <10,000 CFU/mL Normal Urogenital Ann      CAPILLARY BLOOD GLUCOSE            RADIOLOGY & ADDITIONAL TESTS:    4/3/22 MRI Brain   IMPRESSION:  2 SMALL FOCI OF DIFFUSION RESTRICTION WITHIN THE RIGHT CEREBRAL WHITE   MATTER COMPATIBLE WITH SMALL ACUTE INFARCTS.    4/3/22 MRI C Spine   IMPRESSION:   CHRONIC APPEARING NONDISPLACED TYPE II DENS FRACTURE.   MILD TO MODERATE MULTILEVEL DEGENERATIVE CHANGES.      22 MRI L spine   IMPRESSION:  SMALL TO MODERATE RIGHT PARACENTRAL DISC EXTRUSION T11-12.  SMALL TO MODERATE RIGHT PARACENTRAL DISC EXTRUSION L4-5 WITH SUPERIOR   MIGRATION.  MODERATE LEFT PARACENTRAL DISC EXTRUSION L5-S1  SCOLIOSIS WITH ASSOCIATED DEGENERATIVE CHANGES.  LOW-LYING CORD WITH TIP OF THE CONUS AT THE L3-4 LEVEL.                                             [facility] Division of Hospital Medicine    Chief Complaint:  CVA     SUBJECTIVE / OVERNIGHT EVENTS:  Pt examined layng in bed  In am pt complaining of significant pain in groin.   Refusing UA  Family unwilling to have pt return home until Covid swab is done as there is reportedly an immunocompromised person in the household   Patient denies chest pain, SOB, abd pain, N/V, fever, chills, dysuria or any other complaints. All remainder ROS negative.     MEDICATIONS  (STANDING):  aMIOdarone    Tablet 200 milliGRAM(s) Oral daily  apixaban 5 milliGRAM(s) Oral two times a day  aspirin  chewable 81 milliGRAM(s) Oral daily  atorvastatin 40 milliGRAM(s) Oral at bedtime  escitalopram 10 milliGRAM(s) Oral daily  levothyroxine 50 MICROGram(s) Oral daily    MEDICATIONS  (PRN):  acetaminophen     Tablet .. 650 milliGRAM(s) Oral every 6 hours PRN Temp greater or equal to 38C (100.4F), Mild Pain (1 - 3)        PHYSICAL EXAM:  Vital Signs Last 24 Hrs  T(C): 36.4 (2022 10:00), Max: 36.8 (2022 04:28)  T(F): 97.6 (2022 10:00), Max: 98.2 (2022 04:28)  HR: 62 (2022 10:00) (62 - 78)  BP: 144/68 (2022 10:00) (129/63 - 180/70)  BP(mean): --  RR: 18 (2022 10:00) (18 - 18)  SpO2: 95% (2022 10:00) (95% - 98%)        CONSTITUTIONAL: Non toxic appearing, lying in bed  ENMT: Moist oral mucosa, no pharyngeal injection or exudates; normal dentition    RESPIRATORY: Normal respiratory effort; lungs are clear to auscultation bilaterally  CARDIOVASCULAR: Regular rate and rhythm, normal S1 and S2, no murmur/rub/gallop; No lower extremity edema; Peripheral pulses are 2+ bilaterally  ABDOMEN: Nontender to palpation, normoactive bowel sounds, no rebound/guarding; No hepatosplenomegaly, labial majora tenderness with erythema on exam (NP chaperone present during exam)   MUSCLOSKELETAL : no clubbing or cyanosis of digits; no joint swelling or tenderness to palpation  PSYCH: A+O to person, place, and time; affect appropriate  NEUROLOGY: CN 2-12 are intact and symmetric; no gross sensory deficits;   SKIN: No rashes; no palpable lesions    LABS:                                     11.7   9.16  )-----------( 305      ( 2022 07:13 )             41.0   04-06    139  |  105  |  25.0<H>  ----------------------------<  95  4.0   |  21.0<L>  |  0.80    Ca    9.3      2022 07:13          CARDIAC MARKERS ( 2022 10:41 )  x     / <0.01 ng/mL / x     / x     / x          Urinalysis Basic - ( 2022 10:57 )    Color: Yellow / Appearance: Clear / S.010 / pH: x  Gluc: x / Ketone: Negative  / Bili: Negative / Urobili: Negative mg/dL   Blood: x / Protein: 15 / Nitrite: Negative   Leuk Esterase: Negative / RBC: 0-2 /HPF / WBC 0-2 /HPF   Sq Epi: x / Non Sq Epi: Occasional / Bacteria: Occasional        Culture - Urine (collected 2022 15:00)  Source: Clean Catch Clean Catch (Midstream)  Final Report (2022 12:07):    <10,000 CFU/mL Normal Urogenital Ann      CAPILLARY BLOOD GLUCOSE            RADIOLOGY & ADDITIONAL TESTS:    4/3/22 MRI Brain   IMPRESSION:  2 SMALL FOCI OF DIFFUSION RESTRICTION WITHIN THE RIGHT CEREBRAL WHITE   MATTER COMPATIBLE WITH SMALL ACUTE INFARCTS.    4/3/22 MRI C Spine   IMPRESSION:   CHRONIC APPEARING NONDISPLACED TYPE II DENS FRACTURE.   MILD TO MODERATE MULTILEVEL DEGENERATIVE CHANGES.      22 MRI L spine   IMPRESSION:  SMALL TO MODERATE RIGHT PARACENTRAL DISC EXTRUSION T11-12.  SMALL TO MODERATE RIGHT PARACENTRAL DISC EXTRUSION L4-5 WITH SUPERIOR   MIGRATION.  MODERATE LEFT PARACENTRAL DISC EXTRUSION L5-S1  SCOLIOSIS WITH ASSOCIATED DEGENERATIVE CHANGES.  LOW-LYING CORD WITH TIP OF THE CONUS AT THE L3-4 LEVEL.

## 2022-04-08 NOTE — PROGRESS NOTE ADULT - ASSESSMENT
82 y/o female with PMH of afib on Eliquis, HTN, hypothyroidism, HLD, depression came to the ED complaining of urinary incontinence. Patient said she woke up and noticed she was wet, covered in urine which is unusual for her.  In the ED, CT head with no significant finding; CT cervical with C2 fracture. Initially placed in observation, neurosurgery consulted. MRI ordered. Medicine called for admission when acute stroke was seen on MRI.     Acute stroke   MRI: 2 small foci with the right cerebral white matter compatible with small acute stroke   Neuro checks Q4 hours   TTE reviewed, intraatrial septum appears intact   Atorvastatin 80mg (patient on 20mg prior to admission)  d/w Neuro, will dc Aspirin 81mg (Daughter confirms that pt has been non compliant with home meds), given fall risk will continue eliquis 5 mg BID for now  High risk for ED complications given C2 fracture at this time  Cardiology consulted,     C2 fracture   CT neck noted   MRI cervical: chronic non-displaced type 2 dens fracture   Neurosurgery following     HTN/HLD  BP was elevated on arrival and treated prior to MRI showing acute stroke   Giving acute stroke, will hold BP medications for now to allow for permissive HTN  Hydralazine 10 mg Q6 IV PRN for SBP > 160 from now  Monitor BP and resume meds as needed   Atorvastatin increased to 40mg     Afib   Rate controlled   Eliquis 5mg bid   Amiodarone 200mg     Hypothyroidism   Synthroid 50mcg     Depression  Escitalopram 10mg    Supportive   DVT prophylaxis: Eliquis 5mg bid   Diet: DASH    Dispo : Home in am    82 y/o female with PMH of afib on Eliquis, HTN, hypothyroidism, HLD, depression came to the ED complaining of urinary incontinence. Patient said she woke up and noticed she was wet, covered in urine which is unusual for her.  In the ED, CT head with no significant finding; CT cervical with C2 fracture. Initially placed in observation, neurosurgery consulted. MRI ordered. Medicine called for admission when acute stroke was seen on MRI.     Acute stroke   MRI: 2 small foci with the right cerebral white matter compatible with small acute stroke   Neuro checks Q4 hours   TTE reviewed, intraatrial septum appears intact   Atorvastatin 80mg (patient on 20mg prior to admission)  d/w Neuro, will dc Aspirin 81mg (Daughter confirms that pt has been non compliant with home meds), given fall risk will continue eliquis 5 mg BID for now  High risk for ED complications given C2 fracture at this time  Cardiology consult appreciated     C2 fracture   CT neck noted   MRI cervical: chronic non-displaced type 2 dens fracture   Neurosurgery recs appreciated, no need for C collar at this time   f/u as outpt    Suspected normal pressure hydrocephalus   Imaging with history of urinary incontinence with gait instability as well as increasing memory recall deficits (acute/subacute)  dd/w NSx, will defer further w/u att his time. Outpt NSx f/u for evaluation and potential treatment if positive     HTN/HLD  BP was elevated on arrival and treated prior to MRI showing acute stroke   Giving acute stroke, will hold BP medications for now to allow for permissive HTN  Hydralazine 10 mg Q6 IV PRN for SBP > 160 from now  Monitor BP and resume meds as needed   Atorvastatin increased to 40mg     External vulvovaginitis   Continue Nystatin and Bacitracin     Afib   Rate controlled   Eliquis 5mg bid   Amiodarone 200mg     Hypothyroidism   Synthroid 50mcg     Depression  Escitalopram 10mg    Supportive   DVT prophylaxis: Eliquis 5mg bid   Diet: DASH    Dispo : Home in am (unable to dsc as Covid PCR still pending)

## 2022-04-09 PROCEDURE — 99232 SBSQ HOSP IP/OBS MODERATE 35: CPT

## 2022-04-09 RX ORDER — LOPERAMIDE HCL 2 MG
2 TABLET ORAL ONCE
Refills: 0 | Status: COMPLETED | OUTPATIENT
Start: 2022-04-09 | End: 2022-04-09

## 2022-04-09 RX ORDER — AMLODIPINE BESYLATE 2.5 MG/1
1 TABLET ORAL
Qty: 30 | Refills: 0
Start: 2022-04-09 | End: 2022-05-08

## 2022-04-09 RX ORDER — AMLODIPINE BESYLATE 2.5 MG/1
5 TABLET ORAL DAILY
Refills: 0 | Status: DISCONTINUED | OUTPATIENT
Start: 2022-04-09 | End: 2022-04-12

## 2022-04-09 RX ORDER — LOPERAMIDE HCL 2 MG
1 TABLET ORAL
Qty: 2 | Refills: 0
Start: 2022-04-09 | End: 2022-04-10

## 2022-04-09 RX ADMIN — NYSTATIN CREAM 1 APPLICATION(S): 100000 CREAM TOPICAL at 18:23

## 2022-04-09 RX ADMIN — AMLODIPINE BESYLATE 5 MILLIGRAM(S): 2.5 TABLET ORAL at 13:04

## 2022-04-09 RX ADMIN — Medication 50 MICROGRAM(S): at 05:43

## 2022-04-09 RX ADMIN — Medication 1 APPLICATION(S): at 18:22

## 2022-04-09 RX ADMIN — APIXABAN 5 MILLIGRAM(S): 2.5 TABLET, FILM COATED ORAL at 05:44

## 2022-04-09 RX ADMIN — NYSTATIN CREAM 1 APPLICATION(S): 100000 CREAM TOPICAL at 05:44

## 2022-04-09 RX ADMIN — AMIODARONE HYDROCHLORIDE 200 MILLIGRAM(S): 400 TABLET ORAL at 05:44

## 2022-04-09 RX ADMIN — ATORVASTATIN CALCIUM 80 MILLIGRAM(S): 80 TABLET, FILM COATED ORAL at 21:38

## 2022-04-09 RX ADMIN — Medication 20 MILLIGRAM(S): at 05:44

## 2022-04-09 RX ADMIN — ESCITALOPRAM OXALATE 10 MILLIGRAM(S): 10 TABLET, FILM COATED ORAL at 13:04

## 2022-04-09 RX ADMIN — Medication 1 APPLICATION(S): at 05:44

## 2022-04-09 RX ADMIN — APIXABAN 5 MILLIGRAM(S): 2.5 TABLET, FILM COATED ORAL at 17:07

## 2022-04-09 RX ADMIN — Medication 2 MILLIGRAM(S): at 17:06

## 2022-04-09 NOTE — PROGRESS NOTE ADULT - SUBJECTIVE AND OBJECTIVE BOX
[facility] Division of Hospital Medicine    Chief Complaint:  CVA     SUBJECTIVE / OVERNIGHT EVENTS:  Pt examined layng in bed  No acute overnight events  few episodes of loose BM today. Non watery, No abd pain,  d/w Daughter, pt has remained inpt until her Covid returned neg (immunocompromised pt in household) which has prolonged her inpt stay. has progressively deconditioned  daughter unwilling to have pt return unless independent. Will request stat PT reevaluation to see if pt will require brief HERB      Patient denies chest pain, SOB, abd pain, N/V, fever, chills, dysuria or any other complaints. All remainder ROS negative.       MEDICATIONS  (STANDING):  aMIOdarone    Tablet 200 milliGRAM(s) Oral daily  amLODIPine   Tablet 5 milliGRAM(s) Oral daily  apixaban 5 milliGRAM(s) Oral two times a day  atorvastatin 80 milliGRAM(s) Oral at bedtime  BACItracin   Ointment 1 Application(s) Topical two times a day  enalapril 20 milliGRAM(s) Oral daily  escitalopram 10 milliGRAM(s) Oral daily  levothyroxine 50 MICROGram(s) Oral daily  loperamide 2 milliGRAM(s) Oral once  nystatin Cream 1 Application(s) Topical two times a day    MEDICATIONS  (PRN):  acetaminophen     Tablet .. 650 milliGRAM(s) Oral every 6 hours PRN Temp greater or equal to 38C (100.4F), Mild Pain (1 - 3)  hydrALAZINE Injectable 10 milliGRAM(s) IV Push every 6 hours PRN SBP > 160        PHYSICAL EXAM:  Vital Signs Last 24 Hrs  T(C): 36.7 (2022 16:06), Max: 36.7 (2022 16:06)  T(F): 98 (2022 16:06), Max: 98 (2022 16:06)  HR: 64 (2022 16:06) (56 - 71)  BP: 153/64 (2022 16:06) (145/67 - 153/64)  BP(mean): --  RR: 18 (2022 16:06) (18 - 18)  SpO2: 93% (2022 16:06) (91% - 96%)    CONSTITUTIONAL: Non toxic appearing, lying in bed  ENMT: Moist oral mucosa, no pharyngeal injection or exudates; normal dentition    RESPIRATORY: Normal respiratory effort; lungs are clear to auscultation bilaterally  CARDIOVASCULAR: Regular rate and rhythm, normal S1 and S2, no murmur/rub/gallop; No lower extremity edema; Peripheral pulses are 2+ bilaterally  ABDOMEN: Nontender to palpation, normoactive bowel sounds, no rebound/guarding; No hepatosplenomegaly, labial majora tenderness with erythema on exam (NP chaperone present during exam)   MUSCLOSKELETAL : no clubbing or cyanosis of digits; no joint swelling or tenderness to palpation  PSYCH: A+O to person, place, and time; affect appropriate  NEUROLOGY: CN 2-12 are intact and symmetric; no gross sensory deficits;   SKIN: No rashes; no palpable lesions    LABS:                                     11.7   9.16  )-----------( 305      ( 2022 07:13 )             41.0   04-06    139  |  105  |  25.0<H>  ----------------------------<  95  4.0   |  21.0<L>  |  0.80    Ca    9.3      2022 07:13          CARDIAC MARKERS ( 2022 10:41 )  x     / <0.01 ng/mL / x     / x     / x          Urinalysis Basic - ( 2022 10:57 )    Color: Yellow / Appearance: Clear / S.010 / pH: x  Gluc: x / Ketone: Negative  / Bili: Negative / Urobili: Negative mg/dL   Blood: x / Protein: 15 / Nitrite: Negative   Leuk Esterase: Negative / RBC: 0-2 /HPF / WBC 0-2 /HPF   Sq Epi: x / Non Sq Epi: Occasional / Bacteria: Occasional        Culture - Urine (collected 2022 15:00)  Source: Clean Catch Clean Catch (Midstream)  Final Report (2022 12:07):    <10,000 CFU/mL Normal Urogenital Ann      CAPILLARY BLOOD GLUCOSE            RADIOLOGY & ADDITIONAL TESTS:    4/3/22 MRI Brain   IMPRESSION:  2 SMALL FOCI OF DIFFUSION RESTRICTION WITHIN THE RIGHT CEREBRAL WHITE   MATTER COMPATIBLE WITH SMALL ACUTE INFARCTS.    4/3/22 MRI C Spine   IMPRESSION:   CHRONIC APPEARING NONDISPLACED TYPE II DENS FRACTURE.   MILD TO MODERATE MULTILEVEL DEGENERATIVE CHANGES.      22 MRI L spine   IMPRESSION:  SMALL TO MODERATE RIGHT PARACENTRAL DISC EXTRUSION T11-12.  SMALL TO MODERATE RIGHT PARACENTRAL DISC EXTRUSION L4-5 WITH SUPERIOR   MIGRATION.  MODERATE LEFT PARACENTRAL DISC EXTRUSION L5-S1  SCOLIOSIS WITH ASSOCIATED DEGENERATIVE CHANGES.  LOW-LYING CORD WITH TIP OF THE CONUS AT THE L3-4 LEVEL.

## 2022-04-09 NOTE — CHART NOTE - NSCHARTNOTEFT_GEN_A_CORE
Pt examined lying comfortably in bed  Medically stable, has mild loose stools, abd exam benign. d/w RN, non watery, no fevers, chills.   Will dsc home (pt requesting)  with instructions ot f/.u with PCP if symptoms persist

## 2022-04-09 NOTE — PROGRESS NOTE ADULT - ASSESSMENT
82 y/o female with PMH of afib on Eliquis, HTN, hypothyroidism, HLD, depression came to the ED complaining of urinary incontinence. Patient said she woke up and noticed she was wet, covered in urine which is unusual for her.  In the ED, CT head with no significant finding; CT cervical with C2 fracture. Initially placed in observation, neurosurgery consulted. MRI ordered. Medicine called for admission when acute stroke was seen on MRI.     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 Outpt NSx f/u for evaluation and potential treatment if positive     HTN/HLD  BP was elevated on arrival and treated prior to MRI showing acute stroke   Giving acute stroke, will hold BP medications for now to allow for permissive HTN  Hydralazine 10 mg Q6 IV PRN for SBP > 160 from now  Monitor BP and resume meds as needed   Atorvastatin increased to 40mg     External vulvovaginitis   Continue Nystatin and Bacitracin     Afib   Rate controlled   Eliquis 5mg bid   Amiodarone 200mg     Hypothyroidism   Synthroid 50mcg     Depression  Escitalopram 10mg    Supportive   DVT prophylaxis: Eliquis 5mg bid   Diet: DASH    Dispo : Now may need HERB

## 2022-04-10 LAB
ANION GAP SERPL CALC-SCNC: 14 MMOL/L — SIGNIFICANT CHANGE UP (ref 5–17)
BUN SERPL-MCNC: 27 MG/DL — HIGH (ref 8–20)
CALCIUM SERPL-MCNC: 9.4 MG/DL — SIGNIFICANT CHANGE UP (ref 8.6–10.2)
CHLORIDE SERPL-SCNC: 107 MMOL/L — SIGNIFICANT CHANGE UP (ref 98–107)
CO2 SERPL-SCNC: 21 MMOL/L — LOW (ref 22–29)
CREAT SERPL-MCNC: 0.86 MG/DL — SIGNIFICANT CHANGE UP (ref 0.5–1.3)
EGFR: 68 ML/MIN/1.73M2 — SIGNIFICANT CHANGE UP
GLUCOSE SERPL-MCNC: 90 MG/DL — SIGNIFICANT CHANGE UP (ref 70–99)
HCT VFR BLD CALC: 42.1 % — SIGNIFICANT CHANGE UP (ref 34.5–45)
HGB BLD-MCNC: 11.8 G/DL — SIGNIFICANT CHANGE UP (ref 11.5–15.5)
MCHC RBC-ENTMCNC: 22.3 PG — LOW (ref 27–34)
MCHC RBC-ENTMCNC: 28 GM/DL — LOW (ref 32–36)
MCV RBC AUTO: 79.4 FL — LOW (ref 80–100)
PLATELET # BLD AUTO: 330 K/UL — SIGNIFICANT CHANGE UP (ref 150–400)
POTASSIUM SERPL-MCNC: 4.1 MMOL/L — SIGNIFICANT CHANGE UP (ref 3.5–5.3)
POTASSIUM SERPL-SCNC: 4.1 MMOL/L — SIGNIFICANT CHANGE UP (ref 3.5–5.3)
RBC # BLD: 5.3 M/UL — HIGH (ref 3.8–5.2)
RBC # FLD: 17.4 % — HIGH (ref 10.3–14.5)
SODIUM SERPL-SCNC: 142 MMOL/L — SIGNIFICANT CHANGE UP (ref 135–145)
WBC # BLD: 8.96 K/UL — SIGNIFICANT CHANGE UP (ref 3.8–10.5)
WBC # FLD AUTO: 8.96 K/UL — SIGNIFICANT CHANGE UP (ref 3.8–10.5)

## 2022-04-10 PROCEDURE — 99232 SBSQ HOSP IP/OBS MODERATE 35: CPT

## 2022-04-10 RX ADMIN — ESCITALOPRAM OXALATE 10 MILLIGRAM(S): 10 TABLET, FILM COATED ORAL at 11:34

## 2022-04-10 RX ADMIN — APIXABAN 5 MILLIGRAM(S): 2.5 TABLET, FILM COATED ORAL at 06:03

## 2022-04-10 RX ADMIN — APIXABAN 5 MILLIGRAM(S): 2.5 TABLET, FILM COATED ORAL at 17:23

## 2022-04-10 RX ADMIN — ATORVASTATIN CALCIUM 80 MILLIGRAM(S): 80 TABLET, FILM COATED ORAL at 21:36

## 2022-04-10 RX ADMIN — Medication 1 APPLICATION(S): at 17:24

## 2022-04-10 RX ADMIN — NYSTATIN CREAM 1 APPLICATION(S): 100000 CREAM TOPICAL at 17:24

## 2022-04-10 RX ADMIN — Medication 20 MILLIGRAM(S): at 06:01

## 2022-04-10 RX ADMIN — NYSTATIN CREAM 1 APPLICATION(S): 100000 CREAM TOPICAL at 06:02

## 2022-04-10 RX ADMIN — Medication 1 APPLICATION(S): at 06:02

## 2022-04-10 RX ADMIN — Medication 50 MICROGRAM(S): at 06:03

## 2022-04-10 RX ADMIN — AMIODARONE HYDROCHLORIDE 200 MILLIGRAM(S): 400 TABLET ORAL at 06:01

## 2022-04-10 RX ADMIN — AMLODIPINE BESYLATE 5 MILLIGRAM(S): 2.5 TABLET ORAL at 06:02

## 2022-04-10 NOTE — PROGRESS NOTE ADULT - ASSESSMENT
82 y/o female with PMH of afib on Eliquis, HTN, hypothyroidism, HLD, depression came to the ED complaining of urinary incontinence. Patient said she woke up and noticed she was wet, covered in urine which is unusual for her.  In the ED, CT head with no significant finding; CT cervical with C2 fracture. Initially placed in observation, neurosurgery consulted. MRI ordered. Medicine called for admission when acute stroke was seen on MRI.     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 Outpt NSx f/u for evaluation and potential treatment if positive     HTN/HLD  BP was elevated on arrival and treated prior to MRI showing acute stroke   Giving acute stroke, will hold BP medications for now to allow for permissive HTN  Hydralazine 10 mg Q6 IV PRN for SBP > 160 from now  Monitor BP and resume meds as needed   Atorvastatin increased to 40mg     External vulvovaginitis   Continue Nystatin and Bacitracin     Afib   Rate controlled   Eliquis 5mg bid   Amiodarone 200mg     Hypothyroidism   Synthroid 50mcg     Depression  Escitalopram 10mg    Supportive   DVT prophylaxis: Eliquis 5mg bid   Diet: DASH    Dispo : Now may need HERB   82 y/o female with PMH of afib on Eliquis, HTN, hypothyroidism, HLD, depression came to the ED complaining of urinary incontinence. Patient said she woke up and noticed she was wet, covered in urine which is unusual for her.  In the ED, CT head with no significant finding; CT cervical with C2 fracture. Initially placed in observation, neurosurgery consulted. MRI ordered. Medicine called for admission when acute stroke was seen on MRI.     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 Outpt NSx f/u for evaluation and potential treatment if positive     HTN/HLD  BP was elevated on arrival and treated prior to MRI showing acute stroke   Giving acute stroke, will hold BP medications for now to allow for permissive HTN  Hydralazine 10 mg Q6 IV PRN for SBP > 160 from now  Monitor BP and resume meds as needed   Atorvastatin increased to 40mg     External vulvovaginitis   Continue Nystatin and Bacitracin     Afib (chronic)  Rate controlled   Eliquis 5mg bid   Amiodarone 200mg     Hypothyroidism   Synthroid 50mcg     Depression  Escitalopram 10mg    Supportive   DVT prophylaxis: Eliquis 5mg bid   Diet: DASH    Dispo : Now may need HERB

## 2022-04-10 NOTE — DIETITIAN INITIAL EVALUATION ADULT - PERTINENT LABORATORY DATA
Initial (On Arrival) 04-10    142  |  107  |  27.0<H>  ----------------------------<  90  4.1   |  21.0<L>  |  0.86    Ca    9.4      10 Apr 2022 07:45    A1C with Estimated Average Glucose Result: 6.3 % (04-04-22 @ 03:30)

## 2022-04-10 NOTE — PROGRESS NOTE ADULT - SUBJECTIVE AND OBJECTIVE BOX
[facility] Division of Hospital Medicine    Chief Complaint:  CVA     SUBJECTIVE / OVERNIGHT EVENTS:  Pt examined layng in bed  No acute overnight events  Abd pain resolved  Awaiting HERB placement (daughter now reports cannot care for pt in current functional state)  Patient denies chest pain, SOB, abd pain, N/V, fever, chills, dysuria or any other complaints. All remainder ROS negative.       MEDICATIONS  (STANDING):  aMIOdarone    Tablet 200 milliGRAM(s) Oral daily  amLODIPine   Tablet 5 milliGRAM(s) Oral daily  apixaban 5 milliGRAM(s) Oral two times a day  atorvastatin 80 milliGRAM(s) Oral at bedtime  BACItracin   Ointment 1 Application(s) Topical two times a day  enalapril 20 milliGRAM(s) Oral daily  escitalopram 10 milliGRAM(s) Oral daily  levothyroxine 50 MICROGram(s) Oral daily  loperamide 2 milliGRAM(s) Oral once  nystatin Cream 1 Application(s) Topical two times a day    MEDICATIONS  (PRN):  acetaminophen     Tablet .. 650 milliGRAM(s) Oral every 6 hours PRN Temp greater or equal to 38C (100.4F), Mild Pain (1 - 3)  hydrALAZINE Injectable 10 milliGRAM(s) IV Push every 6 hours PRN SBP > 160        PHYSICAL EXAM:  Vital Signs Last 24 Hrs  T(C): 36.3 (10 Apr 2022 17:09), Max: 37 (2022 20:03)  T(F): 97.4 (10 Apr 2022 17:09), Max: 98.6 (2022 20:03)  HR: 68 (10 Apr 2022 17:09) (59 - 68)  BP: 131/61 (10 Apr 2022 17:09) (128/64 - 166/75)  BP(mean): --  RR: 18 (10 Apr 2022 17:09) (18 - 18)  SpO2: 95% (10 Apr 2022 17:09) (93% - 95%)    CONSTITUTIONAL: Non toxic appearing, lying in bed  ENMT: Moist oral mucosa, no pharyngeal injection or exudates; normal dentition    RESPIRATORY: Normal respiratory effort; lungs are clear to auscultation bilaterally  CARDIOVASCULAR: Regular rate and rhythm, normal S1 and S2, no murmur/rub/gallop; No lower extremity edema; Peripheral pulses are 2+ bilaterally  ABDOMEN: Nontender to palpation, normoactive bowel sounds, no rebound/guarding; No hepatosplenomegaly, labial majora tenderness with erythema on exam (NP chaperone present during exam)   MUSCLOSKELETAL : no clubbing or cyanosis of digits; no joint swelling or tenderness to palpation  PSYCH: A+O to person, place, and time; affect appropriate  NEUROLOGY: CN 2-12 are intact and symmetric; no gross sensory deficits;   SKIN: No rashes; no palpable lesions    LABS:                                     11.8   8.96  )-----------( 330      ( 10 Apr 2022 07:45 )             42.1   04-10    142  |  107  |  27.0<H>  ----------------------------<  90  4.1   |  21.0<L>  |  0.86    Ca    9.4      10 Apr 2022 07:45            CARDIAC MARKERS ( 2022 10:41 )  x     / <0.01 ng/mL / x     / x     / x          Urinalysis Basic - ( 2022 10:57 )    Color: Yellow / Appearance: Clear / S.010 / pH: x  Gluc: x / Ketone: Negative  / Bili: Negative / Urobili: Negative mg/dL   Blood: x / Protein: 15 / Nitrite: Negative   Leuk Esterase: Negative / RBC: 0-2 /HPF / WBC 0-2 /HPF   Sq Epi: x / Non Sq Epi: Occasional / Bacteria: Occasional        Culture - Urine (collected 2022 15:00)  Source: Clean Catch Clean Catch (Midstream)  Final Report (2022 12:07):    <10,000 CFU/mL Normal Urogenital Ann      CAPILLARY BLOOD GLUCOSE            RADIOLOGY & ADDITIONAL TESTS:    4/3/22 MRI Brain   IMPRESSION:  2 SMALL FOCI OF DIFFUSION RESTRICTION WITHIN THE RIGHT CEREBRAL WHITE   MATTER COMPATIBLE WITH SMALL ACUTE INFARCTS.    4/3/22 MRI C Spine   IMPRESSION:   CHRONIC APPEARING NONDISPLACED TYPE II DENS FRACTURE.   MILD TO MODERATE MULTILEVEL DEGENERATIVE CHANGES.      22 MRI L spine   IMPRESSION:  SMALL TO MODERATE RIGHT PARACENTRAL DISC EXTRUSION T11-12.  SMALL TO MODERATE RIGHT PARACENTRAL DISC EXTRUSION L4-5 WITH SUPERIOR   MIGRATION.  MODERATE LEFT PARACENTRAL DISC EXTRUSION L5-S1  SCOLIOSIS WITH ASSOCIATED DEGENERATIVE CHANGES.  LOW-LYING CORD WITH TIP OF THE CONUS AT THE L3-4 LEVEL.

## 2022-04-10 NOTE — DIETITIAN INITIAL EVALUATION ADULT - PERTINENT MEDS FT
MEDICATIONS  (STANDING):  aMIOdarone    Tablet 200 milliGRAM(s) Oral daily  amLODIPine   Tablet 5 milliGRAM(s) Oral daily  apixaban 5 milliGRAM(s) Oral two times a day  atorvastatin 80 milliGRAM(s) Oral at bedtime  BACItracin   Ointment 1 Application(s) Topical two times a day  enalapril 20 milliGRAM(s) Oral daily  escitalopram 10 milliGRAM(s) Oral daily  levothyroxine 50 MICROGram(s) Oral daily  nystatin Cream 1 Application(s) Topical two times a day    MEDICATIONS  (PRN):  acetaminophen     Tablet .. 650 milliGRAM(s) Oral every 6 hours PRN Temp greater or equal to 38C (100.4F), Mild Pain (1 - 3)  hydrALAZINE Injectable 10 milliGRAM(s) IV Push every 6 hours PRN SBP > 160

## 2022-04-11 DIAGNOSIS — E03.9 HYPOTHYROIDISM, UNSPECIFIED: ICD-10-CM

## 2022-04-11 DIAGNOSIS — I63.9 CEREBRAL INFARCTION, UNSPECIFIED: ICD-10-CM

## 2022-04-11 DIAGNOSIS — S12.100A UNSPECIFIED DISPLACED FRACTURE OF SECOND CERVICAL VERTEBRA, INITIAL ENCOUNTER FOR CLOSED FRACTURE: ICD-10-CM

## 2022-04-11 DIAGNOSIS — I35.0 NONRHEUMATIC AORTIC (VALVE) STENOSIS: ICD-10-CM

## 2022-04-11 DIAGNOSIS — I10 ESSENTIAL (PRIMARY) HYPERTENSION: ICD-10-CM

## 2022-04-11 DIAGNOSIS — I48.0 PAROXYSMAL ATRIAL FIBRILLATION: ICD-10-CM

## 2022-04-11 DIAGNOSIS — D50.9 IRON DEFICIENCY ANEMIA, UNSPECIFIED: ICD-10-CM

## 2022-04-11 DIAGNOSIS — E78.5 HYPERLIPIDEMIA, UNSPECIFIED: ICD-10-CM

## 2022-04-11 DIAGNOSIS — R73.03 PREDIABETES: ICD-10-CM

## 2022-04-11 PROCEDURE — 99239 HOSP IP/OBS DSCHRG MGMT >30: CPT

## 2022-04-11 RX ADMIN — APIXABAN 5 MILLIGRAM(S): 2.5 TABLET, FILM COATED ORAL at 17:27

## 2022-04-11 RX ADMIN — NYSTATIN CREAM 1 APPLICATION(S): 100000 CREAM TOPICAL at 17:28

## 2022-04-11 RX ADMIN — AMLODIPINE BESYLATE 5 MILLIGRAM(S): 2.5 TABLET ORAL at 05:59

## 2022-04-11 RX ADMIN — APIXABAN 5 MILLIGRAM(S): 2.5 TABLET, FILM COATED ORAL at 05:59

## 2022-04-11 RX ADMIN — Medication 1 APPLICATION(S): at 06:00

## 2022-04-11 RX ADMIN — ATORVASTATIN CALCIUM 80 MILLIGRAM(S): 80 TABLET, FILM COATED ORAL at 21:21

## 2022-04-11 RX ADMIN — Medication 20 MILLIGRAM(S): at 21:21

## 2022-04-11 RX ADMIN — ESCITALOPRAM OXALATE 10 MILLIGRAM(S): 10 TABLET, FILM COATED ORAL at 12:07

## 2022-04-11 RX ADMIN — AMIODARONE HYDROCHLORIDE 200 MILLIGRAM(S): 400 TABLET ORAL at 05:59

## 2022-04-11 RX ADMIN — NYSTATIN CREAM 1 APPLICATION(S): 100000 CREAM TOPICAL at 06:01

## 2022-04-11 RX ADMIN — Medication 1 APPLICATION(S): at 17:27

## 2022-04-11 RX ADMIN — Medication 50 MICROGRAM(S): at 05:59

## 2022-04-11 RX ADMIN — Medication 20 MILLIGRAM(S): at 06:29

## 2022-04-11 NOTE — PROGRESS NOTE ADULT - REASON FOR ADMISSION
Acute stroke

## 2022-04-11 NOTE — PROGRESS NOTE ADULT - SUBJECTIVE AND OBJECTIVE BOX
HOSPITALIST PROGRESS NOTE    DONNA HYATT  [id number]  81yFemale    Patient is a 81y old  Female who presents with a chief complaint of Acute stroke (10 Apr 2022 17:36)      SUBJECTIVE:   Chart reviewed since last visit.  Patient seen and examined at bedside.      OBJECTIVE:  Vital Signs Last 24 Hrs  T(C): 36.6 (11 Apr 2022 10:16), Max: 36.7 (10 Apr 2022 21:28)  T(F): 97.9 (11 Apr 2022 10:16), Max: 98 (10 Apr 2022 21:28)  HR: 60 (11 Apr 2022 10:16) (60 - 84)  BP: 119/61 (11 Apr 2022 10:16) (119/61 - 163/73)  BP(mean): --  RR: 18 (11 Apr 2022 10:16) (18 - 18)  SpO2: 93% (11 Apr 2022 10:16) (92% - 95%)    PHYSICAL EXAMINATION  General:   HEENT:    NECK:    CVS:   RESP:    GI:    :   MSK:    CNS:    INTEG:    PSYCH:      MONITOR:  CAPILLARY BLOOD GLUCOSE            I&O's Summary                          11.8   8.96  )-----------( 330      ( 10 Apr 2022 07:45 )             42.1       04-10    142  |  107  |  27.0<H>  ----------------------------<  90  4.1   |  21.0<L>  |  0.86    Ca    9.4      10 Apr 2022 07:45    LDL Cholesterol Calculated: 105 mg/dL (04.04.22 @ 03:30)     A1C with Estimated Average Glucose Result: 6.3 % (04.04.22 @ 03:30)      Thyroid Stimulating Hormone, Serum: 0.74 uIU/mL (03.20.21 @ 11:30)   Culture:    TTE:  < from: TTE Echo Complete w/ Contrast w/ Doppler (04.05.22 @ 20:17) >  Summary:   1. Left ventricular ejection fraction, by visual estimation, is 50 to   55%.   2. Normal global left ventricular systolic function.   3. Severely enlarged left atrium.   4. Normal left ventricular internal cavity size.   5. Spectral Doppler shows impaired relaxation pattern of left   ventricular myocardial filling (Grade I diastolic dysfunction).   6. There is mild concentric left ventricular hypertrophy.   7. Normal right ventricular size and function.   8. Normal right atrial size.  9. Mild thickening of the anterior and posterior mitral valve leaflets.  10. Mild to moderate mitral valve regurgitation.  11. Moderate aortic valve stenosis.  12. Peak transaortic gradient equals 34.6 mmHg, mean transaortic gradient   equals 17.8 mmHg, the calculated SIXTO (via continuity) 0.97 cm². Based on   the Dimesionless Index value which of .30, and mean gradient of 18 mmHg   this is consistent with moderate aortic stenosis. If clinically warranted   may recommend further eval with ED.  13. Trivial pericardial effusion.  14. There is a significant pericardial fat pad present.  15. Endocardial visualization was enhanced with intravenous echo contrast.  16. Compared to prior TTE done on 4/21/2019, the AS is moderate and left   ventricular function remains normal.    Carmen Zuluaga MD Electronically signed on 4/6/2022 at 2:09:21 PM      < end of copied text >    < from: TTE Echo Limited or F/U (04.06.22 @ 15:38) >  Summary:   1. Technically limited study. Agitated saline study for evaluation of   intracardiac shunt.   2. Color flow doppler and intravenous injection of agitated saline   demonstrates the presence of an intact intra atrial septum.   3. Trivial pericardial effusion.   4. Please see TTE from 4/5/22 for further details.    MD Agustina Electronically signed on 4/6/2022 at 4:37:43 PM        *** Final ***    < end of copied text >      RADIOLOGY    < from: Xray Chest 1 View- PORTABLE-Urgent (04.03.22 @ 11:29) >  Findings/  Impression: The heart is unremarkable. The lungs are clear. Bones are   unremarkable for age.    --- End of Report ---    < end of copied text >  < from: CT Head No Cont (04.03.22 @ 11:45) >  IMPRESSION: No evidence of acute hemorrhage in the posterior fossa or   supratentorial region.     There is evidence of fracture seen involving the dens. This appears to be   a type II fracture and does demonstrate sclerotic changes involving the   fracture fragments. This likely a chronic fracture given the adjacent   sclerotic changes and lack of associated soft tissue swelling though   clinical correlation is recommended.     Fracture involving C2 as described above.    --- End of Report ---    < end of copied text >  < from: MR Head No Cont (04.03.22 @ 16:38) >    IMPRESSION:  2 SMALL FOCI OF DIFFUSION RESTRICTION WITHIN THE RIGHT CEREBRALWHITE   MATTER COMPATIBLE WITH SMALL ACUTE INFARCTS.    --- End of Report ---      < end of copied text >      < from: MR Lumbar Spine No Cont (04.03.22 @ 16:39) >  IMPRESSION:  *  SMALL TO MODERATE RIGHT PARACENTRAL DISC EXTRUSION T11-12.  *  SMALL TO MODERATE RIGHT PARACENTRAL DISC EXTRUSION L4-5 WITH SUPERIOR   MIGRATION.  *  MODERATE LEFT PARACENTRAL DISC EXTRUSION L5-S1  *  SCOLIOSIS WITH ASSOCIATED DEGENERATIVE CHANGES.  *  LOW-LYING CORD WITH TIP OF THE CONUS ATTHE L3-4 LEVEL.    --- End of Report ---    < end of copied text >    < from: MR Cervical Spine No Cont (04.03.22 @ 16:39) >    IMPRESSION:  *  CHRONIC APPEARING NONDISPLACED TYPE II DENS FRACTURE.  *  MILD TO MODERATE MULTILEVEL DEGENERATIVE CHANGES.    --- End of Report ---      < end of copied text >      MEDICATIONS  (STANDING):  aMIOdarone    Tablet 200 milliGRAM(s) Oral daily  amLODIPine   Tablet 5 milliGRAM(s) Oral daily  apixaban 5 milliGRAM(s) Oral two times a day  atorvastatin 80 milliGRAM(s) Oral at bedtime  BACItracin   Ointment 1 Application(s) Topical two times a day  enalapril 20 milliGRAM(s) Oral daily  escitalopram 10 milliGRAM(s) Oral daily  levothyroxine 50 MICROGram(s) Oral daily  nystatin Cream 1 Application(s) Topical two times a day      MEDICATIONS  (PRN):  acetaminophen     Tablet .. 650 milliGRAM(s) Oral every 6 hours PRN Temp greater or equal to 38C (100.4F), Mild Pain (1 - 3)  hydrALAZINE Injectable 10 milliGRAM(s) IV Push every 6 hours PRN SBP > 160     HOSPITALIST PROGRESS NOTE    DONNA HYATT  [id number]  81yFemale    Patient is a 81y old  Female who presents with a chief complaint of Acute stroke (10 Apr 2022 17:36)      SUBJECTIVE:   Chart reviewed since last visit.  Patient seen and examined at bedside for CVA, C2 fracture  Denies any headache, dizziness, paresis or paresthesia  Mild neck pain  Denies any urinary complaints      OBJECTIVE:  Vital Signs Last 24 Hrs  T(C): 36.6 (11 Apr 2022 10:16), Max: 36.7 (10 Apr 2022 21:28)  T(F): 97.9 (11 Apr 2022 10:16), Max: 98 (10 Apr 2022 21:28)  HR: 60 (11 Apr 2022 10:16) (60 - 84)  BP: 119/61 (11 Apr 2022 10:16) (119/61 - 163/73)   RR: 18 (11 Apr 2022 10:16) (18 - 18)  SpO2: 93% (11 Apr 2022 10:16) (92% - 95%)    PHYSICAL EXAMINATION  General: Elderly female lying in bed, NAD  HEENT:  extraocular movements intact, No facial asymmetry  NECK:  Supple  CVS: regular rate and rhythm S1 S2 . pan systolic murmur  RESP:  CTAB  GI:  Soft nondistended nontender BS+  : No suprapubic tenderness  MSK:  FROM  CNS:  AAOX3. No facial asymmetry, extraocular movements intact, midline tongue with normal finger nose and heel shin. Generalized weakness. No drift  INTEG:  warm dry skin  PSYCH:  Fair mood    MONITOR: SR  CAPILLARY BLOOD GLUCOSE            I&O's Summary                          11.8   8.96  )-----------( 330      ( 10 Apr 2022 07:45 )             42.1       04-10    142  |  107  |  27.0<H>  ----------------------------<  90  4.1   |  21.0<L>  |  0.86    Ca    9.4      10 Apr 2022 07:45    LDL Cholesterol Calculated: 105 mg/dL (04.04.22 @ 03:30)     A1C with Estimated Average Glucose Result: 6.3 % (04.04.22 @ 03:30)      Thyroid Stimulating Hormone, Serum: 0.74 uIU/mL (03.20.21 @ 11:30)   Culture:    TTE:  < from: TTE Echo Complete w/ Contrast w/ Doppler (04.05.22 @ 20:17) >  Summary:   1. Left ventricular ejection fraction, by visual estimation, is 50 to   55%.   2. Normal global left ventricular systolic function.   3. Severely enlarged left atrium.   4. Normal left ventricular internal cavity size.   5. Spectral Doppler shows impaired relaxation pattern of left   ventricular myocardial filling (Grade I diastolic dysfunction).   6. There is mild concentric left ventricular hypertrophy.   7. Normal right ventricular size and function.   8. Normal right atrial size.  9. Mild thickening of the anterior and posterior mitral valve leaflets.  10. Mild to moderate mitral valve regurgitation.  11. Moderate aortic valve stenosis.  12. Peak transaortic gradient equals 34.6 mmHg, mean transaortic gradient   equals 17.8 mmHg, the calculated SIXTO (via continuity) 0.97 cm². Based on   the Dimesionless Index value which of .30, and mean gradient of 18 mmHg   this is consistent with moderate aortic stenosis. If clinically warranted   may recommend further eval with ED.  13. Trivial pericardial effusion.  14. There is a significant pericardial fat pad present.  15. Endocardial visualization was enhanced with intravenous echo contrast.  16. Compared to prior TTE done on 4/21/2019, the AS is moderate and left   ventricular function remains normal.    Carmen Zuluaga MD Electronically signed on 4/6/2022 at 2:09:21 PM      < end of copied text >    < from: TTE Echo Limited or F/U (04.06.22 @ 15:38) >  Summary:   1. Technically limited study. Agitated saline study for evaluation of   intracardiac shunt.   2. Color flow doppler and intravenous injection of agitated saline   demonstrates the presence of an intact intra atrial septum.   3. Trivial pericardial effusion.   4. Please see TTE from 4/5/22 for further details.    MD Agustina Electronically signed on 4/6/2022 at 4:37:43 PM        *** Final ***    < end of copied text >      RADIOLOGY    < from: Xray Chest 1 View- PORTABLE-Urgent (04.03.22 @ 11:29) >  Findings/  Impression: The heart is unremarkable. The lungs are clear. Bones are   unremarkable for age.    --- End of Report ---    < end of copied text >  < from: CT Head No Cont (04.03.22 @ 11:45) >  IMPRESSION: No evidence of acute hemorrhage in the posterior fossa or   supratentorial region.     There is evidence of fracture seen involving the dens. This appears to be   a type II fracture and does demonstrate sclerotic changes involving the   fracture fragments. This likely a chronic fracture given the adjacent   sclerotic changes and lack of associated soft tissue swelling though   clinical correlation is recommended.     Fracture involving C2 as described above.    --- End of Report ---    < end of copied text >  < from: MR Head No Cont (04.03.22 @ 16:38) >    IMPRESSION:  2 SMALL FOCI OF DIFFUSION RESTRICTION WITHIN THE RIGHT CEREBRALWHITE   MATTER COMPATIBLE WITH SMALL ACUTE INFARCTS.    --- End of Report ---      < end of copied text >      < from: MR Lumbar Spine No Cont (04.03.22 @ 16:39) >  IMPRESSION:  *  SMALL TO MODERATE RIGHT PARACENTRAL DISC EXTRUSION T11-12.  *  SMALL TO MODERATE RIGHT PARACENTRAL DISC EXTRUSION L4-5 WITH SUPERIOR   MIGRATION.  *  MODERATE LEFT PARACENTRAL DISC EXTRUSION L5-S1  *  SCOLIOSIS WITH ASSOCIATED DEGENERATIVE CHANGES.  *  LOW-LYING CORD WITH TIP OF THE CONUS ATTHE L3-4 LEVEL.    --- End of Report ---    < end of copied text >    < from: MR Cervical Spine No Cont (04.03.22 @ 16:39) >    IMPRESSION:  *  CHRONIC APPEARING NONDISPLACED TYPE II DENS FRACTURE.  *  MILD TO MODERATE MULTILEVEL DEGENERATIVE CHANGES.    --- End of Report ---      < end of copied text >      MEDICATIONS  (STANDING):  aMIOdarone    Tablet 200 milliGRAM(s) Oral daily  amLODIPine   Tablet 5 milliGRAM(s) Oral daily  apixaban 5 milliGRAM(s) Oral two times a day  atorvastatin 80 milliGRAM(s) Oral at bedtime  BACItracin   Ointment 1 Application(s) Topical two times a day  enalapril 20 milliGRAM(s) Oral daily  escitalopram 10 milliGRAM(s) Oral daily  levothyroxine 50 MICROGram(s) Oral daily  nystatin Cream 1 Application(s) Topical two times a day      MEDICATIONS  (PRN):  acetaminophen     Tablet .. 650 milliGRAM(s) Oral every 6 hours PRN Temp greater or equal to 38C (100.4F), Mild Pain (1 - 3)  hydrALAZINE Injectable 10 milliGRAM(s) IV Push every 6 hours PRN SBP > 160

## 2022-04-11 NOTE — PROGRESS NOTE ADULT - PROBLEM/PLAN-2
DISPLAY PLAN FREE TEXT I have personally performed a face to face diagnostic evaluation on this patient. I have reviewed the ACP note and agree with the history, exam and plan of care, except as noted.

## 2022-04-11 NOTE — PROGRESS NOTE ADULT - PROVIDER SPECIALTY LIST ADULT
Internal Medicine
Internal Medicine
Neurosurgery
Hospitalist
Internal Medicine
Neurology
Cardiology
Neurology
Neurology
Internal Medicine
Internal Medicine

## 2022-04-11 NOTE — PROGRESS NOTE ADULT - ASSESSMENT
81 year old female with PMH HTN, Dyslipidemia, Iron deficiency Anemia, AF, Hypothyroidism and Depression presented with urinary complaints, weakness and recent fall. Hypertensive at presentation . CT Brain without any acute changes, CT CSpine with C2 Fracture, likely chronic. Evaluated by Neurosurgery without any need for intervention; likely chronic. She was admitted to the observation unit and further work up showed acute strokes on MRI Brain, transitioned to inpatient admission.    She was admitted to the stroke unit on stroke protocol. Initial NIH 0. ED without any PFO. , A1c 6.3. TTE with normal LVSF, mild to moderate MR, Moderate AS.   Passed bedside dysphagia screen. Initial PT evaluation with recommendations for home with PT but patient with worsening weakness during stay and now going to [facility].      Acute CVA  - Apixaban, Statin    C2 fracture, type II, Chronic  - Follow up with Neurosurgery as outpatient    HTN  - Amlodipine, Enalapril    AF, chronic  - Amiodarone, Apixaban    Hypothyroidism. Normal TSH 0.74  - Synthroid    Iron deficiency anemia- normal Hgb    VTEp- On apixaban    Disposition - Medically stable for discharge to [facility]. 81 year old female with PMH HTN, Dyslipidemia, Iron deficiency Anemia, AF, Hypothyroidism and Depression presented with urinary complaints, weakness and recent fall. Hypertensive at presentation . CT Brain without any acute changes, CT CSpine with C2 Fracture, likely chronic. Evaluated by Neurosurgery without any need for intervention; likely chronic. She was admitted to the observation unit and further work up showed acute strokes on MRI Brain, transitioned to inpatient admission.    She was admitted to the stroke unit on stroke protocol. Initial NIH 0. ED without any PFO. , A1c 6.3. TTE with normal LVSF, mild to moderate MR, Moderate AS.   Passed bedside dysphagia screen. Initial PT evaluation with recommendations for home with PT but patient with worsening weakness during stay and now going to [facility].      Acute CVA, Right cerebral. NIH 0  - Apixaban, Statin    C2 fracture, type II, Chronic  - Follow up with Neurosurgery as outpatient    HTN  - Amlodipine, Enalapril    AF, paroxysmal  - Amiodarone, Apixaban    Hypothyroidism. Normal TSH 0.74  - Synthroid    Moderate MR, Moderate AS    Iron deficiency anemia- normal Hgb    VTEp- On apixaban    Disposition - Medically stable for discharge to [facility].    Discussed with patient, daughter Kerrie and TAYLOR Valle 81 year old female with PMH HTN, Dyslipidemia, Iron deficiency Anemia, AF, Hypothyroidism and Depression presented with urinary complaints, weakness and recent fall. Hypertensive at presentation . CT Brain without any acute changes, CT CSpine with C2 Fracture, likely chronic. Evaluated by Neurosurgery without any need for intervention; likely chronic. She was admitted to the observation unit and further work up showed acute strokes on MRI Brain, transitioned to inpatient admission.    She was admitted to the stroke unit on stroke protocol. Initial NIH 0. ED without any PFO. , A1c 6.3. TTE with normal LVSF, mild to moderate MR, Moderate AS.   Passed bedside dysphagia screen. Initial PT evaluation with recommendations for home with PT but patient with worsening weakness during stay and now going to [facility].      Acute CVA, Right cerebral. NIH 0  - Apixaban, Statin    C2 fracture, type II, Chronic  - Follow up with Neurosurgery as outpatient    HTN. Early am elevated BP  - Amlodipine, Change Enalapril to PM dosing    AF, paroxysmal. Currently in NSR  - Amiodarone, Apixaban    Hypothyroidism. Normal TSH 0.74  - Synthroid    Moderate MR, Moderate AS  - Follow up with Cardiology    Prediabetes. A1c 6.3  - Carbohydrate consistent diet    Iron deficiency anemia- normal Hgb    VTEp- On apixaban    Disposition - Medically stable for discharge to [facility].    Discussed with patient, daughter Kerrie and TAYLOR Valle

## 2022-04-12 VITALS
RESPIRATION RATE: 18 BRPM | TEMPERATURE: 98 F | SYSTOLIC BLOOD PRESSURE: 137 MMHG | HEART RATE: 55 BPM | OXYGEN SATURATION: 94 % | DIASTOLIC BLOOD PRESSURE: 74 MMHG

## 2022-04-12 LAB — SARS-COV-2 RNA SPEC QL NAA+PROBE: SIGNIFICANT CHANGE UP

## 2022-04-12 PROCEDURE — 72125 CT NECK SPINE W/O DYE: CPT | Mod: MA

## 2022-04-12 PROCEDURE — 80048 BASIC METABOLIC PNL TOTAL CA: CPT

## 2022-04-12 PROCEDURE — 80053 COMPREHEN METABOLIC PANEL: CPT

## 2022-04-12 PROCEDURE — 85027 COMPLETE CBC AUTOMATED: CPT

## 2022-04-12 PROCEDURE — U0003: CPT

## 2022-04-12 PROCEDURE — 72141 MRI NECK SPINE W/O DYE: CPT | Mod: MA

## 2022-04-12 PROCEDURE — 84484 ASSAY OF TROPONIN QUANT: CPT

## 2022-04-12 PROCEDURE — 71045 X-RAY EXAM CHEST 1 VIEW: CPT

## 2022-04-12 PROCEDURE — 0225U NFCT DS DNA&RNA 21 SARSCOV2: CPT

## 2022-04-12 PROCEDURE — 97530 THERAPEUTIC ACTIVITIES: CPT

## 2022-04-12 PROCEDURE — 93308 TTE F-UP OR LMTD: CPT

## 2022-04-12 PROCEDURE — 99285 EMERGENCY DEPT VISIT HI MDM: CPT | Mod: 25

## 2022-04-12 PROCEDURE — 85025 COMPLETE CBC W/AUTO DIFF WBC: CPT

## 2022-04-12 PROCEDURE — 96374 THER/PROPH/DIAG INJ IV PUSH: CPT

## 2022-04-12 PROCEDURE — 70450 CT HEAD/BRAIN W/O DYE: CPT | Mod: MA

## 2022-04-12 PROCEDURE — 87086 URINE CULTURE/COLONY COUNT: CPT

## 2022-04-12 PROCEDURE — 97163 PT EVAL HIGH COMPLEX 45 MIN: CPT

## 2022-04-12 PROCEDURE — 93005 ELECTROCARDIOGRAM TRACING: CPT

## 2022-04-12 PROCEDURE — 99232 SBSQ HOSP IP/OBS MODERATE 35: CPT

## 2022-04-12 PROCEDURE — 83036 HEMOGLOBIN GLYCOSYLATED A1C: CPT

## 2022-04-12 PROCEDURE — 70551 MRI BRAIN STEM W/O DYE: CPT | Mod: MA

## 2022-04-12 PROCEDURE — U0005: CPT

## 2022-04-12 PROCEDURE — 81001 URINALYSIS AUTO W/SCOPE: CPT

## 2022-04-12 PROCEDURE — 97116 GAIT TRAINING THERAPY: CPT

## 2022-04-12 PROCEDURE — 72148 MRI LUMBAR SPINE W/O DYE: CPT | Mod: MA

## 2022-04-12 PROCEDURE — 83735 ASSAY OF MAGNESIUM: CPT

## 2022-04-12 PROCEDURE — 80061 LIPID PANEL: CPT

## 2022-04-12 PROCEDURE — 36415 COLL VENOUS BLD VENIPUNCTURE: CPT

## 2022-04-12 PROCEDURE — C8929: CPT

## 2022-04-12 RX ADMIN — AMIODARONE HYDROCHLORIDE 200 MILLIGRAM(S): 400 TABLET ORAL at 05:34

## 2022-04-12 RX ADMIN — NYSTATIN CREAM 1 APPLICATION(S): 100000 CREAM TOPICAL at 05:34

## 2022-04-12 RX ADMIN — APIXABAN 5 MILLIGRAM(S): 2.5 TABLET, FILM COATED ORAL at 05:34

## 2022-04-12 RX ADMIN — AMLODIPINE BESYLATE 5 MILLIGRAM(S): 2.5 TABLET ORAL at 05:34

## 2022-04-12 RX ADMIN — Medication 1 APPLICATION(S): at 05:33

## 2022-04-12 RX ADMIN — Medication 50 MICROGRAM(S): at 05:34

## 2022-04-12 NOTE — CHART NOTE - NSCHARTNOTEFT_GEN_A_CORE
HOSPITALIST PROGRESS NOTE    DONNA HYATT  [id number]  81yFemale    Patient is a 81y old  Female who presents with a chief complaint of Acute stroke (11 Apr 2022 11:46)      SUBJECTIVE:   Chart reviewed since last visit.    Medically stable for discharge since 4/11/22 - pending insurance authorization    Patient seen and examined at bedside for stroke.  Denies rika weakness, numbness, headache, dizziness, speech or visual changes      OBJECTIVE:  Vital Signs Last 24 Hrs  T(C): 36.6 (12 Apr 2022 11:06), Max: 36.7 (12 Apr 2022 04:51)  T(F): 97.9 (12 Apr 2022 11:06), Max: 98.1 (12 Apr 2022 04:51)  HR: 55 (12 Apr 2022 11:06) (55 - 60)  BP: 137/74 (12 Apr 2022 11:06) (137/74 - 148/61)   RR: 18 (12 Apr 2022 11:06) (18 - 18)  SpO2: 94% (12 Apr 2022 11:06) (94% - 96%)    PHYSICAL EXAMINATION  General: Elderly female lying in bed, NAD  HEENT:  extraocular movements intact, No facial asymmetry  NECK:  Supple  CVS: regular rate and rhythm S1 S2 . pan systolic murmur  RESP:  CTAB  GI:  Soft nondistended nontender BS+  : No suprapubic tenderness  MSK:  FROM  CNS:  AAOX3. No facial asymmetry, extraocular movements intact, midline tongue with normal finger nose and heel shin. Generalized weakness. No drift  INTEG:  warm dry skin  PSYCH:  Fair mood       MONITOR:  CAPILLARY BLOOD GLUCOSE            I&O's Summary    11 Apr 2022 07:01  -  12 Apr 2022 07:00  --------------------------------------------------------  IN: 0 mL / OUT: 650 mL / NET: -650 mL                        Culture:    TTE:    RADIOLOGY        MEDICATIONS  (STANDING):  aMIOdarone    Tablet 200 milliGRAM(s) Oral daily  amLODIPine   Tablet 5 milliGRAM(s) Oral daily  apixaban 5 milliGRAM(s) Oral two times a day  atorvastatin 80 milliGRAM(s) Oral at bedtime  BACItracin   Ointment 1 Application(s) Topical two times a day  enalapril 20 milliGRAM(s) Oral at bedtime  escitalopram 10 milliGRAM(s) Oral daily  levothyroxine 50 MICROGram(s) Oral daily  nystatin Cream 1 Application(s) Topical two times a day      MEDICATIONS  (PRN):  acetaminophen     Tablet .. 650 milliGRAM(s) Oral every 6 hours PRN Temp greater or equal to 38C (100.4F), Mild Pain (1 - 3)  hydrALAZINE Injectable 10 milliGRAM(s) IV Push every 6 hours PRN SBP > 160            ASSESSMENT AND PLAN:    81 year old female with PMH HTN, Dyslipidemia, Iron deficiency Anemia, AF, Hypothyroidism and Depression presented with urinary complaints, weakness and recent fall. Hypertensive at presentation . CT Brain without any acute changes, CT CSpine with C2 Fracture, likely chronic. Evaluated by Neurosurgery without any need for intervention; likely chronic. She was admitted to the observation unit and further work up showed acute strokes on MRI Brain, transitioned to inpatient admission.    She was admitted to the stroke unit on stroke protocol. Initial NIH 0. ED without any PFO. , A1c 6.3. TTE with normal LVSF, mild to moderate MR, Moderate AS.   Passed bedside dysphagia screen. Initial PT evaluation with recommendations for home with PT but patient with worsening weakness during stay and now going to [facility].      Acute CVA, Right cerebral. NIH 0  - Apixaban, Statin    C2 fracture, type II, Chronic  - Follow up with Neurosurgery as outpatient    HTN. Early am elevated BP  - Amlodipine, Change Enalapril to PM dosing    AF, paroxysmal. Currently in NSR  - Amiodarone, Apixaban    Hypothyroidism. Normal TSH 0.74  - Synthroid    Moderate MR, Moderate AS  - Follow up with Cardiology    Prediabetes. A1c 6.3  - Carbohydrate consistent diet    Iron deficiency anemia- normal Hgb    VTEp- On apixaban    Disposition - Medically stable for discharge to [facility]. Accepted to [facility], insurance authorization obtained    Discussed with patient and TAYLOR Valle

## 2022-04-26 ENCOUNTER — NON-APPOINTMENT (OUTPATIENT)
Age: 82
End: 2022-04-26

## 2022-04-26 ENCOUNTER — APPOINTMENT (OUTPATIENT)
Dept: NEUROLOGY | Facility: CLINIC | Age: 82
End: 2022-04-26
Payer: MEDICARE

## 2022-04-26 DIAGNOSIS — Z78.9 OTHER SPECIFIED HEALTH STATUS: ICD-10-CM

## 2022-04-26 DIAGNOSIS — I10 ESSENTIAL (PRIMARY) HYPERTENSION: ICD-10-CM

## 2022-04-26 DIAGNOSIS — I48.91 UNSPECIFIED ATRIAL FIBRILLATION: ICD-10-CM

## 2022-04-26 DIAGNOSIS — E78.5 HYPERLIPIDEMIA, UNSPECIFIED: ICD-10-CM

## 2022-04-26 PROCEDURE — 99215 OFFICE O/P EST HI 40 MIN: CPT

## 2022-04-26 RX ORDER — BISACODYL 10 MG/1
10 SUPPOSITORY RECTAL
Refills: 0 | Status: ACTIVE | COMMUNITY

## 2022-04-26 RX ORDER — FERROUS SULFATE 325(65) MG
TABLET ORAL
Refills: 0 | Status: ACTIVE | COMMUNITY

## 2022-04-26 RX ORDER — MULTIVITAMIN
TABLET ORAL
Refills: 0 | Status: ACTIVE | COMMUNITY

## 2022-04-26 RX ORDER — AMIODARONE HYDROCHLORIDE 400 MG/1
TABLET ORAL
Refills: 0 | Status: ACTIVE | COMMUNITY

## 2022-04-26 RX ORDER — ESCITALOPRAM OXALATE 10 MG/1
10 TABLET ORAL
Refills: 0 | Status: ACTIVE | COMMUNITY

## 2022-04-26 RX ORDER — AMLODIPINE BESYLATE 10 MG/1
10 TABLET ORAL
Refills: 0 | Status: ACTIVE | COMMUNITY

## 2022-04-26 RX ORDER — MAGNESIUM HYDROXIDE 2400 MG/30ML
SUSPENSION ORAL
Refills: 0 | Status: ACTIVE | COMMUNITY

## 2022-04-26 RX ORDER — ENALAPRIL MALEATE 5 MG/1
TABLET ORAL
Refills: 0 | Status: ACTIVE | COMMUNITY

## 2022-04-26 RX ORDER — ACETAMINOPHEN 325 MG/1
325 TABLET, COATED ORAL
Refills: 0 | Status: ACTIVE | COMMUNITY

## 2022-04-26 RX ORDER — APIXABAN 5 MG/1
5 TABLET, FILM COATED ORAL
Refills: 0 | Status: ACTIVE | COMMUNITY

## 2022-04-26 RX ORDER — ATORVASTATIN CALCIUM 40 MG/1
40 TABLET, FILM COATED ORAL
Refills: 0 | Status: ACTIVE | COMMUNITY

## 2022-04-26 RX ORDER — LEVOTHYROXINE SODIUM 25 UG/1
25 TABLET ORAL
Refills: 0 | Status: ACTIVE | COMMUNITY

## 2022-04-26 NOTE — CONSULT LETTER
[FreeTextEntry3] : Sachin Alan M.D., Ph.D. DPN-N\par [facility]\par Neurology at [place]\par Medical Director of Stroke Services\par [facility]\par

## 2022-04-26 NOTE — HISTORY OF PRESENT ILLNESS
[FreeTextEntry1] : Post hospital follow-up April 26, 2022:\par This is a 81-year-old woman who was seen at [facility] by my colleague on April fourth for stroke.  She came in with weakness fall and incontinence of urine.  She had an MRI of her brain that was done which showed 2 tiny strokes within the right cerebral hemispheric white matter.  Multiple risk factors for stroke which include atrial fibrillation hypertension hyperlipidemia.  Is not clear that she was actually taking anticoagulation as prescribed to prior to her event.  She is here today for neurologic follow-up overall reporting that she is feeling better since being in the hospital.

## 2022-04-26 NOTE — ASSESSMENT
[FreeTextEntry1] : This is an 81-year-old woman who presents today for follow-up for stroke.  She has multiple risk factors.\par \par Atrial fibrillation: She should continue on Eliquis as well as amiodarone for rate control.\par \par Hypertension her goal blood pressure should be normal she should continue on medications.  \par \par Hyperlipidemia: Her goal LDL should be under 70.  She should continue on atorvastatin.  \par \par I will see her back in the office in 6 months for routine neurologic follow-up, sooner should the need arise.

## 2022-04-26 NOTE — DATA REVIEWED
[de-identified] : I reviewed images of brain MRI that was done in the hospital in early April.  It showed 2 small areas of restricted diffusion in the right cerebral white matter.  There was no mass or blood present.  There was extensive small vessel ischemic changes. [de-identified] : Hospital records were reviewed.  It appears that TTE did not show cardioembolic source for her stroke.  The ED was a technically difficult study.  I reviewed other hospital records such as a neurology consult and other physicians consults.

## 2022-04-26 NOTE — PHYSICAL EXAM
[Paresis Pronator Drift Right-Sided] : no pronator drift on the right [Paresis Pronator Drift Left-Sided] : no pronator drift on the left [Motor Strength Upper Extremities Bilaterally] : strength was normal in both upper extremities [Tremor] : no tremor present [Coordination - Dysmetria Impaired Finger-to-Nose Bilateral] : not present [FreeTextEntry6] : Mild lower extremity weakness

## 2022-04-27 ENCOUNTER — APPOINTMENT (OUTPATIENT)
Dept: NEUROSURGERY | Facility: CLINIC | Age: 82
End: 2022-04-27
Payer: MEDICARE

## 2022-04-27 VITALS
TEMPERATURE: 98.4 F | DIASTOLIC BLOOD PRESSURE: 67 MMHG | WEIGHT: 143 LBS | SYSTOLIC BLOOD PRESSURE: 129 MMHG | OXYGEN SATURATION: 95 % | HEART RATE: 51 BPM

## 2022-04-27 PROCEDURE — 99215 OFFICE O/P EST HI 40 MIN: CPT

## 2022-04-27 NOTE — DATA REVIEWED
[de-identified] : EXAM: MR SPINE CERVICAL\par \par PROCEDURE DATE: 04/03/2022\par \par \par \par INTERPRETATION: MR CERVICAL SPINE\par \par Clinical information: C spine fracture\par \par A noncontrast MRI of the cervical spine was performed.\par \par TECHNIQUE:\par In the sagittal plane T1, T2, and STIR imaging was performed. In the axial plane T1, T2 and gradient echo imaging was performed.\par \par COMPARISON:\par Exam is compared to CT of earlier in the day\par \par FINDINGS:\par SPINAL COLUMN:\par Chronic appearing nondisplaced type II dens fracture again seen. No prevertebral or paraspinal edema. Adjacent cystic changes seen.\par \par Straightening of the normal cervical lordosis. Disc desiccation throughout. Narrowing of the C2-3 through C6-7 disc spaces. 2 to 3 mm spondylolisthesis C7 on T1 thought related to facet degenerative changes.\par \par The T1-2 disc space and facet joints appear partly fused.\par \par DISC LEVELS:\par C2-3: Small left paracentral disc osteophyte complex with uncovertebral and facet degenerative changes results in moderate bilateral neural foramina narrowing.\par C3-4: Mild diffuse disc osteophyte complex with uncovertebral and facet degenerative changes with severe right and moderate left neural foramina narrowing.\par C4-5: Mild diffuse disc osteophyte complex with facet and uncovertebral degenerative changes with mild to moderate canal and moderate bilateral neural foramina narrowing.\par C5-6: Mild to moderate diffuse disc osteophyte complex with facet and uncovertebral degenerative changes with mild to moderate canal and moderate to severe neural foramina narrowing.\par C6-7: Mild disc osteophyte complex with facet and uncovertebral degenerative changes with mild canal and moderate to severe neural foramina narrowing.\par C7-T1: Facet degenerative changes results in 2 to 3 mm spondylolisthesis. Mild to moderate bilateral neural foramina narrowing.\par \par CANAL CONTENTS:\par No cord compression. No cord contusion.\par No epidural hematoma.\par \par IMPRESSION:\par * CHRONIC APPEARING NONDISPLACED TYPE II DENS FRACTURE.\par * MILD TO MODERATE MULTILEVEL DEGENERATIVE CHANGES.\par \par \par ACC: [id number] EXAM: MR SPINE LUMBAR\par \par PROCEDURE DATE: 04/03/2022\par \par \par \par INTERPRETATION: MR LUMBAR SPINE\par \par Clinical information: urinary incontinence\par \par A noncontrast MRI of the lumbar spine was performed.\par \par TECHNIQUE:\par In the sagittal plane T1, T2, and STIR imaging was performed. In the axial plane T1 and T2 weighted imaging was utilized. In the coronal plane T2 weighted imaging was utilized.\par \par COMPARISON:\par No prior studies for comparison\par \par FINDINGS:\par SPINAL COLUMN:\par Mild levoscoliosis centered at L2-3. Mild degrees of malalignment thought related to degenerative changes. Disc space narrowing, endplate degenerative changes, and endplate osteophytes are seen throughout. Vacuum disc phenomenon is present at L4-5 and L5-S1.\par \par DISC LEVELS:\par T10-11: Mild disc osteophyte complex with mild facet degenerative changes. Minimal canal and neural and neural foramina narrowing.\par T11-12: Small to moderate right paracentral disc extrusion with mild to moderate canal and moderate right neural foramina narrowing.\par T12-L1: Minimal disc osteophyte complex with minimal canal and neural foramina narrowing.\par L1-2: Mild disc osteophyte complex with mild facet degenerative changes with mild canal and moderate bilateral neural foramina narrowing.\par L2-3: Mild disc osteophyte complex with moderate facet and ligamentous degenerative changes. Mild to moderate canal and moderate bilateral neural foramina narrowing.\par L3-4: Mild disc osteophyte complex with mild facet ligamentous degenerative changes. Mild canal and lateral recess narrowing. Moderate bilateral neural foramina narrowing.\par L4-5: Small to moderate right paracentral disc extrusion with superior migration. Mass effect on the right L4 nerve root. Severe facet degenerative changes. Severe neural foramina narrowing.\par L5-S1: Moderate left paracentral disc extrusion with mass effect on the left S1 nerve root. Moderate facet degenerative changes.\par \par CANAL CONTENTS:\par Low-lying CORD. Tip of the conus is at the L3-4 level.\par No compression of the distal cord.\par No epidural collection.\par \par OTHER:\par Probable moderate renal cyst arising from the lower pole of the right kidney\par \par IMPRESSION:\par * SMALL TO MODERATE RIGHT PARACENTRAL DISC EXTRUSION T11-12.\par * SMALL TO MODERATE RIGHT PARACENTRAL DISC EXTRUSION L4-5 WITH SUPERIOR MIGRATION.\par * MODERATE LEFT PARACENTRAL DISC EXTRUSION L5-S1\par * SCOLIOSIS WITH ASSOCIATED DEGENERATIVE CHANGES.\par * LOW-LYING CORD WITH TIP OF THE CONUS AT THE L3-4 LEVEL.\par \par

## 2022-04-27 NOTE — PHYSICAL EXAM
[General Appearance - Alert] : alert [General Appearance - In No Acute Distress] : in no acute distress [General Appearance - Well Nourished] : well nourished [Oriented To Time, Place, And Person] : oriented to person, place, and time [Impaired Insight] : insight and judgment were intact [Affect] : the affect was normal [Person] : oriented to person [Place] : oriented to place [Motor Strength] : muscle strength was normal in all four extremities [Sensation Tactile Decrease] : light touch was intact [Sensation Pain / Temperature Decrease] : pain and temperature was intact [Limited Balance] : the patient's balance was impaired [No Visual Abnormalities] : no visible abnormailities [No Tenderness to Palpation] : no spine tenderness on palpation [] : no respiratory distress [Involuntary Movements] : no involuntary movements were seen [Motor Tone] : muscle strength and tone were normal [FreeTextEntry8] : presents in wheelchair

## 2022-04-27 NOTE — HISTORY OF PRESENT ILLNESS
[FreeTextEntry1] : DONNA HYATT is a 81 year old female with PMH HTN, Dyslipidemia, Iron deficiency Anemia, AF, \par Hypothyroidism and Depression, who presents for neurosurgical evaluation of C2 fracture and unsteady gait. She presented to [facility] on 4[facility]4/11with urinary complaints, weakness and recent fall. Hypertensive at presentation . CT Brain without any acute changes, CT CSpine with C2 Fracture, likely chronic. Evaluated by Neurosurgery without any need for intervention; likely chronic. She was admitted to the observation unit and further work up showed acute strokes on MRI Brain. Patient with worsening generalized weakness during stay and now going to [facility].  Given high fall risk (known immediately prior to admission as well as evidenced from prior C2 fracture) addition of further AC is not advised at this time. Additionally after d/w Daughter pt is now known to have been non compliant with recommended DOAC therapy and she is medically cleared for discharge with outpatient Neurosurgery (further w/u of NPH as well as C2 fracture) and cardiology. Additionally will also need f/u with outpt PCP/Endo for bl thyroid cysts to r/o underlying neoplastic changes \par 4/3/22 MRI Brain \par IMPRESSION: \par 2 SMALL FOCI OF DIFFUSION RESTRICTION WITHIN THE RIGHT CEREBRAL WHITE \par MATTER COMPATIBLE WITH SMALL ACUTE INFARCTS. \par \par 4/3/22 MRI C Spine \par IMPRESSION: \par  CHRONIC APPEARING NONDISPLACED TYPE II DENS FRACTURE. \par  MILD TO MODERATE MULTILEVEL DEGENERATIVE CHANGES. \par \par 4/4/22 MRI L spine \par IMPRESSION: \par SMALL TO MODERATE RIGHT PARACENTRAL DISC EXTRUSION T11-12. \par SMALL TO MODERATE RIGHT PARACENTRAL DISC EXTRUSION L4-5 WITH SUPERIOR \par MIGRATION. \par MODERATE LEFT PARACENTRAL DISC EXTRUSION L5-S1 \par SCOLIOSIS WITH ASSOCIATED DEGENERATIVE CHANGES. \par LOW-LYING CORD WITH TIP OF THE CONUS AT THE L3-4 LEV \par \par Currently she presents in a wheelchair. She denies numbness/tingling, incontinence, loss of strength, or muscle weakness. She state that her balance has been worse since she was in the hospital. She has had multiple falls and state its because she is a "klutz".  She states her left leg is the problem and causes her to have trouble walking. She is doing PT daily at the rehab. She also states her memory is fine.

## 2022-04-27 NOTE — ASSESSMENT
[FreeTextEntry1] : Patient with above history and imaging. Chronic C2 fracture not causing any neurological deficit. Unsteady gait may be coming from the spine, will do a MRI thoracic spine to assess. She is unsure if she has followed up with endocrine, or cardiology. \par \par Plan:\par MRI thoracic spine\par f/u with Dr. Puga after imaging\par Patient knows to call the office if there are any new or worsening symptoms. \par All questions and concerns answered and addressed in detail to patient's complete satisfaction. Patient verbalized understanding and agreed to plan.\par \par

## 2022-05-06 ENCOUNTER — OUTPATIENT (OUTPATIENT)
Dept: OUTPATIENT SERVICES | Facility: HOSPITAL | Age: 82
LOS: 1 days | End: 2022-05-06
Payer: COMMERCIAL

## 2022-05-06 ENCOUNTER — APPOINTMENT (OUTPATIENT)
Dept: MRI IMAGING | Facility: CLINIC | Age: 82
End: 2022-05-06

## 2022-05-06 ENCOUNTER — RESULT REVIEW (OUTPATIENT)
Age: 82
End: 2022-05-06

## 2022-05-06 DIAGNOSIS — R26.81 UNSTEADINESS ON FEET: ICD-10-CM

## 2022-05-06 PROCEDURE — 72146 MRI CHEST SPINE W/O DYE: CPT | Mod: 26

## 2022-05-06 PROCEDURE — 72146 MRI CHEST SPINE W/O DYE: CPT

## 2022-05-11 NOTE — PATIENT PROFILE ADULT - NSPROEXTENSIONSOFSELF_GEN_A_NUR
Addended by: PERRY SQUIRES on: 5/11/2022 10:22 AM     Modules accepted: Orders     none none/eyeglasses

## 2022-05-24 ENCOUNTER — APPOINTMENT (OUTPATIENT)
Dept: NEUROSURGERY | Facility: CLINIC | Age: 82
End: 2022-05-24
Payer: MEDICARE

## 2022-05-24 VITALS
WEIGHT: 143 LBS | HEART RATE: 82 BPM | TEMPERATURE: 97.6 F | DIASTOLIC BLOOD PRESSURE: 74 MMHG | OXYGEN SATURATION: 97 % | SYSTOLIC BLOOD PRESSURE: 144 MMHG

## 2022-05-24 DIAGNOSIS — M50.20 OTHER CERVICAL DISC DISPLACEMENT, UNSPECIFIED CERVICAL REGION: ICD-10-CM

## 2022-05-24 DIAGNOSIS — R26.81 UNSTEADINESS ON FEET: ICD-10-CM

## 2022-05-24 DIAGNOSIS — I63.9 CEREBRAL INFARCTION, UNSPECIFIED: ICD-10-CM

## 2022-05-24 PROCEDURE — 99214 OFFICE O/P EST MOD 30 MIN: CPT

## 2022-05-25 NOTE — DATA REVIEWED
[de-identified] : EXAM: [id number] - MR SPINE THORACIC - ORDERED BY: LIDIA GARSIA\par \par \par PROCEDURE DATE: 05/06/2022\par \par \par \par INTERPRETATION: MRI of the thoracic spine\par \par History: Back pain\par \par Technique: Multiplanar, multi sequential images of the thoracic spine were obtained without contrast using standard protocol.\par \par Prior study: None available\par \par Findings:\par \par Bone: No acute fracture.\par \par Alignment: Mild anterolisthesis of C7 on T1. Mild retrolisthesis of L1 on L2 and L2 on L3.\par \par Disc spaces: Multilevel degenerative disc disease throughout the thoracic spine, most severe at T12-L1, where there is severe degenerative disc disease.\par \par Spinal cord: No cord signal abnormality seen.\par \par Paraspinal musculature: Mild fatty atrophy of the paraspinal musculature.\par \par Soft tissues: Indeterminate T2 hyperintense lesions within the thyroid bilaterally, measuring up to 1.5 cm. Incompletely evaluated patchy infiltrate within the left lower lobe with trace pleural effusion.Indeterminate left adrenal nodule, measuring up to 1.2 cm. Multiple bilateral renal cysts.\par \par Spinal canal/neural foramina:\par \par At C6-C7, there is a disc bulge with disc osteophyte complex, resulting in moderate central canal stenosis and severe bilateral neural foraminal narrowing.\par \par At T1-T2, there is a disc bulge with disc osteophyte complex. Mild bilateral facet arthrosis. Mild bilateral neural foraminal narrowing. No central canal narrowing.\par \par At T9-T10, there is a minimal disc bulge. Mild bilateral facet arthrosis. Mild left neural foraminal narrowing. No central canal narrowing.\par \par At T10-T11, there is a disc bulge with disc osteophyte complex. Mild bilateral facet arthrosis. Mild bilateral neural foraminal narrowing. No central canal narrowing.\par \par At T11-T12, there is a disc bulge with disc osteophyte complex with superimposed right subarticular/foraminal disc protrusion. Mild bilateral facet arthrosis. Moderate right and mild left neural foraminal narrowing. Mild central canal narrowing.\par \par At T12-L1, there is a disc osteophyte complex. Mild bilateral facet arthrosis. Mild/moderate bilateral neural foraminal narrowing. No central canal narrowing.\par \par The remaining levels throughout the thoracic spine have no significant canal or neuroforaminal stenosis.\par \par At L1-L2, there is a disc bulge with disc osteophyte complex. Mild bilateral facet arthrosis. Mild/moderate bilateral neural foraminal narrowing. Minimal central canal narrowing.\par \par L2-L3, there is a disc bulge with disc osteophyte complex. Mild/moderate right and mild left facet arthrosis. Moderate to severe right and moderate left neural foraminal narrowing. Ligamentum flavum hypertrophy. Mild/moderate central canal narrowing.\par \par Impression:\par \par Multilevel degenerative changes throughout the thoracic spine, as detailed above, worst within the lower thoracic spine and upper lumbar spine. Moderate central canal narrowing is also noted at the C6-C7 level.\par \par Indeterminate T2 hyperintense lesions throughout the thyroid. Recommend further evaluation thyroid ultrasound.\par \par Indeterminate left adrenal nodule. This can be further evaluated with adrenal protocol CT or MRI.\par \par Incompletely evaluated patchy infiltrate within the left lower lobe with trace left pleural effusion.

## 2022-05-25 NOTE — HISTORY OF PRESENT ILLNESS
[FreeTextEntry1] : DONNA HYATT is a 81 year old female with PMH HTN, Dyslipidemia, Iron deficiency Anemia, AF, \par Hypothyroidism and Depression, who presents for neurosurgical evaluation of MRI thoracic spine results.\par TO review: She had a C2 fracture and unsteady gait. She presented to [facility] on 4/3-4/11with urinary complaints, weakness and recent fall. Hypertensive at presentation . CT Brain without any acute changes, CT CSpine with C2 Fracture, likely chronic. Evaluated by Neurosurgery without any need for intervention; likely chronic. She was admitted to the observation unit and further work up showed acute strokes on MRI Brain. Patient with worsening generalized weakness during stay and now going to [facility]. Given high fall risk (known immediately prior to admission as well as evidenced from prior C2 fracture) addition of further AC is not advised at this time. Additionally after d/w Daughter pt is now known to have been non compliant with recommended DOAC therapy and she is medically cleared for discharge with outpatient Neurosurgery (further w/u of NPH as well as C2 fracture) and cardiology. Additionally will also need f/u with outpt PCP/Endo for bl thyroid cysts to r/o underlying neoplastic changes \par 4/3/22 MRI Brain \par IMPRESSION: \par 2 SMALL FOCI OF DIFFUSION RESTRICTION WITHIN THE RIGHT CEREBRAL WHITE \par MATTER COMPATIBLE WITH SMALL ACUTE INFARCTS. \par \par 4/3/22 MRI C Spine \par IMPRESSION: \par  CHRONIC APPEARING NONDISPLACED TYPE II DENS FRACTURE. \par  MILD TO MODERATE MULTILEVEL DEGENERATIVE CHANGES. \par \par 4/4/22 MRI L spine \par IMPRESSION: \par SMALL TO MODERATE RIGHT PARACENTRAL DISC EXTRUSION T11-12. \par SMALL TO MODERATE RIGHT PARACENTRAL DISC EXTRUSION L4-5 WITH SUPERIOR \par MIGRATION. \par MODERATE LEFT PARACENTRAL DISC EXTRUSION L5-S1 \par SCOLIOSIS WITH ASSOCIATED DEGENERATIVE CHANGES. \par LOW-LYING CORD WITH TIP OF THE CONUS AT THE L3-4 LEV \par \par Currently she presents in a wheelchair. She denies numbness/tingling, loss of strength, or muscle weakness. She states that her walking has been improving with PT at facility. She is incontinent and has been for a while. Denies any issues with memory or confusion. Daughter was on phone at visit states that a Dr told her that the patient has water on her brain and would like to know if this is the cause of her incontinence and balance issue. \par

## 2022-05-25 NOTE — ASSESSMENT
[FreeTextEntry1] : Patient with above history and imaging. C2 fracture chronic in nature. Daughter would like LP to test if patients incontinence and unsteady gait will resolve. Advised to finish rehab PT and call office when pt is home to schedule LP.\par \par Plan:\par Possible LP\par Continue PT at facility\par Patient knows to call the office if there are any new or worsening symptoms. \par All questions and concerns answered and addressed in detail to patient's complete satisfaction. Patient verbalized understanding and agreed to plan.\par \par

## 2022-05-25 NOTE — PHYSICAL EXAM
[General Appearance - Alert] : alert [General Appearance - In No Acute Distress] : in no acute distress [General Appearance - Well Nourished] : well nourished [Impaired Insight] : insight and judgment were intact [Affect] : the affect was normal [Person] : oriented to person [Place] : oriented to place [Time] : oriented to time [Motor Tone] : muscle tone was normal in all four extremities [Motor Strength] : muscle strength was normal in all four extremities [No Visual Abnormalities] : no visible abnormailities [No Tenderness to Palpation] : no spine tenderness on palpation [Sclera] : the sclera and conjunctiva were normal [PERRL With Normal Accommodation] : pupils were equal in size, round, reactive to light, with normal accommodation [] : no respiratory distress [No Spinal Tenderness] : no spinal tenderness [Involuntary Movements] : no involuntary movements were seen [FreeTextEntry8] : small step careful gait

## 2022-07-05 ENCOUNTER — APPOINTMENT (OUTPATIENT)
Dept: NEUROSURGERY | Facility: CLINIC | Age: 82
End: 2022-07-05

## 2022-09-13 NOTE — ED ADULT TRIAGE NOTE - TEMPERATURE IN CELSIUS (DEGREES C)
Patient's chart reviewed, please contact to schedule OA colonoscopy with .Patient Preference      Schedule Procedure:   Please Schedule Routine (next available or patient preference)  Procedure: Colonoscopy (55414) with MD preference for bowel prep.  Diagnosis: Colon Cancer Screening Z12.11  Is patient:    Diabetic? No   ANTIPLATELET / ANTICOAGULATION: MEDICATION:  Continue Aspirin  Latex allergy: Yes-Rash  Sleep apnea: No  Location: Patient Preference   Sedation: Mac Anesthesia (Hx of Coronary Artery Disease- status post Coronary Artery By pass Graft x 2)    Special Instructions: None      Covid: Fully Vaccinated  No  Immunocompromised:  No        36.7

## 2022-10-28 ENCOUNTER — APPOINTMENT (OUTPATIENT)
Dept: NEUROLOGY | Facility: CLINIC | Age: 82
End: 2022-10-28

## 2023-07-24 NOTE — ED CDU PROVIDER INITIAL DAY NOTE - NEUROLOGICAL, MLM
Alert and oriented, no focal deficits, no motor or sensory deficits. Low Dose Naltrexone Counseling- I discussed with the patient the potential risks and side effects of low dose naltrexone including but not limited to: more vivid dreams, headaches, nausea, vomiting, abdominal pain, fatigue, dizziness, and anxiety.

## 2023-10-04 NOTE — PHYSICAL THERAPY INITIAL EVALUATION ADULT - SITTING BALANCE: DYNAMIC
PRE-SEDATION ASSESSMENT    CONSENT  Risks, benefits, and alternatives have been discussed with the patient/patient representative, and patient/patient representative agrees to proceed: Yes    MEDICAL HISTORY    Continues to have LBP which is worse with standing and walking. Pain was reproduced today with extension and hyperextension of the lumbar spine.      PHYSICAL EXAM  History and Physical Reviewed: H&P completed today  Airway Anatomy : Class III  Heart : Normal  Lungs : Normal  LOC/Mental Status : Normal    OTHER FINDINGS  Reviewed current medications and allergies: Yes  Pertinent lab/diagnostic test reviewed: Yes    SEDATION RISK ASSESSMENT  Risk Status ASA: Class II - Normal patient with mild systemic disease  Plan for Sedation: Moderate Sedation  EKG Monitoring: No  Lumbar facet arthropathy/MBB/RFA  NARRATIVE FINDINGS     
good minus

## 2023-11-01 ENCOUNTER — INPATIENT (INPATIENT)
Facility: HOSPITAL | Age: 83
LOS: 7 days | Discharge: NURSING, REHAB OR LONG-TERM CARE | DRG: 481 | End: 2023-11-09
Attending: STUDENT IN AN ORGANIZED HEALTH CARE EDUCATION/TRAINING PROGRAM | Admitting: STUDENT IN AN ORGANIZED HEALTH CARE EDUCATION/TRAINING PROGRAM
Payer: MEDICARE

## 2023-11-01 ENCOUNTER — TRANSCRIPTION ENCOUNTER (OUTPATIENT)
Age: 83
End: 2023-11-01

## 2023-11-01 VITALS
TEMPERATURE: 98 F | DIASTOLIC BLOOD PRESSURE: 62 MMHG | OXYGEN SATURATION: 98 % | RESPIRATION RATE: 18 BRPM | SYSTOLIC BLOOD PRESSURE: 155 MMHG | HEART RATE: 65 BPM

## 2023-11-01 DIAGNOSIS — S72.001A FRACTURE OF UNSPECIFIED PART OF NECK OF RIGHT FEMUR, INITIAL ENCOUNTER FOR CLOSED FRACTURE: ICD-10-CM

## 2023-11-01 LAB
ALBUMIN SERPL ELPH-MCNC: 3.7 G/DL — SIGNIFICANT CHANGE UP (ref 3.3–5.2)
ALBUMIN SERPL ELPH-MCNC: 3.7 G/DL — SIGNIFICANT CHANGE UP (ref 3.3–5.2)
ALP SERPL-CCNC: 114 U/L — SIGNIFICANT CHANGE UP (ref 40–120)
ALP SERPL-CCNC: 114 U/L — SIGNIFICANT CHANGE UP (ref 40–120)
ALT FLD-CCNC: 39 U/L — HIGH
ALT FLD-CCNC: 39 U/L — HIGH
ANION GAP SERPL CALC-SCNC: 14 MMOL/L — SIGNIFICANT CHANGE UP (ref 5–17)
ANION GAP SERPL CALC-SCNC: 14 MMOL/L — SIGNIFICANT CHANGE UP (ref 5–17)
APTT BLD: 30.6 SEC — SIGNIFICANT CHANGE UP (ref 24.5–35.6)
APTT BLD: 30.6 SEC — SIGNIFICANT CHANGE UP (ref 24.5–35.6)
AST SERPL-CCNC: 31 U/L — SIGNIFICANT CHANGE UP
AST SERPL-CCNC: 31 U/L — SIGNIFICANT CHANGE UP
BASOPHILS # BLD AUTO: 0.06 K/UL — SIGNIFICANT CHANGE UP (ref 0–0.2)
BASOPHILS # BLD AUTO: 0.06 K/UL — SIGNIFICANT CHANGE UP (ref 0–0.2)
BASOPHILS NFR BLD AUTO: 0.5 % — SIGNIFICANT CHANGE UP (ref 0–2)
BASOPHILS NFR BLD AUTO: 0.5 % — SIGNIFICANT CHANGE UP (ref 0–2)
BILIRUB SERPL-MCNC: 0.4 MG/DL — SIGNIFICANT CHANGE UP (ref 0.4–2)
BILIRUB SERPL-MCNC: 0.4 MG/DL — SIGNIFICANT CHANGE UP (ref 0.4–2)
BLD GP AB SCN SERPL QL: SIGNIFICANT CHANGE UP
BLD GP AB SCN SERPL QL: SIGNIFICANT CHANGE UP
BUN SERPL-MCNC: 23.4 MG/DL — HIGH (ref 8–20)
BUN SERPL-MCNC: 23.4 MG/DL — HIGH (ref 8–20)
CALCIUM SERPL-MCNC: 9.2 MG/DL — SIGNIFICANT CHANGE UP (ref 8.4–10.5)
CALCIUM SERPL-MCNC: 9.2 MG/DL — SIGNIFICANT CHANGE UP (ref 8.4–10.5)
CHLORIDE SERPL-SCNC: 104 MMOL/L — SIGNIFICANT CHANGE UP (ref 96–108)
CHLORIDE SERPL-SCNC: 104 MMOL/L — SIGNIFICANT CHANGE UP (ref 96–108)
CO2 SERPL-SCNC: 22 MMOL/L — SIGNIFICANT CHANGE UP (ref 22–29)
CO2 SERPL-SCNC: 22 MMOL/L — SIGNIFICANT CHANGE UP (ref 22–29)
CREAT SERPL-MCNC: 0.9 MG/DL — SIGNIFICANT CHANGE UP (ref 0.5–1.3)
CREAT SERPL-MCNC: 0.9 MG/DL — SIGNIFICANT CHANGE UP (ref 0.5–1.3)
EGFR: 64 ML/MIN/1.73M2 — SIGNIFICANT CHANGE UP
EGFR: 64 ML/MIN/1.73M2 — SIGNIFICANT CHANGE UP
EOSINOPHIL # BLD AUTO: 0.13 K/UL — SIGNIFICANT CHANGE UP (ref 0–0.5)
EOSINOPHIL # BLD AUTO: 0.13 K/UL — SIGNIFICANT CHANGE UP (ref 0–0.5)
EOSINOPHIL NFR BLD AUTO: 1.1 % — SIGNIFICANT CHANGE UP (ref 0–6)
EOSINOPHIL NFR BLD AUTO: 1.1 % — SIGNIFICANT CHANGE UP (ref 0–6)
GLUCOSE SERPL-MCNC: 124 MG/DL — HIGH (ref 70–99)
GLUCOSE SERPL-MCNC: 124 MG/DL — HIGH (ref 70–99)
HCT VFR BLD CALC: 41.9 % — SIGNIFICANT CHANGE UP (ref 34.5–45)
HCT VFR BLD CALC: 41.9 % — SIGNIFICANT CHANGE UP (ref 34.5–45)
HGB BLD-MCNC: 13.3 G/DL — SIGNIFICANT CHANGE UP (ref 11.5–15.5)
HGB BLD-MCNC: 13.3 G/DL — SIGNIFICANT CHANGE UP (ref 11.5–15.5)
IMM GRANULOCYTES NFR BLD AUTO: 0.8 % — SIGNIFICANT CHANGE UP (ref 0–0.9)
IMM GRANULOCYTES NFR BLD AUTO: 0.8 % — SIGNIFICANT CHANGE UP (ref 0–0.9)
INR BLD: 1.28 RATIO — HIGH (ref 0.85–1.18)
INR BLD: 1.28 RATIO — HIGH (ref 0.85–1.18)
LYMPHOCYTES # BLD AUTO: 0.99 K/UL — LOW (ref 1–3.3)
LYMPHOCYTES # BLD AUTO: 0.99 K/UL — LOW (ref 1–3.3)
LYMPHOCYTES # BLD AUTO: 8.2 % — LOW (ref 13–44)
LYMPHOCYTES # BLD AUTO: 8.2 % — LOW (ref 13–44)
MCHC RBC-ENTMCNC: 25.1 PG — LOW (ref 27–34)
MCHC RBC-ENTMCNC: 25.1 PG — LOW (ref 27–34)
MCHC RBC-ENTMCNC: 31.7 GM/DL — LOW (ref 32–36)
MCHC RBC-ENTMCNC: 31.7 GM/DL — LOW (ref 32–36)
MCV RBC AUTO: 79.2 FL — LOW (ref 80–100)
MCV RBC AUTO: 79.2 FL — LOW (ref 80–100)
MONOCYTES # BLD AUTO: 0.63 K/UL — SIGNIFICANT CHANGE UP (ref 0–0.9)
MONOCYTES # BLD AUTO: 0.63 K/UL — SIGNIFICANT CHANGE UP (ref 0–0.9)
MONOCYTES NFR BLD AUTO: 5.2 % — SIGNIFICANT CHANGE UP (ref 2–14)
MONOCYTES NFR BLD AUTO: 5.2 % — SIGNIFICANT CHANGE UP (ref 2–14)
MRSA PCR RESULT.: SIGNIFICANT CHANGE UP
MRSA PCR RESULT.: SIGNIFICANT CHANGE UP
NEUTROPHILS # BLD AUTO: 10.11 K/UL — HIGH (ref 1.8–7.4)
NEUTROPHILS # BLD AUTO: 10.11 K/UL — HIGH (ref 1.8–7.4)
NEUTROPHILS NFR BLD AUTO: 84.2 % — HIGH (ref 43–77)
NEUTROPHILS NFR BLD AUTO: 84.2 % — HIGH (ref 43–77)
PLATELET # BLD AUTO: 287 K/UL — SIGNIFICANT CHANGE UP (ref 150–400)
PLATELET # BLD AUTO: 287 K/UL — SIGNIFICANT CHANGE UP (ref 150–400)
POTASSIUM SERPL-MCNC: 3.4 MMOL/L — LOW (ref 3.5–5.3)
POTASSIUM SERPL-MCNC: 3.4 MMOL/L — LOW (ref 3.5–5.3)
POTASSIUM SERPL-SCNC: 3.4 MMOL/L — LOW (ref 3.5–5.3)
POTASSIUM SERPL-SCNC: 3.4 MMOL/L — LOW (ref 3.5–5.3)
PROT SERPL-MCNC: 6.7 G/DL — SIGNIFICANT CHANGE UP (ref 6.6–8.7)
PROT SERPL-MCNC: 6.7 G/DL — SIGNIFICANT CHANGE UP (ref 6.6–8.7)
PROTHROM AB SERPL-ACNC: 14.1 SEC — HIGH (ref 9.5–13)
PROTHROM AB SERPL-ACNC: 14.1 SEC — HIGH (ref 9.5–13)
RBC # BLD: 5.29 M/UL — HIGH (ref 3.8–5.2)
RBC # BLD: 5.29 M/UL — HIGH (ref 3.8–5.2)
RBC # FLD: 17.3 % — HIGH (ref 10.3–14.5)
RBC # FLD: 17.3 % — HIGH (ref 10.3–14.5)
S AUREUS DNA NOSE QL NAA+PROBE: SIGNIFICANT CHANGE UP
S AUREUS DNA NOSE QL NAA+PROBE: SIGNIFICANT CHANGE UP
SODIUM SERPL-SCNC: 140 MMOL/L — SIGNIFICANT CHANGE UP (ref 135–145)
SODIUM SERPL-SCNC: 140 MMOL/L — SIGNIFICANT CHANGE UP (ref 135–145)
WBC # BLD: 12.02 K/UL — HIGH (ref 3.8–10.5)
WBC # BLD: 12.02 K/UL — HIGH (ref 3.8–10.5)
WBC # FLD AUTO: 12.02 K/UL — HIGH (ref 3.8–10.5)
WBC # FLD AUTO: 12.02 K/UL — HIGH (ref 3.8–10.5)

## 2023-11-01 PROCEDURE — 73030 X-RAY EXAM OF SHOULDER: CPT | Mod: 26,RT

## 2023-11-01 PROCEDURE — G1004: CPT

## 2023-11-01 PROCEDURE — 73060 X-RAY EXAM OF HUMERUS: CPT | Mod: 26,RT

## 2023-11-01 PROCEDURE — 71045 X-RAY EXAM CHEST 1 VIEW: CPT | Mod: 26

## 2023-11-01 PROCEDURE — 99285 EMERGENCY DEPT VISIT HI MDM: CPT

## 2023-11-01 PROCEDURE — 93010 ELECTROCARDIOGRAM REPORT: CPT

## 2023-11-01 PROCEDURE — 72192 CT PELVIS W/O DYE: CPT | Mod: 26,ME

## 2023-11-01 PROCEDURE — 99284 EMERGENCY DEPT VISIT MOD MDM: CPT

## 2023-11-01 PROCEDURE — 73502 X-RAY EXAM HIP UNI 2-3 VIEWS: CPT | Mod: 26,RT

## 2023-11-01 PROCEDURE — 73200 CT UPPER EXTREMITY W/O DYE: CPT | Mod: 26,RT,MG

## 2023-11-01 PROCEDURE — 73070 X-RAY EXAM OF ELBOW: CPT | Mod: 26,RT

## 2023-11-01 PROCEDURE — 73551 X-RAY EXAM OF FEMUR 1: CPT | Mod: 26,RT

## 2023-11-01 PROCEDURE — 99053 MED SERV 10PM-8AM 24 HR FAC: CPT

## 2023-11-01 RX ORDER — MORPHINE SULFATE 50 MG/1
2 CAPSULE, EXTENDED RELEASE ORAL ONCE
Refills: 0 | Status: DISCONTINUED | OUTPATIENT
Start: 2023-11-01 | End: 2023-11-01

## 2023-11-01 RX ORDER — GABAPENTIN 400 MG/1
100 CAPSULE ORAL ONCE
Refills: 0 | Status: COMPLETED | OUTPATIENT
Start: 2023-11-01 | End: 2023-11-01

## 2023-11-01 RX ORDER — GABAPENTIN 400 MG/1
100 CAPSULE ORAL EVERY 8 HOURS
Refills: 0 | Status: DISCONTINUED | OUTPATIENT
Start: 2023-11-02 | End: 2023-11-02

## 2023-11-01 RX ORDER — POTASSIUM CHLORIDE 20 MEQ
10 PACKET (EA) ORAL ONCE
Refills: 0 | Status: COMPLETED | OUTPATIENT
Start: 2023-11-01 | End: 2023-11-01

## 2023-11-01 RX ORDER — LIDOCAINE 4 G/100G
2 CREAM TOPICAL EVERY 24 HOURS
Refills: 0 | Status: DISCONTINUED | OUTPATIENT
Start: 2023-11-01 | End: 2023-11-02

## 2023-11-01 RX ORDER — POTASSIUM CHLORIDE 20 MEQ
40 PACKET (EA) ORAL ONCE
Refills: 0 | Status: COMPLETED | OUTPATIENT
Start: 2023-11-01 | End: 2023-11-01

## 2023-11-01 RX ORDER — ACETAMINOPHEN 500 MG
975 TABLET ORAL EVERY 6 HOURS
Refills: 0 | Status: DISCONTINUED | OUTPATIENT
Start: 2023-11-01 | End: 2023-11-01

## 2023-11-01 RX ORDER — ACETAMINOPHEN 500 MG
1000 TABLET ORAL ONCE
Refills: 0 | Status: COMPLETED | OUTPATIENT
Start: 2023-11-02 | End: 2023-11-02

## 2023-11-01 RX ORDER — ENOXAPARIN SODIUM 100 MG/ML
30 INJECTION SUBCUTANEOUS EVERY 12 HOURS
Refills: 0 | Status: DISCONTINUED | OUTPATIENT
Start: 2023-11-01 | End: 2023-11-01

## 2023-11-01 RX ORDER — SODIUM CHLORIDE 9 MG/ML
1000 INJECTION, SOLUTION INTRAVENOUS
Refills: 0 | Status: DISCONTINUED | OUTPATIENT
Start: 2023-11-01 | End: 2023-11-02

## 2023-11-01 RX ORDER — TRANEXAMIC ACID 100 MG/ML
1000 INJECTION, SOLUTION INTRAVENOUS ONCE
Refills: 0 | Status: COMPLETED | OUTPATIENT
Start: 2023-11-01 | End: 2023-11-01

## 2023-11-01 RX ORDER — INFLUENZA VIRUS VACCINE 15; 15; 15; 15 UG/.5ML; UG/.5ML; UG/.5ML; UG/.5ML
0.7 SUSPENSION INTRAMUSCULAR ONCE
Refills: 0 | Status: DISCONTINUED | OUTPATIENT
Start: 2023-11-01 | End: 2023-11-09

## 2023-11-01 RX ORDER — ACETAMINOPHEN 500 MG
650 TABLET ORAL EVERY 6 HOURS
Refills: 0 | Status: DISCONTINUED | OUTPATIENT
Start: 2023-11-01 | End: 2023-11-01

## 2023-11-01 RX ORDER — KETOROLAC TROMETHAMINE 30 MG/ML
15 SYRINGE (ML) INJECTION ONCE
Refills: 0 | Status: DISCONTINUED | OUTPATIENT
Start: 2023-11-01 | End: 2023-11-01

## 2023-11-01 RX ORDER — ONDANSETRON 8 MG/1
4 TABLET, FILM COATED ORAL EVERY 6 HOURS
Refills: 0 | Status: DISCONTINUED | OUTPATIENT
Start: 2023-11-01 | End: 2023-11-02

## 2023-11-01 RX ORDER — MUPIROCIN 20 MG/G
1 OINTMENT TOPICAL
Refills: 0 | Status: DISCONTINUED | OUTPATIENT
Start: 2023-11-01 | End: 2023-11-02

## 2023-11-01 RX ORDER — ACETAMINOPHEN 500 MG
1000 TABLET ORAL ONCE
Refills: 0 | Status: COMPLETED | OUTPATIENT
Start: 2023-11-01 | End: 2023-11-01

## 2023-11-01 RX ORDER — TRAMADOL HYDROCHLORIDE 50 MG/1
25 TABLET ORAL EVERY 6 HOURS
Refills: 0 | Status: DISCONTINUED | OUTPATIENT
Start: 2023-11-01 | End: 2023-11-02

## 2023-11-01 RX ORDER — CHLORHEXIDINE GLUCONATE 213 G/1000ML
1 SOLUTION TOPICAL
Refills: 0 | Status: DISCONTINUED | OUTPATIENT
Start: 2023-11-01 | End: 2023-11-02

## 2023-11-01 RX ORDER — LANOLIN ALCOHOL/MO/W.PET/CERES
5 CREAM (GRAM) TOPICAL AT BEDTIME
Refills: 0 | Status: DISCONTINUED | OUTPATIENT
Start: 2023-11-01 | End: 2023-11-02

## 2023-11-01 RX ORDER — NYSTATIN CREAM 100000 [USP'U]/G
1 CREAM TOPICAL
Refills: 0 | Status: DISCONTINUED | OUTPATIENT
Start: 2023-11-01 | End: 2023-11-02

## 2023-11-01 RX ORDER — ENOXAPARIN SODIUM 100 MG/ML
40 INJECTION SUBCUTANEOUS ONCE
Refills: 0 | Status: COMPLETED | OUTPATIENT
Start: 2023-11-01 | End: 2023-11-01

## 2023-11-01 RX ORDER — POVIDONE-IODINE 5 %
1 AEROSOL (ML) TOPICAL ONCE
Refills: 0 | Status: DISCONTINUED | OUTPATIENT
Start: 2023-11-01 | End: 2023-11-02

## 2023-11-01 RX ADMIN — Medication 975 MILLIGRAM(S): at 21:00

## 2023-11-01 RX ADMIN — SODIUM CHLORIDE 100 MILLILITER(S): 9 INJECTION, SOLUTION INTRAVENOUS at 20:03

## 2023-11-01 RX ADMIN — Medication 975 MILLIGRAM(S): at 10:30

## 2023-11-01 RX ADMIN — MORPHINE SULFATE 2 MILLIGRAM(S): 50 CAPSULE, EXTENDED RELEASE ORAL at 01:46

## 2023-11-01 RX ADMIN — LIDOCAINE 2 PATCH: 4 CREAM TOPICAL at 21:35

## 2023-11-01 RX ADMIN — TRANEXAMIC ACID 220 MILLIGRAM(S): 100 INJECTION, SOLUTION INTRAVENOUS at 06:17

## 2023-11-01 RX ADMIN — Medication 40 MILLIEQUIVALENT(S): at 05:00

## 2023-11-01 RX ADMIN — Medication 15 MILLIGRAM(S): at 22:30

## 2023-11-01 RX ADMIN — NYSTATIN CREAM 1 APPLICATION(S): 100000 CREAM TOPICAL at 16:54

## 2023-11-01 RX ADMIN — MORPHINE SULFATE 2 MILLIGRAM(S): 50 CAPSULE, EXTENDED RELEASE ORAL at 03:15

## 2023-11-01 RX ADMIN — Medication 5 MILLIGRAM(S): at 21:35

## 2023-11-01 RX ADMIN — Medication 15 MILLIGRAM(S): at 21:35

## 2023-11-01 RX ADMIN — MUPIROCIN 1 APPLICATION(S): 20 OINTMENT TOPICAL at 06:17

## 2023-11-01 RX ADMIN — SODIUM CHLORIDE 100 MILLILITER(S): 9 INJECTION, SOLUTION INTRAVENOUS at 10:30

## 2023-11-01 RX ADMIN — GABAPENTIN 100 MILLIGRAM(S): 400 CAPSULE ORAL at 21:35

## 2023-11-01 RX ADMIN — Medication 400 MILLIGRAM(S): at 01:47

## 2023-11-01 RX ADMIN — CHLORHEXIDINE GLUCONATE 1 APPLICATION(S): 213 SOLUTION TOPICAL at 06:17

## 2023-11-01 RX ADMIN — ENOXAPARIN SODIUM 40 MILLIGRAM(S): 100 INJECTION SUBCUTANEOUS at 18:56

## 2023-11-01 RX ADMIN — Medication 975 MILLIGRAM(S): at 07:47

## 2023-11-01 RX ADMIN — Medication 975 MILLIGRAM(S): at 20:03

## 2023-11-01 RX ADMIN — MUPIROCIN 1 APPLICATION(S): 20 OINTMENT TOPICAL at 16:55

## 2023-11-01 RX ADMIN — Medication 100 MILLIEQUIVALENT(S): at 04:56

## 2023-11-01 RX ADMIN — MORPHINE SULFATE 2 MILLIGRAM(S): 50 CAPSULE, EXTENDED RELEASE ORAL at 05:13

## 2023-11-01 NOTE — H&P ADULT - NSICDXPASTSURGICALHX_GEN_ALL_CORE_FT
Pt called and asking for any recommendation for cardiologist doctor  and doctor who do colonoscopy    PAST SURGICAL HISTORY:  No significant past surgical history

## 2023-11-01 NOTE — PATIENT PROFILE ADULT - FALL HARM RISK - HARM RISK INTERVENTIONS

## 2023-11-01 NOTE — PROVIDER CONTACT NOTE (OTHER) - SITUATION
patient alert x4, fall from home OR scheduled today. patient daughter want to be made aware of any procedures to be done regarding her Mother.  Patient has B/L MAD under breast and pannus folds IAD

## 2023-11-01 NOTE — CHART NOTE - NSCHARTNOTEFT_GEN_A_CORE
Patient seen and examined on am rounds. states her arm and hip hurt. to OR today with Ortho. Will need tertiary survey. Dispo planning.

## 2023-11-01 NOTE — ED PROVIDER NOTE - NS ED ROS FT
Review of Systems  SKIN: warm, dry w/ no rash or bleeding  RESPIRATORY: no cough or SOB  CARDIAC: no chest pain & no palpitations  GI: no abd pain, nausea, vomiting, diarrhea  MUSCULOSKELETAL:  +RIGHT SHOULDER PAIN +RIGHT HIP PAIN  NEURO: Alert, oriented x3. No weakness, numbness.

## 2023-11-01 NOTE — H&P ADULT - HISTORY OF PRESENT ILLNESS
83yo F w/ hx of a fib on Eliquis presents after fall at home on her R side. Pt states that she got up in the middle of the night to use the bathroom when she fell on her right side. Denies headstrike or LOC. Remembers the incident. Complaining of R thigh pain and R shoulder pain. Unable to lift R arm or R leg. AAOx3. Hemodynamically stable on arrival. X-rays demonstrating R hip fx and R humerus fx. Trauma surgery consulted for management.

## 2023-11-01 NOTE — ED ADULT NURSE NOTE - NSFALLHARMRISKINTERV_ED_ALL_ED

## 2023-11-01 NOTE — ED PROVIDER NOTE - OBJECTIVE STATEMENT
DONNA HYATT is a 81yo Female with PMH afib on eliquis, HTN, hypothyroid, HLD who presents c/o right shoulder and hip pain after falling. PT states that about an hour PTA she was getting up to go to the bathroom when she fell on her right side. She is unsure why she fell but denies syncope and doesn't think she had a mechanical fall. She denies hitting her head and denies LOC. States she fell on her right side. Her daughter immediately called EMS. DONNA HYATT is a 81yo Female with PMH afib on Eliquis, HTN, hypothyroid, HLD who presents c/o right shoulder and hip pain after falling. PT states that about an hour PTA she was getting up to go to the bathroom when she fell on her right side. She is unsure why she fell but denies syncope and doesn't think she had a mechanical fall. She denies hitting her head and denies LOC. States she fell on her right side. Her daughter immediately called EMS.

## 2023-11-01 NOTE — ED PROVIDER NOTE - PROGRESS NOTE DETAILS
Mark: PT w/ right humoral head and right proximal femur fracture on Xray. Ortho and trauma consulted.

## 2023-11-01 NOTE — ED ADULT NURSE NOTE - OBJECTIVE STATEMENT
Pt received at 0127 . Pt seen and assessed at bedside. Pt is a&o x 4, RR even and unlabored, resting comfortably, slightly anxious. Pt c/o fall in bathroom at home, denies hitting head or LOC. C/o pain to R shoulder and R thigh, rating 4/5, R sling in place by EMS. Upon assessment, R shoulder and thigh no swelling, tenderness, skin intact. No other apparent trauma. Pt denies CP/SOB/N/V/D. Awaiting POC.

## 2023-11-01 NOTE — ED PROVIDER NOTE - ATTENDING CONTRIBUTION TO CARE
82y F w/ hx Afib on Eliquis, HTN, HLD, hypothyroid; presents from home for fall. Pt says she got up to use the bathroom and lost her balance. Denies any dizziness, chest pain, palpitations. Now complains of R shoulder and hip pain. On exam, pt in no acute distress. +Tenderness to R shoulder, no tenderness to elbow. +Tenderness of R hip with extremity shortened/externally rotated. Distal sensation/pulses intact. X-rays showing R proximal humerus fx and R intertrochanteric hip fx. Ortho/trauma consulted. EKG/labs, pain control.

## 2023-11-01 NOTE — PROGRESS NOTE ADULT - ASSESSMENT
Chart reviewed, of not aortic stenosis worsened on previous echo. No additional testing needed prior to OR.

## 2023-11-01 NOTE — H&P ADULT - NSHPPHYSICALEXAM_GEN_ALL_CORE
VITALS:   T(C): 36.7 (11-01-23 @ 03:57), Max: 36.7 (11-01-23 @ 03:57)  HR: 63 (11-01-23 @ 03:57) (63 - 98)  BP: 121/63 (11-01-23 @ 03:57) (121/63 - 156/89)  RR: 18 (11-01-23 @ 03:57) (18 - 18)  SpO2: 93% (11-01-23 @ 03:57) (93% - 98%)    GENERAL: NAD, lying in bed comfortably  HEAD:  Atraumatic, normocephalic  HEART: RRR  LUNGS: Unlabored respirations. No conversational dyspnea.   ABDOMEN: Soft, nontender, nondistended  EXTREMITIES: Unable to lift RLE and RUE. R shoulder and R hip tender to palpation. No clubbing, cyanosis, or edema  NERVOUS SYSTEM:  A&Ox3, no focal deficits   SKIN: No rashes or lesions

## 2023-11-01 NOTE — CHART NOTE - NSCHARTNOTEFT_GEN_A_CORE
I called patient's daughter Jazzmine as she had questions regarding her mother's injuries, the surgery she needs and overall status. I had a wilma conversation, all questions were answered to the best of my ability, she verbalized and understood what we discussed. Daughter satisfied with the conversation.

## 2023-11-01 NOTE — ED PROVIDER NOTE - PHYSICAL EXAMINATION
Gen: IN PAIN  Head: NC/AT  Neck: trachea midline  Resp:  No distress  Abd: soft, nd, nt  Ext: +RIGHT LEG LENGTH DISCREPANCY. RIGHT SHOULDER TTP. REFUSING TO RANGE 2/2 PAIN.   Neuro:  A&O appears non focal  Skin:  Warm and dry as visualized  Psych:  Normal affect and mood

## 2023-11-01 NOTE — H&P ADULT - ASSESSMENT
ASSESSMENT: 81yo F presenting s/p mechanical fall and found to have R humeral fx and R hip fx.     PLAN:  - admit to trauma, Dr. Velez  - ortho consulted, pt will need surgery for hip fx  - will obtain med rec for patient's daughter when able to resume home meds  - pain control   - holding DVT ppx for OR  - PT postoperatively     Pt seen and plan discussed with attending, Dr. Velez

## 2023-11-01 NOTE — H&P ADULT - NS ATTEND AMEND GEN_ALL_CORE FT
I have seen and examined this patient with the resident.  82F fell onto her right side at home.  Found to have right humerus fracture and right femur fracture.  Plan for orthopedics consultation, admission, [facility].

## 2023-11-01 NOTE — ED PROVIDER NOTE - CLINICAL SUMMARY MEDICAL DECISION MAKING FREE TEXT BOX
82y F w/ hx Afib on Eliquis, presents for mechanical fall at home. Found to have shoulder and hip fractures. Hemodynamically stable. Ortho/trauma consulted.

## 2023-11-02 ENCOUNTER — TRANSCRIPTION ENCOUNTER (OUTPATIENT)
Age: 83
End: 2023-11-02

## 2023-11-02 ENCOUNTER — APPOINTMENT (OUTPATIENT)
Dept: ORTHOPEDIC SURGERY | Facility: HOSPITAL | Age: 83
End: 2023-11-02

## 2023-11-02 LAB
ANION GAP SERPL CALC-SCNC: 15 MMOL/L — SIGNIFICANT CHANGE UP (ref 5–17)
ANION GAP SERPL CALC-SCNC: 15 MMOL/L — SIGNIFICANT CHANGE UP (ref 5–17)
ANISOCYTOSIS BLD QL: SLIGHT — SIGNIFICANT CHANGE UP
ANISOCYTOSIS BLD QL: SLIGHT — SIGNIFICANT CHANGE UP
BASOPHILS # BLD AUTO: 0 K/UL — SIGNIFICANT CHANGE UP (ref 0–0.2)
BASOPHILS # BLD AUTO: 0 K/UL — SIGNIFICANT CHANGE UP (ref 0–0.2)
BASOPHILS # BLD AUTO: 0.05 K/UL — SIGNIFICANT CHANGE UP (ref 0–0.2)
BASOPHILS # BLD AUTO: 0.05 K/UL — SIGNIFICANT CHANGE UP (ref 0–0.2)
BASOPHILS NFR BLD AUTO: 0 % — SIGNIFICANT CHANGE UP (ref 0–2)
BASOPHILS NFR BLD AUTO: 0 % — SIGNIFICANT CHANGE UP (ref 0–2)
BASOPHILS NFR BLD AUTO: 0.4 % — SIGNIFICANT CHANGE UP (ref 0–2)
BASOPHILS NFR BLD AUTO: 0.4 % — SIGNIFICANT CHANGE UP (ref 0–2)
BUN SERPL-MCNC: 17.6 MG/DL — SIGNIFICANT CHANGE UP (ref 8–20)
BUN SERPL-MCNC: 17.6 MG/DL — SIGNIFICANT CHANGE UP (ref 8–20)
BURR CELLS BLD QL SMEAR: PRESENT — SIGNIFICANT CHANGE UP
BURR CELLS BLD QL SMEAR: PRESENT — SIGNIFICANT CHANGE UP
CALCIUM SERPL-MCNC: 8.5 MG/DL — SIGNIFICANT CHANGE UP (ref 8.4–10.5)
CALCIUM SERPL-MCNC: 8.5 MG/DL — SIGNIFICANT CHANGE UP (ref 8.4–10.5)
CHLORIDE SERPL-SCNC: 105 MMOL/L — SIGNIFICANT CHANGE UP (ref 96–108)
CHLORIDE SERPL-SCNC: 105 MMOL/L — SIGNIFICANT CHANGE UP (ref 96–108)
CO2 SERPL-SCNC: 19 MMOL/L — LOW (ref 22–29)
CO2 SERPL-SCNC: 19 MMOL/L — LOW (ref 22–29)
CREAT SERPL-MCNC: 0.59 MG/DL — SIGNIFICANT CHANGE UP (ref 0.5–1.3)
CREAT SERPL-MCNC: 0.59 MG/DL — SIGNIFICANT CHANGE UP (ref 0.5–1.3)
EGFR: 90 ML/MIN/1.73M2 — SIGNIFICANT CHANGE UP
EGFR: 90 ML/MIN/1.73M2 — SIGNIFICANT CHANGE UP
EOSINOPHIL # BLD AUTO: 0.19 K/UL — SIGNIFICANT CHANGE UP (ref 0–0.5)
EOSINOPHIL # BLD AUTO: 0.19 K/UL — SIGNIFICANT CHANGE UP (ref 0–0.5)
EOSINOPHIL # BLD AUTO: 0.27 K/UL — SIGNIFICANT CHANGE UP (ref 0–0.5)
EOSINOPHIL # BLD AUTO: 0.27 K/UL — SIGNIFICANT CHANGE UP (ref 0–0.5)
EOSINOPHIL NFR BLD AUTO: 0.9 % — SIGNIFICANT CHANGE UP (ref 0–6)
EOSINOPHIL NFR BLD AUTO: 0.9 % — SIGNIFICANT CHANGE UP (ref 0–6)
EOSINOPHIL NFR BLD AUTO: 2 % — SIGNIFICANT CHANGE UP (ref 0–6)
EOSINOPHIL NFR BLD AUTO: 2 % — SIGNIFICANT CHANGE UP (ref 0–6)
GIANT PLATELETS BLD QL SMEAR: PRESENT — SIGNIFICANT CHANGE UP
GIANT PLATELETS BLD QL SMEAR: PRESENT — SIGNIFICANT CHANGE UP
GLUCOSE SERPL-MCNC: 101 MG/DL — HIGH (ref 70–99)
GLUCOSE SERPL-MCNC: 101 MG/DL — HIGH (ref 70–99)
HCT VFR BLD CALC: 30.8 % — LOW (ref 34.5–45)
HCT VFR BLD CALC: 30.8 % — LOW (ref 34.5–45)
HCT VFR BLD CALC: 34.9 % — SIGNIFICANT CHANGE UP (ref 34.5–45)
HCT VFR BLD CALC: 34.9 % — SIGNIFICANT CHANGE UP (ref 34.5–45)
HGB BLD-MCNC: 10.9 G/DL — LOW (ref 11.5–15.5)
HGB BLD-MCNC: 10.9 G/DL — LOW (ref 11.5–15.5)
HGB BLD-MCNC: 9.1 G/DL — LOW (ref 11.5–15.5)
HGB BLD-MCNC: 9.1 G/DL — LOW (ref 11.5–15.5)
IMM GRANULOCYTES NFR BLD AUTO: 0.7 % — SIGNIFICANT CHANGE UP (ref 0–0.9)
IMM GRANULOCYTES NFR BLD AUTO: 0.7 % — SIGNIFICANT CHANGE UP (ref 0–0.9)
LYMPHOCYTES # BLD AUTO: 0.19 K/UL — LOW (ref 1–3.3)
LYMPHOCYTES # BLD AUTO: 0.19 K/UL — LOW (ref 1–3.3)
LYMPHOCYTES # BLD AUTO: 0.9 % — LOW (ref 13–44)
LYMPHOCYTES # BLD AUTO: 0.9 % — LOW (ref 13–44)
LYMPHOCYTES # BLD AUTO: 1.38 K/UL — SIGNIFICANT CHANGE UP (ref 1–3.3)
LYMPHOCYTES # BLD AUTO: 1.38 K/UL — SIGNIFICANT CHANGE UP (ref 1–3.3)
LYMPHOCYTES # BLD AUTO: 10.4 % — LOW (ref 13–44)
LYMPHOCYTES # BLD AUTO: 10.4 % — LOW (ref 13–44)
MAGNESIUM SERPL-MCNC: 2 MG/DL — SIGNIFICANT CHANGE UP (ref 1.6–2.6)
MAGNESIUM SERPL-MCNC: 2 MG/DL — SIGNIFICANT CHANGE UP (ref 1.6–2.6)
MANUAL SMEAR VERIFICATION: SIGNIFICANT CHANGE UP
MANUAL SMEAR VERIFICATION: SIGNIFICANT CHANGE UP
MCHC RBC-ENTMCNC: 24.5 PG — LOW (ref 27–34)
MCHC RBC-ENTMCNC: 24.5 PG — LOW (ref 27–34)
MCHC RBC-ENTMCNC: 25.6 PG — LOW (ref 27–34)
MCHC RBC-ENTMCNC: 25.6 PG — LOW (ref 27–34)
MCHC RBC-ENTMCNC: 29.5 GM/DL — LOW (ref 32–36)
MCHC RBC-ENTMCNC: 29.5 GM/DL — LOW (ref 32–36)
MCHC RBC-ENTMCNC: 31.2 GM/DL — LOW (ref 32–36)
MCHC RBC-ENTMCNC: 31.2 GM/DL — LOW (ref 32–36)
MCV RBC AUTO: 82.1 FL — SIGNIFICANT CHANGE UP (ref 80–100)
MCV RBC AUTO: 82.1 FL — SIGNIFICANT CHANGE UP (ref 80–100)
MCV RBC AUTO: 82.8 FL — SIGNIFICANT CHANGE UP (ref 80–100)
MCV RBC AUTO: 82.8 FL — SIGNIFICANT CHANGE UP (ref 80–100)
MICROCYTES BLD QL: SLIGHT — SIGNIFICANT CHANGE UP
MICROCYTES BLD QL: SLIGHT — SIGNIFICANT CHANGE UP
MONOCYTES # BLD AUTO: 0 K/UL — SIGNIFICANT CHANGE UP (ref 0–0.9)
MONOCYTES # BLD AUTO: 0 K/UL — SIGNIFICANT CHANGE UP (ref 0–0.9)
MONOCYTES # BLD AUTO: 1.04 K/UL — HIGH (ref 0–0.9)
MONOCYTES # BLD AUTO: 1.04 K/UL — HIGH (ref 0–0.9)
MONOCYTES NFR BLD AUTO: 0 % — LOW (ref 2–14)
MONOCYTES NFR BLD AUTO: 0 % — LOW (ref 2–14)
MONOCYTES NFR BLD AUTO: 7.8 % — SIGNIFICANT CHANGE UP (ref 2–14)
MONOCYTES NFR BLD AUTO: 7.8 % — SIGNIFICANT CHANGE UP (ref 2–14)
NEUTROPHILS # BLD AUTO: 10.46 K/UL — HIGH (ref 1.8–7.4)
NEUTROPHILS # BLD AUTO: 10.46 K/UL — HIGH (ref 1.8–7.4)
NEUTROPHILS # BLD AUTO: 20.41 K/UL — HIGH (ref 1.8–7.4)
NEUTROPHILS # BLD AUTO: 20.41 K/UL — HIGH (ref 1.8–7.4)
NEUTROPHILS NFR BLD AUTO: 78.7 % — HIGH (ref 43–77)
NEUTROPHILS NFR BLD AUTO: 78.7 % — HIGH (ref 43–77)
NEUTROPHILS NFR BLD AUTO: 98.2 % — HIGH (ref 43–77)
NEUTROPHILS NFR BLD AUTO: 98.2 % — HIGH (ref 43–77)
OVALOCYTES BLD QL SMEAR: SLIGHT — SIGNIFICANT CHANGE UP
OVALOCYTES BLD QL SMEAR: SLIGHT — SIGNIFICANT CHANGE UP
PHOSPHATE SERPL-MCNC: 3.6 MG/DL — SIGNIFICANT CHANGE UP (ref 2.4–4.7)
PHOSPHATE SERPL-MCNC: 3.6 MG/DL — SIGNIFICANT CHANGE UP (ref 2.4–4.7)
PLAT MORPH BLD: NORMAL — SIGNIFICANT CHANGE UP
PLAT MORPH BLD: NORMAL — SIGNIFICANT CHANGE UP
PLATELET # BLD AUTO: 225 K/UL — SIGNIFICANT CHANGE UP (ref 150–400)
PLATELET # BLD AUTO: 225 K/UL — SIGNIFICANT CHANGE UP (ref 150–400)
PLATELET # BLD AUTO: 236 K/UL — SIGNIFICANT CHANGE UP (ref 150–400)
PLATELET # BLD AUTO: 236 K/UL — SIGNIFICANT CHANGE UP (ref 150–400)
POIKILOCYTOSIS BLD QL AUTO: SIGNIFICANT CHANGE UP
POIKILOCYTOSIS BLD QL AUTO: SIGNIFICANT CHANGE UP
POLYCHROMASIA BLD QL SMEAR: SLIGHT — SIGNIFICANT CHANGE UP
POLYCHROMASIA BLD QL SMEAR: SLIGHT — SIGNIFICANT CHANGE UP
POTASSIUM SERPL-MCNC: 4.1 MMOL/L — SIGNIFICANT CHANGE UP (ref 3.5–5.3)
POTASSIUM SERPL-MCNC: 4.1 MMOL/L — SIGNIFICANT CHANGE UP (ref 3.5–5.3)
POTASSIUM SERPL-SCNC: 4.1 MMOL/L — SIGNIFICANT CHANGE UP (ref 3.5–5.3)
POTASSIUM SERPL-SCNC: 4.1 MMOL/L — SIGNIFICANT CHANGE UP (ref 3.5–5.3)
RBC # BLD: 3.72 M/UL — LOW (ref 3.8–5.2)
RBC # BLD: 3.72 M/UL — LOW (ref 3.8–5.2)
RBC # BLD: 4.25 M/UL — SIGNIFICANT CHANGE UP (ref 3.8–5.2)
RBC # BLD: 4.25 M/UL — SIGNIFICANT CHANGE UP (ref 3.8–5.2)
RBC # FLD: 17.5 % — HIGH (ref 10.3–14.5)
RBC # FLD: 17.5 % — HIGH (ref 10.3–14.5)
RBC # FLD: 17.7 % — HIGH (ref 10.3–14.5)
RBC # FLD: 17.7 % — HIGH (ref 10.3–14.5)
RBC BLD AUTO: ABNORMAL
RBC BLD AUTO: ABNORMAL
SODIUM SERPL-SCNC: 139 MMOL/L — SIGNIFICANT CHANGE UP (ref 135–145)
SODIUM SERPL-SCNC: 139 MMOL/L — SIGNIFICANT CHANGE UP (ref 135–145)
WBC # BLD: 13.29 K/UL — HIGH (ref 3.8–10.5)
WBC # BLD: 13.29 K/UL — HIGH (ref 3.8–10.5)
WBC # BLD: 20.78 K/UL — HIGH (ref 3.8–10.5)
WBC # BLD: 20.78 K/UL — HIGH (ref 3.8–10.5)
WBC # FLD AUTO: 13.29 K/UL — HIGH (ref 3.8–10.5)
WBC # FLD AUTO: 13.29 K/UL — HIGH (ref 3.8–10.5)
WBC # FLD AUTO: 20.78 K/UL — HIGH (ref 3.8–10.5)
WBC # FLD AUTO: 20.78 K/UL — HIGH (ref 3.8–10.5)

## 2023-11-02 PROCEDURE — 99232 SBSQ HOSP IP/OBS MODERATE 35: CPT

## 2023-11-02 PROCEDURE — 23605 CLTX PRX HMRL FX MNPJ+-TRACT: CPT | Mod: RT

## 2023-11-02 PROCEDURE — 27245 TREAT THIGH FRACTURE: CPT | Mod: RT

## 2023-11-02 DEVICE — STRYKER TROCHANTERIC NAIL 11MM X 180MM 125 DEGREE: Type: IMPLANTABLE DEVICE | Site: RIGHT | Status: FUNCTIONAL

## 2023-11-02 DEVICE — SCREW LOK FT T2 5X35MM VIT: Type: IMPLANTABLE DEVICE | Site: RIGHT | Status: FUNCTIONAL

## 2023-11-02 DEVICE — SCREW LAG TI GAMMA3 10.5X90MM: Type: IMPLANTABLE DEVICE | Site: RIGHT | Status: FUNCTIONAL

## 2023-11-02 DEVICE — K-WIRE STRYKER 3.2M X 450MM: Type: IMPLANTABLE DEVICE | Site: RIGHT | Status: FUNCTIONAL

## 2023-11-02 RX ORDER — TRAMADOL HYDROCHLORIDE 50 MG/1
50 TABLET ORAL
Refills: 0 | Status: DISCONTINUED | OUTPATIENT
Start: 2023-11-02 | End: 2023-11-07

## 2023-11-02 RX ORDER — ACETAMINOPHEN 500 MG
1000 TABLET ORAL ONCE
Refills: 0 | Status: COMPLETED | OUTPATIENT
Start: 2023-11-02 | End: 2023-11-02

## 2023-11-02 RX ORDER — CHLORTHALIDONE 50 MG
0.5 TABLET ORAL
Qty: 0 | Refills: 0 | DISCHARGE

## 2023-11-02 RX ORDER — AMIODARONE HYDROCHLORIDE 400 MG/1
1 TABLET ORAL
Qty: 0 | Refills: 0 | DISCHARGE

## 2023-11-02 RX ORDER — ATORVASTATIN CALCIUM 80 MG/1
40 TABLET, FILM COATED ORAL AT BEDTIME
Refills: 0 | Status: DISCONTINUED | OUTPATIENT
Start: 2023-11-02 | End: 2023-11-09

## 2023-11-02 RX ORDER — FENTANYL CITRATE 50 UG/ML
25 INJECTION INTRAVENOUS
Refills: 0 | Status: DISCONTINUED | OUTPATIENT
Start: 2023-11-02 | End: 2023-11-02

## 2023-11-02 RX ORDER — ESCITALOPRAM OXALATE 10 MG/1
10 TABLET, FILM COATED ORAL DAILY
Refills: 0 | Status: DISCONTINUED | OUTPATIENT
Start: 2023-11-02 | End: 2023-11-09

## 2023-11-02 RX ORDER — CEFAZOLIN SODIUM 1 G
2000 VIAL (EA) INJECTION
Refills: 0 | Status: COMPLETED | OUTPATIENT
Start: 2023-11-02 | End: 2023-11-03

## 2023-11-02 RX ORDER — SODIUM CHLORIDE 9 MG/ML
1000 INJECTION INTRAMUSCULAR; INTRAVENOUS; SUBCUTANEOUS
Refills: 0 | Status: DISCONTINUED | OUTPATIENT
Start: 2023-11-02 | End: 2023-11-03

## 2023-11-02 RX ORDER — AMLODIPINE BESYLATE 2.5 MG/1
1 TABLET ORAL
Qty: 0 | Refills: 0 | DISCHARGE

## 2023-11-02 RX ORDER — LEVOTHYROXINE SODIUM 125 MCG
1 TABLET ORAL
Qty: 0 | Refills: 0 | DISCHARGE

## 2023-11-02 RX ORDER — ACETAMINOPHEN 500 MG
975 TABLET ORAL EVERY 8 HOURS
Refills: 0 | Status: DISCONTINUED | OUTPATIENT
Start: 2023-11-02 | End: 2023-11-09

## 2023-11-02 RX ORDER — CEFAZOLIN SODIUM 1 G
2000 VIAL (EA) INJECTION ONCE
Refills: 0 | Status: DISCONTINUED | OUTPATIENT
Start: 2023-11-02 | End: 2023-11-02

## 2023-11-02 RX ORDER — CHLORTHALIDONE 50 MG
1 TABLET ORAL
Qty: 0 | Refills: 0 | DISCHARGE

## 2023-11-02 RX ORDER — APIXABAN 2.5 MG/1
2.5 TABLET, FILM COATED ORAL
Refills: 0 | Status: COMPLETED | OUTPATIENT
Start: 2023-11-03 | End: 2023-11-05

## 2023-11-02 RX ORDER — ESCITALOPRAM OXALATE 10 MG/1
1 TABLET, FILM COATED ORAL
Qty: 0 | Refills: 0 | DISCHARGE

## 2023-11-02 RX ORDER — AMLODIPINE BESYLATE 2.5 MG/1
10 TABLET ORAL DAILY
Refills: 0 | Status: DISCONTINUED | OUTPATIENT
Start: 2023-11-04 | End: 2023-11-09

## 2023-11-02 RX ORDER — LANOLIN ALCOHOL/MO/W.PET/CERES
5 CREAM (GRAM) TOPICAL AT BEDTIME
Refills: 0 | Status: DISCONTINUED | OUTPATIENT
Start: 2023-11-02 | End: 2023-11-09

## 2023-11-02 RX ORDER — ONDANSETRON 8 MG/1
4 TABLET, FILM COATED ORAL EVERY 6 HOURS
Refills: 0 | Status: DISCONTINUED | OUTPATIENT
Start: 2023-11-02 | End: 2023-11-09

## 2023-11-02 RX ORDER — LEVOTHYROXINE SODIUM 125 MCG
25 TABLET ORAL DAILY
Refills: 0 | Status: DISCONTINUED | OUTPATIENT
Start: 2023-11-02 | End: 2023-11-09

## 2023-11-02 RX ORDER — ATORVASTATIN CALCIUM 80 MG/1
1 TABLET, FILM COATED ORAL
Qty: 0 | Refills: 0 | DISCHARGE

## 2023-11-02 RX ORDER — AMIODARONE HYDROCHLORIDE 400 MG/1
200 TABLET ORAL DAILY
Refills: 0 | Status: DISCONTINUED | OUTPATIENT
Start: 2023-11-02 | End: 2023-11-09

## 2023-11-02 RX ORDER — APIXABAN 2.5 MG/1
1 TABLET, FILM COATED ORAL
Qty: 0 | Refills: 0 | DISCHARGE

## 2023-11-02 RX ORDER — TRAMADOL HYDROCHLORIDE 50 MG/1
100 TABLET ORAL EVERY 6 HOURS
Refills: 0 | Status: DISCONTINUED | OUTPATIENT
Start: 2023-11-02 | End: 2023-11-07

## 2023-11-02 RX ORDER — GABAPENTIN 400 MG/1
100 CAPSULE ORAL EVERY 8 HOURS
Refills: 0 | Status: DISCONTINUED | OUTPATIENT
Start: 2023-11-02 | End: 2023-11-09

## 2023-11-02 RX ADMIN — GABAPENTIN 100 MILLIGRAM(S): 400 CAPSULE ORAL at 05:01

## 2023-11-02 RX ADMIN — TRAMADOL HYDROCHLORIDE 25 MILLIGRAM(S): 50 TABLET ORAL at 15:42

## 2023-11-02 RX ADMIN — Medication 1000 MILLIGRAM(S): at 20:24

## 2023-11-02 RX ADMIN — Medication 5 MILLIGRAM(S): at 21:20

## 2023-11-02 RX ADMIN — GABAPENTIN 100 MILLIGRAM(S): 400 CAPSULE ORAL at 21:20

## 2023-11-02 RX ADMIN — LIDOCAINE 2 PATCH: 4 CREAM TOPICAL at 09:33

## 2023-11-02 RX ADMIN — Medication 975 MILLIGRAM(S): at 21:25

## 2023-11-02 RX ADMIN — CHLORHEXIDINE GLUCONATE 1 APPLICATION(S): 213 SOLUTION TOPICAL at 05:02

## 2023-11-02 RX ADMIN — SODIUM CHLORIDE 75 MILLILITER(S): 9 INJECTION INTRAMUSCULAR; INTRAVENOUS; SUBCUTANEOUS at 21:25

## 2023-11-02 RX ADMIN — LIDOCAINE 2 PATCH: 4 CREAM TOPICAL at 07:34

## 2023-11-02 RX ADMIN — Medication 400 MILLIGRAM(S): at 20:14

## 2023-11-02 RX ADMIN — TRAMADOL HYDROCHLORIDE 25 MILLIGRAM(S): 50 TABLET ORAL at 04:57

## 2023-11-02 RX ADMIN — Medication 400 MILLIGRAM(S): at 02:16

## 2023-11-02 RX ADMIN — Medication 975 MILLIGRAM(S): at 21:20

## 2023-11-02 RX ADMIN — TRAMADOL HYDROCHLORIDE 25 MILLIGRAM(S): 50 TABLET ORAL at 05:57

## 2023-11-02 RX ADMIN — MUPIROCIN 1 APPLICATION(S): 20 OINTMENT TOPICAL at 05:01

## 2023-11-02 RX ADMIN — GABAPENTIN 100 MILLIGRAM(S): 400 CAPSULE ORAL at 12:45

## 2023-11-02 RX ADMIN — Medication 1000 MILLIGRAM(S): at 03:15

## 2023-11-02 RX ADMIN — ATORVASTATIN CALCIUM 40 MILLIGRAM(S): 80 TABLET, FILM COATED ORAL at 21:20

## 2023-11-02 NOTE — PROGRESS NOTE ADULT - SUBJECTIVE AND OBJECTIVE BOX
Ortho Post Op Check    Name: DONNA HYATT  MR #: [id number]    Procedure: right hip IMN  Surgeon: Lanny    Pt comfortable without complaints, pain controlled  Denies CP, SOB, N/V, numbness/tingling     General Exam:  Vital Signs Last 24 Hrs  T(C): 36.5 (11-02-23 @ 19:25), Max: 37.7 (11-02-23 @ 16:38)  T(F): 97.7 (11-02-23 @ 19:25), Max: 99.8 (11-02-23 @ 16:38)  HR: 66 (11-02-23 @ 19:55) (66 - 69)  BP: 141/58 (11-02-23 @ 19:55) (132/50 - 147/67)  BP(mean): 80 (11-02-23 @ 19:55) (70 - 83)  RR: 24 (11-02-23 @ 19:55) (16 - 25)  SpO2: 97% (11-02-23 @ 19:55) (93% - 99%)    General: Pt Alert and oriented, NAD, controlled pain.  Dressings C/D/I. No bleeding.  Pulses: 2+ dorsalis pedis pulse. Cap refill < 2 sec.  Sensation: Grossly intact to light touch, endorsing tingling to top and bottom of foot  Motor: + EHL/FHL/TA/GS        A/P: 82yFemale POD#0 s/p right hip IMN    - Pain Control  - DVT ppx: Eliquis 2.5 BID x 3 days then resume home dose  - Post op abx: as per SCIP  - PT eval pending  - Weight bearing status: WBAT RLE, NWB RUE in sling

## 2023-11-02 NOTE — DISCHARGE NOTE PROVIDER - NSDCFUADDINST_GEN_ALL_CORE_FT
The patient will be seen in the office between 2-3 weeks for wound check. Sutures/Staples/Tape will be removed at that time. Patient may shower after post-op day #5. The dressing is to be removed on day # 7. The patient will contact the office if the wound becomes red, has increasing pain, develops bleeding or discharge, an injury occurs, or has other concerns. The patient will continue PT for gait training.  The patient will continue LOVENOX for 2 weeks and then begin ASPIRIN for DVTP. The patient will take oxycodone for pain control and titrate according to prescription and patient needs. The patient is FULL weight bearing. The patient will be seen in the office between 2-3 weeks for wound check. Tape will be removed at that time. Patient may shower after post-op day #5. The dressing is to be removed on day # 7. The patient will contact the office if the wound becomes red, has increasing pain, develops bleeding or discharge, an injury occurs, or has other concerns. The patient will continue PT for gait training.  The patient will continue LOVENOX for 4 weeks  for DVTP. The patient will take oxycodone for pain control and titrate according to prescription and patient needs. The patient is FULL weight bearing on the right leg. Patient is NON WEIGHTBEARING of the right arm in sling at all times. The patient will be seen in the office between 2-3 weeks for wound check. Tape will be removed at that time. Patient may shower after post-op day #5. The dressing is to be removed on day # 7. The patient will contact the office if the wound becomes red, has increasing pain, develops bleeding or discharge, an injury occurs, or has other concerns. The patient will continue PT for gait training.  The patient will continue ELIQUIS 2.5mg twice a day for 3 days then resume home dose for DVTP. The patient will take oxycodone for pain control and titrate according to prescription and patient needs. The patient is FULL weight bearing on the right leg. Patient is NON WEIGHTBEARING of the right arm in sling at all times.

## 2023-11-02 NOTE — BRIEF OPERATIVE NOTE - NSICDXBRIEFPROCEDURE_GEN_ALL_CORE_FT
PROCEDURES:  Open reduction and internal fixation of right hip using locking intramedullary yehuda 02-Nov-2023 19:30:15  Michel Colon

## 2023-11-02 NOTE — DISCHARGE NOTE PROVIDER - NSDCCPTREATMENT_GEN_ALL_CORE_FT
PRINCIPAL PROCEDURE  Procedure: Open reduction and internal fixation of right hip using locking intramedullary yehuda  Findings and Treatment:

## 2023-11-02 NOTE — CHART NOTE - NSCHARTNOTEFT_GEN_A_CORE
Tertiary Trauma Survey (TTS)    Date of TTS: 11-02-23 @ 10:53                             Admit Date: 11-01-23 @ 04:07       Subjective / 24 hour events: Patient does not report any pain however, reports R hip pain. Plan for OR today. On PE patient noted to have tenderness to L ankle and some pain with flexion.  Patient has not yet ambulated since fall. Xrays of ankle/foot pending.     Vital Signs Last 24 Hrs  T(C): 36.3 (02 Nov 2023 05:07), Max: 37.3 (01 Nov 2023 20:17)  T(F): 97.4 (02 Nov 2023 05:07), Max: 99.1 (01 Nov 2023 20:17)  HR: 60 (02 Nov 2023 05:07) (60 - 71)  BP: 136/56 (02 Nov 2023 05:07) (130/60 - 164/66)  BP(mean): --  RR: 19 (02 Nov 2023 05:07) (18 - 19)  SpO2: 95% (02 Nov 2023 05:07) (93% - 95%)    Parameters below as of 02 Nov 2023 05:07  Patient On (Oxygen Delivery Method): room air        Physical Exam:    Neuro: [ ] non focal neurological exam [ ] Focal Neurological deficits noted to be:     HEENT: [ ] Normo-cephalic/atraumatic  [ ] abnormalities noted to be:    Pulm/Chest:  [ ] CTA b/l  [ ] chest wall non tender  [ ] abnormalities noted to be:    Cardiac: [ ] S1S2, sinus rhythm  [ ] abnormalities noted to be:     GI / Abdomen: [ ] Soft, non-tender, non-distended [ ] abnormalities noted to be:    Musculoskeletal / Extremities: [ ]normal active ROM  [ ]  abnormalities noted to be:    Integumentary: [ ] Skin intact [ ] Warm [ ] Dry [ ]abnormalities noted to be:    Vascular: [ ] 2+ palpable distal pulses  [ ] TIAGO:       [ ] abnormalities noted to be:      List Injuries Identified to Date:    List Operative and Interventional Radiological Procedures:     Consults (Date):  [  ] Neurosurgery   [  ] Orthopedics  [  ] Plastics  [  ] Urology  [  ] PM&R  [  ] Social Work    RADIOLOGICAL FINDINGS REVIEW:  CXR:    Pelvis Films:     C-Spine Films:    T/L/S Spine Films:    Extremity Films:    Head CT:    C-Spine CT:    Neck CT:    Chest CT:    ABD/Pelvis CT:    Other: Tertiary Trauma Survey (TTS)    Date of TTS: 11-02-23 @ 10:53                             Admit Date: 11-01-23 @ 04:07       Subjective / 24 hour events: Patient does not report any pain however, reports R hip pain. Plan for OR today. On PE patient noted to have tenderness to L ankle and some pain with flexion.  Patient has not yet ambulated since fall. Xrays of ankle/foot pending.     Vital Signs Last 24 Hrs  T(C): 36.3 (02 Nov 2023 05:07), Max: 37.3 (01 Nov 2023 20:17)  T(F): 97.4 (02 Nov 2023 05:07), Max: 99.1 (01 Nov 2023 20:17)  HR: 60 (02 Nov 2023 05:07) (60 - 71)  BP: 136/56 (02 Nov 2023 05:07) (130/60 - 164/66)  BP(mean): --  RR: 19 (02 Nov 2023 05:07) (18 - 19)  SpO2: 95% (02 Nov 2023 05:07) (93% - 95%)    Parameters below as of 02 Nov 2023 05:07  Patient On (Oxygen Delivery Method): room air        Physical Exam:    Neuro: [x ] non focal neurological exam [ ] Focal Neurological deficits noted to be:     HEENT: [x ] Normo-cephalic/atraumatic  [ ] abnormalities noted to be:    Pulm/Chest:  [x ] CTA b/l  [ ] chest wall non tender  [ ] abnormalities noted to be:    Cardiac: [x ] S1S2, sinus rhythm  [ ] abnormalities noted to be:     GI / Abdomen: [x] Soft, non-tender, non-distended [ ] abnormalities noted to be:    Musculoskeletal / Extremities: [ ]normal active ROM  [x ]  abnormalities noted to be: RUE in sling/immbolized, R hip +ecchymosis and tenderness, limited ROM secondary to pain. Tenderness to palpation of L ankle, pain on flexion but not extension.     Integumentary: [x ] Skin intact [ ] Warm [ ] Dry [ ]abnormalities noted to be:    Vascular: [x ] 2+ palpable distal pulses  [ ] TIAGO:       [ ] abnormalities noted to be:      List Injuries Identified to Date: R intertrochanteric fx, R humerus fx     List Operative and Interventional Radiological Procedures: Plan for OR today w/ ortho for R intertrochanteric fx     Consults (Date):  [  ] Neurosurgery   [ x ] Orthopedics  [  ] Plastics  [  ] Urology  [  ] PM&R  [  ] Social Work    RADIOLOGICAL FINDINGS REVIEW:    L foot/Ankle XRAY:     CT shoulder:   Obliquely oriented neck and proximal humeral shaft fracture with minimal   impaction and mild displacement.    No glenohumeral dislocation or involvement of the articular surface.    Severe glenohumeral osteoarthrosis.    CT pelvis:   Intertrochanteric fracture of the right femoral neck.        Portable chest: Exam is compared to April 3, 2022. Sclerotic changes   progressive left greater than right humeral head region. Fracture of the   right humeral neck better seen on shoulder exam. Increased perihilar   interstitial markings bilaterally. Cardiomegaly.    Right shoulder: Fracture of the right humeral neck proximal humeral shaft   with angulation with osteoarthritic changes prominent at the glenohumeral   joint. Follow-up suggested as indicated clinically.      Right humerus: Fracture of the right humeral neck and proximal right   humeral shaft better seen on the shoulder exam. Osteoarthritic changes   noted at the shoulder joint. Follow-up suggested.    Right elbow: Osteoarthritic changes. Some soft tissue prominence   posterior to the olecranon on a limited lateral view. If clinically   indicated and symptoms persist follow-up suggested.    AP view of the pelvis and right hip: intratrochanteric fracture noted of   the right proximal femur with some angulation noted. Avulsed lesser   trochanteric fragment noted. Surgical clips right groin. Follow-up   suggested.    Right femur: Intratrochanteric fracture of the right proximal femur with   some angulation. Lesser trochanteric fracture fragment avulsed. Regional   vascular calcifications. Surgical staples right groin. Degenerative   changes at the knee joint. Tertiary Trauma Survey (TTS)    Date of TTS: 11-02-23 @ 10:53                             Admit Date: 11-01-23 @ 04:07       Subjective / 24 hour events: Patient does not report any pain however, reports R hip pain. Plan for OR today. On PE patient noted to have tenderness to L ankle and some pain with flexion.  Patient has not yet ambulated since fall. Xrays of ankle/foot pending.     Vital Signs Last 24 Hrs  T(C): 36.3 (02 Nov 2023 05:07), Max: 37.3 (01 Nov 2023 20:17)  T(F): 97.4 (02 Nov 2023 05:07), Max: 99.1 (01 Nov 2023 20:17)  HR: 60 (02 Nov 2023 05:07) (60 - 71)  BP: 136/56 (02 Nov 2023 05:07) (130/60 - 164/66)  BP(mean): --  RR: 19 (02 Nov 2023 05:07) (18 - 19)  SpO2: 95% (02 Nov 2023 05:07) (93% - 95%)    Parameters below as of 02 Nov 2023 05:07  Patient On (Oxygen Delivery Method): room air        Physical Exam:    Neuro: [x ] non focal neurological exam [ ] Focal Neurological deficits noted to be:     HEENT: [x ] Normo-cephalic/atraumatic  [ ] abnormalities noted to be:    Pulm/Chest:  [x ] CTA b/l  [ ] chest wall non tender  [ ] abnormalities noted to be:    Cardiac: [x ] S1S2, sinus rhythm  [ ] abnormalities noted to be:     GI / Abdomen: [x] Soft, non-tender, non-distended [ ] abnormalities noted to be:    Musculoskeletal / Extremities: [ ]normal active ROM  [x ]  abnormalities noted to be: RUE in sling/immbolized, R hip +ecchymosis and tenderness, limited ROM secondary to pain. Tenderness to palpation of L ankle, pain on flexion but not extension.     Integumentary: [x ] Skin intact [ ] Warm [ ] Dry [ ]abnormalities noted to be:    Vascular: [x ] 2+ palpable distal pulses  [ ] TIAGO:       [ ] abnormalities noted to be:      List Injuries Identified to Date: R intertrochanteric fx, R humerus fx     List Operative and Interventional Radiological Procedures: Plan for OR today w/ ortho for R intertrochanteric fx     Consults (Date):  [  ] Neurosurgery   [ x ] Orthopedics  [  ] Plastics  [  ] Urology  [  ] PM&R  [  ] Social Work    RADIOLOGICAL FINDINGS REVIEW:    L foot/Ankle XRAY: xray reads negative.     CT shoulder:   Obliquely oriented neck and proximal humeral shaft fracture with minimal   impaction and mild displacement.    No glenohumeral dislocation or involvement of the articular surface.    Severe glenohumeral osteoarthrosis.    CT pelvis:   Intertrochanteric fracture of the right femoral neck.        Portable chest: Exam is compared to April 3, 2022. Sclerotic changes   progressive left greater than right humeral head region. Fracture of the   right humeral neck better seen on shoulder exam. Increased perihilar   interstitial markings bilaterally. Cardiomegaly.    Right shoulder: Fracture of the right humeral neck proximal humeral shaft   with angulation with osteoarthritic changes prominent at the glenohumeral   joint. Follow-up suggested as indicated clinically.      Right humerus: Fracture of the right humeral neck and proximal right   humeral shaft better seen on the shoulder exam. Osteoarthritic changes   noted at the shoulder joint. Follow-up suggested.    Right elbow: Osteoarthritic changes. Some soft tissue prominence   posterior to the olecranon on a limited lateral view. If clinically   indicated and symptoms persist follow-up suggested.    AP view of the pelvis and right hip: intratrochanteric fracture noted of   the right proximal femur with some angulation noted. Avulsed lesser   trochanteric fragment noted. Surgical clips right groin. Follow-up   suggested.    Right femur: Intratrochanteric fracture of the right proximal femur with   some angulation. Lesser trochanteric fracture fragment avulsed. Regional   vascular calcifications. Surgical staples right groin. Degenerative   changes at the knee joint.

## 2023-11-02 NOTE — PROGRESS NOTE ADULT - ASSESSMENT
82 year old female with PMH of Afib on Eliquis, anxiety, and HTN presents after a fall. She sustained a right humerus fracture and right intertrochanteric hip fracture.     Plan:    OR with Ortho for right intertrochanteric hip fracture 11/2  Tertiary evaluation 11/2  Resume Cardiac diet after OR  Hold AC until after procedure  NWB until after procedure  Continue home medications    Pt evaluated with senior resident and attending, Dr. Velez

## 2023-11-02 NOTE — DISCHARGE NOTE PROVIDER - NSDCCPCAREPLAN_GEN_ALL_CORE_FT
PRINCIPAL DISCHARGE DIAGNOSIS  Diagnosis: Hip fracture, right  Assessment and Plan of Treatment:       SECONDARY DISCHARGE DIAGNOSES  Diagnosis: Shoulder fracture, right  Assessment and Plan of Treatment:      PRINCIPAL DISCHARGE DIAGNOSIS  Diagnosis: Hip fracture, right  Assessment and Plan of Treatment: The patient will be seen in the office between 2-3 weeks for wound check. Tape will be removed at that time. Patient may shower after post-op day #5. The dressing is to be removed on day # 7. The patient will contact the office if the wound becomes red, has increasing pain, develops bleeding or discharge, an injury occurs, or has other concerns. The patient will continue PT for gait training.  The patient will continue ELIQUIS 2.5mg twice a day for 3 days then resume home dose for DVTP. The patient will take oxycodone for pain control and titrate according to prescription and patient needs. The patient is FULL weight bearing on the right leg. Patient is NON WEIGHTBEARING of the right arm in sling at all times.      SECONDARY DISCHARGE DIAGNOSES  Diagnosis: Shoulder fracture, right  Assessment and Plan of Treatment:

## 2023-11-02 NOTE — PROGRESS NOTE ADULT - SUBJECTIVE AND OBJECTIVE BOX
INTERVAL HPI/OVERNIGHT EVENTS: No events overnight. Patient is pre-opped for OR with Ortho 11/2      MEDICATIONS  (STANDING):  acetaminophen   IVPB .. 1000 milliGRAM(s) IV Intermittent once  chlorhexidine 2% Cloths 1 Application(s) Topical <User Schedule>  gabapentin 100 milliGRAM(s) Oral every 8 hours  influenza  Vaccine (HIGH DOSE) 0.7 milliLiter(s) IntraMuscular once  lactated ringers. 1000 milliLiter(s) (100 mL/Hr) IV Continuous <Continuous>  lidocaine   4% Patch 2 Patch Transdermal every 24 hours  mupirocin 2% Ointment 1 Application(s) Both Nostrils two times a day  nystatin Powder 1 Application(s) Topical two times a day  povidone iodine 5% Nasal Swab 1 Application(s) Both Nostrils once    MEDICATIONS  (PRN):  melatonin 5 milliGRAM(s) Oral at bedtime PRN Sleep  ondansetron Injectable 4 milliGRAM(s) IV Push every 6 hours PRN Nausea  traMADol 25 milliGRAM(s) Oral every 6 hours PRN Moderate Pain (4 - 6)      Vital Signs Last 24 Hrs  T(C): 36.3 (02 Nov 2023 05:07), Max: 37.3 (01 Nov 2023 20:17)  T(F): 97.4 (02 Nov 2023 05:07), Max: 99.1 (01 Nov 2023 20:17)  HR: 60 (02 Nov 2023 05:07) (60 - 71)  BP: 136/56 (02 Nov 2023 05:07) (130/60 - 164/66)  BP(mean): --  RR: 19 (02 Nov 2023 05:07) (17 - 19)  SpO2: 95% (02 Nov 2023 05:07) (91% - 95%)    Parameters below as of 02 Nov 2023 05:07  Patient On (Oxygen Delivery Method): room air        Physical Exam:    Neurological:  No sensory/motor deficits    HEENT: PERRLA, EOMI, no drainage or redness    Neck: No bruits; no thyromegaly or nodules,  No JVD    Back: Normal spine flexure, No CVA tenderness, No deformity or limitation of movement    Respiratory: Breath Sounds equal & clear to auscultation, no accessory muscle use    Cardiovascular: Regular rate & rhythm, normal S1, S2; no murmurs, gallops or rubs    Gastrointestinal: Soft, non-tender, normal bowel sounds    Extremities: right arm in sling, right lower extremity externally rotated     Vascular: Equal and normal pulses: 2+ peripheral pulses throughout    Musculoskeletal: No joint pain, swelling or deformity; no limitation of movement    Skin: No rashes      I&O's Detail    01 Nov 2023 07:01  -  02 Nov 2023 07:00  --------------------------------------------------------  IN:    Lactated Ringers: 2900 mL    Oral Fluid: 480 mL  Total IN: 3380 mL    OUT:    Voided (mL): 1350 mL  Total OUT: 1350 mL    Total NET: 2030 mL          LABS:                        10.9   13.29 )-----------( 225      ( 02 Nov 2023 04:40 )             34.9     11-02    139  |  105  |  17.6  ----------------------------<  101<H>  4.1   |  19.0<L>  |  0.59    Ca    8.5      02 Nov 2023 04:40  Phos  3.6     11-02  Mg     2.0     11-02    TPro  6.7  /  Alb  3.7  /  TBili  0.4  /  DBili  x   /  AST  31  /  ALT  39<H>  /  AlkPhos  114  11-01    PT/INR - ( 01 Nov 2023 01:38 )   PT: 14.1 sec;   INR: 1.28 ratio         PTT - ( 01 Nov 2023 01:38 )  PTT:30.6 sec  Urinalysis Basic - ( 02 Nov 2023 04:40 )    Color: x / Appearance: x / SG: x / pH: x  Gluc: 101 mg/dL / Ketone: x  / Bili: x / Urobili: x   Blood: x / Protein: x / Nitrite: x   Leuk Esterase: x / RBC: x / WBC x   Sq Epi: x / Non Sq Epi: x / Bacteria: x        RADIOLOGY & ADDITIONAL STUDIES:

## 2023-11-02 NOTE — DISCHARGE NOTE PROVIDER - NSDCMRMEDTOKEN_GEN_ALL_CORE_FT
amiodarone 200 mg oral tablet: 1 tab(s) orally once a day  amLODIPine 10 mg oral tablet: 1 tab(s) orally once a day   apixaban 5 mg oral tablet: 1 tab(s) orally 2 times a day  atorvastatin 40 mg oral tablet: 1 tab(s) orally once a day (at bedtime)   enalapril 20 mg oral tablet: 1 tab(s) orally once a day in the evening  escitalopram 10 mg oral tablet: 1 tab(s) orally once a day  ferrous sulfate 325 mg (65 mg elemental iron) oral tablet: 1 tab(s) orally once a day   Multiple Vitamins oral tablet: 1 tab(s) orally once a day  Synthroid 25 mcg (0.025 mg) oral tablet: 1 tab(s) orally once a day   acetaminophen 325 mg oral tablet: 3 tab(s) orally every 8 hours  amiodarone 200 mg oral tablet: 1 tab(s) orally once a day  amLODIPine 10 mg oral tablet: 1 tab(s) orally once a day   apixaban 5 mg oral tablet: 1 tab(s) orally 2 times a day  atorvastatin 40 mg oral tablet: 1 tab(s) orally once a day (at bedtime)   enalapril 20 mg oral tablet: 1 tab(s) orally once a day in the evening  escitalopram 10 mg oral tablet: 1 tab(s) orally once a day  ferrous sulfate 325 mg (65 mg elemental iron) oral tablet: 1 tab(s) orally once a day   gabapentin 100 mg oral capsule: 1 cap(s) orally every 8 hours  magnesium hydroxide 8% oral suspension: 30 milliliter(s) orally once a day As needed Constipation  melatonin 5 mg oral tablet: 1 tab(s) orally once a day (at bedtime)  Multiple Vitamins oral tablet: 1 tab(s) orally once a day  polyethylene glycol 3350 oral powder for reconstitution: 17 gram(s) orally once a day  senna leaf extract oral tablet: 2 tab(s) orally once a day (at bedtime)  Synthroid 25 mcg (0.025 mg) oral tablet: 1 tab(s) orally once a day  traMADol 50 mg oral tablet: 0.5 tab(s) orally every 6 hours As needed Severe Pain (7 - 10)

## 2023-11-02 NOTE — DISCHARGE NOTE PROVIDER - CARE PROVIDER_API CALL
Jeremías Quintanilla  Orthopaedic Surgery  [address] [place] [id number]  Phone: [phone]  Fax: [phone]  Follow Up Time:    Wilber Ca  Orthopaedic Surgery  [address] [facility], [address]  [place] [address]  Phone: [phone]  Fax: [phone]  Follow Up Time:

## 2023-11-02 NOTE — DISCHARGE NOTE PROVIDER - HOSPITAL COURSE
HPI: 83yo F w/ hx of a fib on Eliquis presents after fall at home on her R side. Pt states that she got up in the middle of the night to use the bathroom when she fell on her right side. Denies headstrike or LOC. Remembers the incident. Complaining of R thigh pain and R shoulder pain. Unable to lift R arm or R leg. AAOx3. Hemodynamically stable on arrival. X-rays demonstrating R hip fx and R humerus fx. Trauma surgery consulted for management.  (01 Nov 2023 05:13)    Hospital Course: Imaging in the ED was significant for traumatic injuries consisting of R intertrochanteric fx and R humerus fx. Patient was admitted to the Trauma service under Dr. Velez. Orthopedics was consulted and patient was taken to the OR on 11/2 and underwent a ORIF of R hip. Patient tolerated operation well and there were NO post-operative complications identified. Patient was extubated in the OR and transferred to the floor in stable condition. Patient had post-op anemia requiring 1 unit PRBC. Hgb responded well and remained stable throughout remainder of hospital stay. Patient was WBAT to RLE and NWB to RUE with sling for R humerus fx. Patient's Eliquis was restarted post-op for Afib. Aggressive bowel regimen was implemented during stay and patient had BM prior to discharge. PT/OT evaluated patient and recommended [facility]. Pain was well controlled at time of discharge. Voiding spontaneously. Tolerating regular diet well. Patient was stable for discharge to [facility].

## 2023-11-02 NOTE — PROGRESS NOTE ADULT - SUBJECTIVE AND OBJECTIVE BOX
INTERVAL HPI/OVERNIGHT EVENTS/SUBJECTIVE:  No acute events overnight.  Pt pending OR today with Ortho for introch fx.    ICU Vital Signs Last 24 Hrs  T(C): 36.3 (02 Nov 2023 05:07), Max: 37.3 (01 Nov 2023 20:17)  T(F): 97.4 (02 Nov 2023 05:07), Max: 99.1 (01 Nov 2023 20:17)  HR: 60 (02 Nov 2023 05:07) (60 - 71)  BP: 136/56 (02 Nov 2023 05:07) (130/60 - 164/66)  BP(mean): --  ABP: --  ABP(mean): --  RR: 19 (02 Nov 2023 05:07) (17 - 19)  SpO2: 95% (02 Nov 2023 05:07) (91% - 95%)    O2 Parameters below as of 02 Nov 2023 05:07  Patient On (Oxygen Delivery Method): room air            I&O's Detail    01 Nov 2023 07:01  -  02 Nov 2023 07:00  --------------------------------------------------------  IN:    Lactated Ringers: 2900 mL    Oral Fluid: 480 mL  Total IN: 3380 mL    OUT:    Voided (mL): 1350 mL  Total OUT: 1350 mL    Total NET: 2030 mL                MEDICATIONS  (STANDING):  acetaminophen   IVPB .. 1000 milliGRAM(s) IV Intermittent once  chlorhexidine 2% Cloths 1 Application(s) Topical <User Schedule>  gabapentin 100 milliGRAM(s) Oral every 8 hours  influenza  Vaccine (HIGH DOSE) 0.7 milliLiter(s) IntraMuscular once  lactated ringers. 1000 milliLiter(s) (100 mL/Hr) IV Continuous <Continuous>  lidocaine   4% Patch 2 Patch Transdermal every 24 hours  mupirocin 2% Ointment 1 Application(s) Both Nostrils two times a day  nystatin Powder 1 Application(s) Topical two times a day  povidone iodine 5% Nasal Swab 1 Application(s) Both Nostrils once    MEDICATIONS  (PRN):  melatonin 5 milliGRAM(s) Oral at bedtime PRN Sleep  ondansetron Injectable 4 milliGRAM(s) IV Push every 6 hours PRN Nausea  traMADol 25 milliGRAM(s) Oral every 6 hours PRN Moderate Pain (4 - 6)      NUTRITION/IVF: NPO/LR @ 100cc/hr        PHYSICAL EXAM:    Gen:  NAD laying in bed    Eyes:  EOMI's BL    Neurological:  GCS 15    ENMT:  MMM    Neck:  Trachea midline, supple    Pulmonary:  Unlabored on RA    Cardiovascular:  NSR    Gastrointestinal:  Soft NTND    Genitourinary:  Voids    Extremities:  Rt humerus in sling, Rt hip TTP    Skin:  Warm, dry, intact    Musculoskeletal:  AFROM x4          LABS:  CBC Full  -  ( 02 Nov 2023 04:40 )  WBC Count : 13.29 K/uL  RBC Count : 4.25 M/uL  Hemoglobin : 10.9 g/dL  Hematocrit : 34.9 %  Platelet Count - Automated : 225 K/uL  Mean Cell Volume : 82.1 fl  Mean Cell Hemoglobin : 25.6 pg  Mean Cell Hemoglobin Concentration : 31.2 gm/dL  Auto Neutrophil # : 10.46 K/uL  Auto Lymphocyte # : 1.38 K/uL  Auto Monocyte # : 1.04 K/uL  Auto Eosinophil # : 0.27 K/uL  Auto Basophil # : 0.05 K/uL  Auto Neutrophil % : 78.7 %  Auto Lymphocyte % : 10.4 %  Auto Monocyte % : 7.8 %  Auto Eosinophil % : 2.0 %  Auto Basophil % : 0.4 %    11-02    139  |  105  |  17.6  ----------------------------<  101<H>  4.1   |  19.0<L>  |  0.59    Ca    8.5      02 Nov 2023 04:40  Phos  3.6     11-02  Mg     2.0     11-02    TPro  6.7  /  Alb  3.7  /  TBili  0.4  /  DBili  x   /  AST  31  /  ALT  39<H>  /  AlkPhos  114  11-01    PT/INR - ( 01 Nov 2023 01:38 )   PT: 14.1 sec;   INR: 1.28 ratio         PTT - ( 01 Nov 2023 01:38 )  PTT:30.6 sec  Urinalysis Basic - ( 02 Nov 2023 04:40 )    Color: x / Appearance: x / SG: x / pH: x  Gluc: 101 mg/dL / Ketone: x  / Bili: x / Urobili: x   Blood: x / Protein: x / Nitrite: x   Leuk Esterase: x / RBC: x / WBC x   Sq Epi: x / Non Sq Epi: x / Bacteria: x      RECENT CULTURES:      LIVER FUNCTIONS - ( 01 Nov 2023 01:38 )  Alb: 3.7 g/dL / Pro: 6.7 g/dL / ALK PHOS: 114 U/L / ALT: 39 U/L / AST: 31 U/L / GGT: x               CAPILLARY BLOOD GLUCOSE      RADIOLOGY & ADDITIONAL STUDIES:    ASSESSMENT/PLAN:  82yFemale presenting with:  s/p fall with Rt intertroch fx, Rt humerus fx    Pt pending OR today with Ortho for intertroch fx  NPO/LR @ 100cc/hr    Will reeval post op for continued pain management, activity, diet, and DVT PPX      CRITICAL CARE TIME SPENT:

## 2023-11-02 NOTE — ASU PREOP CHECKLIST - SKIN PREP
no murmur, click, rub or gallop   Abdomen:  soft, non-tender; bowel sounds normal; no masses,  no organomegaly   :  not examined   Extremities:   negative   Neuro:  normal without focal findings, mental status, speech normal, alert and oriented x3, MOSES and reflexes normal and symmetric       Assessment:      Healthy exam.        Diagnosis Orders   1. Encounter for well child visit at 10years of age     3. Need for polio vaccination  Poliovirus vaccine IPV subcutaneous/IM   3. Vaginal adhesions       #2: proof of vaccination provided to parent for school. #3: refer to [facility] pediatric GYN. Plan:      1. Anticipatory guidance: Specific topics reviewed: importance of regular dental care. 2. Screening tests:   a.  Venous lead level: not applicable (CDC/AAP recommends if at risk and never done previously)    b. Hb or HCT (CDC recommends annually through age 11 years for children at risk; AAP recommends once age 6-12 months then once at 13 months-5 years): not indicated    c.  PPD: not applicable (Recommended annually if at risk: immunosuppression, clinical suspicion, poor/overcrowded living conditions, recent immigrant from [facility], contact with adults who are HIV+, homeless, IV drug user, NH residents, farm workers, or with active TB)    d. Cholesterol screening: not applicable (AAP, AHA, and NCEP but not USPSTF recommend fasting lipid profile for h/o premature cardiovascular disease in a parent or grandparent less than 54years old; AAP but not USPSTF recommends total cholesterol if either parent has a cholesterol greater than 240)    e. Urinalysis dipstick: not applicable (Recommended by AAP at 11years old but not by USPSTF)    3. Immunizations today: IPV  History of previous adverse reactions to immunizations? no    4. Follow-up visit in 1 year for next well-child visit, or sooner as needed.
done

## 2023-11-02 NOTE — DISCHARGE NOTE PROVIDER - CARE PROVIDERS DIRECT ADDRESSES
,[email or web address] ,[email or web address]

## 2023-11-03 LAB
ANION GAP SERPL CALC-SCNC: 13 MMOL/L — SIGNIFICANT CHANGE UP (ref 5–17)
ANION GAP SERPL CALC-SCNC: 13 MMOL/L — SIGNIFICANT CHANGE UP (ref 5–17)
BASOPHILS # BLD AUTO: 0.03 K/UL — SIGNIFICANT CHANGE UP (ref 0–0.2)
BASOPHILS # BLD AUTO: 0.03 K/UL — SIGNIFICANT CHANGE UP (ref 0–0.2)
BASOPHILS NFR BLD AUTO: 0.2 % — SIGNIFICANT CHANGE UP (ref 0–2)
BASOPHILS NFR BLD AUTO: 0.2 % — SIGNIFICANT CHANGE UP (ref 0–2)
BUN SERPL-MCNC: 19.3 MG/DL — SIGNIFICANT CHANGE UP (ref 8–20)
BUN SERPL-MCNC: 19.3 MG/DL — SIGNIFICANT CHANGE UP (ref 8–20)
CALCIUM SERPL-MCNC: 8 MG/DL — LOW (ref 8.4–10.5)
CALCIUM SERPL-MCNC: 8 MG/DL — LOW (ref 8.4–10.5)
CHLORIDE SERPL-SCNC: 109 MMOL/L — HIGH (ref 96–108)
CHLORIDE SERPL-SCNC: 109 MMOL/L — HIGH (ref 96–108)
CO2 SERPL-SCNC: 20 MMOL/L — LOW (ref 22–29)
CO2 SERPL-SCNC: 20 MMOL/L — LOW (ref 22–29)
CREAT SERPL-MCNC: 0.74 MG/DL — SIGNIFICANT CHANGE UP (ref 0.5–1.3)
CREAT SERPL-MCNC: 0.74 MG/DL — SIGNIFICANT CHANGE UP (ref 0.5–1.3)
EGFR: 81 ML/MIN/1.73M2 — SIGNIFICANT CHANGE UP
EGFR: 81 ML/MIN/1.73M2 — SIGNIFICANT CHANGE UP
EOSINOPHIL # BLD AUTO: 0 K/UL — SIGNIFICANT CHANGE UP (ref 0–0.5)
EOSINOPHIL # BLD AUTO: 0 K/UL — SIGNIFICANT CHANGE UP (ref 0–0.5)
EOSINOPHIL NFR BLD AUTO: 0 % — SIGNIFICANT CHANGE UP (ref 0–6)
EOSINOPHIL NFR BLD AUTO: 0 % — SIGNIFICANT CHANGE UP (ref 0–6)
GLUCOSE SERPL-MCNC: 159 MG/DL — HIGH (ref 70–99)
GLUCOSE SERPL-MCNC: 159 MG/DL — HIGH (ref 70–99)
HCT VFR BLD CALC: 32.8 % — LOW (ref 34.5–45)
HCT VFR BLD CALC: 32.8 % — LOW (ref 34.5–45)
HGB BLD-MCNC: 9.3 G/DL — LOW (ref 11.5–15.5)
HGB BLD-MCNC: 9.3 G/DL — LOW (ref 11.5–15.5)
IMM GRANULOCYTES NFR BLD AUTO: 1.7 % — HIGH (ref 0–0.9)
IMM GRANULOCYTES NFR BLD AUTO: 1.7 % — HIGH (ref 0–0.9)
LYMPHOCYTES # BLD AUTO: 0.46 K/UL — LOW (ref 1–3.3)
LYMPHOCYTES # BLD AUTO: 0.46 K/UL — LOW (ref 1–3.3)
LYMPHOCYTES # BLD AUTO: 2.8 % — LOW (ref 13–44)
LYMPHOCYTES # BLD AUTO: 2.8 % — LOW (ref 13–44)
MAGNESIUM SERPL-MCNC: 1.9 MG/DL — SIGNIFICANT CHANGE UP (ref 1.6–2.6)
MAGNESIUM SERPL-MCNC: 1.9 MG/DL — SIGNIFICANT CHANGE UP (ref 1.6–2.6)
MCHC RBC-ENTMCNC: 24 PG — LOW (ref 27–34)
MCHC RBC-ENTMCNC: 24 PG — LOW (ref 27–34)
MCHC RBC-ENTMCNC: 28.4 GM/DL — LOW (ref 32–36)
MCHC RBC-ENTMCNC: 28.4 GM/DL — LOW (ref 32–36)
MCV RBC AUTO: 84.8 FL — SIGNIFICANT CHANGE UP (ref 80–100)
MCV RBC AUTO: 84.8 FL — SIGNIFICANT CHANGE UP (ref 80–100)
MONOCYTES # BLD AUTO: 0.54 K/UL — SIGNIFICANT CHANGE UP (ref 0–0.9)
MONOCYTES # BLD AUTO: 0.54 K/UL — SIGNIFICANT CHANGE UP (ref 0–0.9)
MONOCYTES NFR BLD AUTO: 3.3 % — SIGNIFICANT CHANGE UP (ref 2–14)
MONOCYTES NFR BLD AUTO: 3.3 % — SIGNIFICANT CHANGE UP (ref 2–14)
NEUTROPHILS # BLD AUTO: 15.24 K/UL — HIGH (ref 1.8–7.4)
NEUTROPHILS # BLD AUTO: 15.24 K/UL — HIGH (ref 1.8–7.4)
NEUTROPHILS NFR BLD AUTO: 92 % — HIGH (ref 43–77)
NEUTROPHILS NFR BLD AUTO: 92 % — HIGH (ref 43–77)
PHOSPHATE SERPL-MCNC: 4.6 MG/DL — SIGNIFICANT CHANGE UP (ref 2.4–4.7)
PHOSPHATE SERPL-MCNC: 4.6 MG/DL — SIGNIFICANT CHANGE UP (ref 2.4–4.7)
PLATELET # BLD AUTO: 217 K/UL — SIGNIFICANT CHANGE UP (ref 150–400)
PLATELET # BLD AUTO: 217 K/UL — SIGNIFICANT CHANGE UP (ref 150–400)
POTASSIUM SERPL-MCNC: 4.2 MMOL/L — SIGNIFICANT CHANGE UP (ref 3.5–5.3)
POTASSIUM SERPL-MCNC: 4.2 MMOL/L — SIGNIFICANT CHANGE UP (ref 3.5–5.3)
POTASSIUM SERPL-SCNC: 4.2 MMOL/L — SIGNIFICANT CHANGE UP (ref 3.5–5.3)
POTASSIUM SERPL-SCNC: 4.2 MMOL/L — SIGNIFICANT CHANGE UP (ref 3.5–5.3)
RBC # BLD: 3.87 M/UL — SIGNIFICANT CHANGE UP (ref 3.8–5.2)
RBC # BLD: 3.87 M/UL — SIGNIFICANT CHANGE UP (ref 3.8–5.2)
RBC # FLD: 17.4 % — HIGH (ref 10.3–14.5)
RBC # FLD: 17.4 % — HIGH (ref 10.3–14.5)
SODIUM SERPL-SCNC: 142 MMOL/L — SIGNIFICANT CHANGE UP (ref 135–145)
SODIUM SERPL-SCNC: 142 MMOL/L — SIGNIFICANT CHANGE UP (ref 135–145)
WBC # BLD: 16.55 K/UL — HIGH (ref 3.8–10.5)
WBC # BLD: 16.55 K/UL — HIGH (ref 3.8–10.5)
WBC # FLD AUTO: 16.55 K/UL — HIGH (ref 3.8–10.5)
WBC # FLD AUTO: 16.55 K/UL — HIGH (ref 3.8–10.5)

## 2023-11-03 PROCEDURE — 73600 X-RAY EXAM OF ANKLE: CPT | Mod: 26,LT

## 2023-11-03 PROCEDURE — 99233 SBSQ HOSP IP/OBS HIGH 50: CPT

## 2023-11-03 PROCEDURE — 73620 X-RAY EXAM OF FOOT: CPT | Mod: 26,LT

## 2023-11-03 RX ORDER — MAGNESIUM SULFATE 500 MG/ML
1 VIAL (ML) INJECTION ONCE
Refills: 0 | Status: COMPLETED | OUTPATIENT
Start: 2023-11-03 | End: 2023-11-03

## 2023-11-03 RX ORDER — NYSTATIN CREAM 100000 [USP'U]/G
1 CREAM TOPICAL EVERY 12 HOURS
Refills: 0 | Status: DISCONTINUED | OUTPATIENT
Start: 2023-11-03 | End: 2023-11-09

## 2023-11-03 RX ADMIN — Medication 975 MILLIGRAM(S): at 04:58

## 2023-11-03 RX ADMIN — TRAMADOL HYDROCHLORIDE 100 MILLIGRAM(S): 50 TABLET ORAL at 21:10

## 2023-11-03 RX ADMIN — GABAPENTIN 100 MILLIGRAM(S): 400 CAPSULE ORAL at 04:58

## 2023-11-03 RX ADMIN — GABAPENTIN 100 MILLIGRAM(S): 400 CAPSULE ORAL at 21:10

## 2023-11-03 RX ADMIN — Medication 2000 MILLIGRAM(S): at 00:30

## 2023-11-03 RX ADMIN — Medication 5 MILLIGRAM(S): at 21:10

## 2023-11-03 RX ADMIN — APIXABAN 2.5 MILLIGRAM(S): 2.5 TABLET, FILM COATED ORAL at 18:34

## 2023-11-03 RX ADMIN — NYSTATIN CREAM 1 APPLICATION(S): 100000 CREAM TOPICAL at 18:34

## 2023-11-03 RX ADMIN — Medication 975 MILLIGRAM(S): at 05:01

## 2023-11-03 RX ADMIN — APIXABAN 2.5 MILLIGRAM(S): 2.5 TABLET, FILM COATED ORAL at 04:58

## 2023-11-03 RX ADMIN — Medication 975 MILLIGRAM(S): at 21:13

## 2023-11-03 RX ADMIN — AMIODARONE HYDROCHLORIDE 200 MILLIGRAM(S): 400 TABLET ORAL at 04:56

## 2023-11-03 RX ADMIN — Medication 2000 MILLIGRAM(S): at 11:07

## 2023-11-03 RX ADMIN — Medication 100 GRAM(S): at 12:39

## 2023-11-03 RX ADMIN — Medication 975 MILLIGRAM(S): at 15:51

## 2023-11-03 RX ADMIN — GABAPENTIN 100 MILLIGRAM(S): 400 CAPSULE ORAL at 15:51

## 2023-11-03 RX ADMIN — TRAMADOL HYDROCHLORIDE 100 MILLIGRAM(S): 50 TABLET ORAL at 04:56

## 2023-11-03 RX ADMIN — Medication 975 MILLIGRAM(S): at 16:51

## 2023-11-03 RX ADMIN — TRAMADOL HYDROCHLORIDE 100 MILLIGRAM(S): 50 TABLET ORAL at 22:10

## 2023-11-03 RX ADMIN — TRAMADOL HYDROCHLORIDE 100 MILLIGRAM(S): 50 TABLET ORAL at 05:57

## 2023-11-03 RX ADMIN — ATORVASTATIN CALCIUM 40 MILLIGRAM(S): 80 TABLET, FILM COATED ORAL at 21:10

## 2023-11-03 RX ADMIN — Medication 975 MILLIGRAM(S): at 21:10

## 2023-11-03 RX ADMIN — Medication 25 MICROGRAM(S): at 04:58

## 2023-11-03 RX ADMIN — ESCITALOPRAM OXALATE 10 MILLIGRAM(S): 10 TABLET, FILM COATED ORAL at 11:07

## 2023-11-03 NOTE — PROGRESS NOTE ADULT - ASSESSMENT
82 year old female with PMH of Afib on Eliquis, anxiety, and HTN presents after a fall with right humerus and right intertrochanteric hip fracture. Now POD 1 s/p R IM nail.    Plan:  - pain control PRN  - DASH/TLC diet  - 2d eliquis 2.5mg, will advance to home dose on day 3  - post-op WBS: WBAT to RLE, IVANIA RUE  - F/U PT eval

## 2023-11-03 NOTE — PROGRESS NOTE ADULT - SUBJECTIVE AND OBJECTIVE BOX
TRAUMA SURGERY PROGRESS NOTE    Subjective:   Patient examined at bedside this AM. Reports her pain is much better post-op. No acute events overnight.    Objective:  Vital Signs  T(C): 37 (11-03 @ 04:08), Max: 37.7 (11-02 @ 16:38)  HR: 78 (11-03 @ 04:08) (61 - 78)  BP: 118/63 (11-03 @ 04:08) (109/46 - 153/62)  RR: 18 (11-03 @ 04:08) (16 - 25)  SpO2: 95% (11-03 @ 04:08) (92% - 99%)  11-02-23 @ 07:01  -  11-03-23 @ 07:00  --------------------------------------------------------  IN:  Total IN: 0 mL    OUT:    Voided (mL): 250 mL  Total OUT: 250 mL    Total NET: -250 mL      Physical Exam:  General: alert and oriented, NAD  Resp: airway patent, respirations unlabored  Abdomen: soft, nontender, nondistended  Extremities: no edema, R hip dressing c/d/i, minimally tender to palpation, thigh soft  Skin: warm, dry, appropriate color      Labs:                        9.3    16.55 )-----------( 217      ( 03 Nov 2023 04:37 )             32.8   11-03    142  |  109<H>  |  19.3  ----------------------------<  159<H>  4.2   |  20.0<L>  |  0.74    Ca    8.0<L>      03 Nov 2023 04:37  Phos  4.6     11-03  Mg     1.9     11-03

## 2023-11-03 NOTE — PROGRESS NOTE ADULT - SUBJECTIVE AND OBJECTIVE BOX
ORTHOPEDIC POST-OP PROGRESS NOTE:    Name: DONNA HYATT    MR #: [id number]    Procedure: Intramedulary nail of Right Hip      DOS: 11/2      Pt comfortable without complaints, pain controlled. Denies CP, SOB, N/V, numbness/tingling. patient also with right proximal humerus fx               Vital Signs Last 24 Hrs  T(C): 37 (11-03-23 @ 04:08), Max: 37 (11-03-23 @ 04:08)  T(F): 98.6 (11-03-23 @ 04:08), Max: 98.6 (11-03-23 @ 04:08)  HR: 78 (11-03-23 @ 04:08) (78 - 78)  BP: 118/63 (11-03-23 @ 04:08) (118/63 - 118/63)  BP(mean): --  RR: 18 (11-03-23 @ 04:08) (18 - 18)  SpO2: 95% (11-03-23 @ 04:08) (95% - 95%)      General Exam:    General: Pt Alert and oriented, NAD, controlled pain.    Dressings C/D/I. No bleeding. No erythema.    Skin: No erythema. No wound dehisence.    Pulses: 2+ dorsalis pedis pulse. Cap refill < 2 sec.    Sensation: Grossly intact to light touch without deficit.    Motor: No motor weaknesses found.    Right arm in sling   pulse intact, FROM to fingers and wrist         A/P: 82y Female  POD# 1 s/p right hip IM nail.  Also with right proximal humerus fx     - Stable  - Pain Control  - DVT ppx as prescribed of eliquis 2.5 mg BID x 3 days, than 5 mg BID  - PT eval  - Weight bearing status: WBAT RLE, NWB RUE

## 2023-11-04 PROCEDURE — 99231 SBSQ HOSP IP/OBS SF/LOW 25: CPT | Mod: GC

## 2023-11-04 RX ADMIN — APIXABAN 2.5 MILLIGRAM(S): 2.5 TABLET, FILM COATED ORAL at 18:48

## 2023-11-04 RX ADMIN — Medication 975 MILLIGRAM(S): at 21:29

## 2023-11-04 RX ADMIN — APIXABAN 2.5 MILLIGRAM(S): 2.5 TABLET, FILM COATED ORAL at 04:53

## 2023-11-04 RX ADMIN — Medication 975 MILLIGRAM(S): at 21:34

## 2023-11-04 RX ADMIN — GABAPENTIN 100 MILLIGRAM(S): 400 CAPSULE ORAL at 04:53

## 2023-11-04 RX ADMIN — Medication 975 MILLIGRAM(S): at 15:49

## 2023-11-04 RX ADMIN — Medication 975 MILLIGRAM(S): at 04:54

## 2023-11-04 RX ADMIN — NYSTATIN CREAM 1 APPLICATION(S): 100000 CREAM TOPICAL at 18:49

## 2023-11-04 RX ADMIN — GABAPENTIN 100 MILLIGRAM(S): 400 CAPSULE ORAL at 15:50

## 2023-11-04 RX ADMIN — Medication 5 MILLIGRAM(S): at 21:29

## 2023-11-04 RX ADMIN — Medication 975 MILLIGRAM(S): at 04:58

## 2023-11-04 RX ADMIN — GABAPENTIN 100 MILLIGRAM(S): 400 CAPSULE ORAL at 21:29

## 2023-11-04 RX ADMIN — ESCITALOPRAM OXALATE 10 MILLIGRAM(S): 10 TABLET, FILM COATED ORAL at 11:54

## 2023-11-04 RX ADMIN — NYSTATIN CREAM 1 APPLICATION(S): 100000 CREAM TOPICAL at 04:56

## 2023-11-04 RX ADMIN — AMIODARONE HYDROCHLORIDE 200 MILLIGRAM(S): 400 TABLET ORAL at 04:56

## 2023-11-04 RX ADMIN — ATORVASTATIN CALCIUM 40 MILLIGRAM(S): 80 TABLET, FILM COATED ORAL at 21:29

## 2023-11-04 RX ADMIN — Medication 25 MICROGRAM(S): at 04:53

## 2023-11-04 RX ADMIN — Medication 975 MILLIGRAM(S): at 16:49

## 2023-11-04 NOTE — PROGRESS NOTE ADULT - ASSESSMENT
82F PMHx Afib on Eliquis, anxiety, and HTN presents after a fall with right humerus and right intertrochanteric hip fracture. Now POD 1 s/p R IM nail.    Plan:  - pain control PRN  - DASH/TLC diet  - 2d eliquis 2.5mg, will advance to home dose (5mg bid) on day 3  - WBAT to RLE  - R Humerus non-op management, NWB RUE  - F/U PT eval for dispo

## 2023-11-04 NOTE — PHYSICAL THERAPY INITIAL EVALUATION ADULT - LEVEL OF INDEPENDENCE: SUPINE/SIT, REHAB EVAL
Unable to perform due to severe pain with movement. However, based on pts current strength and pain, pt is dependent for bed mobility./unable to perform

## 2023-11-04 NOTE — PHYSICAL THERAPY INITIAL EVALUATION ADULT - ADDITIONAL COMMENTS
Pt reports she lives in a  with her daugther, son-in-law, and grandchildren. Pts home has 4 HENNA with 1 left to ascend handrail. Pt has a first floor set up within the home. Prior to hospital admission pt reports she was I with ambulation using a RW, and only occasionally required assist of 1 person during stair negotiation. Pt was I for bathing and dressing. Pt reports her daughter works full time and her son in law goes to work at 3PM.  DME: pt owns RW

## 2023-11-04 NOTE — PHYSICAL THERAPY INITIAL EVALUATION ADULT - GENERAL OBSERVATIONS, REHAB EVAL
Pt received reclining in bed, bedrails x 4, CBWR, on 2LPM wall O2 via NC, purewick intact. Pt agreeable to PT session.

## 2023-11-04 NOTE — PROGRESS NOTE ADULT - SUBJECTIVE AND OBJECTIVE BOX
INTERVAL HPI/OVERNIGHT EVENTS: sleeping comfortably when seen, no complaints or events as per bedside RN    STATUS POST:  ORIF R Hip 11/2      MEDICATIONS  (STANDING):  acetaminophen     Tablet .. 975 milliGRAM(s) Oral every 8 hours  aMIOdarone    Tablet 200 milliGRAM(s) Oral daily  amLODIPine   Tablet 10 milliGRAM(s) Oral daily  apixaban 2.5 milliGRAM(s) Oral two times a day  atorvastatin 40 milliGRAM(s) Oral at bedtime  enalapril 20 milliGRAM(s) Oral daily  escitalopram 10 milliGRAM(s) Oral daily  gabapentin 100 milliGRAM(s) Oral every 8 hours  influenza  Vaccine (HIGH DOSE) 0.7 milliLiter(s) IntraMuscular once  levothyroxine 25 MICROGram(s) Oral daily  melatonin 5 milliGRAM(s) Oral at bedtime  nystatin Powder 1 Application(s) Topical every 12 hours    MEDICATIONS  (PRN):  ondansetron Injectable 4 milliGRAM(s) IV Push every 6 hours PRN Nausea  traMADol 50 milliGRAM(s) Oral every 3 hours PRN Moderate Pain (4 - 6)  traMADol 100 milliGRAM(s) Oral every 6 hours PRN Severe Pain (7 - 10)      Vital Signs Last 24 Hrs  T(C): 36.7 (03 Nov 2023 20:43), Max: 37 (03 Nov 2023 04:08)  T(F): 98.1 (03 Nov 2023 20:43), Max: 98.6 (03 Nov 2023 04:08)  HR: 62 (03 Nov 2023 20:43) (58 - 78)  BP: 128/61 (03 Nov 2023 20:43) (114/54 - 130/57)  BP(mean): --  RR: 18 (03 Nov 2023 20:43) (18 - 19)  SpO2: 93% (03 Nov 2023 20:43) (92% - 95%)    Parameters below as of 03 Nov 2023 20:43  Patient On (Oxygen Delivery Method): nasal cannula        PHYSICAL EXAM:      Constitutional: NAD    Respiratory: no accessory muscle use    Cardiovascular: RRR    Gastrointestinal: soft, NT/ND (as per RN)          I&O's Detail    02 Nov 2023 07:01  -  03 Nov 2023 07:00  --------------------------------------------------------  IN:    IV PiggyBack: 100 mL    Lactated Ringers: 300 mL    Oral Fluid: 240 mL    sodium chloride 0.9%: 825 mL  Total IN: 1465 mL    OUT:    Blood Loss (mL): 30 mL    Voided (mL): 250 mL  Total OUT: 280 mL    Total NET: 1185 mL          LABS:                        9.3    16.55 )-----------( 217      ( 03 Nov 2023 04:37 )             32.8     11-03    142  |  109<H>  |  19.3  ----------------------------<  159<H>  4.2   |  20.0<L>  |  0.74    Ca    8.0<L>      03 Nov 2023 04:37  Phos  4.6     11-03  Mg     1.9     11-03        Urinalysis Basic - ( 03 Nov 2023 04:37 )    Color: x / Appearance: x / SG: x / pH: x  Gluc: 159 mg/dL / Ketone: x  / Bili: x / Urobili: x   Blood: x / Protein: x / Nitrite: x   Leuk Esterase: x / RBC: x / WBC x   Sq Epi: x / Non Sq Epi: x / Bacteria: x        RADIOLOGY & ADDITIONAL STUDIES:

## 2023-11-04 NOTE — PROGRESS NOTE ADULT - ATTENDING COMMENTS
Above assessment noted.  The patient was seen and examined by myself with the surgical PA and resident. The patient is without pain in the left foot this morning.  She overall states she is feeling improved.  The right and left legs without localizing tenderness, no angulation, feet appear perfused.  The patient is trauma stable. If official x-ray is negative then proceed with PT/OT.

## 2023-11-04 NOTE — PHYSICAL THERAPY INITIAL EVALUATION ADULT - RANGE OF MOTION EXAMINATION, REHAB EVAL
LLE: hip flexion WFL, knee extension WFL, and ankle dorsiflexion and plantarflexion WFL.  RLE: hip flexion to approximately 80 degrees, knee extension WFL, ankle dorsiflexion and plantarflexion WFL./bilateral upper extremity ROM was WNL (within normal limits)

## 2023-11-04 NOTE — PHYSICAL THERAPY INITIAL EVALUATION ADULT - PERTINENT HX OF CURRENT PROBLEM, REHAB EVAL
PMH: Afib, depression, tubal ligation, hernia, hypothyroid, iron deficiency anemia.  DX: pt found to have right hip fx and right humerus fx s/p fall. Pt is now s/p right hip ORIF with IMN.

## 2023-11-04 NOTE — PHYSICAL THERAPY INITIAL EVALUATION ADULT - DIAGNOSIS, PT EVAL
Pt presents with limitations in functional mobility due to weakness. pain, and weight bearing restrictions.

## 2023-11-04 NOTE — PROGRESS NOTE ADULT - SUBJECTIVE AND OBJECTIVE BOX
Pt Name: DONNA HYATT    MRN: [id number]      Patient is a being followed for non operative management of right proximal humerus fracture.  Patient underwent a right hip IM nail on 11/2, POD#2. Comfortable in bed, no new orthopedic complaints. Sling in place to Right upper extremity.       PAST MEDICAL & SURGICAL HISTORY:  PAST MEDICAL & SURGICAL HISTORY:  Atrial fibrillation      HTN (hypertension)      Hypothyroid      HLD (hyperlipidemia)      Hernia      Iron deficiency anemia      Depression      H/O tubal ligation      No significant past surgical history          Allergies: No Known Allergies      Medications: acetaminophen     Tablet .. 975 milliGRAM(s) Oral every 8 hours  aMIOdarone    Tablet 200 milliGRAM(s) Oral daily  amLODIPine   Tablet 10 milliGRAM(s) Oral daily  apixaban 2.5 milliGRAM(s) Oral two times a day  atorvastatin 40 milliGRAM(s) Oral at bedtime  enalapril 20 milliGRAM(s) Oral daily  escitalopram 10 milliGRAM(s) Oral daily  gabapentin 100 milliGRAM(s) Oral every 8 hours  influenza  Vaccine (HIGH DOSE) 0.7 milliLiter(s) IntraMuscular once  levothyroxine 25 MICROGram(s) Oral daily  melatonin 5 milliGRAM(s) Oral at bedtime  nystatin Powder 1 Application(s) Topical every 12 hours  ondansetron Injectable 4 milliGRAM(s) IV Push every 6 hours PRN  traMADol 50 milliGRAM(s) Oral every 3 hours PRN  traMADol 100 milliGRAM(s) Oral every 6 hours PRN        Ambulation: Walking independently With Walker                       9.3    16.55 )-----------( 217      ( 03 Nov 2023 04:37 )             32.8     11-03    142  |  109<H>  |  19.3  ----------------------------<  159<H>  4.2   |  20.0<L>  |  0.74    Ca    8.0<L>      03 Nov 2023 04:37  Phos  4.6     11-03  Mg     1.9     11-03        PHYSICAL EXAM:    Vital Signs Last 24 Hrs  T(C): 36.4 (04 Nov 2023 04:05), Max: 36.8 (03 Nov 2023 16:05)  T(F): 97.5 (04 Nov 2023 04:05), Max: 98.3 (03 Nov 2023 16:05)  HR: 58 (04 Nov 2023 04:05) (58 - 62)  BP: 119/69 (04 Nov 2023 04:05) (114/54 - 130/57)  BP(mean): --  RR: 18 (04 Nov 2023 04:05) (18 - 19)  SpO2: 95% (04 Nov 2023 04:05) (92% - 95%)    Parameters below as of 04 Nov 2023 04:05  Patient On (Oxygen Delivery Method): nasal cannula      Daily     Daily     Appearance: Alert, responsive, in no acute distress.    Neurological: Sensation is grossly intact to light touch. 5/5 motor function of all extremities. No focal deficits or weaknesses found.    Skin: no rash on visible skin. Skin is clean, dry and intact. No bleeding. No abrasions. No ulcerations.    Vascular: 2+ distal pulses. Cap refill < 2 sec. No signs of venous   insufficiency   or stasis. No extremity ulcerations. No cyanosis.    Musculoskeletal:         Right Upper Extremity: Sling in -place.  Compartments soft non tender. Motor and sensory intact.  Palpable distal pulse.          Right Lower Extremity: Mepilex dressing remains clean dry and intact.  EHL/FHL intact.  Compartments soft non tender.  Calves soft.       A/P:  Pt is a  82y Female with non operative right proximal humerus fracture, POD#2 s/p right hip IM nail    PLAN:   * WBAT right lower extremity  Non weight bearing right upper extremity  Eliquis 2.5 mg BID x 3 days then resume 5mg.   Pain control  Discharge planning.

## 2023-11-05 LAB
ANION GAP SERPL CALC-SCNC: 9 MMOL/L — SIGNIFICANT CHANGE UP (ref 5–17)
ANION GAP SERPL CALC-SCNC: 9 MMOL/L — SIGNIFICANT CHANGE UP (ref 5–17)
BASOPHILS # BLD AUTO: 0.06 K/UL — SIGNIFICANT CHANGE UP (ref 0–0.2)
BASOPHILS # BLD AUTO: 0.06 K/UL — SIGNIFICANT CHANGE UP (ref 0–0.2)
BASOPHILS NFR BLD AUTO: 0.5 % — SIGNIFICANT CHANGE UP (ref 0–2)
BASOPHILS NFR BLD AUTO: 0.5 % — SIGNIFICANT CHANGE UP (ref 0–2)
BLD GP AB SCN SERPL QL: SIGNIFICANT CHANGE UP
BLD GP AB SCN SERPL QL: SIGNIFICANT CHANGE UP
BUN SERPL-MCNC: 29.8 MG/DL — HIGH (ref 8–20)
BUN SERPL-MCNC: 29.8 MG/DL — HIGH (ref 8–20)
CALCIUM SERPL-MCNC: 8.2 MG/DL — LOW (ref 8.4–10.5)
CALCIUM SERPL-MCNC: 8.2 MG/DL — LOW (ref 8.4–10.5)
CHLORIDE SERPL-SCNC: 107 MMOL/L — SIGNIFICANT CHANGE UP (ref 96–108)
CHLORIDE SERPL-SCNC: 107 MMOL/L — SIGNIFICANT CHANGE UP (ref 96–108)
CO2 SERPL-SCNC: 24 MMOL/L — SIGNIFICANT CHANGE UP (ref 22–29)
CO2 SERPL-SCNC: 24 MMOL/L — SIGNIFICANT CHANGE UP (ref 22–29)
CREAT SERPL-MCNC: 0.74 MG/DL — SIGNIFICANT CHANGE UP (ref 0.5–1.3)
CREAT SERPL-MCNC: 0.74 MG/DL — SIGNIFICANT CHANGE UP (ref 0.5–1.3)
EGFR: 81 ML/MIN/1.73M2 — SIGNIFICANT CHANGE UP
EGFR: 81 ML/MIN/1.73M2 — SIGNIFICANT CHANGE UP
EOSINOPHIL # BLD AUTO: 0.43 K/UL — SIGNIFICANT CHANGE UP (ref 0–0.5)
EOSINOPHIL # BLD AUTO: 0.43 K/UL — SIGNIFICANT CHANGE UP (ref 0–0.5)
EOSINOPHIL NFR BLD AUTO: 3.4 % — SIGNIFICANT CHANGE UP (ref 0–6)
EOSINOPHIL NFR BLD AUTO: 3.4 % — SIGNIFICANT CHANGE UP (ref 0–6)
GLUCOSE SERPL-MCNC: 124 MG/DL — HIGH (ref 70–99)
GLUCOSE SERPL-MCNC: 124 MG/DL — HIGH (ref 70–99)
HCT VFR BLD CALC: 22.3 % — LOW (ref 34.5–45)
HCT VFR BLD CALC: 22.3 % — LOW (ref 34.5–45)
HCT VFR BLD CALC: 24.1 % — LOW (ref 34.5–45)
HCT VFR BLD CALC: 24.1 % — LOW (ref 34.5–45)
HCT VFR BLD CALC: 28.6 % — LOW (ref 34.5–45)
HCT VFR BLD CALC: 28.6 % — LOW (ref 34.5–45)
HGB BLD-MCNC: 6.7 G/DL — CRITICAL LOW (ref 11.5–15.5)
HGB BLD-MCNC: 6.7 G/DL — CRITICAL LOW (ref 11.5–15.5)
HGB BLD-MCNC: 7.1 G/DL — LOW (ref 11.5–15.5)
HGB BLD-MCNC: 7.1 G/DL — LOW (ref 11.5–15.5)
HGB BLD-MCNC: 8.9 G/DL — LOW (ref 11.5–15.5)
HGB BLD-MCNC: 8.9 G/DL — LOW (ref 11.5–15.5)
IMM GRANULOCYTES NFR BLD AUTO: 1.3 % — HIGH (ref 0–0.9)
IMM GRANULOCYTES NFR BLD AUTO: 1.3 % — HIGH (ref 0–0.9)
LYMPHOCYTES # BLD AUTO: 1.21 K/UL — SIGNIFICANT CHANGE UP (ref 1–3.3)
LYMPHOCYTES # BLD AUTO: 1.21 K/UL — SIGNIFICANT CHANGE UP (ref 1–3.3)
LYMPHOCYTES # BLD AUTO: 9.5 % — LOW (ref 13–44)
LYMPHOCYTES # BLD AUTO: 9.5 % — LOW (ref 13–44)
MAGNESIUM SERPL-MCNC: 2.1 MG/DL — SIGNIFICANT CHANGE UP (ref 1.6–2.6)
MAGNESIUM SERPL-MCNC: 2.1 MG/DL — SIGNIFICANT CHANGE UP (ref 1.6–2.6)
MCHC RBC-ENTMCNC: 24.9 PG — LOW (ref 27–34)
MCHC RBC-ENTMCNC: 24.9 PG — LOW (ref 27–34)
MCHC RBC-ENTMCNC: 25.6 PG — LOW (ref 27–34)
MCHC RBC-ENTMCNC: 25.6 PG — LOW (ref 27–34)
MCHC RBC-ENTMCNC: 29.5 GM/DL — LOW (ref 32–36)
MCHC RBC-ENTMCNC: 29.5 GM/DL — LOW (ref 32–36)
MCHC RBC-ENTMCNC: 30 GM/DL — LOW (ref 32–36)
MCHC RBC-ENTMCNC: 30 GM/DL — LOW (ref 32–36)
MCV RBC AUTO: 84.6 FL — SIGNIFICANT CHANGE UP (ref 80–100)
MCV RBC AUTO: 84.6 FL — SIGNIFICANT CHANGE UP (ref 80–100)
MCV RBC AUTO: 85.1 FL — SIGNIFICANT CHANGE UP (ref 80–100)
MCV RBC AUTO: 85.1 FL — SIGNIFICANT CHANGE UP (ref 80–100)
MONOCYTES # BLD AUTO: 0.78 K/UL — SIGNIFICANT CHANGE UP (ref 0–0.9)
MONOCYTES # BLD AUTO: 0.78 K/UL — SIGNIFICANT CHANGE UP (ref 0–0.9)
MONOCYTES NFR BLD AUTO: 6.2 % — SIGNIFICANT CHANGE UP (ref 2–14)
MONOCYTES NFR BLD AUTO: 6.2 % — SIGNIFICANT CHANGE UP (ref 2–14)
NEUTROPHILS # BLD AUTO: 10.03 K/UL — HIGH (ref 1.8–7.4)
NEUTROPHILS # BLD AUTO: 10.03 K/UL — HIGH (ref 1.8–7.4)
NEUTROPHILS NFR BLD AUTO: 79.1 % — HIGH (ref 43–77)
NEUTROPHILS NFR BLD AUTO: 79.1 % — HIGH (ref 43–77)
PHOSPHATE SERPL-MCNC: 2.4 MG/DL — SIGNIFICANT CHANGE UP (ref 2.4–4.7)
PHOSPHATE SERPL-MCNC: 2.4 MG/DL — SIGNIFICANT CHANGE UP (ref 2.4–4.7)
PLATELET # BLD AUTO: 241 K/UL — SIGNIFICANT CHANGE UP (ref 150–400)
PLATELET # BLD AUTO: 241 K/UL — SIGNIFICANT CHANGE UP (ref 150–400)
PLATELET # BLD AUTO: 246 K/UL — SIGNIFICANT CHANGE UP (ref 150–400)
PLATELET # BLD AUTO: 246 K/UL — SIGNIFICANT CHANGE UP (ref 150–400)
POTASSIUM SERPL-MCNC: 4.4 MMOL/L — SIGNIFICANT CHANGE UP (ref 3.5–5.3)
POTASSIUM SERPL-MCNC: 4.4 MMOL/L — SIGNIFICANT CHANGE UP (ref 3.5–5.3)
POTASSIUM SERPL-SCNC: 4.4 MMOL/L — SIGNIFICANT CHANGE UP (ref 3.5–5.3)
POTASSIUM SERPL-SCNC: 4.4 MMOL/L — SIGNIFICANT CHANGE UP (ref 3.5–5.3)
RBC # BLD: 2.62 M/UL — LOW (ref 3.8–5.2)
RBC # BLD: 2.62 M/UL — LOW (ref 3.8–5.2)
RBC # BLD: 2.85 M/UL — LOW (ref 3.8–5.2)
RBC # BLD: 2.85 M/UL — LOW (ref 3.8–5.2)
RBC # FLD: 17.7 % — HIGH (ref 10.3–14.5)
RBC # FLD: 17.7 % — HIGH (ref 10.3–14.5)
RBC # FLD: 17.8 % — HIGH (ref 10.3–14.5)
RBC # FLD: 17.8 % — HIGH (ref 10.3–14.5)
SODIUM SERPL-SCNC: 140 MMOL/L — SIGNIFICANT CHANGE UP (ref 135–145)
SODIUM SERPL-SCNC: 140 MMOL/L — SIGNIFICANT CHANGE UP (ref 135–145)
WBC # BLD: 12.68 K/UL — HIGH (ref 3.8–10.5)
WBC # BLD: 12.68 K/UL — HIGH (ref 3.8–10.5)
WBC # BLD: 12.86 K/UL — HIGH (ref 3.8–10.5)
WBC # BLD: 12.86 K/UL — HIGH (ref 3.8–10.5)
WBC # FLD AUTO: 12.68 K/UL — HIGH (ref 3.8–10.5)
WBC # FLD AUTO: 12.68 K/UL — HIGH (ref 3.8–10.5)
WBC # FLD AUTO: 12.86 K/UL — HIGH (ref 3.8–10.5)
WBC # FLD AUTO: 12.86 K/UL — HIGH (ref 3.8–10.5)

## 2023-11-05 PROCEDURE — 99231 SBSQ HOSP IP/OBS SF/LOW 25: CPT

## 2023-11-05 RX ADMIN — NYSTATIN CREAM 1 APPLICATION(S): 100000 CREAM TOPICAL at 05:18

## 2023-11-05 RX ADMIN — Medication 25 MICROGRAM(S): at 05:14

## 2023-11-05 RX ADMIN — TRAMADOL HYDROCHLORIDE 100 MILLIGRAM(S): 50 TABLET ORAL at 22:30

## 2023-11-05 RX ADMIN — APIXABAN 2.5 MILLIGRAM(S): 2.5 TABLET, FILM COATED ORAL at 17:03

## 2023-11-05 RX ADMIN — GABAPENTIN 100 MILLIGRAM(S): 400 CAPSULE ORAL at 05:15

## 2023-11-05 RX ADMIN — AMIODARONE HYDROCHLORIDE 200 MILLIGRAM(S): 400 TABLET ORAL at 05:14

## 2023-11-05 RX ADMIN — GABAPENTIN 100 MILLIGRAM(S): 400 CAPSULE ORAL at 11:03

## 2023-11-05 RX ADMIN — Medication 975 MILLIGRAM(S): at 22:30

## 2023-11-05 RX ADMIN — ATORVASTATIN CALCIUM 40 MILLIGRAM(S): 80 TABLET, FILM COATED ORAL at 21:38

## 2023-11-05 RX ADMIN — GABAPENTIN 100 MILLIGRAM(S): 400 CAPSULE ORAL at 21:38

## 2023-11-05 RX ADMIN — Medication 975 MILLIGRAM(S): at 05:13

## 2023-11-05 RX ADMIN — Medication 975 MILLIGRAM(S): at 21:38

## 2023-11-05 RX ADMIN — ESCITALOPRAM OXALATE 10 MILLIGRAM(S): 10 TABLET, FILM COATED ORAL at 11:04

## 2023-11-05 RX ADMIN — Medication 975 MILLIGRAM(S): at 11:03

## 2023-11-05 RX ADMIN — AMLODIPINE BESYLATE 10 MILLIGRAM(S): 2.5 TABLET ORAL at 05:13

## 2023-11-05 RX ADMIN — Medication 5 MILLIGRAM(S): at 21:38

## 2023-11-05 RX ADMIN — Medication 20 MILLIGRAM(S): at 05:13

## 2023-11-05 RX ADMIN — Medication 975 MILLIGRAM(S): at 05:17

## 2023-11-05 RX ADMIN — NYSTATIN CREAM 1 APPLICATION(S): 100000 CREAM TOPICAL at 17:03

## 2023-11-05 RX ADMIN — Medication 975 MILLIGRAM(S): at 12:03

## 2023-11-05 RX ADMIN — APIXABAN 2.5 MILLIGRAM(S): 2.5 TABLET, FILM COATED ORAL at 05:14

## 2023-11-05 RX ADMIN — TRAMADOL HYDROCHLORIDE 100 MILLIGRAM(S): 50 TABLET ORAL at 21:41

## 2023-11-05 NOTE — PROGRESS NOTE ADULT - SUBJECTIVE AND OBJECTIVE BOX
DONNA HYATT    [id number]    History: Patient is status being followed for non operative right proximal humerus fracture, POD#3 s/p Right hip IM nail.   Patient is doing well. The patient's pain is controlled using the prescribed pain medications. The patient is participating in physical therapy, WBAT RLE, non weight bearing right upper extremity.   Denies nausea, vomiting, chest pain, shortness of breath, abdominal pain or fever. No new orthopedic complaints.    Vital Signs Last 24 Hrs  T(C): 37 (05 Nov 2023 04:10), Max: 37 (05 Nov 2023 04:10)  T(F): 98.6 (05 Nov 2023 04:10), Max: 98.6 (05 Nov 2023 04:10)  HR: 66 (05 Nov 2023 04:10) (63 - 66)  BP: 129/67 (05 Nov 2023 04:10) (117/58 - 129/67)  BP(mean): --  RR: 18 (05 Nov 2023 04:10) (18 - 20)  SpO2: 94% (05 Nov 2023 04:10) (90% - 94%)    Parameters below as of 05 Nov 2023 04:10  Patient On (Oxygen Delivery Method): nasal cannula  O2 Flow (L/min): 3                            7.1    12.68 )-----------( 246      ( 05 Nov 2023 05:11 )             24.1       11-05    140  |  107  |  29.8<H>  ----------------------------<  124<H>  4.4   |  24.0  |  0.74    Ca    8.2<L>      05 Nov 2023 05:11  Phos  2.4     11-05  Mg     2.1     11-05        MEDICATIONS  (STANDING):  acetaminophen     Tablet .. 975 milliGRAM(s) Oral every 8 hours  aMIOdarone    Tablet 200 milliGRAM(s) Oral daily  amLODIPine   Tablet 10 milliGRAM(s) Oral daily  apixaban 2.5 milliGRAM(s) Oral two times a day  atorvastatin 40 milliGRAM(s) Oral at bedtime  enalapril 20 milliGRAM(s) Oral daily  escitalopram 10 milliGRAM(s) Oral daily  gabapentin 100 milliGRAM(s) Oral every 8 hours  influenza  Vaccine (HIGH DOSE) 0.7 milliLiter(s) IntraMuscular once  levothyroxine 25 MICROGram(s) Oral daily  melatonin 5 milliGRAM(s) Oral at bedtime  nystatin Powder 1 Application(s) Topical every 12 hours    MEDICATIONS  (PRN):  ondansetron Injectable 4 milliGRAM(s) IV Push every 6 hours PRN Nausea  traMADol 50 milliGRAM(s) Oral every 3 hours PRN Moderate Pain (4 - 6)  traMADol 100 milliGRAM(s) Oral every 6 hours PRN Severe Pain (7 - 10)      Physical exam: The right hip dressing remains clean, dry and intact. No drainage or discharge. No erythema is noted. No blistering. No ecchymosis. The calf is supple nontender. Passive range of motion is acceptable to due postoperative pain. No calf tenderness. Sensation to light touch is grossly intact distally. Motor function distally is 5/5. No foot drop. 2+ dorsalis pedis pulse. Capillary refill is less than 2 seconds. No cyanosis.  Right upper extremity:   Eccymosis noted to right axilla, compartments soft non tender.  FUll ROM of elbow and wrist.  Palpable distal pulse.  Primary Orthopedic Assessment:  • s/p RIGHT hip IM nail, POD#3  Right proximal humerus fracture    Secondary  Orthopedic Assessment(s):   •     Secondary  Medical Assessment(s):   •     Plan:   • DVT prophylaxis as prescribed, including use of compression devices and ankle pumps Eliquis 5 mg daily start today   • Continue physical therapy  • Weightbearing as tolerated of the right lower extremity with assistance of a walker  • NON weight bearing right upper extremity   • Pain control as clinically indicated.  Consider transfusion  Monitor hemoglobin  Discharge planning to [facility]   DONNA HYATT    [id number]    History: Patient is status being followed for non operative right proximal humerus fracture, POD#3 s/p Right hip IM nail.   Patient is doing well. The patient's pain is controlled using the prescribed pain medications. The patient is participating in physical therapy, WBAT RLE, non weight bearing right upper extremity.   Denies nausea, vomiting, chest pain, shortness of breath, abdominal pain or fever. No new orthopedic complaints.    Vital Signs Last 24 Hrs  T(C): 37 (05 Nov 2023 04:10), Max: 37 (05 Nov 2023 04:10)  T(F): 98.6 (05 Nov 2023 04:10), Max: 98.6 (05 Nov 2023 04:10)  HR: 66 (05 Nov 2023 04:10) (63 - 66)  BP: 129/67 (05 Nov 2023 04:10) (117/58 - 129/67)  BP(mean): --  RR: 18 (05 Nov 2023 04:10) (18 - 20)  SpO2: 94% (05 Nov 2023 04:10) (90% - 94%)    Parameters below as of 05 Nov 2023 04:10  Patient On (Oxygen Delivery Method): nasal cannula  O2 Flow (L/min): 3                            7.1    12.68 )-----------( 246      ( 05 Nov 2023 05:11 )             24.1       11-05    140  |  107  |  29.8<H>  ----------------------------<  124<H>  4.4   |  24.0  |  0.74    Ca    8.2<L>      05 Nov 2023 05:11  Phos  2.4     11-05  Mg     2.1     11-05        MEDICATIONS  (STANDING):  acetaminophen     Tablet .. 975 milliGRAM(s) Oral every 8 hours  aMIOdarone    Tablet 200 milliGRAM(s) Oral daily  amLODIPine   Tablet 10 milliGRAM(s) Oral daily  apixaban 2.5 milliGRAM(s) Oral two times a day  atorvastatin 40 milliGRAM(s) Oral at bedtime  enalapril 20 milliGRAM(s) Oral daily  escitalopram 10 milliGRAM(s) Oral daily  gabapentin 100 milliGRAM(s) Oral every 8 hours  influenza  Vaccine (HIGH DOSE) 0.7 milliLiter(s) IntraMuscular once  levothyroxine 25 MICROGram(s) Oral daily  melatonin 5 milliGRAM(s) Oral at bedtime  nystatin Powder 1 Application(s) Topical every 12 hours    MEDICATIONS  (PRN):  ondansetron Injectable 4 milliGRAM(s) IV Push every 6 hours PRN Nausea  traMADol 50 milliGRAM(s) Oral every 3 hours PRN Moderate Pain (4 - 6)  traMADol 100 milliGRAM(s) Oral every 6 hours PRN Severe Pain (7 - 10)      Physical exam: The right hip dressing remains clean, dry and intact. No drainage or discharge. No erythema is noted. No blistering. No ecchymosis. The calf is supple nontender. Passive range of motion is acceptable to due postoperative pain. No calf tenderness. Sensation to light touch is grossly intact distally. Motor function distally is 5/5. No foot drop. 2+ dorsalis pedis pulse. Capillary refill is less than 2 seconds. No cyanosis.  Right upper extremity:   Ecchymosis noted to right axilla, compartments soft non tender.  FUll ROM of elbow and wrist.  Palpable distal pulse.  Primary Orthopedic Assessment:  • s/p RIGHT hip IM nail, POD#3  Right proximal humerus fracture    Secondary  Orthopedic Assessment(s):   •     Secondary  Medical Assessment(s):   •     Plan:   • DVT prophylaxis as prescribed, including use of compression devices and ankle pumps Eliquis 5 mg daily start tomorrow  • Continue physical therapy  • Weightbearing as tolerated of the right lower extremity with assistance of a walker  • NON weight bearing right upper extremity   • Pain control as clinically indicated.  Consider transfusion  Monitor hemoglobin  Discharge planning to [facility]

## 2023-11-05 NOTE — OCCUPATIONAL THERAPY INITIAL EVALUATION ADULT - RANGE OF MOTION EXAMINATION, UPPER EXTREMITY
right UE wrist and digits WFL, shoulder N/A, elbow limited flexion/extension secondary to injury/Left UE Active ROM was WFL (within functional limits)

## 2023-11-05 NOTE — OCCUPATIONAL THERAPY INITIAL EVALUATION ADULT - PERTINENT HX OF CURRENT PROBLEM, REHAB EVAL
As per MD note:  Patient is status being followed for non operative right proximal humerus fracture, POD#3 s/p Right hip IM nail.   Patient is doing well. The patient's pain is controlled using the prescribed pain medications. The patient is participating in physical therapy, WBAT RLE, non weight bearing right upper extremity.   Denies nausea, vomiting, chest pain, shortness of breath, abdominal pain or fever. No new orthopedic complaints.

## 2023-11-05 NOTE — PROGRESS NOTE ADULT - SUBJECTIVE AND OBJECTIVE BOX
Subjective:  patient seen at bedside. No overnight events. No complaints.     STATUS POST:  ORIF R hip    POST OPERATIVE DAY #: 3    MEDICATIONS  (STANDING):  acetaminophen     Tablet .. 975 milliGRAM(s) Oral every 8 hours  aMIOdarone    Tablet 200 milliGRAM(s) Oral daily  amLODIPine   Tablet 10 milliGRAM(s) Oral daily  apixaban 2.5 milliGRAM(s) Oral two times a day  atorvastatin 40 milliGRAM(s) Oral at bedtime  enalapril 20 milliGRAM(s) Oral daily  escitalopram 10 milliGRAM(s) Oral daily  gabapentin 100 milliGRAM(s) Oral every 8 hours  influenza  Vaccine (HIGH DOSE) 0.7 milliLiter(s) IntraMuscular once  levothyroxine 25 MICROGram(s) Oral daily  melatonin 5 milliGRAM(s) Oral at bedtime  nystatin Powder 1 Application(s) Topical every 12 hours    MEDICATIONS  (PRN):  ondansetron Injectable 4 milliGRAM(s) IV Push every 6 hours PRN Nausea  traMADol 50 milliGRAM(s) Oral every 3 hours PRN Moderate Pain (4 - 6)  traMADol 100 milliGRAM(s) Oral every 6 hours PRN Severe Pain (7 - 10)      Vital Signs Last 24 Hrs  T(C): 37 (05 Nov 2023 04:10), Max: 37 (05 Nov 2023 04:10)  T(F): 98.6 (05 Nov 2023 04:10), Max: 98.6 (05 Nov 2023 04:10)  HR: 66 (05 Nov 2023 04:10) (63 - 66)  BP: 129/67 (05 Nov 2023 04:10) (117/58 - 129/67)  BP(mean): --  RR: 18 (05 Nov 2023 04:10) (18 - 20)  SpO2: 94% (05 Nov 2023 04:10) (90% - 94%)    Parameters below as of 05 Nov 2023 04:10  Patient On (Oxygen Delivery Method): nasal cannula  O2 Flow (L/min): 3      Physical Exam:    Constitutional: NAD  Respiratory: Respirations non-labored, no accessory muscle use  Gastrointestinal: Soft, non-tender, non-distended  Extremities: No peripheral edema, No cyanosis        LABS: Pending                            A:   82F PMHx Afib on Eliquis, anxiety, and HTN presents after a fall with right humerus and right intertrochanteric hip fracture. Now POD 1 s/p R IM nail.    Plan:  - pain control PRN  - DASH/TLC diet  - 2d eliquis 2.5mg, will advance to home dose (5mg bid) on day 3  - WBAT to RLE  - R Humerus non-op management, NWB RUE  - F/U PT eval for dispo

## 2023-11-06 LAB
HCT VFR BLD CALC: 28 % — LOW (ref 34.5–45)
HCT VFR BLD CALC: 28 % — LOW (ref 34.5–45)
HCT VFR BLD CALC: 30 % — LOW (ref 34.5–45)
HCT VFR BLD CALC: 30 % — LOW (ref 34.5–45)
HGB BLD-MCNC: 8.6 G/DL — LOW (ref 11.5–15.5)
HGB BLD-MCNC: 8.6 G/DL — LOW (ref 11.5–15.5)
HGB BLD-MCNC: 9.2 G/DL — LOW (ref 11.5–15.5)
HGB BLD-MCNC: 9.2 G/DL — LOW (ref 11.5–15.5)
MCHC RBC-ENTMCNC: 26.3 PG — LOW (ref 27–34)
MCHC RBC-ENTMCNC: 26.3 PG — LOW (ref 27–34)
MCHC RBC-ENTMCNC: 30.7 GM/DL — LOW (ref 32–36)
MCHC RBC-ENTMCNC: 30.7 GM/DL — LOW (ref 32–36)
MCV RBC AUTO: 85.6 FL — SIGNIFICANT CHANGE UP (ref 80–100)
MCV RBC AUTO: 85.6 FL — SIGNIFICANT CHANGE UP (ref 80–100)
PLATELET # BLD AUTO: 253 K/UL — SIGNIFICANT CHANGE UP (ref 150–400)
PLATELET # BLD AUTO: 253 K/UL — SIGNIFICANT CHANGE UP (ref 150–400)
RBC # BLD: 3.27 M/UL — LOW (ref 3.8–5.2)
RBC # BLD: 3.27 M/UL — LOW (ref 3.8–5.2)
RBC # FLD: 17.2 % — HIGH (ref 10.3–14.5)
RBC # FLD: 17.2 % — HIGH (ref 10.3–14.5)
WBC # BLD: 12.21 K/UL — HIGH (ref 3.8–10.5)
WBC # BLD: 12.21 K/UL — HIGH (ref 3.8–10.5)
WBC # FLD AUTO: 12.21 K/UL — HIGH (ref 3.8–10.5)
WBC # FLD AUTO: 12.21 K/UL — HIGH (ref 3.8–10.5)

## 2023-11-06 PROCEDURE — 99233 SBSQ HOSP IP/OBS HIGH 50: CPT

## 2023-11-06 RX ORDER — KETOROLAC TROMETHAMINE 30 MG/ML
15 SYRINGE (ML) INJECTION ONCE
Refills: 0 | Status: DISCONTINUED | OUTPATIENT
Start: 2023-11-06 | End: 2023-11-06

## 2023-11-06 RX ORDER — APIXABAN 2.5 MG/1
5 TABLET, FILM COATED ORAL EVERY 12 HOURS
Refills: 0 | Status: DISCONTINUED | OUTPATIENT
Start: 2023-11-06 | End: 2023-11-09

## 2023-11-06 RX ADMIN — ATORVASTATIN CALCIUM 40 MILLIGRAM(S): 80 TABLET, FILM COATED ORAL at 22:42

## 2023-11-06 RX ADMIN — Medication 975 MILLIGRAM(S): at 05:57

## 2023-11-06 RX ADMIN — Medication 975 MILLIGRAM(S): at 14:24

## 2023-11-06 RX ADMIN — TRAMADOL HYDROCHLORIDE 50 MILLIGRAM(S): 50 TABLET ORAL at 18:38

## 2023-11-06 RX ADMIN — NYSTATIN CREAM 1 APPLICATION(S): 100000 CREAM TOPICAL at 17:39

## 2023-11-06 RX ADMIN — Medication 975 MILLIGRAM(S): at 15:24

## 2023-11-06 RX ADMIN — Medication 5 MILLIGRAM(S): at 22:41

## 2023-11-06 RX ADMIN — NYSTATIN CREAM 1 APPLICATION(S): 100000 CREAM TOPICAL at 05:59

## 2023-11-06 RX ADMIN — Medication 25 MICROGRAM(S): at 05:16

## 2023-11-06 RX ADMIN — AMIODARONE HYDROCHLORIDE 200 MILLIGRAM(S): 400 TABLET ORAL at 05:57

## 2023-11-06 RX ADMIN — Medication 20 MILLIGRAM(S): at 05:59

## 2023-11-06 RX ADMIN — APIXABAN 5 MILLIGRAM(S): 2.5 TABLET, FILM COATED ORAL at 17:35

## 2023-11-06 RX ADMIN — Medication 15 MILLIGRAM(S): at 02:00

## 2023-11-06 RX ADMIN — GABAPENTIN 100 MILLIGRAM(S): 400 CAPSULE ORAL at 14:24

## 2023-11-06 RX ADMIN — ESCITALOPRAM OXALATE 10 MILLIGRAM(S): 10 TABLET, FILM COATED ORAL at 11:51

## 2023-11-06 RX ADMIN — TRAMADOL HYDROCHLORIDE 50 MILLIGRAM(S): 50 TABLET ORAL at 17:38

## 2023-11-06 RX ADMIN — Medication 15 MILLIGRAM(S): at 01:05

## 2023-11-06 RX ADMIN — GABAPENTIN 100 MILLIGRAM(S): 400 CAPSULE ORAL at 22:42

## 2023-11-06 RX ADMIN — AMLODIPINE BESYLATE 10 MILLIGRAM(S): 2.5 TABLET ORAL at 05:58

## 2023-11-06 RX ADMIN — Medication 975 MILLIGRAM(S): at 22:42

## 2023-11-06 RX ADMIN — GABAPENTIN 100 MILLIGRAM(S): 400 CAPSULE ORAL at 05:54

## 2023-11-06 NOTE — PROGRESS NOTE ADULT - SUBJECTIVE AND OBJECTIVE BOX
Pt Name: DONNA HYATT    MRN: [id number]      Patient is a being followed for non operative right proximal humerus  fracture, POD#4 s/p IM nail right hip.  Comfortable in bed, no new orthopedic complaints at this time. Sling in place to RUE       PAST MEDICAL & SURGICAL HISTORY:  PAST MEDICAL & SURGICAL HISTORY:  Atrial fibrillation      HTN (hypertension)      Hypothyroid      HLD (hyperlipidemia)      Hernia      Iron deficiency anemia      Depression      H/O tubal ligation      No significant past surgical history          Allergies: No Known Allergies      Medications: acetaminophen     Tablet .. 975 milliGRAM(s) Oral every 8 hours  aMIOdarone    Tablet 200 milliGRAM(s) Oral daily  amLODIPine   Tablet 10 milliGRAM(s) Oral daily  atorvastatin 40 milliGRAM(s) Oral at bedtime  enalapril 20 milliGRAM(s) Oral daily  escitalopram 10 milliGRAM(s) Oral daily  gabapentin 100 milliGRAM(s) Oral every 8 hours  influenza  Vaccine (HIGH DOSE) 0.7 milliLiter(s) IntraMuscular once  levothyroxine 25 MICROGram(s) Oral daily  melatonin 5 milliGRAM(s) Oral at bedtime  nystatin Powder 1 Application(s) Topical every 12 hours  ondansetron Injectable 4 milliGRAM(s) IV Push every 6 hours PRN  traMADol 50 milliGRAM(s) Oral every 3 hours PRN  traMADol 100 milliGRAM(s) Oral every 6 hours PRN        Ambulation: With Walker                        8.6    12.21 )-----------( 253      ( 06 Nov 2023 04:40 )             28.0     11-05    140  |  107  |  29.8<H>  ----------------------------<  124<H>  4.4   |  24.0  |  0.74    Ca    8.2<L>      05 Nov 2023 05:11  Phos  2.4     11-05  Mg     2.1     11-05        PHYSICAL EXAM:    Vital Signs Last 24 Hrs  T(C): 36.9 (06 Nov 2023 04:13), Max: 37.2 (06 Nov 2023 00:40)  T(F): 98.4 (06 Nov 2023 04:13), Max: 99 (06 Nov 2023 00:40)  HR: 60 (06 Nov 2023 04:13) (60 - 67)  BP: 130/61 (06 Nov 2023 04:13) (103/54 - 145/58)  BP(mean): --  RR: 16 (06 Nov 2023 04:13) (16 - 18)  SpO2: 95% (06 Nov 2023 04:13) (91% - 95%)    Parameters below as of 06 Nov 2023 04:13  Patient On (Oxygen Delivery Method): nasal cannula  O2 Flow (L/min): 2    Daily     Daily     Appearance: Alert, responsive, in no acute distress.    Neurological: Sensation is grossly intact to light touch. 5/5 motor function of all extremities. No focal deficits or weaknesses found.    Skin: no rash on visible skin. Skin is clean, dry and intact. No bleeding. No abrasions. No ulcerations.    Vascular: 2+ distal pulses. Cap refill < 2 sec. No signs of venous   insufficiency   or stasis. No extremity ulcerations. No cyanosis.    Musculoskeletal:         Sling in place to RUE.  Ecchymosis noted to Right axilla.  Compartments soft non tender.  FULL rom of right elbow and wrist.   Right hip dressings remain in place.  EHL/FHL intact.    SCD's in place.  A/P:  Pt is a  83y Female with right proximal humerus fracture  S/p IM nail right hip.     PLAN:   * WBAT RLE  NWB RUE  Begin eliquis 5 mg today   ortho stable for dispo planning.   follow up in the office with Dr Quintanilla.

## 2023-11-06 NOTE — PROGRESS NOTE ADULT - SUBJECTIVE AND OBJECTIVE BOX
HPI/OVERNIGHT EVENTS:  Overnight, patient had SOB and HgB 6.7 consistent with symptomatic anemia. Given 1UPRBC with good response ( HgB 6.7 > 8.9), symptoms improved. Assessed at bedside on this morning by surgery team. No complaints. Pain well controlled. Denies any CP, SOB, palpitations, N&V. HDS.     MEDICATIONS  (STANDING):  acetaminophen     Tablet .. 975 milliGRAM(s) Oral every 8 hours  aMIOdarone    Tablet 200 milliGRAM(s) Oral daily  amLODIPine   Tablet 10 milliGRAM(s) Oral daily  apixaban 5 milliGRAM(s) Oral every 12 hours  atorvastatin 40 milliGRAM(s) Oral at bedtime  enalapril 20 milliGRAM(s) Oral daily  escitalopram 10 milliGRAM(s) Oral daily  gabapentin 100 milliGRAM(s) Oral every 8 hours  influenza  Vaccine (HIGH DOSE) 0.7 milliLiter(s) IntraMuscular once  levothyroxine 25 MICROGram(s) Oral daily  melatonin 5 milliGRAM(s) Oral at bedtime  nystatin Powder 1 Application(s) Topical every 12 hours    MEDICATIONS  (PRN):  ondansetron Injectable 4 milliGRAM(s) IV Push every 6 hours PRN Nausea  traMADol 50 milliGRAM(s) Oral every 3 hours PRN Moderate Pain (4 - 6)  traMADol 100 milliGRAM(s) Oral every 6 hours PRN Severe Pain (7 - 10)      Vital Signs Last 24 Hrs  T(C): 36.9 (06 Nov 2023 04:13), Max: 37.2 (06 Nov 2023 00:40)  T(F): 98.4 (06 Nov 2023 04:13), Max: 99 (06 Nov 2023 00:40)  HR: 60 (06 Nov 2023 04:13) (60 - 67)  BP: 130/61 (06 Nov 2023 04:13) (103/54 - 145/58)  BP(mean): --  RR: 16 (06 Nov 2023 04:13) (16 - 18)  SpO2: 95% (06 Nov 2023 04:13) (91% - 95%)    Parameters below as of 06 Nov 2023 04:13  Patient On (Oxygen Delivery Method): nasal cannula  O2 Flow (L/min): 2      Constitutional: patient resting comfortably in bed, in no acute distress  Respiratory: respirations are unlabored, no accessory muscle use, no conversational dyspnea  Cardiovascular: regular rate & rhythm  Gastrointestinal: Abdomen soft, non-tender, non-distended, no rebound tenderness / guarding  Neurological: A&O x 3  Musculoskeletal: No joint pain, swelling or deformity; no limitation of movement, dressing in place on R hip, compartments are soft , RUE  in sling, NVI       I&O's Detail    05 Nov 2023 07:01  -  06 Nov 2023 07:00  --------------------------------------------------------  IN:    Oral Fluid: 540 mL  Total IN: 540 mL    OUT:    Voided (mL): 650 mL  Total OUT: 650 mL    Total NET: -110 mL          LABS:                        8.6    12.21 )-----------( 253      ( 06 Nov 2023 04:40 )             28.0     11-05    140  |  107  |  29.8<H>  ----------------------------<  124<H>  4.4   |  24.0  |  0.74    Ca    8.2<L>      05 Nov 2023 05:11  Phos  2.4     11-05  Mg     2.1     11-05        Urinalysis Basic - ( 05 Nov 2023 05:11 )    Color: x / Appearance: x / SG: x / pH: x  Gluc: 124 mg/dL / Ketone: x  / Bili: x / Urobili: x   Blood: x / Protein: x / Nitrite: x   Leuk Esterase: x / RBC: x / WBC x   Sq Epi: x / Non Sq Epi: x / Bacteria: x

## 2023-11-06 NOTE — PROGRESS NOTE ADULT - ASSESSMENT
82F PMHx Afib on Eliquis, anxiety, and HTN presents after a fall with right humerus and right intertrochanteric hip fracture. Now POD 4 s/p R IM nail.    Plan:  - pain control PRN  - DASH/TLC diet  - Increase  eliquis to 5mg BID   - Hemodynamic monitoring, AM CBC    - WBAT to RLE  - R Humerus non-op management, NWB RUE  - F/U PT eval for disp   82F PMHx Afib on Eliquis, anxiety, and HTN presents after a fall with right humerus and right intertrochanteric hip fracture. Now POD 4 s/p R IM nail.    Plan:  - pain control PRN  - DASH/TLC diet  - Increase  eliquis to 5mg BID   - Hemodynamic monitoring, AM CBC    - WBAT to RLE  - R Humerus non-op management, NWNICOL RUE  - PT/OT:  [facility]     Dispo planning with [facility]

## 2023-11-07 LAB
HCT VFR BLD CALC: 29.3 % — LOW (ref 34.5–45)
HCT VFR BLD CALC: 29.3 % — LOW (ref 34.5–45)
HGB BLD-MCNC: 9 G/DL — LOW (ref 11.5–15.5)
HGB BLD-MCNC: 9 G/DL — LOW (ref 11.5–15.5)

## 2023-11-07 PROCEDURE — 99233 SBSQ HOSP IP/OBS HIGH 50: CPT

## 2023-11-07 RX ORDER — POLYETHYLENE GLYCOL 3350 17 G/17G
17 POWDER, FOR SOLUTION ORAL DAILY
Refills: 0 | Status: DISCONTINUED | OUTPATIENT
Start: 2023-11-07 | End: 2023-11-09

## 2023-11-07 RX ORDER — ACETAMINOPHEN 500 MG
3 TABLET ORAL
Qty: 0 | Refills: 0 | DISCHARGE
Start: 2023-11-07

## 2023-11-07 RX ORDER — SENNA PLUS 8.6 MG/1
2 TABLET ORAL
Qty: 0 | Refills: 0 | DISCHARGE
Start: 2023-11-07

## 2023-11-07 RX ORDER — TRAMADOL HYDROCHLORIDE 50 MG/1
0.5 TABLET ORAL
Qty: 0 | Refills: 0 | DISCHARGE
Start: 2023-11-07

## 2023-11-07 RX ORDER — SENNA PLUS 8.6 MG/1
2 TABLET ORAL AT BEDTIME
Refills: 0 | Status: DISCONTINUED | OUTPATIENT
Start: 2023-11-07 | End: 2023-11-09

## 2023-11-07 RX ORDER — LACTULOSE 10 G/15ML
10 SOLUTION ORAL ONCE
Refills: 0 | Status: COMPLETED | OUTPATIENT
Start: 2023-11-07 | End: 2023-11-07

## 2023-11-07 RX ORDER — MAGNESIUM HYDROXIDE 400 MG/1
30 TABLET, CHEWABLE ORAL DAILY
Refills: 0 | Status: DISCONTINUED | OUTPATIENT
Start: 2023-11-07 | End: 2023-11-09

## 2023-11-07 RX ORDER — GABAPENTIN 400 MG/1
1 CAPSULE ORAL
Qty: 0 | Refills: 0 | DISCHARGE
Start: 2023-11-07

## 2023-11-07 RX ORDER — LANOLIN ALCOHOL/MO/W.PET/CERES
1 CREAM (GRAM) TOPICAL
Qty: 0 | Refills: 0 | DISCHARGE
Start: 2023-11-07

## 2023-11-07 RX ORDER — POLYETHYLENE GLYCOL 3350 17 G/17G
17 POWDER, FOR SOLUTION ORAL
Qty: 0 | Refills: 0 | DISCHARGE
Start: 2023-11-07

## 2023-11-07 RX ORDER — TRAMADOL HYDROCHLORIDE 50 MG/1
25 TABLET ORAL EVERY 6 HOURS
Refills: 0 | Status: DISCONTINUED | OUTPATIENT
Start: 2023-11-07 | End: 2023-11-09

## 2023-11-07 RX ORDER — MAGNESIUM HYDROXIDE 400 MG/1
30 TABLET, CHEWABLE ORAL
Qty: 0 | Refills: 0 | DISCHARGE
Start: 2023-11-07

## 2023-11-07 RX ADMIN — NYSTATIN CREAM 1 APPLICATION(S): 100000 CREAM TOPICAL at 17:46

## 2023-11-07 RX ADMIN — TRAMADOL HYDROCHLORIDE 25 MILLIGRAM(S): 50 TABLET ORAL at 05:33

## 2023-11-07 RX ADMIN — ATORVASTATIN CALCIUM 40 MILLIGRAM(S): 80 TABLET, FILM COATED ORAL at 21:16

## 2023-11-07 RX ADMIN — MAGNESIUM HYDROXIDE 30 MILLILITER(S): 400 TABLET, CHEWABLE ORAL at 05:34

## 2023-11-07 RX ADMIN — TRAMADOL HYDROCHLORIDE 25 MILLIGRAM(S): 50 TABLET ORAL at 17:45

## 2023-11-07 RX ADMIN — Medication 20 MILLIGRAM(S): at 05:36

## 2023-11-07 RX ADMIN — GABAPENTIN 100 MILLIGRAM(S): 400 CAPSULE ORAL at 21:16

## 2023-11-07 RX ADMIN — Medication 5 MILLIGRAM(S): at 21:16

## 2023-11-07 RX ADMIN — NYSTATIN CREAM 1 APPLICATION(S): 100000 CREAM TOPICAL at 05:36

## 2023-11-07 RX ADMIN — AMIODARONE HYDROCHLORIDE 200 MILLIGRAM(S): 400 TABLET ORAL at 05:35

## 2023-11-07 RX ADMIN — Medication 25 MICROGRAM(S): at 04:49

## 2023-11-07 RX ADMIN — Medication 975 MILLIGRAM(S): at 21:16

## 2023-11-07 RX ADMIN — SENNA PLUS 2 TABLET(S): 8.6 TABLET ORAL at 21:16

## 2023-11-07 RX ADMIN — APIXABAN 5 MILLIGRAM(S): 2.5 TABLET, FILM COATED ORAL at 17:46

## 2023-11-07 RX ADMIN — TRAMADOL HYDROCHLORIDE 25 MILLIGRAM(S): 50 TABLET ORAL at 12:30

## 2023-11-07 RX ADMIN — Medication 975 MILLIGRAM(S): at 15:02

## 2023-11-07 RX ADMIN — APIXABAN 5 MILLIGRAM(S): 2.5 TABLET, FILM COATED ORAL at 05:34

## 2023-11-07 RX ADMIN — Medication 975 MILLIGRAM(S): at 14:32

## 2023-11-07 RX ADMIN — ESCITALOPRAM OXALATE 10 MILLIGRAM(S): 10 TABLET, FILM COATED ORAL at 11:35

## 2023-11-07 RX ADMIN — TRAMADOL HYDROCHLORIDE 25 MILLIGRAM(S): 50 TABLET ORAL at 06:30

## 2023-11-07 RX ADMIN — LACTULOSE 10 GRAM(S): 10 SOLUTION ORAL at 11:35

## 2023-11-07 RX ADMIN — GABAPENTIN 100 MILLIGRAM(S): 400 CAPSULE ORAL at 05:34

## 2023-11-07 RX ADMIN — AMLODIPINE BESYLATE 10 MILLIGRAM(S): 2.5 TABLET ORAL at 05:36

## 2023-11-07 RX ADMIN — Medication 975 MILLIGRAM(S): at 22:00

## 2023-11-07 RX ADMIN — TRAMADOL HYDROCHLORIDE 25 MILLIGRAM(S): 50 TABLET ORAL at 18:30

## 2023-11-07 RX ADMIN — TRAMADOL HYDROCHLORIDE 25 MILLIGRAM(S): 50 TABLET ORAL at 11:37

## 2023-11-07 RX ADMIN — POLYETHYLENE GLYCOL 3350 17 GRAM(S): 17 POWDER, FOR SOLUTION ORAL at 11:35

## 2023-11-07 RX ADMIN — GABAPENTIN 100 MILLIGRAM(S): 400 CAPSULE ORAL at 14:32

## 2023-11-07 RX ADMIN — Medication 975 MILLIGRAM(S): at 05:36

## 2023-11-07 NOTE — PROGRESS NOTE ADULT - ASSESSMENT
Pt is an 81 y/o female s/p fall sustaining a R intertrochanteric fx and R humerus fx. POD#5 s/p ORIF R hip. Pt restarted on home dose Eliquis yesterday - Hgb stable this morning. Pain well controlled. RUE/RLE neurovascularly intact  - Ortho recs appreciated - NWB to RUE, WBAT to RLE  - Continue pain control w/ tylenol, gabapentin & tramadol PRN  - Regular diet as tolerated  - DVT ppx w/ Eliquis & SCDs b/l  - Bowel regimen increased - monitor for bowel fxn  - Encourage continued work with PT, OOB as much as pt can tolerated  - Encourage frequent IS use for pulm toiletting  - Pt medically stable for d/c to [facility] - CM / SW aware

## 2023-11-07 NOTE — PROGRESS NOTE ADULT - SUBJECTIVE AND OBJECTIVE BOX
SUBJECTIVE / 24H EVENTS: Patient seen and examined at bedside. She reports feeling well today. Has mild pain to R arm & R hip for which medications are giving adequate relief. Denies numbness / tingling to affected extremities. Tolerating diet & voiding. States she is passing gas but has not had a bowel movement recently. Denies fever or chills, CP or SOB.    MEDICATIONS  (STANDING):  acetaminophen     Tablet .. 975 milliGRAM(s) Oral every 8 hours  aMIOdarone    Tablet 200 milliGRAM(s) Oral daily  amLODIPine   Tablet 10 milliGRAM(s) Oral daily  apixaban 5 milliGRAM(s) Oral every 12 hours  atorvastatin 40 milliGRAM(s) Oral at bedtime  enalapril 20 milliGRAM(s) Oral daily  escitalopram 10 milliGRAM(s) Oral daily  gabapentin 100 milliGRAM(s) Oral every 8 hours  influenza  Vaccine (HIGH DOSE) 0.7 milliLiter(s) IntraMuscular once  levothyroxine 25 MICROGram(s) Oral daily  melatonin 5 milliGRAM(s) Oral at bedtime  nystatin Powder 1 Application(s) Topical every 12 hours  polyethylene glycol 3350 17 Gram(s) Oral daily  senna 2 Tablet(s) Oral at bedtime    MEDICATIONS  (PRN):  magnesium hydroxide Suspension 30 milliLiter(s) Oral daily PRN Constipation  ondansetron Injectable 4 milliGRAM(s) IV Push every 6 hours PRN Nausea  traMADol 25 milliGRAM(s) Oral every 6 hours PRN Severe Pain (7 - 10)      Vital Signs Last 24 Hrs  T(C): 36.3 (07 Nov 2023 10:40), Max: 36.9 (06 Nov 2023 15:43)  T(F): 97.3 (07 Nov 2023 10:40), Max: 98.5 (06 Nov 2023 15:43)  HR: 60 (07 Nov 2023 10:40) (58 - 65)  BP: 126/63 (07 Nov 2023 10:40) (112/57 - 146/63)  BP(mean): --  RR: 18 (07 Nov 2023 10:40) (18 - 18)  SpO2: 92% (07 Nov 2023 10:40) (91% - 93%)    Parameters below as of 07 Nov 2023 10:40  Patient On (Oxygen Delivery Method): room air        Constitutional: patient resting comfortably in bed, in no acute distress  Respiratory: respirations are unlabored, no accessory muscle use, no conversational dyspnea  Cardiovascular: regular rate & rhythm  Gastrointestinal: abdomen soft, non-tender, non-distended, no rebound tenderness / guarding  Neurological: GCS15, A&O x 3, no focal neurologic deficits  MSK: CM x 4, RUE in sling, moving fingers freely, compartments soft, 2+ R radial pulse w/ brisk cap refill. RLE dressings are clean & dry, surgical site is mildly tender to palpation, sensation grossly intact, RLE compartments are soft. 2+ R DP pulse w/ brisk cap refill  Skin: mucous membranes moist, no diaphoresis, pallor, cyanosis or jaundice      I&O's Detail    06 Nov 2023 07:01  -  07 Nov 2023 07:00  --------------------------------------------------------  IN:    Oral Fluid: 480 mL  Total IN: 480 mL    OUT:    Voided (mL): 1600 mL  Total OUT: 1600 mL    Total NET: -1120 mL          LABS:                        9.0    x     )-----------( x        ( 07 Nov 2023 04:40 )             29.3

## 2023-11-08 ENCOUNTER — TRANSCRIPTION ENCOUNTER (OUTPATIENT)
Age: 83
End: 2023-11-08

## 2023-11-08 PROCEDURE — 99233 SBSQ HOSP IP/OBS HIGH 50: CPT

## 2023-11-08 RX ADMIN — Medication 25 MICROGRAM(S): at 05:40

## 2023-11-08 RX ADMIN — NYSTATIN CREAM 1 APPLICATION(S): 100000 CREAM TOPICAL at 05:39

## 2023-11-08 RX ADMIN — GABAPENTIN 100 MILLIGRAM(S): 400 CAPSULE ORAL at 20:32

## 2023-11-08 RX ADMIN — ESCITALOPRAM OXALATE 10 MILLIGRAM(S): 10 TABLET, FILM COATED ORAL at 11:10

## 2023-11-08 RX ADMIN — Medication 975 MILLIGRAM(S): at 06:13

## 2023-11-08 RX ADMIN — GABAPENTIN 100 MILLIGRAM(S): 400 CAPSULE ORAL at 05:39

## 2023-11-08 RX ADMIN — APIXABAN 5 MILLIGRAM(S): 2.5 TABLET, FILM COATED ORAL at 17:01

## 2023-11-08 RX ADMIN — GABAPENTIN 100 MILLIGRAM(S): 400 CAPSULE ORAL at 11:09

## 2023-11-08 RX ADMIN — TRAMADOL HYDROCHLORIDE 25 MILLIGRAM(S): 50 TABLET ORAL at 01:01

## 2023-11-08 RX ADMIN — Medication 975 MILLIGRAM(S): at 11:09

## 2023-11-08 RX ADMIN — ATORVASTATIN CALCIUM 40 MILLIGRAM(S): 80 TABLET, FILM COATED ORAL at 20:33

## 2023-11-08 RX ADMIN — Medication 5 MILLIGRAM(S): at 20:32

## 2023-11-08 RX ADMIN — Medication 975 MILLIGRAM(S): at 05:40

## 2023-11-08 RX ADMIN — Medication 975 MILLIGRAM(S): at 21:46

## 2023-11-08 RX ADMIN — NYSTATIN CREAM 1 APPLICATION(S): 100000 CREAM TOPICAL at 17:01

## 2023-11-08 RX ADMIN — Medication 975 MILLIGRAM(S): at 20:33

## 2023-11-08 RX ADMIN — SENNA PLUS 2 TABLET(S): 8.6 TABLET ORAL at 20:33

## 2023-11-08 RX ADMIN — TRAMADOL HYDROCHLORIDE 25 MILLIGRAM(S): 50 TABLET ORAL at 00:01

## 2023-11-08 RX ADMIN — Medication 20 MILLIGRAM(S): at 05:40

## 2023-11-08 RX ADMIN — Medication 975 MILLIGRAM(S): at 12:09

## 2023-11-08 RX ADMIN — AMIODARONE HYDROCHLORIDE 200 MILLIGRAM(S): 400 TABLET ORAL at 05:40

## 2023-11-08 RX ADMIN — AMLODIPINE BESYLATE 10 MILLIGRAM(S): 2.5 TABLET ORAL at 05:40

## 2023-11-08 RX ADMIN — APIXABAN 5 MILLIGRAM(S): 2.5 TABLET, FILM COATED ORAL at 05:40

## 2023-11-08 NOTE — PROGRESS NOTE ADULT - SUBJECTIVE AND OBJECTIVE BOX
HPI/OVERNIGHT EVENTS:  NAEON. Pain is well controlled. No complaints. Pending [facility].     MEDICATIONS  (STANDING):  acetaminophen     Tablet .. 975 milliGRAM(s) Oral every 8 hours  aMIOdarone    Tablet 200 milliGRAM(s) Oral daily  amLODIPine   Tablet 10 milliGRAM(s) Oral daily  apixaban 5 milliGRAM(s) Oral every 12 hours  atorvastatin 40 milliGRAM(s) Oral at bedtime  enalapril 20 milliGRAM(s) Oral daily  escitalopram 10 milliGRAM(s) Oral daily  gabapentin 100 milliGRAM(s) Oral every 8 hours  influenza  Vaccine (HIGH DOSE) 0.7 milliLiter(s) IntraMuscular once  levothyroxine 25 MICROGram(s) Oral daily  melatonin 5 milliGRAM(s) Oral at bedtime  nystatin Powder 1 Application(s) Topical every 12 hours  polyethylene glycol 3350 17 Gram(s) Oral daily  senna 2 Tablet(s) Oral at bedtime    MEDICATIONS  (PRN):  magnesium hydroxide Suspension 30 milliLiter(s) Oral daily PRN Constipation  ondansetron Injectable 4 milliGRAM(s) IV Push every 6 hours PRN Nausea  traMADol 25 milliGRAM(s) Oral every 6 hours PRN Severe Pain (7 - 10)      Vital Signs Last 24 Hrs  T(C): 36.8 (08 Nov 2023 04:19), Max: 36.8 (08 Nov 2023 04:19)  T(F): 98.2 (08 Nov 2023 04:19), Max: 98.2 (08 Nov 2023 04:19)  HR: 66 (08 Nov 2023 05:38) (59 - 68)  BP: 160/62 (08 Nov 2023 05:38) (117/58 - 160/62)  BP(mean): --  RR: 18 (08 Nov 2023 05:38) (18 - 19)  SpO2: 94% (08 Nov 2023 05:38) (92% - 94%)    Parameters below as of 08 Nov 2023 05:38  Patient On (Oxygen Delivery Method): room air        Constitutional: patient resting comfortably in bed, in no acute distress  Respiratory: respirations are unlabored, no accessory muscle use, no conversational dyspnea  Cardiovascular: regular rate & rhythm  Gastrointestinal: Abdomen soft, non-tender, non-distended, no rebound tenderness / guarding  Neurological:  A&O x 3  Musculoskeletal: RLE compartments soft      I&O's Detail    07 Nov 2023 07:01  -  08 Nov 2023 07:00  --------------------------------------------------------  IN:    Oral Fluid: 250 mL  Total IN: 250 mL    OUT:    Stool (mL): 1 mL    Voided (mL): 700 mL  Total OUT: 701 mL    Total NET: -451 mL          LABS:                        9.0    x     )-----------( x        ( 07 Nov 2023 04:40 )             29.3

## 2023-11-08 NOTE — DISCHARGE NOTE NURSING/CASE MANAGEMENT/SOCIAL WORK - PATIENT PORTAL LINK FT
You can access the FollowMyHealth Patient Portal offered by [facility] by registering at the following website: [email or web address]. By joining [employer]’s FollowMyHealth portal, you will also be able to view your health information using other applications (apps) compatible with our system.

## 2023-11-08 NOTE — PROGRESS NOTE ADULT - ASSESSMENT
A: 83 y/o female s/p fall sustaining a R intertrochanteric fx and R humerus fx. POD#5 s/p ORIF R hip. Pt restarted on home dose Eliquis yesterday - Hgb stable this morning. Pain well controlled. RUE/RLE neurovascularly intact    Plan:   - Ortho recs appreciated - NWB to RUE, WBAT to RLE  - Continue pain control w/ tylenol, gabapentin & tramadol PRN  - Regular diet as tolerated  - DVT ppx w/ Eliquis & SCDs b/l  - Bowel regimen increased - monitor for bowel fxn  - Encourage continued work with PT, OOB as much as pt can tolerated  - Encourage frequent IS use for pulm toiletting  - Pt medically stable for d/c to [facility] - CM / SW aware

## 2023-11-09 VITALS
RESPIRATION RATE: 18 BRPM | HEART RATE: 63 BPM | SYSTOLIC BLOOD PRESSURE: 131 MMHG | TEMPERATURE: 98 F | OXYGEN SATURATION: 91 % | DIASTOLIC BLOOD PRESSURE: 55 MMHG

## 2023-11-09 PROCEDURE — 86900 BLOOD TYPING SEROLOGIC ABO: CPT

## 2023-11-09 PROCEDURE — 73600 X-RAY EXAM OF ANKLE: CPT

## 2023-11-09 PROCEDURE — 73620 X-RAY EXAM OF FOOT: CPT

## 2023-11-09 PROCEDURE — 96375 TX/PRO/DX INJ NEW DRUG ADDON: CPT

## 2023-11-09 PROCEDURE — 73070 X-RAY EXAM OF ELBOW: CPT

## 2023-11-09 PROCEDURE — 72192 CT PELVIS W/O DYE: CPT | Mod: ME

## 2023-11-09 PROCEDURE — 73502 X-RAY EXAM HIP UNI 2-3 VIEWS: CPT

## 2023-11-09 PROCEDURE — 76000 FLUOROSCOPY <1 HR PHYS/QHP: CPT

## 2023-11-09 PROCEDURE — 73200 CT UPPER EXTREMITY W/O DYE: CPT | Mod: MG

## 2023-11-09 PROCEDURE — 93005 ELECTROCARDIOGRAM TRACING: CPT

## 2023-11-09 PROCEDURE — 87641 MR-STAPH DNA AMP PROBE: CPT

## 2023-11-09 PROCEDURE — 86850 RBC ANTIBODY SCREEN: CPT

## 2023-11-09 PROCEDURE — 85730 THROMBOPLASTIN TIME PARTIAL: CPT

## 2023-11-09 PROCEDURE — G1004: CPT

## 2023-11-09 PROCEDURE — 85014 HEMATOCRIT: CPT

## 2023-11-09 PROCEDURE — 80048 BASIC METABOLIC PNL TOTAL CA: CPT

## 2023-11-09 PROCEDURE — 73060 X-RAY EXAM OF HUMERUS: CPT

## 2023-11-09 PROCEDURE — 96374 THER/PROPH/DIAG INJ IV PUSH: CPT

## 2023-11-09 PROCEDURE — 73030 X-RAY EXAM OF SHOULDER: CPT

## 2023-11-09 PROCEDURE — 99285 EMERGENCY DEPT VISIT HI MDM: CPT

## 2023-11-09 PROCEDURE — P9016: CPT

## 2023-11-09 PROCEDURE — 85025 COMPLETE CBC W/AUTO DIFF WBC: CPT

## 2023-11-09 PROCEDURE — 87640 STAPH A DNA AMP PROBE: CPT

## 2023-11-09 PROCEDURE — 36415 COLL VENOUS BLD VENIPUNCTURE: CPT

## 2023-11-09 PROCEDURE — 84100 ASSAY OF PHOSPHORUS: CPT

## 2023-11-09 PROCEDURE — 73551 X-RAY EXAM OF FEMUR 1: CPT

## 2023-11-09 PROCEDURE — 85027 COMPLETE CBC AUTOMATED: CPT

## 2023-11-09 PROCEDURE — 85610 PROTHROMBIN TIME: CPT

## 2023-11-09 PROCEDURE — 36430 TRANSFUSION BLD/BLD COMPNT: CPT

## 2023-11-09 PROCEDURE — 85018 HEMOGLOBIN: CPT

## 2023-11-09 PROCEDURE — 71045 X-RAY EXAM CHEST 1 VIEW: CPT

## 2023-11-09 PROCEDURE — 86923 COMPATIBILITY TEST ELECTRIC: CPT

## 2023-11-09 PROCEDURE — C1713: CPT

## 2023-11-09 PROCEDURE — 97163 PT EVAL HIGH COMPLEX 45 MIN: CPT

## 2023-11-09 PROCEDURE — 96376 TX/PRO/DX INJ SAME DRUG ADON: CPT

## 2023-11-09 PROCEDURE — 86901 BLOOD TYPING SEROLOGIC RH(D): CPT

## 2023-11-09 PROCEDURE — 83735 ASSAY OF MAGNESIUM: CPT

## 2023-11-09 PROCEDURE — 97167 OT EVAL HIGH COMPLEX 60 MIN: CPT

## 2023-11-09 PROCEDURE — C1776: CPT

## 2023-11-09 PROCEDURE — 80053 COMPREHEN METABOLIC PANEL: CPT

## 2023-11-09 RX ADMIN — AMIODARONE HYDROCHLORIDE 200 MILLIGRAM(S): 400 TABLET ORAL at 04:36

## 2023-11-09 RX ADMIN — Medication 20 MILLIGRAM(S): at 04:35

## 2023-11-09 RX ADMIN — TRAMADOL HYDROCHLORIDE 25 MILLIGRAM(S): 50 TABLET ORAL at 07:28

## 2023-11-09 RX ADMIN — TRAMADOL HYDROCHLORIDE 25 MILLIGRAM(S): 50 TABLET ORAL at 08:28

## 2023-11-09 RX ADMIN — GABAPENTIN 100 MILLIGRAM(S): 400 CAPSULE ORAL at 04:35

## 2023-11-09 RX ADMIN — Medication 975 MILLIGRAM(S): at 05:00

## 2023-11-09 RX ADMIN — APIXABAN 5 MILLIGRAM(S): 2.5 TABLET, FILM COATED ORAL at 04:36

## 2023-11-09 RX ADMIN — NYSTATIN CREAM 1 APPLICATION(S): 100000 CREAM TOPICAL at 04:35

## 2023-11-09 RX ADMIN — AMLODIPINE BESYLATE 10 MILLIGRAM(S): 2.5 TABLET ORAL at 04:36

## 2023-11-09 RX ADMIN — Medication 25 MICROGRAM(S): at 04:36

## 2023-11-09 RX ADMIN — Medication 975 MILLIGRAM(S): at 04:36

## 2023-11-09 NOTE — PROGRESS NOTE ADULT - REASON FOR ADMISSION
fall, hip fx, humerus fx

## 2023-11-09 NOTE — PROGRESS NOTE ADULT - PROVIDER SPECIALTY LIST ADULT
Orthopedics
Trauma Surgery
Orthopedics
Surgery
Trauma Surgery
Anesthesia
Orthopedics
Trauma Surgery
Trauma Surgery

## 2023-11-09 NOTE — DIETITIAN INITIAL EVALUATION ADULT - PERTINENT MEDS FT
MEDICATIONS  (STANDING):  levothyroxine 25 MICROGram(s) Oral daily  polyethylene glycol 3350 17 Gram(s) Oral daily  senna 2 Tablet(s) Oral at bedtime    MEDICATIONS  (PRN):  magnesium hydroxide Suspension 30 milliLiter(s) Oral daily PRN Constipation  ondansetron Injectable 4 milliGRAM(s) IV Push every 6 hours PRN Nausea

## 2023-11-09 NOTE — DIETITIAN INITIAL EVALUATION ADULT - ORAL INTAKE PTA/DIET HISTORY
Nutrition assessment completed. Pt reports good appetite/po intake prior to admission. States she follows a regular diet at home. Denies any weight changes. Pt states since admission appetite/po intake has been fair. Encouraged HBV protein sources. RD to follow up.

## 2023-11-09 NOTE — PROGRESS NOTE ADULT - SUBJECTIVE AND OBJECTIVE BOX
HPI/OVERNIGHT EVENTS:  NAEON. Patient doing well. Pain is well controlled. Denies any fever/chills, CP and/or SOB.     MEDICATIONS  (STANDING):  acetaminophen     Tablet .. 975 milliGRAM(s) Oral every 8 hours  aMIOdarone    Tablet 200 milliGRAM(s) Oral daily  amLODIPine   Tablet 10 milliGRAM(s) Oral daily  apixaban 5 milliGRAM(s) Oral every 12 hours  atorvastatin 40 milliGRAM(s) Oral at bedtime  enalapril 20 milliGRAM(s) Oral daily  escitalopram 10 milliGRAM(s) Oral daily  gabapentin 100 milliGRAM(s) Oral every 8 hours  influenza  Vaccine (HIGH DOSE) 0.7 milliLiter(s) IntraMuscular once  levothyroxine 25 MICROGram(s) Oral daily  melatonin 5 milliGRAM(s) Oral at bedtime  nystatin Powder 1 Application(s) Topical every 12 hours  polyethylene glycol 3350 17 Gram(s) Oral daily  senna 2 Tablet(s) Oral at bedtime    MEDICATIONS  (PRN):  magnesium hydroxide Suspension 30 milliLiter(s) Oral daily PRN Constipation  ondansetron Injectable 4 milliGRAM(s) IV Push every 6 hours PRN Nausea  traMADol 25 milliGRAM(s) Oral every 6 hours PRN Severe Pain (7 - 10)      Vital Signs Last 24 Hrs  T(C): 36.6 (09 Nov 2023 09:32), Max: 36.6 (08 Nov 2023 16:43)  T(F): 97.8 (09 Nov 2023 09:32), Max: 97.9 (08 Nov 2023 16:43)  HR: 63 (09 Nov 2023 09:32) (58 - 66)  BP: 131/55 (09 Nov 2023 09:32) (105/60 - 157/63)  BP(mean): --  RR: 18 (09 Nov 2023 09:32) (18 - 18)  SpO2: 91% (09 Nov 2023 09:32) (91% - 97%)    Parameters below as of 09 Nov 2023 09:32  Patient On (Oxygen Delivery Method): room air        Constitutional: patient resting comfortably in bed, in no acute distress  Respiratory: respirations are unlabored, no accessory muscle use, no conversational dyspnea  Cardiovascular: regular rate & rhythm  Gastrointestinal: Abdomen soft, non-tender, non-distended, no rebound tenderness / guarding  NeurologicalA&O x 3  Musculoskeletal: R thigh compartment is soft      I&O's Detail      LABS:

## 2023-11-09 NOTE — PROGRESS NOTE ADULT - ASSESSMENT
A: 81 y/o female s/p fall sustaining a R intertrochanteric fx and R humerus fx. POD 8 s/p ORIF R hip. Pt restarted on home dose Eliquis yesterday - Hgb stable this morning. Pain well controlled. RUE/RLE neurovascularly intact    Plan:   - Ortho recs appreciated - NWB to RUE, WBAT to RLE  - Continue pain control w/ tylenol, gabapentin & tramadol PRN  - Regular diet as tolerated  - DVT ppx w/ Eliquis & SCDs b/l  - Bowel regimen increased - monitor for bowel fxn  - Encourage continued work with PT, OOB as much as pt can tolerated  - Encourage frequent IS use for pulm toiletting  - Pt medically stable for d/c to [facility] - CM / SW aware

## 2023-11-09 NOTE — PROGRESS NOTE ADULT - NS ATTEND AMEND GEN_ALL_CORE FT
I have seen and examined this patient with the surgery team.  No acute events overnight.  Patient appears well this morning.  Endorses minimal pain in her hip.  Planned for DC today to [facility]
I have seen and examined this patient with the surgical team.  No acute events overnight.  Patient appears well this morning.  Complaining of pain in her hip.  Plan for DC today.
I have seen and examined this patient with the surgical team.  No acute events overnight.  Patient appears well this morning.  She endorses some pain in her hip.  Denies numbness/tingling of her RLE.  Minimal pain in her RUE.   Good neurovascular exam in her RLE.  Planned for OR today.
Above assessment noted.  The patient was seen and examined by myself with the surgical PA.  The patient is without new events or complaints overnight.  The patient remains trauma stable.  Needs to continue work with PT/OT.  Trauma stable for discharge planning.
Patient seen and examined with the surgical team.  No acute events overnight.  Patient appears well this morning.  Denies pain.  DC today to [facility].
I have seen and examined this patient with the surgical team.  No acute events overnight.  Patient appears well this morning.  No complaints of pain.  Working with PT.  Hgb dropped, s/p 1U pRBC with appropriate response, now unchanged.  DCP to [facility].
Patient seen and examined on am rounds. No complaints, pain improved post op. Needs PT/OT eval then dispo planning.

## 2023-11-28 NOTE — PATIENT PROFILE ADULT - NSPRESCRUSEDDRG_GEN_A_NUR
Patient calling to change location on referral for physical therapy to [facility] in [place]. Patient moved to [facility] senior living facility 6 days ago.     [address]

## 2024-01-05 ENCOUNTER — APPOINTMENT (OUTPATIENT)
Dept: ORTHOPEDIC SURGERY | Facility: CLINIC | Age: 84
End: 2024-01-05
Payer: MEDICARE

## 2024-01-05 VITALS — BODY MASS INDEX: 25.91 KG/M2 | WEIGHT: 132 LBS | HEIGHT: 60 IN

## 2024-01-05 PROCEDURE — 99024 POSTOP FOLLOW-UP VISIT: CPT

## 2024-01-05 NOTE — HISTORY OF PRESENT ILLNESS
[de-identified] : S/p Right hip intertrochanteric fracture open reduction and intramedullary nailing. DOS :11/2/23  Right shoulder proximal humerus fracture closed manipulation treatment.  [de-identified] : She returns for follow-up together with her daughter.  Overall she is doing well [de-identified] : She continues to be in a right shoulder sling.  Despite this she tolerates range of motion however has stiffness Right hip tolerates circumduction and axial loading very well she however has knee pain and crepitus from arthropathy.  No signs of infection [de-identified] : Reviewed films from 12-19 for right shoulder and right hip.  Right hip shows excellent fixation hardware in place with good signs of healing.  Right shoulder shows tremendous callus formation at the fracture site.  Baseline glenohumeral arthritis. [de-identified] : We are delighted to see that she is doing well.  She will continue with physical therapy [de-identified] : 1.  Continue PT follow-up 4 months for right shoulder x-ray and right hip x-ray

## 2024-01-08 NOTE — DISCHARGE NOTE PROVIDER - NSDCQMERRANDS_GEN_ALL_CORE
I have seen and examined the patient, and I have reviewed the H&P from 12/22/2023 located under the media tab. There are no changes to add to the prior H&P.     Supervising Physician: Chon Turner MD    This case was discussed with Chon Turner MD including medical decision making.     Ayo Payan PA-C  01/08/24  7:38    Available via [employer]      
Yes

## 2024-05-03 ENCOUNTER — APPOINTMENT (OUTPATIENT)
Dept: ORTHOPEDIC SURGERY | Facility: CLINIC | Age: 84
End: 2024-05-03
Payer: MEDICARE

## 2024-05-03 DIAGNOSIS — S72.001A FRACTURE OF UNSPECIFIED PART OF NECK OF RIGHT FEMUR, INITIAL ENCOUNTER FOR CLOSED FRACTURE: ICD-10-CM

## 2024-05-03 DIAGNOSIS — M25.511 PAIN IN RIGHT SHOULDER: ICD-10-CM

## 2024-05-03 DIAGNOSIS — S42.201A UNSPECIFIED FRACTURE OF UPPER END OF RIGHT HUMERUS, INITIAL ENCOUNTER FOR CLOSED FRACTURE: ICD-10-CM

## 2024-05-03 PROCEDURE — 73030 X-RAY EXAM OF SHOULDER: CPT | Mod: RT

## 2024-05-03 PROCEDURE — 99215 OFFICE O/P EST HI 40 MIN: CPT

## 2024-05-03 PROCEDURE — 73502 X-RAY EXAM HIP UNI 2-3 VIEWS: CPT | Mod: RT

## 2024-05-03 NOTE — REASON FOR VISIT
[Follow-Up Visit] : a follow-up visit for [Other: ____] : [unfilled] [FreeTextEntry2] : S/p Right hip intertrochanteric fracture open reduction and intramedullary nailing. DOS :11/2/23  Right shoulder proximal humerus fracture closed manipulation treatment.

## 2024-05-03 NOTE — ASSESSMENT
[FreeTextEntry1] : ASSESSMENT: [The patient was accompanied today and was assisted with explaining their complaints today.] The patient comes in today with history of right proximal humerus fracture and right hip fracture as well.  These are both healing beautifully.  X-rays and clinical examination are both reassuring.  She will continue with physical therapy as needed   The patient was adequately and thoroughly informed of my assessment of their current condition(s).  - This may diminish bodily function for the extremity. We discussed prognosis, tx modalities including operative and nonoperative options for the above diagnostic assessment. As always, 2nd opinion is always provided as an option.  When accessible, I was able to review other physicians note(s) including reviewing other tests, imaging results as well as personally view these results for my own interpretation.   The patient was adequately and thoroughly informed of my assessment of their current condition(s).  DISCUSSION: 1.  Continued activity modification physical therapy and progression.  We have discussed chronicity of recovery. 2. [x] 3. [x]

## 2024-05-03 NOTE — PHYSICAL EXAM
[de-identified] : Examination of the right shoulder reveals no swelling.  She tolerates range of motion well.  No pain.  Examination of the right hip reveals tolerates range of motion as well as circumduction. [de-identified] : [4] views of [right] shoulder were obtained today in my office and were seen by me and discussed with the patient.  These show healing of proximal humerus fracture with robust callus formation.  There is joint arthropathy baseline   [3] views of [right] hip and pelvis were obtained today in my office and were seen by me and discussed with the patient. These show status post right hip nail fixation with excellent signs of healing.

## 2024-05-03 NOTE — HISTORY OF PRESENT ILLNESS
[FreeTextEntry1] : Reason for Visit:   S/p Right hip intertrochanteric fracture open reduction and intramedullary nailing. DOS :11/2/23  Right shoulder proximal humerus fracture closed manipulation treatment.   History of Present Illness: She returns for follow-up together with her daughter. Overall she is doing well.

## 2024-08-22 NOTE — ED ADULT NURSE NOTE - CAS EDN DISCHARGE ASSESSMENT
Anesthesia Post-op Note    Patient: Mercedes Santana  Procedure(s) Performed: COLONOSCOPY   Anesthesia type: General    Vitals Value Taken Time   Temp 36.3 08/22/24 1129   Pulse 86 08/22/24 1129   Resp 17 08/22/24 1129   SpO2 96 08/22/24 1129   /60 08/22/24 1129         Patient Location: bedside  Post-op Vital Signs:stable  Level of Consciousness: participates in exam, awake, alert, oriented and follows commands  Respiratory Status: spontaneous ventilation and room air  Cardiovascular blood pressure returned to baseline  Hydration: euvolemic  Pain Management: adequately controlled  Handoff: Handoff to receiving nurse was performed and questions were answered  Vomiting: none  Nausea: None  Airway Patency:patent  Post-op Assessment: no complications, patient tolerated procedure well and moving all extremities      There were no known notable events for this encounter.                       Alert and oriented to person, place and time

## 2024-12-03 NOTE — ED ADULT NURSE NOTE - NS PRO AD NO ADVANCE DIRECTIVE
Inform px  xr  hip showed moderate degenerative arthritis of left hip.  Please ask if she is still having sciatic pain. She has moderate arthritis of her hip. Ask if she wants to be seen by ortho No

## 2024-12-09 NOTE — CONSULT NOTE ADULT - SUBJECTIVE AND OBJECTIVE BOX
Medication:   donepezil (ARICEPT) 10 MG tablet 28 tablet 11 1/8/2024      Last office visit date: 02/14/2024 [facility]  Next appointment scheduled?: No;  Return around 02/14/2025    Number of refills given: 11  passed protocol.    Pt Name: DONNA HYATT    MRN: [id number]    83yo Female with PMH afib on Eliquis, HTN, hypothyroid, HLD who presents c/o right shoulder and right hip pain after falling. PT states that she was getting up to go to the bathroom when she fell on her right side. She is unsure why she fell. She denies hitting her head and denies LOC. Follwoing fall pt had right shoulder and hip pain. pain was unable to ambulate following fall and daughter who she lives with called an ambulance. Pt uses a walker at baseline for assist with ambulation. Denies numbness/tingling to RLE or RUE.       PAST MEDICAL & SURGICAL HISTORY:  Atrial fibrillation      HTN (hypertension)      Hypothyroid      HLD (hyperlipidemia)      Hernia      Iron deficiency anemia      Depression      H/O tubal ligation      No significant past surgical history          Allergies: No Known Allergies      Medications: acetaminophen     Tablet .. 650 milliGRAM(s) Oral every 6 hours PRN  chlorhexidine 2% Cloths 1 Application(s) Topical <User Schedule>  enoxaparin Injectable 30 milliGRAM(s) SubCutaneous every 12 hours  lactated ringers. 1000 milliLiter(s) IV Continuous <Continuous>  mupirocin 2% Ointment 1 Application(s) Both Nostrils two times a day  ondansetron Injectable 4 milliGRAM(s) IV Push every 6 hours PRN  povidone iodine 5% Nasal Swab 1 Application(s) Both Nostrils once      FAMILY HISTORY:  FH: pancreatic cancer  father, in his 70's     FH: breast cancer (Mother)  mother,  at age 50's    FH: lung cancer  mother    : non-contributory    Social History:     Ambulation: Walking independently with walker at baseline                          13.3   12.02 )-----------( 287      ( 2023 01:38 )             41.9           140  |  104  |  23.4<H>  ----------------------------<  124<H>  3.4<L>   |  22.0  |  0.90    Ca    9.2      2023 01:38    TPro  6.7  /  Alb  3.7  /  TBili  0.4  /  DBili  x   /  AST  31  /  ALT  39<H>  /  AlkPhos  114        Vital Signs Last 24 Hrs  T(C): 36.7 (2023 03:57), Max: 36.7 (2023 03:57)  T(F): 98.1 (2023 03:57), Max: 98.1 (2023 03:57)  HR: 63 (2023 03:57) (63 - 98)  BP: 121/63 (2023 03:57) (121/63 - 156/89)  BP(mean): --  RR: 18 (2023 03:57) (18 - 18)  SpO2: 93% (2023 03:57) (93% - 98%)    Parameters below as of 2023 03:57  Patient On (Oxygen Delivery Method): room air        Daily     Daily       PHYSICAL EXAM:      Appearance: Alert, responsive, in no acute distress.    Neurological: Sensation is grossly intact to light touch. No focal deficits or weaknesses found.    Skin: no rash on visible skin. Skin is clean, dry and intact. No bleeding. No abrasions. No ulcerations.    Vascular: 2+ distal pulses. Cap refill < 2 sec. No signs of venous insufficiency or stasis. No extremity ulcerations. No cyanosis.    Musculoskeletal:         Left Upper Extremity: Clavicle: NTTP. Shoulder: NTTP, FROM. Abduction/adduction/flexion/extension intact. Elbow: NTTP, FROM. EE/EF intact. Wrist: NTTP, FROM. WE/WF intact. Hand: NTTP throughout, FROM. Abduction/adduction/flexion/extension of digits 1-5 intact. Compartments soft compressible. Sensation intact to light touch.        Right Upper Extremity: Clavicle: TTP. Shoulder: TTP, limited ROM due to pain and fracture. Elbow: NTTP, FROM. EE/EF intact. Wrist: NTTP, FROM. WE/WF intact. Hand: NTTP throughout, FROM. Abduction/adduction/flexion/extension of digits 1-5 intact. Compartments soft compressible. Sensation intact to light touch. +radial pulse palpable.       Left Lower Extremity: Hip: NTTP, FROM. HF/HE intact. Knee: NTTP, FROM. KE/KF intact. Ankle: NTTP, FROM. DF/PF/EHL/FHL intact. Compartments soft compressible. Sensation intact to light touch. Toes well perfused and normal cap refill       Right Lower Extremity: Hip: + shortening of right leg length compared to left, hip TTP, unable to flex hip due to pain, unable to SLR, pain with gentle log roll. Knee: NTTP, limited ROM due to right hip pain. Ankle: NTTP, FROM. DF/PF/EHL/FHL intact. Compartments soft compressible. Sensation intact to light touch. Toes well perfused and normal cap refill    Imaging Studies:   PRELIMINARY PA READ:   Xrays right shoulder reveal right proximal humerus fracture   Xrays right hip with pelvis reveals right IT fracture with possible extension into femoral neck     A/P:  Pt is a  82y Female found to have right proximal humerus fracture, right IT fracture     PLAN:   * NPO for OR today  * IV fluids once NPO  * Pre-operative ABX ordered  * Hold Routine daily anticoagulation held for OR  * Medical clearance requested for procedure  * Bed rest  * Mup ordered  * booking sheet sent   * anesthesia notified   * Remaining care per trauma team  * CT right shoulder and pelvis pending   * D/W Dr. Ca Pt Name: DONNA HYATT    MRN: [id number]    81yo Female with PMH afib on Eliquis, HTN, hypothyroid, HLD who presents c/o right shoulder and right hip pain after falling. PT states that she was getting up to go to the bathroom when she fell on her right side. She is unsure why she fell. She denies hitting her head and denies LOC. Follwoing fall pt had right shoulder and hip pain. pain was unable to ambulate following fall and daughter who she lives with called an ambulance. Pt uses a walker at baseline for assist with ambulation. Denies numbness/tingling to RLE or RUE.       PAST MEDICAL & SURGICAL HISTORY:  Atrial fibrillation      HTN (hypertension)      Hypothyroid      HLD (hyperlipidemia)      Hernia      Iron deficiency anemia      Depression      H/O tubal ligation      No significant past surgical history          Allergies: No Known Allergies      Medications: acetaminophen     Tablet .. 650 milliGRAM(s) Oral every 6 hours PRN  chlorhexidine 2% Cloths 1 Application(s) Topical <User Schedule>  enoxaparin Injectable 30 milliGRAM(s) SubCutaneous every 12 hours  lactated ringers. 1000 milliLiter(s) IV Continuous <Continuous>  mupirocin 2% Ointment 1 Application(s) Both Nostrils two times a day  ondansetron Injectable 4 milliGRAM(s) IV Push every 6 hours PRN  povidone iodine 5% Nasal Swab 1 Application(s) Both Nostrils once      FAMILY HISTORY:  FH: pancreatic cancer  father, in his 70's     FH: breast cancer (Mother)  mother,  at age 50's    FH: lung cancer  mother    : non-contributory    Social History:     Ambulation: Walking independently with walker at baseline                          13.3   12.02 )-----------( 287      ( 2023 01:38 )             41.9           140  |  104  |  23.4<H>  ----------------------------<  124<H>  3.4<L>   |  22.0  |  0.90    Ca    9.2      2023 01:38    TPro  6.7  /  Alb  3.7  /  TBili  0.4  /  DBili  x   /  AST  31  /  ALT  39<H>  /  AlkPhos  114        Vital Signs Last 24 Hrs  T(C): 36.7 (2023 03:57), Max: 36.7 (2023 03:57)  T(F): 98.1 (2023 03:57), Max: 98.1 (2023 03:57)  HR: 63 (2023 03:57) (63 - 98)  BP: 121/63 (2023 03:57) (121/63 - 156/89)  BP(mean): --  RR: 18 (2023 03:57) (18 - 18)  SpO2: 93% (2023 03:57) (93% - 98%)    Parameters below as of 2023 03:57  Patient On (Oxygen Delivery Method): room air        Daily     Daily       PHYSICAL EXAM:      Appearance: Alert, responsive, in no acute distress.    Neurological: Sensation is grossly intact to light touch. No focal deficits or weaknesses found.    Skin: no rash on visible skin. Skin is clean, dry and intact. No bleeding. No abrasions. No ulcerations.    Vascular: 2+ distal pulses. Cap refill < 2 sec. No signs of venous insufficiency or stasis. No extremity ulcerations. No cyanosis.    Musculoskeletal:         Left Upper Extremity: Clavicle: NTTP. Shoulder: NTTP, FROM. Abduction/adduction/flexion/extension intact. Elbow: NTTP, FROM. EE/EF intact. Wrist: NTTP, FROM. WE/WF intact. Hand: NTTP throughout, FROM. Abduction/adduction/flexion/extension of digits 1-5 intact. Compartments soft compressible. Sensation intact to light touch.        Right Upper Extremity: Clavicle: TTP. Shoulder: TTP, limited ROM due to pain and fracture. Elbow: NTTP, FROM. EE/EF intact. Wrist: NTTP, FROM. WE/WF intact. Hand: NTTP throughout, FROM. Abduction/adduction/flexion/extension of digits 1-5 intact. Compartments soft compressible. Sensation intact to light touch. +radial pulse palpable.       Left Lower Extremity: Hip: NTTP, FROM. HF/HE intact. Knee: NTTP, FROM. KE/KF intact. Ankle: NTTP, FROM. DF/PF/EHL/FHL intact. Compartments soft compressible. Sensation intact to light touch. Toes well perfused and normal cap refill       Right Lower Extremity: Hip: + shortening of right leg length compared to left, hip TTP, unable to flex hip due to pain, unable to SLR, pain with gentle log roll. Knee: NTTP, limited ROM due to right hip pain. Ankle: NTTP, FROM. DF/PF/EHL/FHL intact. Compartments soft compressible. Sensation intact to light touch. Toes well perfused and normal cap refill    Imaging Studies:   PRELIMINARY PA READ:   Xrays right shoulder reveal right proximal humerus fracture   Xrays right hip with pelvis reveals right IT fracture with possible extension into femoral neck     A/P:  Pt is a  82y Female found to have right proximal humerus fracture, right IT fracture     PLAN:   * NPO for OR today  * IV fluids once NPO  * Pre-operative ABX ordered  * Hold Routine daily anticoagulation for OR  * Medical clearance requested for procedure  * Bed rest  * Sling and NWB RUE  * Mup ordered  * booking sheet sent   * anesthesia notified   * Remaining care per trauma team  * CT right shoulder and pelvis pending   * D/W Dr. Ca

## 2025-01-03 NOTE — DISCHARGE NOTE NURSING/CASE MANAGEMENT/SOCIAL WORK - NSCORESITESY/N_GEN_A_CORE_RD
01/22/25         Re:  Hattie MANCUSO Ronnie   [address]  [place] [address]     To Whom It May Concern:    I am writing with regard to the of denial of coverage for the prescription for Repatha.      The patient has chronic hyperlipidemia with severely elevated LDL level.  Considering that she has Type 2 Diabetes Mellitus and fatty liver disease, she has a really high risk for heart attack, stroke, and other cardiovascular complications if LDL is left untreated.  The goal for LDL is <100.    The patient did not tolerate four different statins in the past and will benefit from such medication as Repatha.    There is documented intolerance to other statin medications such as atorvastatin, rosuvastatin, pravastatin, and simavastatin.  She had severe lower extremity cramps while taking these statins.    Per ASCVD risk calculator, the patient has a 4.7% risk of a cardiovascular event (coronary or stroke death or non-fatal MI or stroke) in the next 10 years.      I am asking your re-consideration of coverage for this medication.  Thank you.      Sincerely,     Kale Schneider MD    [facility]  [address] [place] [id number]  Phone: [phone]  Fax: [phone]          
      01/22/25      Re:  Hattie MANCUSO Ronnie 1965  [address]   [place] [address]       To Whom It May Concern:    I am writing with regard to the of denial of coverage for the prescription for Repatha.      The patient has chronic hyperlipidemia with severely elevated LDL level.  Considering that she has Type 2 Diabetes Mellitus and fatty liver disease, she has a really high risk for heart attack, stroke, and other cardiovascular complications if LDL is left untreated.  The goal for LDL is <100.    The patient did not tolerate four different statins in the past and will benefit from such medication as Repatha.    There is documented intolerance to other statin medications such as atorvastatin, rosuvastatin, pravastatin, and simavastatin.  She had severe lower extremity cramps while taking these statins.    Per ASCVD risk calculator, the patient has a 4.7% risk of a cardiovascular event (coronary or stroke death or non-fatal MI or stroke) in the next 10 years.      I am asking your re-consideration of coverage for this medication.  Thank you.      Sincerely,     Kale Schneider MD    [facility]-[address] [address]  Phone: [phone]  Fax: [phone]              
No

## 2025-02-12 NOTE — ED ADULT TRIAGE NOTE - ACCOMPANIED BY
Health Maintenance       COVID-19 Vaccine (3 - Pfizer risk series)  Overdue since 9/29/2021    Cervical Cancer Screening (HPV/Cotest - Every 5 Years)  Order placed this encounter    Breast Cancer Screening (Every 2 Years)  Order placed this encounter    Depression Screening (Yearly)  Due since 2/9/2025           Following review of the above:  Patient is not proceeding with: COVID-19    Note: Refer to final orders and clinician documentation.       Immediate family member

## 2025-03-24 NOTE — ED STATDOCS - MUSCULOSKELETAL, MLM
Medication:     amphetamine-dextroamphetamine (ADDERALL) 10 MG tablet     Controlled Substances Refill Protocol - 12 Month Protocol - ALWAYS forward to ordering provider [id number]/2025 06:53 PM   Protocol Details Medication (including dose and sig) on current med list    FORWARD TO PROVIDER - Refill requests for these medications must ALWAYS be forwarded to the ordering provider, even if all criteria are met    PDMP has been reviewed in last 24 hours    Urine/serum drug screen on file in the appropriate time frame    Active/up-to-date controlled substances agreement/consent on file     Last office visit date: 12/11/24, f/u 3 mo. Message sent to [facility] to schedule patient    Medication Refill Protocol Failed.  Failed criteria: see above. Sent to clinician to review.     Last refill: 2/20/25 per PDMP  Is the patient due for refill of this medication(s): Yes  PDMP review: Criteria met. Forwarded to Physician/DEL for signature.     UDS: 10/16/24  Amphetamines, Urine Positive Abnormal      Cannabinoids, Urine Positive Abnormal             range of motion is not limited and there is no muscle tenderness.

## 2025-05-06 NOTE — ED CDU PROVIDER INITIAL DAY NOTE - QUALITY
Alberto Guerrero MD P Herrera, CHRIS Cristobal Nurse Msg blood on toilet paper

## (undated) DEVICE — DRSG MEPILEX 10 X 25CM (4 X 10") POST OP AG SILVER

## (undated) DEVICE — DRAPE TOWEL BLUE 17" X 24"

## (undated) DEVICE — PACK MINOR WITH LAP

## (undated) DEVICE — WARMING BLANKET UPPER ADULT

## (undated) DEVICE — DRSG DERMABOND PRINEO 60CM

## (undated) DEVICE — SUT MONOCRYL 2-0 27" SH UNDYED

## (undated) DEVICE — GLV 8 PROTEXIS (BLUE)

## (undated) DEVICE — SOL IRR POUR NS 0.9% 1000ML

## (undated) DEVICE — DRAPE SHOWER CURTAIN ISOLATION

## (undated) DEVICE — SUT VICRYL 0 27" CT-2 UNDYED

## (undated) DEVICE — DRAPE U LONG 47X70"

## (undated) DEVICE — SOL IRR POUR H2O 1000ML

## (undated) DEVICE — PACK EXTREMITY

## (undated) DEVICE — DRILL BIT STRYKER ORTHO 4.2 X 340MM

## (undated) DEVICE — SUT MONOCRYL 3-0 27" PS-2 UNDYED

## (undated) DEVICE — DRAPE C ARM UNIVERSAL

## (undated) DEVICE — VENODYNE/SCD SLEEVE CALF MEDIUM

## (undated) DEVICE — GLV 7.5 PROTEXIS (WHITE)